# Patient Record
Sex: MALE | Race: WHITE | NOT HISPANIC OR LATINO | Employment: FULL TIME | URBAN - METROPOLITAN AREA
[De-identification: names, ages, dates, MRNs, and addresses within clinical notes are randomized per-mention and may not be internally consistent; named-entity substitution may affect disease eponyms.]

---

## 2017-01-03 DIAGNOSIS — E78.5 HYPERLIPIDEMIA, UNSPECIFIED HYPERLIPIDEMIA TYPE: ICD-10-CM

## 2017-01-03 RX ORDER — SIMVASTATIN 40 MG
40 TABLET ORAL EVERY EVENING
Qty: 90 TAB | Refills: 3 | Status: SHIPPED | OUTPATIENT
Start: 2017-01-03 | End: 2018-01-11 | Stop reason: SDUPTHER

## 2017-01-03 NOTE — TELEPHONE ENCOUNTER
Was the patient seen in the last year in this department? Yes     Does patient have an active prescription for medications requested? No     Received Request Via: Pharmacy

## 2017-02-02 RX ORDER — ASPIRIN 81 MG/1
TABLET, CHEWABLE ORAL
Qty: 100 TAB | Refills: 0 | Status: SHIPPED | OUTPATIENT
Start: 2017-02-02 | End: 2017-05-25 | Stop reason: SDUPTHER

## 2017-03-06 DIAGNOSIS — G47.00 INSOMNIA, UNSPECIFIED TYPE: ICD-10-CM

## 2017-03-07 RX ORDER — ZOLPIDEM TARTRATE 10 MG/1
TABLET ORAL
Qty: 90 TAB | Refills: 1 | Status: SHIPPED | OUTPATIENT
Start: 2017-03-07 | End: 2017-09-22 | Stop reason: SDUPTHER

## 2017-04-18 DIAGNOSIS — M25.522 ELBOW PAIN, LEFT: ICD-10-CM

## 2017-04-18 RX ORDER — DIAZEPAM 5 MG/1
5 TABLET ORAL
Qty: 3 TAB | Refills: 0 | Status: CANCELLED
Start: 2017-04-18

## 2017-04-21 ENCOUNTER — OFFICE VISIT (OUTPATIENT)
Dept: OTHER | Facility: IMAGING CENTER | Age: 62
End: 2017-04-21
Payer: COMMERCIAL

## 2017-04-21 DIAGNOSIS — M77.12 LEFT LATERAL EPICONDYLITIS: ICD-10-CM

## 2017-04-21 DIAGNOSIS — G47.00 INSOMNIA, UNSPECIFIED TYPE: ICD-10-CM

## 2017-04-21 DIAGNOSIS — M62.830 MUSCLE SPASM OF BACK: ICD-10-CM

## 2017-04-21 DIAGNOSIS — R07.89 RIGHT-SIDED CHEST WALL PAIN: ICD-10-CM

## 2017-04-21 DIAGNOSIS — M54.50 ACUTE LEFT-SIDED LOW BACK PAIN WITHOUT SCIATICA: ICD-10-CM

## 2017-04-21 PROCEDURE — 99213 OFFICE O/P EST LOW 20 MIN: CPT | Mod: 25 | Performed by: FAMILY MEDICINE

## 2017-04-21 PROCEDURE — 97813 ACUP 1/> W/ESTIM 1ST 15 MIN: CPT | Performed by: FAMILY MEDICINE

## 2017-04-21 PROCEDURE — 97811 ACUP 1/> W/O ESTIM EA ADD 15: CPT | Performed by: FAMILY MEDICINE

## 2017-04-21 RX ORDER — CYCLOBENZAPRINE HCL 5 MG
5-10 TABLET ORAL 3 TIMES DAILY PRN
Qty: 30 TAB | Refills: 1 | Status: SHIPPED | OUTPATIENT
Start: 2017-04-21 | End: 2017-07-06 | Stop reason: SDUPTHER

## 2017-04-21 NOTE — PROGRESS NOTES
[facility]upuncture Progress Note  [address] [place] [place] [address]  Dept: [phone]      Patient Name: Nick Rodriguez  YOB: 1955  Date of Service: 4/21/2017  4:09 PM    CC Left side low back pain/spasm   HPI This is a 60 year old male with left side lower back pain/spasm.   Was sitting at kitchen table and started to spasm past week or so. Left side slightly lateral, feels tight. Used to happen in past, but has not had in several years.  Was skiing before symptoms occurred and his grandchild was visiting, thus uncertain what may have provoked it.   Next week sees chiropractor and massage therapy. Also requests refill of muscle relaxant.   Last 2 days with mild headache in afternoon left lateral parietal region.   Has had pain chest on right side, felt due to his prior work out. Feels it is in the muscle. No shortness of breath or chest pressure. No radiation of symptoms.   No shortness of breath with activities.   On CPAP, usual sleep issue, wakes up at 2 am, takes something to help him sleep.  Re- aggravated his left tennis elbow symptoms with some activities previously.        ROS Season: fall  Color preference: blue  Taste preference: spicy  Born: [place], [place] 8:30 pm  , works for university. Son is in med school, recently got .   Active overall, hikes daily.      PMH Past Medical History   Diagnosis Date   • Hyperlipidemia    • Hemorrhoid    • Basal cell carcinoma      dermatology- Dr. Smith   • S/P colonoscopy with polypectomy      age 53, due age   • S/P colonoscopy 7/2013     negative, repeat in 5 years   • Sleep apnea    • Scoliosis    • Dyslipidemia    • Insomnia    • Chickenpox    • Slovenian measles    • Mumps    • Tonsillitis        Social History     Social History   • Marital Status:      Spouse Name: N/A   • Number of Children: N/A   • Years of Education: N/A     Occupational History   • ADMINISTRATION [employer]     Social History  Main Topics   • Smoking status: Never Smoker    • Smokeless tobacco: Never Used   • Alcohol Use: 0.0 oz/week     0 Standard drinks or equivalent per week      Comment: one a day   • Drug Use: No   • Sexual Activity:     Partners: Female     Other Topics Concern   • Not on file     Social History Narrative           MEDS Current Outpatient Prescriptions on File Prior to Visit   Medication Sig Dispense Refill   • zolpidem (AMBIEN) 10 MG Tab TAKE ONE TABLET BY MOUTH AT BEDTIME ASNEEDED FOR SLEEP -GENERIC FOR AMBIEN 90 Tab 1   • aspirin (ASA) 81 MG Chew Tab chewable tablet CHEW AND SWALLOW ONE TABLET BY MOUTH EVERY  Tab 0   • simvastatin (ZOCOR) 40 MG Tab Take 1 Tab by mouth every evening. 90 Tab 3   • tamsulosin (FLOMAX) 0.4 MG capsule TAKE ONE CAPSULE BY MOUTH DAILY 1/2 HOUR   AFTER BREAKFAST -GENERIC FOR FLOMAX 90 Cap 1   • diazepam (VALIUM) 5 MG Tab Take 1 Tab by mouth 1 time daily as needed for Anxiety. 3 Tab 0   • betamethasone valerate (VALISONE) 0.1 % CREA Apply 1 Application to affected area(s) 2 times a day. Apply to affected area(s) 2 times a day. 1 Tube 2   • ketoconazole (NIZORAL) 2 % CREA Apply  to affected area(s) every day. Apply to affected area(s) every day.     • fluorouracil (EFUDEX) 5 % cream Apply 1 Application to affected area(s) 2 times a day. Use 2-4 weeks as directed- apply bid. 1 Tube 0     No current facility-administered medications on file prior to visit.      ALLERGIES Allergies   Allergen Reactions   • Nkda [No Known Drug Allergy]       PE Tongue: not examined  Pulses: not examined  Right chest wall region: appears tender in right lateral lower pectoralis muscle region.   Areas of spasm appears left lateral upper lumbar region.      Assesment Eastern Qi stagnation- BL    Western 1. Acute left-sided low back pain without sciatica     2. Muscle spasm of back  cyclobenzaprine (FLEXERIL) 5 MG tablet   3. Left lateral epicondylitis     4. Insomnia, unspecified type     5.  Right-sided chest wall pain- appears due to muscle spasm. Monitor.    ER precautions reviewed.                 Plan Set 1: L- GB 39++> GB 34 b/l, BL 59++> BL 40 b/l, BL 60 b/l  Set 2: L- SJ 10 SI 7--> SI 8, SJ 6--> SJ 10  Set 3: ear-b/l garland men, R- insomnia, lumbar b/l  Set 4: GV 20, Si garland jose  Set 5: R- LR 4-6 x1A , KI 3-7 x1A, R- ling ku, da kary, vlad kary, SI 4    He will follow up as needed.     >15 min spent face to face, not including procedure.  >50% of the face to face time was spent in counseling and coordination. Topics discussed included:  Counseled on management of symptoms. Modify activities, use of splint as needed. Routine conditioning exercises.   Keep well hydrated.     Total acupuncture treatment time = 45 minutes    Eva Rand M.D.

## 2017-04-21 NOTE — MR AVS SNAPSHOT
Nick Rodriguez   2017 4:00 PM   Office Visit   MRN: [id number]    Department:  Acupuncture Med   Dept Phone:  [phone]    Description:  Male : 1955   Provider:  Eva Rand M.D.           Allergies as of 2017     Allergen Noted Reactions    Nkda [No Known Drug Allergy] 2010         You were diagnosed with     Acute left-sided low back pain without sciatica   [9339134]       Muscle spasm of back   [655617]       Left lateral epicondylitis   [112319]       Insomnia, unspecified type   [3146081]       Right-sided chest wall pain   [972612]         Vital Signs     Smoking Status                   Never Smoker            Basic Information     Date Of Birth Sex Race Ethnicity Preferred Language    1955 Male White Non- English      Problem List              ICD-10-CM Priority Class Noted - Resolved    Hyperlipidemia E78.5   10/11/2010 - Present    Insomnia G47.00   10/11/2010 - Present    Groin strain S76.219A   10/21/2011 - Present    Postnasal drip R09.82   5/3/2012 - Present    Sleep apnea G47.30   3/20/2013 - Present    Vitamin D insufficiency E55.9   2013 - Present    Right hip pain M25.551   2013 - Present    BPH (benign prostatic hypertrophy) N40.0   2015 - Present    Scoliosis M41.9   10/9/2015 - Present    Thrombocytopenia (CMS-HCC) D69.6   2015 - Present    Seasonal allergies J30.2   2016 - Present    Left forearm pain M79.632   2016 - Present    Left lateral epicondylitis M77.12   2016 - Present    Left elbow pain M25.522   2016 - Present    Preventative health care Z00.00   10/17/2016 - Present    Need for vaccination Z23   10/17/2016 - Present    Obstructive sleep apnea syndrome G47.33   10/17/2016 - Present    Pain in right shin M79.661   10/17/2016 - Present      Health Maintenance        Date Due Completion Dates    COLONOSCOPY 2018    IMM DTaP/Tdap/Td Vaccine (2 - Td) 10/16/2023 10/16/2013               Current Immunizations     Hepatitis A Vaccine, Adult 10/16/2013    Influenza TIV (IM) 10/24/2012, 10/24/2011    Influenza Vaccine Quad Inj (Preserved) 10/9/2015    SHINGLES VACCINE 10/17/2016  6:02 PM    Tdap Vaccine 10/16/2013      Below and/or attached are the medications your provider expects you to take. Review all of your home medications and newly ordered medications with your provider and/or pharmacist. Follow medication instructions as directed by your provider and/or pharmacist. Please keep your medication list with you and share with your provider. Update the information when medications are discontinued, doses are changed, or new medications (including over-the-counter products) are added; and carry medication information at all times in the event of emergency situations     Allergies:  NKDA - (reactions not documented)               Medications  Valid as of: April 21, 2017 -  5:15 PM    Generic Name Brand Name Tablet Size Instructions for use    Aspirin (Chew Tab) ASA 81 MG CHEW AND SWALLOW ONE TABLET BY MOUTH EVERY DAY        Betamethasone Valerate (Cream) VALISONE 0.1 % Apply 1 Application to affected area(s) 2 times a day. Apply to affected area(s) 2 times a day.        Cyclobenzaprine HCl (Tab) FLEXERIL 5 MG Take 1-2 Tabs by mouth 3 times a day as needed.        DiazePAM (Tab) VALIUM 5 MG Take 1 Tab by mouth 1 time daily as needed for Anxiety.        Fluorouracil (Cream) EFUDEX 5 % Apply 1 Application to affected area(s) 2 times a day. Use 2-4 weeks as directed- apply bid.        Ketoconazole (Cream) NIZORAL 2 % Apply  to affected area(s) every day. Apply to affected area(s) every day.        Simvastatin (Tab) ZOCOR 40 MG Take 1 Tab by mouth every evening.        Tamsulosin HCl (Cap) FLOMAX 0.4 MG TAKE ONE CAPSULE BY MOUTH DAILY 1/2 HOUR   AFTER BREAKFAST -GENERIC FOR FLOMAX        Zolpidem Tartrate (Tab) AMBIEN 10 MG TAKE ONE TABLET BY MOUTH AT BEDTIME ASNEEDED FOR SLEEP -GENERIC FOR AMBIEN         .                 Medicines prescribed today were sent to:     [facility] [place], [place] - [address] [facility]    [address] [facility] [place] [address]    Phone: [phone] Fax: [phone]    Open 24 Hours?: No      Medication refill instructions:       If your prescription bottle indicates you have medication refills left, it is not necessary to call your provider’s office. Please contact your pharmacy and they will refill your medication.    If your prescription bottle indicates you do not have any refills left, you may request refills at any time through one of the following ways: The online [employer] system (except Urgent Care), by calling your provider’s office, or by asking your pharmacy to contact your provider’s office with a refill request. Medication refills are processed only during regular business hours and may not be available until the next business day. Your provider may request additional information or to have a follow-up visit with you prior to refilling your medication.   *Please Note: Medication refills are assigned a new Rx number when refilled electronically. Your pharmacy may indicate that no refills were authorized even though a new prescription for the same medication is available at the pharmacy. Please request the medicine by name with the pharmacy before contacting your provider for a refill.           [employer] Access Code: Activation code not generated  Current [employer] Status: Active

## 2017-04-26 NOTE — TELEPHONE ENCOUNTER
Phone Number Called: [phone] (home)     Message: patient states this was the incorrect request.  No further action needed.    Left Message for patient to call back: no

## 2017-05-05 ENCOUNTER — OFFICE VISIT (OUTPATIENT)
Dept: OTHER | Facility: IMAGING CENTER | Age: 62
End: 2017-05-05
Payer: COMMERCIAL

## 2017-05-05 DIAGNOSIS — F43.9 STRESS: ICD-10-CM

## 2017-05-05 DIAGNOSIS — J34.89 NOSE IRRITATION: ICD-10-CM

## 2017-05-05 DIAGNOSIS — M54.50 CHRONIC BILATERAL LOW BACK PAIN WITHOUT SCIATICA: ICD-10-CM

## 2017-05-05 DIAGNOSIS — G89.29 CHRONIC BILATERAL LOW BACK PAIN WITHOUT SCIATICA: ICD-10-CM

## 2017-05-05 DIAGNOSIS — G47.33 OBSTRUCTIVE SLEEP APNEA SYNDROME: ICD-10-CM

## 2017-05-05 DIAGNOSIS — R51.9 NONINTRACTABLE EPISODIC HEADACHE, UNSPECIFIED HEADACHE TYPE: ICD-10-CM

## 2017-05-05 DIAGNOSIS — G47.00 INSOMNIA, UNSPECIFIED TYPE: ICD-10-CM

## 2017-05-05 PROCEDURE — 97811 ACUP 1/> W/O ESTIM EA ADD 15: CPT | Performed by: FAMILY MEDICINE

## 2017-05-05 PROCEDURE — 97813 ACUP 1/> W/ESTIM 1ST 15 MIN: CPT | Performed by: FAMILY MEDICINE

## 2017-05-05 PROCEDURE — 99213 OFFICE O/P EST LOW 20 MIN: CPT | Mod: 25 | Performed by: FAMILY MEDICINE

## 2017-05-05 NOTE — MR AVS SNAPSHOT
Nick Rodriguez   2017 3:00 PM   Office Visit   MRN: [id number]    Department:  Acupuncture Med   Dept Phone:  [phone]    Description:  Male : 1955   Provider:  Eva Rand M.D.           Allergies as of 2017     Allergen Noted Reactions    Nkda [No Known Drug Allergy] 2010         Vital Signs     Smoking Status                   Never Smoker            Basic Information     Date Of Birth Sex Race Ethnicity Preferred Language    1955 Male White Non- English      Your appointments     May 26, 2017  3:00 PM   ACUPUNCTURE 30 with Eva Rand M.D.   [facility] (--)    [address] [facility]  [place] [address] [phone]            Oct 05, 2017  8:00 AM   ANNUAL EXAM PREVENTATIVE with Eva Rand M.D.   [facility] [address] [place] [address]    [address] [place] [id number] [phone]              Problem List              ICD-10-CM Priority Class Noted - Resolved    Hyperlipidemia E78.5   10/11/2010 - Present    Insomnia G47.00   10/11/2010 - Present    Groin strain S76.219A   10/21/2011 - Present    Postnasal drip R09.82   5/3/2012 - Present    Sleep apnea G47.30   3/20/2013 - Present    Vitamin D insufficiency E55.9   2013 - Present    Right hip pain M25.551   2013 - Present    BPH (benign prostatic hypertrophy) N40.0   2015 - Present    Scoliosis M41.9   10/9/2015 - Present    Thrombocytopenia (CMS-HCC) D69.6   2015 - Present    Seasonal allergies J30.2   2016 - Present    Left forearm pain M79.632   2016 - Present    Left lateral epicondylitis M77.12   2016 - Present    Left elbow pain M25.522   2016 - Present    Preventative health care Z00.00   10/17/2016 - Present    Need for vaccination Z23   10/17/2016 - Present    Obstructive sleep apnea syndrome G47.33   10/17/2016 - Present    Pain in right shin M79.661   10/17/2016 - Present      Health Maintenance        Date Due Completion Dates    COLONOSCOPY 7/30/2018 7/30/2013    IMM DTaP/Tdap/Td Vaccine (2 - Td) 10/16/2023 10/16/2013            Current Immunizations     Hepatitis A Vaccine, Adult 10/16/2013    Influenza TIV (IM) 10/24/2012, 10/24/2011    Influenza Vaccine Quad Inj (Preserved) 10/9/2015    SHINGLES VACCINE 10/17/2016  6:02 PM    Tdap Vaccine 10/16/2013      Below and/or attached are the medications your provider expects you to take. Review all of your home medications and newly ordered medications with your provider and/or pharmacist. Follow medication instructions as directed by your provider and/or pharmacist. Please keep your medication list with you and share with your provider. Update the information when medications are discontinued, doses are changed, or new medications (including over-the-counter products) are added; and carry medication information at all times in the event of emergency situations     Allergies:  NKDA - (reactions not documented)               Medications  Valid as of: May 05, 2017 -  4:53 PM    Generic Name Brand Name Tablet Size Instructions for use    Aspirin (Chew Tab) ASA 81 MG CHEW AND SWALLOW ONE TABLET BY MOUTH EVERY DAY        Betamethasone Valerate (Cream) VALISONE 0.1 % Apply 1 Application to affected area(s) 2 times a day. Apply to affected area(s) 2 times a day.        Cyclobenzaprine HCl (Tab) FLEXERIL 5 MG Take 1-2 Tabs by mouth 3 times a day as needed.        DiazePAM (Tab) VALIUM 5 MG Take 1 Tab by mouth 1 time daily as needed for Anxiety.        Fluorouracil (Cream) EFUDEX 5 % Apply 1 Application to affected area(s) 2 times a day. Use 2-4 weeks as directed- apply bid.        Ketoconazole (Cream) NIZORAL 2 % Apply  to affected area(s) every day. Apply to affected area(s) every day.        Simvastatin (Tab) ZOCOR 40 MG Take 1 Tab by mouth every evening.        Tamsulosin HCl (Cap) FLOMAX 0.4 MG TAKE ONE CAPSULE BY MOUTH DAILY 1/2 HOUR   AFTER BREAKFAST -GENERIC FOR  FLOMAX        Zolpidem Tartrate (Tab) AMBIEN 10 MG TAKE ONE TABLET BY MOUTH AT BEDTIME ASNEEDED FOR SLEEP -GENERIC FOR AMBIEN        .                 Medicines prescribed today were sent to:     [facility] [place], [place] - [address] [facility]    [address] [facility] [place] [address]    Phone: [phone] Fax: [phone]    Open 24 Hours?: No      Medication refill instructions:       If your prescription bottle indicates you have medication refills left, it is not necessary to call your provider’s office. Please contact your pharmacy and they will refill your medication.    If your prescription bottle indicates you do not have any refills left, you may request refills at any time through one of the following ways: The online [employer] system (except Urgent Care), by calling your provider’s office, or by asking your pharmacy to contact your provider’s office with a refill request. Medication refills are processed only during regular business hours and may not be available until the next business day. Your provider may request additional information or to have a follow-up visit with you prior to refilling your medication.   *Please Note: Medication refills are assigned a new Rx number when refilled electronically. Your pharmacy may indicate that no refills were authorized even though a new prescription for the same medication is available at the pharmacy. Please request the medicine by name with the pharmacy before contacting your provider for a refill.           [employer] Access Code: Activation code not generated  Current [employer] Status: Active

## 2017-05-05 NOTE — PROGRESS NOTES
[facility] Acupuncture Progress Note  [address]  [place] [address]  Dept: [phone]      Patient Name: Nick Rodriguez  YOB: 1955  Date of Service: 5/5/2017  3:06 PM    CC  bilateral lower back pain and stress/insomnia    HPI This is a 60 year old male with bilateral lower back pain and stress issues.  Previous left side lower back spasm has improved but usually has his regular issues with his lower back bilaterally which is localized. He did use a muscle relaxant which helped.  States the last treatment helped to focus and relaxant. He did follow-up with a massage and chiropractic therapy.  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     Left side headache- has had 2 episodes. Feels almost like caffeine headache, region just above and laterally to outer portion of eyebrow. At worst can be 5/10. Seems to occur late afternoon and lasts the rest of the day. 2 days ago noticed symptoms, which was gone by morning. Nontender to touch of the area. Felt more inside, deep pain lateral to his eyebrow. No visual changes. Could be glasses or looking at screen. Possibly sharp. No light sensitivity to light or sound. No nausea/vomting. No throbbing. Possibly a tightness. Not sensitive to touch. No rash. Eyes are tired at times. No visual changes. Not waking up with pain. Has scheduled eye exam. Feels eye sight has gradually deteriorated overall.   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  1st time with symptoms was a month ago. Drinks his regular coffee in the morning routinely.   He does feel his sleep is off currently due to work and stressors. Seems to be waking up after an hour going to sleep. Uncertain if it's associated with stress or due to his CPAP.    Has CPAP- felt he had a cut in his nostril, then other side was sore. Improved last few days. Was sore and reddish in color. Lasted a couple of weeks.      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   Active overall, hikes daily.      PMH Past Medical History   Diagnosis Date   • Hyperlipidemia    • Hemorrhoid    • Basal cell carcinoma      dermatology- Dr. Smith   • S/P colonoscopy with polypectomy      age 53, due age   • S/P colonoscopy 7/2013     negative, repeat in 5 years   • Sleep apnea    • Scoliosis    • Dyslipidemia    • Insomnia    • Chickenpox    • Nepalese measles    • Mumps    • Tonsillitis        Social History     Social History   • Marital Status:      Spouse Name: N/A   • Number of Children: N/A   • Years of Education: N/A     Occupational History   • ADMINISTRATION [employer]     Social History Main Topics   • Smoking status: Never Smoker    • Smokeless tobacco: Never Used   • Alcohol Use: 0.0 oz/week     0 Standard drinks or equivalent per week      Comment: one a day   • Drug Use: No   • Sexual Activity:     Partners: Female     Other Topics Concern   • Not on file     Social History Narrative           MEDS Current Outpatient Prescriptions on File Prior to Visit   Medication Sig Dispense Refill   • cyclobenzaprine (FLEXERIL) 5 MG tablet Take 1-2 Tabs by mouth 3 times a day as needed. 30 Tab 1   • zolpidem (AMBIEN) 10 MG Tab TAKE ONE TABLET BY MOUTH AT BEDTIME ASNEEDED FOR SLEEP -GENERIC FOR AMBIEN 90 Tab 1   • aspirin (ASA) 81 MG Chew Tab chewable tablet CHEW AND SWALLOW ONE TABLET BY MOUTH EVERY  Tab 0   • simvastatin (ZOCOR) 40 MG Tab Take 1 Tab by mouth every evening. 90 Tab 3   • tamsulosin (FLOMAX) 0.4 MG capsule TAKE ONE CAPSULE BY MOUTH DAILY 1/2 HOUR   AFTER BREAKFAST -GENERIC FOR FLOMAX 90 Cap 1   • diazepam (VALIUM) 5 MG Tab Take 1 Tab by mouth 1 time daily as needed for Anxiety. 3 Tab 0   • betamethasone valerate (VALISONE) 0.1 % CREA Apply 1 Application to affected area(s) 2 times a day. Apply to affected  area(s) 2 times a day. 1 Tube 2   • ketoconazole (NIZORAL) 2 % CREA Apply  to affected area(s) every day. Apply to affected area(s) every day.     • fluorouracil (EFUDEX) 5 % cream Apply 1 Application to affected area(s) 2 times a day. Use 2-4 weeks as directed- apply bid. 1 Tube 0     No current facility-administered medications on file prior to visit.      ALLERGIES Allergies   Allergen Reactions   • Nkda [No Known Drug Allergy]       PE Tongue: not examined  Pulses: not examined     Assesment Eastern Qi stagnation- BL, garland imbalance    Western 1. Chronic bilateral low back pain without sciatica     2. Insomnia, unspecified type    3. Stress     4. Nonintractable episodic headache, unspecified headache type- unclear etiology, possible tension type headache, associated with stress or sleep disturbance. Has had 2 episodes lasting a few hours. Does not appear due to temporal arteritis at this time.    -Discussed possible trial of caffeine or other otc products for symptoms. Keep well hydrated. Advised ergonomics at work station. Monitor at this time. Patient obtain blood work if continued and persistent symptoms.  WESTERGREN SED RATE    CRP QUANTITIVE (NON-CARDIAC)   5. Obstructive sleep apnea syndrome- reviewed management of equipment.      6.    Nose irritation- currently appears improved.          Reviewed management of sleep apnea machine and equipment.          Use humidifier and nasal saline spray.          Follow up if any recurrent symptoms.            Plan Set 1: L- GB 39+++> GB 34 b/l, BL 59+++> BL 40 b/l, BL 60 b/l  Set 3: ear-b/l garland men, insomnia b/l, L-lumbar, tranquilizer b/l, R-BFA  Set 4: GV 20, Si garland jose, GV 24.5   Set 5: R- LR 4-6 x1A , KI 3-7 x1A  Set 6: L -TY YM 3:6   He will follow up as needed.   Otherwise recommend follow up for PCP visit if above symptoms continue or worsen.    >15 min spent face to face, not including procedure.  >50% of the face to face time was spent in counseling and  coordination. Topics discussed included:  Counseled on management of symptoms. Reviewed stress management techniques including meditation and breathing exercises in further detail. Sleep hygiene, monitoring of symptoms, and evaluation of work station.    Total acupuncture treatment time = 45 minutes    Eva Rand M.D.

## 2017-05-17 RX ORDER — TAMSULOSIN HYDROCHLORIDE 0.4 MG/1
0.4 CAPSULE ORAL DAILY
Qty: 90 CAP | Refills: 0 | Status: SHIPPED | OUTPATIENT
Start: 2017-05-17 | End: 2017-08-22 | Stop reason: SDUPTHER

## 2017-05-26 ENCOUNTER — OFFICE VISIT (OUTPATIENT)
Dept: OTHER | Facility: IMAGING CENTER | Age: 62
End: 2017-05-26
Payer: COMMERCIAL

## 2017-05-26 DIAGNOSIS — M54.50 CHRONIC BILATERAL LOW BACK PAIN WITHOUT SCIATICA: ICD-10-CM

## 2017-05-26 DIAGNOSIS — F43.9 STRESS: ICD-10-CM

## 2017-05-26 DIAGNOSIS — M77.12 LEFT LATERAL EPICONDYLITIS: ICD-10-CM

## 2017-05-26 DIAGNOSIS — H92.02 LEFT EAR PAIN: ICD-10-CM

## 2017-05-26 DIAGNOSIS — G89.29 CHRONIC BILATERAL LOW BACK PAIN WITHOUT SCIATICA: ICD-10-CM

## 2017-05-26 DIAGNOSIS — G47.00 INSOMNIA, UNSPECIFIED TYPE: ICD-10-CM

## 2017-05-26 DIAGNOSIS — R51.9 NONINTRACTABLE EPISODIC HEADACHE, UNSPECIFIED HEADACHE TYPE: ICD-10-CM

## 2017-05-26 PROCEDURE — 97811 ACUP 1/> W/O ESTIM EA ADD 15: CPT | Performed by: FAMILY MEDICINE

## 2017-05-26 PROCEDURE — 97813 ACUP 1/> W/ESTIM 1ST 15 MIN: CPT | Performed by: FAMILY MEDICINE

## 2017-05-26 PROCEDURE — 99213 OFFICE O/P EST LOW 20 MIN: CPT | Mod: 25 | Performed by: FAMILY MEDICINE

## 2017-05-26 NOTE — PROGRESS NOTES
[facility] Acupuncture Progress Note  [address]  [place] [address]  Dept: [phone]      Patient Name: Nick Rodriguez  YOB: 1955  Date of Service: 5/26/2017  3:00 PM    CC bilateral lower back pain and stress/insomnia    HPI This is a 61 year old male with bilateral lower back pain, stress/insomnia issues and continues to have intermittent left elbow pain symptoms.   Also has some left ear pain today.   Prior headache symptoms on left side resolved.   Has his regular insomnia issues, wakes up at 2 am as well as his lower back issues. Tends to have improvement after treatment until he overdoes it. Likes to exercise- goes to gym and hikes 3-4 days a week.   His elbow intermittently bothers him. Noticed when does weight activities.   Made a list- toolkit of stress management techniques as discussed at last visit.   Work-stressors are a little different since commencement is over.   Sat next to a speaker recently, thus has had some left side ear pain. Had hearing tested a couple years ago.   Feels sight graudally worse, will be getting eye sight checked. No flashes of light.   Eyes-feels looking at computer screen a little too much at work. Takes breaks regularly.   Daughter is pregnant with 2nd child now.      ROS Season: fall  Color preference: blue  Taste preference: spicy  Born: [place] 8:30 pm  , works for university. Son is in med school, recently got .   Active overall, hikes daily.      PMH Past Medical History   Diagnosis Date   • Hyperlipidemia    • Hemorrhoid    • Basal cell carcinoma      dermatology- Dr. Smith   • S/P colonoscopy with polypectomy      age 53, due age   • S/P colonoscopy 7/2013     negative, repeat in 5 years   • Sleep apnea    • Scoliosis    • Dyslipidemia    • Insomnia    • Chickenpox    • Bangladeshi measles    • Mumps    • Tonsillitis        Social History     Social History   • Marital Status:      Spouse Name: N/A   •  Number of Children: N/A   • Years of Education: N/A     Occupational History   • ADMINISTRATION [employer]     Social History Main Topics   • Smoking status: Never Smoker    • Smokeless tobacco: Never Used   • Alcohol Use: 0.0 oz/week     0 Standard drinks or equivalent per week      Comment: one a day   • Drug Use: No   • Sexual Activity:     Partners: Female     Other Topics Concern   • Not on file     Social History Narrative           MEDS Current Outpatient Prescriptions on File Prior to Visit   Medication Sig Dispense Refill   • aspirin (ASA) 81 MG Chew Tab chewable tablet CHEW AND SWALLOW 1 TABLET BY MOUTH ONCE DAILY 100 Tab 0   • tamsulosin (FLOMAX) 0.4 MG capsule Take 1 Cap by mouth every day. 90 Cap 0   • cyclobenzaprine (FLEXERIL) 5 MG tablet Take 1-2 Tabs by mouth 3 times a day as needed. 30 Tab 1   • zolpidem (AMBIEN) 10 MG Tab TAKE ONE TABLET BY MOUTH AT BEDTIME ASNEEDED FOR SLEEP -GENERIC FOR AMBIEN 90 Tab 1   • simvastatin (ZOCOR) 40 MG Tab Take 1 Tab by mouth every evening. 90 Tab 3   • diazepam (VALIUM) 5 MG Tab Take 1 Tab by mouth 1 time daily as needed for Anxiety. 3 Tab 0   • betamethasone valerate (VALISONE) 0.1 % CREA Apply 1 Application to affected area(s) 2 times a day. Apply to affected area(s) 2 times a day. 1 Tube 2   • ketoconazole (NIZORAL) 2 % CREA Apply  to affected area(s) every day. Apply to affected area(s) every day.     • fluorouracil (EFUDEX) 5 % cream Apply 1 Application to affected area(s) 2 times a day. Use 2-4 weeks as directed- apply bid. 1 Tube 0     No current facility-administered medications on file prior to visit.      ALLERGIES Allergies   Allergen Reactions   • Nkda [No Known Drug Allergy]       PE Tongue: not examined  Pulses: not examined     Assesment Eastern Qi stagnation- BL/ SJ, garland imbalance    Western 1. Chronic bilateral low back pain without sciatica- stable.      2. Left lateral epicondylitis- intermittent recurrent symptoms.      3.  Insomnia, unspecified type     4. Stress     5. Left ear pain- continue to monitor.      6. Nonintractable episodic headache, unspecified headache type- appears resolved.                  Plan Set 1: L- GB 39+++> GB 34 b/l, BL 59+++> BL 40 b/l, BL 60 b/l  Set 2: ear-b/l garland men,L- insomnia, L-lumbar, L-tranquilizer, R-BFA  Set 3: GV 20, Si garland jose, GV 24.5   Set 4: L, R- LR 4-6 x1A , KI 3-7 x1A  Set 5: L- SI 7--> SI 8, SJ 6--> SJ 10  Set 6: L- SJ 21, SI 19, GB 2   He will follow up as needed.     >15 min spent face to face, not including procedure.  >50% of the face to face time was spent in counseling and coordination. Topics discussed included:  Counseled on continued stress management of symptoms daily. Reviewed breathing exercises and recommendations. Recommend modification of routine exercise activities, including rest.   Reviewed preventative care fore left elbow symptoms. Modify activities at work, home and for routine exercise. Continue sleep hygiene.     Total acupuncture treatment time = 45 minutes    Eva Rand M.D.

## 2017-05-30 PROBLEM — M54.50 CHRONIC BILATERAL LOW BACK PAIN WITHOUT SCIATICA: Status: ACTIVE | Noted: 2017-05-30

## 2017-05-30 PROBLEM — G89.29 CHRONIC BILATERAL LOW BACK PAIN WITHOUT SCIATICA: Status: ACTIVE | Noted: 2017-05-30

## 2017-07-06 DIAGNOSIS — M62.830 MUSCLE SPASM OF BACK: ICD-10-CM

## 2017-07-07 RX ORDER — CYCLOBENZAPRINE HCL 5 MG
5-10 TABLET ORAL 3 TIMES DAILY PRN
Qty: 30 TAB | Refills: 0 | Status: SHIPPED | OUTPATIENT
Start: 2017-07-07 | End: 2018-03-07 | Stop reason: SDUPTHER

## 2017-07-18 ENCOUNTER — TELEPHONE (OUTPATIENT)
Dept: PULMONOLOGY | Facility: HOSPICE | Age: 62
End: 2017-07-18

## 2017-07-18 DIAGNOSIS — G47.33 OBSTRUCTIVE SLEEP APNEA: ICD-10-CM

## 2017-08-08 ENCOUNTER — PATIENT MESSAGE (OUTPATIENT)
Dept: OTHER | Facility: IMAGING CENTER | Age: 62
End: 2017-08-08

## 2017-08-09 ENCOUNTER — TELEPHONE (OUTPATIENT)
Dept: MEDICAL GROUP | Age: 62
End: 2017-08-09

## 2017-08-10 ENCOUNTER — OFFICE VISIT (OUTPATIENT)
Dept: MEDICAL GROUP | Age: 62
End: 2017-08-10
Payer: COMMERCIAL

## 2017-08-10 VITALS
DIASTOLIC BLOOD PRESSURE: 60 MMHG | TEMPERATURE: 97.7 F | WEIGHT: 185 LBS | HEART RATE: 80 BPM | BODY MASS INDEX: 25.9 KG/M2 | HEIGHT: 71 IN | OXYGEN SATURATION: 96 % | SYSTOLIC BLOOD PRESSURE: 108 MMHG

## 2017-08-10 DIAGNOSIS — Z13.0 SCREENING FOR DEFICIENCY ANEMIA: ICD-10-CM

## 2017-08-10 DIAGNOSIS — Z12.5 SCREENING FOR PROSTATE CANCER: ICD-10-CM

## 2017-08-10 DIAGNOSIS — Z13.1 SCREENING FOR DIABETES MELLITUS: ICD-10-CM

## 2017-08-10 DIAGNOSIS — J01.90 ACUTE NON-RECURRENT SINUSITIS, UNSPECIFIED LOCATION: ICD-10-CM

## 2017-08-10 DIAGNOSIS — E78.5 HYPERLIPIDEMIA, UNSPECIFIED HYPERLIPIDEMIA TYPE: ICD-10-CM

## 2017-08-10 DIAGNOSIS — E55.9 VITAMIN D INSUFFICIENCY: ICD-10-CM

## 2017-08-10 DIAGNOSIS — Z13.29 SCREENING FOR ENDOCRINE DISORDER: ICD-10-CM

## 2017-08-10 PROCEDURE — 99214 OFFICE O/P EST MOD 30 MIN: CPT | Performed by: FAMILY MEDICINE

## 2017-08-10 RX ORDER — AMOXICILLIN AND CLAVULANATE POTASSIUM 875; 125 MG/1; MG/1
1 TABLET, FILM COATED ORAL 2 TIMES DAILY
Qty: 20 TAB | Refills: 0 | Status: SHIPPED | OUTPATIENT
Start: 2017-08-10 | End: 2017-10-05

## 2017-08-10 ASSESSMENT — PATIENT HEALTH QUESTIONNAIRE - PHQ9: CLINICAL INTERPRETATION OF PHQ2 SCORE: 0

## 2017-08-10 NOTE — PROGRESS NOTES
SUBJECTIVE:        Chief Complaint   Patient presents with   • Cough   • Nasal Congestion   • URI       HPI:     Nick Rodriguez is a 61 y.o. male here for symptoms of cough and nasal congestion.  Symptoms started about 10 days ago. Has felt some sore throat symptoms.  Heavy congestion has improved somewhat. However, his cough has continued and last night seemed to get worse. Denies any shortness of breath or wheezing. Had one night when both sides of his jaws were hurting him. Prior to his symptoms occurring he was on an airplane recently visiting his new grandson. Cough has gotten worse since he got back into town. Feels that cough is going down into his chest. Has been taking Mucinex during the day. His appetite has been okay.    Hyperlipidemia- on simvastatin 40 mg. He will need routine labs for his future visit for his physical.   Vitamin D low on prior labs.     ROS:  Denies any recent fevers or chills. No nausea or vomiting. No diarrhea. No chest pains or shortness of breath. No lower extremity edema.    Current Outpatient Prescriptions on File Prior to Visit   Medication Sig Dispense Refill   • cyclobenzaprine (FLEXERIL) 5 MG tablet Take 1-2 Tabs by mouth 3 times a day as needed. 30 Tab 0   • aspirin (ASA) 81 MG Chew Tab chewable tablet CHEW AND SWALLOW 1 TABLET BY MOUTH ONCE DAILY 100 Tab 0   • tamsulosin (FLOMAX) 0.4 MG capsule Take 1 Cap by mouth every day. 90 Cap 0   • zolpidem (AMBIEN) 10 MG Tab TAKE ONE TABLET BY MOUTH AT BEDTIME ASNEEDED FOR SLEEP -GENERIC FOR AMBIEN 90 Tab 1   • simvastatin (ZOCOR) 40 MG Tab Take 1 Tab by mouth every evening. 90 Tab 3   • diazepam (VALIUM) 5 MG Tab Take 1 Tab by mouth 1 time daily as needed for Anxiety. 3 Tab 0   • betamethasone valerate (VALISONE) 0.1 % CREA Apply 1 Application to affected area(s) 2 times a day. Apply to affected area(s) 2 times a day. 1 Tube 2   • ketoconazole (NIZORAL) 2 % CREA Apply  to affected area(s) every day. Apply to affected area(s) every  "day.     • fluorouracil (EFUDEX) 5 % cream Apply 1 Application to affected area(s) 2 times a day. Use 2-4 weeks as directed- apply bid. 1 Tube 0     No current facility-administered medications on file prior to visit.       Allergies   Allergen Reactions   • Nkda [No Known Drug Allergy]        Patient Active Problem List    Diagnosis Date Noted   • Chronic bilateral low back pain without sciatica 05/30/2017   • Preventative health care 10/17/2016   • Need for vaccination 10/17/2016   • Obstructive sleep apnea syndrome 10/17/2016   • Pain in right shin 10/17/2016   • Seasonal allergies 05/06/2016   • Left forearm pain 05/06/2016   • Left lateral epicondylitis 05/06/2016   • Left elbow pain 05/06/2016   • Thrombocytopenia (CMS-HCC) 12/30/2015   • Scoliosis 10/09/2015   • BPH (benign prostatic hypertrophy) 02/12/2015   • Vitamin D insufficiency 11/21/2013   • Right hip pain 11/21/2013   • Sleep apnea 03/20/2013   • Postnasal drip 05/03/2012   • Groin strain 10/21/2011   • Hyperlipidemia 10/11/2010   • Insomnia 10/11/2010       Past Medical History   Diagnosis Date   • Hyperlipidemia    • Hemorrhoid    • Basal cell carcinoma      dermatology- Dr. Smith   • S/P colonoscopy with polypectomy      age 53, due age   • S/P colonoscopy 7/2013     negative, repeat in 5 years   • Sleep apnea    • Scoliosis    • Dyslipidemia    • Insomnia    • Chickenpox    • Romanian measles    • Mumps    • Tonsillitis        OBJECTIVE:   /60 mmHg  Pulse 80  Temp(Src) 36.5 °C (97.7 °F)  Ht 1.803 m (5' 11\")  Wt 83.915 kg (185 lb)  BMI 25.81 kg/m2  SpO2 96%  General: Well-developed well-nourished male, no acute distress  HEENT: oropharynx clear, eyes clear, TMs clear and intact, nasal passages with edema. Mild right maxillary sinus ttp.   Neck: supple, no lymphadenopathy- cervical or supraclavicular, no thyromegaly  Cardiovascular: regular rate and rhythm, no murmurs, gallops, rubs  Lungs: clear to auscultation bilaterally, no wheezes, " crackles, or rhonchi  Abdomen: +bowel sounds, soft, nontender, nondistended, no rebound, no guarding, no hepatosplenomegaly  Extremities: no cyanosis, clubbing, edema  Skin: Warm and dry  Psych: appropriate mood and affect      ASSESSMENT/PLAN:    61 y.o.male.     1. Acute non-recurrent sinusitis, unspecified location   -Recommend use of probiotic therapy. Reviewed possible side effects of medication therapy.  amoxicillin-clavulanate (AUGMENTIN) 875-125 MG Tab   2. Hyperlipidemia, unspecified hyperlipidemia type- stable. Continue current medication. Obtain labs.   LIPID PROFILE   3. Vitamin D insufficiency - monitor labs.  VITAMIN D,25 HYDROXY   4. Screening for diabetes mellitus  COMP METABOLIC PANEL   5. Screening for endocrine disorder  TSH   6. Screening for prostate cancer  PROSTATE SPECIFIC AG DIAGNOSTIC   7. Screening for deficiency anemia  CBC WITH DIFFERENTIAL     Rest, keep well hydrated, and discussed over-the-counter medications to use for symptom relief.  Follow up if no improvement in symptoms in 1-2 weeks.      This medical record contains text that has been entered with the assistance of computer voice recognition and dictation software.  Therefore, it may contain unintended errors in text, spelling, punctuation, or grammar.

## 2017-08-10 NOTE — MR AVS SNAPSHOT
"Nick Rodriguez   8/10/2017 3:40 PM   Office Visit   MRN: [id number]    Department:  [address] [place] [address]   Dept Phone:  [phone]    Description:  Male : 1955   Provider:  Eva Rand M.D.           Reason for Visit     Cough     Nasal Congestion     URI           Allergies as of 8/10/2017     Allergen Noted Reactions    Nkda [No Known Drug Allergy] 2010         You were diagnosed with     Acute non-recurrent sinusitis, unspecified location   [1643151]       Hyperlipidemia, unspecified hyperlipidemia type   [6062937]       Vitamin D insufficiency   [478376]       Screening for diabetes mellitus   [V77.1.ICD-9-CM]       Screening for endocrine disorder   [6188223]       Screening for prostate cancer   [916324]       Screening for deficiency anemia   [369408]         Vital Signs     Blood Pressure Pulse Temperature Height Weight Body Mass Index    108/60 mmHg 80 36.5 °C (97.7 °F) 1.803 m (5' 11\") 83.915 kg (185 lb) 25.81 kg/m2    Oxygen Saturation Smoking Status                96% Never Smoker           Basic Information     Date Of Birth Sex Race Ethnicity Preferred Language    1955 Male White Non- English      Your appointments     Oct 05, 2017  8:00 AM   ANNUAL EXAM PREVENTATIVE with Eva Rand M.D.   [address] [id number] [phone]              Problem List              ICD-10-CM Priority Class Noted - Resolved    Hyperlipidemia E78.5   10/11/2010 - Present    Insomnia G47.00   10/11/2010 - Present    Groin strain S76.219A   10/21/2011 - Present    Postnasal drip R09.82   5/3/2012 - Present    Sleep apnea G47.30   3/20/2013 - Present    Vitamin D insufficiency E55.9   2013 - Present    Right hip pain M25.551   2013 - Present    BPH (benign prostatic hypertrophy) N40.0   2015 - Present    Scoliosis M41.9   10/9/2015 - Present    Thrombocytopenia (CMS-HCC) D69.6   2015 - Present   " Seasonal allergies J30.2   5/6/2016 - Present    Left forearm pain M79.632   5/6/2016 - Present    Left lateral epicondylitis M77.12   5/6/2016 - Present    Left elbow pain M25.522   5/6/2016 - Present    Preventative health care Z00.00   10/17/2016 - Present    Need for vaccination Z23   10/17/2016 - Present    Obstructive sleep apnea syndrome G47.33   10/17/2016 - Present    Pain in right shin M79.661   10/17/2016 - Present    Chronic bilateral low back pain without sciatica M54.5, G89.29   5/30/2017 - Present      Health Maintenance        Date Due Completion Dates    IMM INFLUENZA (1) 9/1/2017 10/9/2015, 10/24/2012, 10/24/2011    COLONOSCOPY 7/30/2018 7/30/2013    IMM DTaP/Tdap/Td Vaccine (2 - Td) 10/16/2023 10/16/2013            Current Immunizations     Hepatitis A Vaccine, Adult 10/16/2013    Influenza TIV (IM) 10/24/2012, 10/24/2011    Influenza Vaccine Quad Inj (Preserved) 10/9/2015    SHINGLES VACCINE 10/17/2016  6:02 PM    Tdap Vaccine 10/16/2013      Below and/or attached are the medications your provider expects you to take. Review all of your home medications and newly ordered medications with your provider and/or pharmacist. Follow medication instructions as directed by your provider and/or pharmacist. Please keep your medication list with you and share with your provider. Update the information when medications are discontinued, doses are changed, or new medications (including over-the-counter products) are added; and carry medication information at all times in the event of emergency situations     Allergies:  NKDA - (reactions not documented)               Medications  Valid as of: August 10, 2017 -  4:27 PM    Generic Name Brand Name Tablet Size Instructions for use    Amoxicillin-Pot Clavulanate (Tab) AUGMENTIN 875-125 MG Take 1 Tab by mouth 2 times a day.        Aspirin (Chew Tab) ASA 81 MG CHEW AND SWALLOW 1 TABLET BY MOUTH ONCE DAILY        Betamethasone Valerate (Cream) VALISONE 0.1 % Apply 1  Application to affected area(s) 2 times a day. Apply to affected area(s) 2 times a day.        Cyclobenzaprine HCl (Tab) FLEXERIL 5 MG Take 1-2 Tabs by mouth 3 times a day as needed.        DiazePAM (Tab) VALIUM 5 MG Take 1 Tab by mouth 1 time daily as needed for Anxiety.        Fluorouracil (Cream) EFUDEX 5 % Apply 1 Application to affected area(s) 2 times a day. Use 2-4 weeks as directed- apply bid.        Hydrocod Polst-Chlorphen Polst (Suspension Extended Release) TUSSIONEX 10-8 MG/5ML Take 5 mL by mouth every 12 hours.        Ketoconazole (Cream) NIZORAL 2 % Apply  to affected area(s) every day. Apply to affected area(s) every day.        Simvastatin (Tab) ZOCOR 40 MG Take 1 Tab by mouth every evening.        Tamsulosin HCl (Cap) FLOMAX 0.4 MG Take 1 Cap by mouth every day.        Zolpidem Tartrate (Tab) AMBIEN 10 MG TAKE ONE TABLET BY MOUTH AT BEDTIME ASNEEDED FOR SLEEP -GENERIC FOR AMBIEN        .                 Medicines prescribed today were sent to:     [id number], [place] - [address] [id number] [facility] [address]    Phone: [phone] Fax: [phone]    Open 24 Hours?: No      Medication refill instructions:       If your prescription bottle indicates you have medication refills left, it is not necessary to call your provider’s office. Please contact your pharmacy and they will refill your medication.    If your prescription bottle indicates you do not have any refills left, you may request refills at any time through one of the following ways: The online [employer] system (except Urgent Care), by calling your provider’s office, or by asking your pharmacy to contact your provider’s office with a refill request. Medication refills are processed only during regular business hours and may not be available until the next business day. Your provider may request additional information or to have a follow-up visit with you prior to refilling your medication.   *Please Note: Medication  refills are assigned a new Rx number when refilled electronically. Your pharmacy may indicate that no refills were authorized even though a new prescription for the same medication is available at the pharmacy. Please request the medicine by name with the pharmacy before contacting your provider for a refill.        Your To Do List     Future Labs/Procedures Complete By Expires    CBC WITH DIFFERENTIAL  As directed 8/10/2018    COMP METABOLIC PANEL  As directed 2/7/2018    LIPID PROFILE  As directed 2/7/2018    PROSTATE SPECIFIC AG DIAGNOSTIC  As directed 8/10/2018    TSH  As directed 8/10/2018    VITAMIN D,25 HYDROXY  As directed 2/10/2018         [employer]hart Access Code: Activation code not generated  Current Aviir Status: Active

## 2017-08-22 RX ORDER — TAMSULOSIN HYDROCHLORIDE 0.4 MG/1
0.4 CAPSULE ORAL DAILY
Qty: 90 CAP | Refills: 0 | Status: SHIPPED | OUTPATIENT
Start: 2017-08-22 | End: 2017-11-29 | Stop reason: SDUPTHER

## 2017-08-28 RX ORDER — ALBUTEROL SULFATE 90 UG/1
2 AEROSOL, METERED RESPIRATORY (INHALATION) EVERY 6 HOURS PRN
Qty: 8.5 G | Refills: 0 | Status: SHIPPED | OUTPATIENT
Start: 2017-08-28 | End: 2021-01-14

## 2017-09-13 ENCOUNTER — HOSPITAL ENCOUNTER (OUTPATIENT)
Dept: LAB | Facility: MEDICAL CENTER | Age: 62
End: 2017-09-13
Attending: FAMILY MEDICINE
Payer: COMMERCIAL

## 2017-09-13 DIAGNOSIS — R51.9 NONINTRACTABLE EPISODIC HEADACHE, UNSPECIFIED HEADACHE TYPE: ICD-10-CM

## 2017-09-13 DIAGNOSIS — Z13.1 SCREENING FOR DIABETES MELLITUS: ICD-10-CM

## 2017-09-13 DIAGNOSIS — E78.5 HYPERLIPIDEMIA, UNSPECIFIED HYPERLIPIDEMIA TYPE: ICD-10-CM

## 2017-09-13 DIAGNOSIS — Z13.29 SCREENING FOR ENDOCRINE DISORDER: ICD-10-CM

## 2017-09-13 DIAGNOSIS — Z12.5 SCREENING FOR PROSTATE CANCER: ICD-10-CM

## 2017-09-13 DIAGNOSIS — E55.9 VITAMIN D INSUFFICIENCY: ICD-10-CM

## 2017-09-13 DIAGNOSIS — Z13.0 SCREENING FOR DEFICIENCY ANEMIA: ICD-10-CM

## 2017-09-13 LAB
25(OH)D3 SERPL-MCNC: 29 NG/ML (ref 30–100)
ALBUMIN SERPL BCP-MCNC: 4.1 G/DL (ref 3.2–4.9)
ALBUMIN/GLOB SERPL: 1.9 G/DL
ALP SERPL-CCNC: 42 U/L (ref 30–99)
ALT SERPL-CCNC: 22 U/L (ref 2–50)
ANION GAP SERPL CALC-SCNC: 4 MMOL/L (ref 0–11.9)
AST SERPL-CCNC: 28 U/L (ref 12–45)
BASOPHILS # BLD AUTO: 0.6 % (ref 0–1.8)
BASOPHILS # BLD: 0.03 K/UL (ref 0–0.12)
BILIRUB SERPL-MCNC: 0.6 MG/DL (ref 0.1–1.5)
BUN SERPL-MCNC: 17 MG/DL (ref 8–22)
CALCIUM SERPL-MCNC: 9.6 MG/DL (ref 8.5–10.5)
CHLORIDE SERPL-SCNC: 105 MMOL/L (ref 96–112)
CHOLEST SERPL-MCNC: 129 MG/DL (ref 100–199)
CO2 SERPL-SCNC: 30 MMOL/L (ref 20–33)
CREAT SERPL-MCNC: 1.11 MG/DL (ref 0.5–1.4)
CRP SERPL HS-MCNC: <0.02 MG/DL (ref 0–0.75)
EOSINOPHIL # BLD AUTO: 0.09 K/UL (ref 0–0.51)
EOSINOPHIL NFR BLD: 1.8 % (ref 0–6.9)
ERYTHROCYTE [DISTWIDTH] IN BLOOD BY AUTOMATED COUNT: 47.3 FL (ref 35.9–50)
ERYTHROCYTE [SEDIMENTATION RATE] IN BLOOD BY WESTERGREN METHOD: 5 MM/HOUR (ref 0–20)
GFR SERPL CREATININE-BSD FRML MDRD: >60 ML/MIN/1.73 M 2
GLOBULIN SER CALC-MCNC: 2.2 G/DL (ref 1.9–3.5)
GLUCOSE SERPL-MCNC: 101 MG/DL (ref 65–99)
HCT VFR BLD AUTO: 45.3 % (ref 42–52)
HDLC SERPL-MCNC: 53 MG/DL
HGB BLD-MCNC: 15.2 G/DL (ref 14–18)
IMM GRANULOCYTES # BLD AUTO: 0.01 K/UL (ref 0–0.11)
IMM GRANULOCYTES NFR BLD AUTO: 0.2 % (ref 0–0.9)
LDLC SERPL CALC-MCNC: 60 MG/DL
LYMPHOCYTES # BLD AUTO: 1.91 K/UL (ref 1–4.8)
LYMPHOCYTES NFR BLD: 38.6 % (ref 22–41)
MCH RBC QN AUTO: 33.3 PG (ref 27–33)
MCHC RBC AUTO-ENTMCNC: 33.6 G/DL (ref 33.7–35.3)
MCV RBC AUTO: 99.1 FL (ref 81.4–97.8)
MONOCYTES # BLD AUTO: 0.37 K/UL (ref 0–0.85)
MONOCYTES NFR BLD AUTO: 7.5 % (ref 0–13.4)
NEUTROPHILS # BLD AUTO: 2.54 K/UL (ref 1.82–7.42)
NEUTROPHILS NFR BLD: 51.3 % (ref 44–72)
NRBC # BLD AUTO: 0 K/UL
NRBC BLD AUTO-RTO: 0 /100 WBC
PLATELET # BLD AUTO: 153 K/UL (ref 164–446)
PMV BLD AUTO: 10 FL (ref 9–12.9)
POTASSIUM SERPL-SCNC: 4.2 MMOL/L (ref 3.6–5.5)
PROT SERPL-MCNC: 6.3 G/DL (ref 6–8.2)
PSA SERPL-MCNC: 1.67 NG/ML (ref 0–4)
RBC # BLD AUTO: 4.57 M/UL (ref 4.7–6.1)
SODIUM SERPL-SCNC: 139 MMOL/L (ref 135–145)
TRIGL SERPL-MCNC: 79 MG/DL (ref 0–149)
TSH SERPL DL<=0.005 MIU/L-ACNC: 2.59 UIU/ML (ref 0.3–3.7)
WBC # BLD AUTO: 5 K/UL (ref 4.8–10.8)

## 2017-09-13 PROCEDURE — 84153 ASSAY OF PSA TOTAL: CPT

## 2017-09-13 PROCEDURE — 80061 LIPID PANEL: CPT

## 2017-09-13 PROCEDURE — 86140 C-REACTIVE PROTEIN: CPT

## 2017-09-13 PROCEDURE — 85652 RBC SED RATE AUTOMATED: CPT

## 2017-09-13 PROCEDURE — 82306 VITAMIN D 25 HYDROXY: CPT

## 2017-09-13 PROCEDURE — 85025 COMPLETE CBC W/AUTO DIFF WBC: CPT

## 2017-09-13 PROCEDURE — 84443 ASSAY THYROID STIM HORMONE: CPT

## 2017-09-13 PROCEDURE — 80053 COMPREHEN METABOLIC PANEL: CPT

## 2017-09-13 PROCEDURE — 36415 COLL VENOUS BLD VENIPUNCTURE: CPT

## 2017-09-22 DIAGNOSIS — G47.00 INSOMNIA, UNSPECIFIED TYPE: ICD-10-CM

## 2017-09-23 RX ORDER — ZOLPIDEM TARTRATE 10 MG/1
TABLET ORAL
Qty: 90 TAB | Refills: 0 | OUTPATIENT
Start: 2017-09-23 | End: 2017-12-18 | Stop reason: SDUPTHER

## 2017-09-25 NOTE — TELEPHONE ENCOUNTER
Prescription called in to:    [id number] - [place] - [address] [facility]  [address] [facility]  [place] [address]  Phone: [phone] Fax: [phone]

## 2017-10-04 ENCOUNTER — TELEPHONE (OUTPATIENT)
Dept: MEDICAL GROUP | Age: 62
End: 2017-10-04

## 2017-10-04 NOTE — TELEPHONE ENCOUNTER
ESTABLISHED PATIENT PRE-VISIT PLANNING     Note: Patient will not be contacted if there is no indication to call.     1.  Reviewed notes from the last few office visits within the medical group: Yes    2.  If any orders were placed at last visit or intended to be done for this visit (i.e. 6 mos follow-up), do we have Results/Consult Notes?        •  Labs - Labs ordered, completed on 9/13 and results are in chart.       •  Imaging - Imaging was not ordered at last office visit.       •  Referrals - No referrals were ordered at last office visit.    3. Is this appointment scheduled as a Hospital Follow-Up? No    4.  Immunizations were updated in Epic using WebIZ?: Epic matches WebIZ       •  Web Iz Recommendations: FLU, HEPATITIS A , HEPATITIS B and TD    5.  Patient is due for the following Health Maintenance Topics:   Health Maintenance Due   Topic Date Due   • IMM INFLUENZA (1) 09/01/2017       - Patient is up-to-date on all Health Maintenance topics. No records have been requested at this time.    6.  Patient was NOT informed to arrive 15 min prior to their scheduled appointment and bring in their medication bottles.

## 2017-10-05 ENCOUNTER — OFFICE VISIT (OUTPATIENT)
Dept: MEDICAL GROUP | Age: 62
End: 2017-10-05
Payer: COMMERCIAL

## 2017-10-05 VITALS
HEART RATE: 74 BPM | DIASTOLIC BLOOD PRESSURE: 62 MMHG | OXYGEN SATURATION: 94 % | SYSTOLIC BLOOD PRESSURE: 108 MMHG | HEIGHT: 71 IN | TEMPERATURE: 96.8 F | BODY MASS INDEX: 26.04 KG/M2 | WEIGHT: 186 LBS

## 2017-10-05 DIAGNOSIS — Z23 NEED FOR VACCINATION: ICD-10-CM

## 2017-10-05 DIAGNOSIS — L57.0 AK (ACTINIC KERATOSIS): ICD-10-CM

## 2017-10-05 DIAGNOSIS — J30.1 SEASONAL ALLERGIC RHINITIS DUE TO POLLEN, UNSPECIFIED CHRONICITY: ICD-10-CM

## 2017-10-05 DIAGNOSIS — R41.3 MEMORY CHANGES: ICD-10-CM

## 2017-10-05 DIAGNOSIS — F51.01 PRIMARY INSOMNIA: ICD-10-CM

## 2017-10-05 DIAGNOSIS — G47.33 OBSTRUCTIVE SLEEP APNEA SYNDROME: ICD-10-CM

## 2017-10-05 DIAGNOSIS — E55.9 VITAMIN D INSUFFICIENCY: ICD-10-CM

## 2017-10-05 DIAGNOSIS — R20.0 NUMBNESS AND TINGLING IN BOTH HANDS: ICD-10-CM

## 2017-10-05 DIAGNOSIS — M41.9 SCOLIOSIS, UNSPECIFIED SCOLIOSIS TYPE, UNSPECIFIED SPINAL REGION: ICD-10-CM

## 2017-10-05 DIAGNOSIS — M54.50 CHRONIC BILATERAL LOW BACK PAIN WITHOUT SCIATICA: ICD-10-CM

## 2017-10-05 DIAGNOSIS — L98.9 SKIN LESIONS: ICD-10-CM

## 2017-10-05 DIAGNOSIS — N40.1 BENIGN PROSTATIC HYPERPLASIA WITH LOWER URINARY TRACT SYMPTOMS, SYMPTOM DETAILS UNSPECIFIED: ICD-10-CM

## 2017-10-05 DIAGNOSIS — Z00.00 WELL ADULT EXAM: ICD-10-CM

## 2017-10-05 DIAGNOSIS — R97.20 RISING PSA LEVEL: ICD-10-CM

## 2017-10-05 DIAGNOSIS — R20.2 NUMBNESS AND TINGLING IN BOTH HANDS: ICD-10-CM

## 2017-10-05 DIAGNOSIS — E78.00 PURE HYPERCHOLESTEROLEMIA: ICD-10-CM

## 2017-10-05 DIAGNOSIS — G89.29 CHRONIC BILATERAL LOW BACK PAIN WITHOUT SCIATICA: ICD-10-CM

## 2017-10-05 PROCEDURE — 90471 IMMUNIZATION ADMIN: CPT | Performed by: FAMILY MEDICINE

## 2017-10-05 PROCEDURE — 99396 PREV VISIT EST AGE 40-64: CPT | Mod: 25 | Performed by: FAMILY MEDICINE

## 2017-10-05 PROCEDURE — 90686 IIV4 VACC NO PRSV 0.5 ML IM: CPT | Performed by: FAMILY MEDICINE

## 2017-10-05 NOTE — PROGRESS NOTES
Chief Complaint   Patient presents with   • Annual Exam         <SUBJECTIVE>    Nick Rodriguez  is a 62 y.o. male for routine annual evaluation.    Hyperlipidemia- labs have been stable on simvastatin 40 mg daily. Has tolerated medication well. No significant or unusual muscle aches.     Sleep apnea-on CPAP therapy, stable. Seems fine for 3 hours a night. Had some irritation with CPAP, but is better. Does not feel refreshed in morning. Has appointment with pulmonary in December this year. Wakes up around 12-2 am. Then takes ambien, was taking 2 tabs in the past. Sleeps on back.     Hands- 4th and 5th finger gets numb bilaterally when sleeping. No weakness.     Insomnia- wakes up after about 3-4 hours of sleep. No issues with going to sleep. Goes to bed around 10 pm. No coffee past noon. Down to 3 cups a day. Limits alcohol. No sodas, not much sugar in diet.    Stress discussed previously. When wakes up speaker with talk radio which is on a timer.  90% of the time takes ambien.  Has benefited from acupuncture therapy in the past for sleep.    Last month had some jaw tightness on both sides. Goes to dentist regularly.     States he feels it takes a little longer to remember lately. Names of people or places usually. Has noticed more so within last year. Does not feel sleep is worse in last year. Family hx-negative for dementia. Mother will be turning 91 yo soon and doing well.  Father due to lifestyle passed age 70's, father had cva with surgery/anesthesia and then developed dementia. Brother inoperable brain tumor for 10 years. Mother lives in [facility], but has more needs lately. Sister is the main care taker currently.  He otherwise has his regular stressors.     BPH -has been stable on flomax therapy.   Friend had a prostate scare, thus patient concerned.   Appears to be slight rise in PSA level compared to last year.     Skin lesions-  uses effudex as needed.  Sees dermatology, Dr. Smith- next month, has had a  right side head bump for 6 months.     Scoliosis- L5 pain- same, no significant changes. Has not had massage or acupuncture in months. Can be painful at times with yardwork. Takes flexeril as needed, no issue son medication.   Left foot sometimes feels swollen on plantar side at night time, but no actual swelling.     Allergies- sometimes eye symptoms, congestion and drainage, but taking local honey helps.     Vitamin D levels slightly low.     Health Maintenance:  Diet: overall healthy diet currently. No soda. Does not eat fast food. Occasional Panda.   Calcium: no supplement therapy  Exercise: regular, better than before. Dog is 12 years old.   Immunizations: Tdap- 2013     ; Shingles vaccine-  2016        ; pneumonia vaccine- reviewed when due       ; influenza -due  Colonoscopy: 7/2013  PSA:    Ref. Range 9/29/2015 07:05 10/13/2015 12:00 10/12/2016 07:33 9/13/2017 07:25   Prostatic Specific Antigen Tot Latest Ref Range: 0.00 - 4.00 ng/mL 1.01  0.93 1.67       Current Outpatient Prescriptions on File Prior to Visit   Medication Sig Dispense Refill   • zolpidem (AMBIEN) 10 MG Tab TAKE ONE TABLET BY MOUTH AT BEDTIME ASNEEDED FOR SLEEP -GENERIC FOR AMBIEN 90 Tab 0   • tamsulosin (FLOMAX) 0.4 MG capsule Take 1 Cap by mouth every day. 90 Cap 0   • cyclobenzaprine (FLEXERIL) 5 MG tablet Take 1-2 Tabs by mouth 3 times a day as needed. 30 Tab 0   • aspirin (ASA) 81 MG Chew Tab chewable tablet CHEW AND SWALLOW 1 TABLET BY MOUTH ONCE DAILY 100 Tab 0   • simvastatin (ZOCOR) 40 MG Tab Take 1 Tab by mouth every evening. 90 Tab 3   • fluorouracil (EFUDEX) 5 % cream Apply 1 Application to affected area(s) 2 times a day. Use 2-4 weeks as directed- apply bid. 1 Tube 0   • albuterol 108 (90 Base) MCG/ACT Aero Soln inhalation aerosol Inhale 2 Puffs by mouth every 6 hours as needed for Shortness of Breath. 8.5 g 0   • betamethasone valerate (VALISONE) 0.1 % CREA Apply 1 Application to affected area(s) 2 times a day. Apply to  affected area(s) 2 times a day. 1 Tube 2   • ketoconazole (NIZORAL) 2 % CREA Apply  to affected area(s) every day. Apply to affected area(s) every day.       No current facility-administered medications on file prior to visit.        Allergies   Allergen Reactions   • Nkda [No Known Drug Allergy]        Past Medical History:   Diagnosis Date   • Basal cell carcinoma     dermatology- Dr. Smith   • Chickenpox    • Dyslipidemia    • [place] measles    • Groin strain 10/21/2011     ICD-10 transition   • Hemorrhoid    • Hyperlipidemia    • Insomnia    • Mumps    • Right hip pain 11/21/2013   • S/P colonoscopy 7/2013    negative, repeat in 5 years   • S/P colonoscopy with polypectomy     age 53, due age   • Scoliosis    • Sleep apnea    • Tonsillitis        Past Surgical History:   Procedure Laterality Date   • BLEPHAROPLASTY  11/14/2012    Performed by Florentin Naylor M.D. 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   HENT: Negative for congestion.    Eyes: Negative for pain. No visual changes.   Respiratory: Negative for cough and  "shortness of breath.    Cardiovascular: Negative for leg swelling.   Gastrointestinal: Negative for nausea, vomiting, abdominal pain and diarrhea. No black or bloody stool.   Genitourinary: Negative for dysuria and hematuria.   Skin: Negative for rash.   Neurological: Negative for dizziness, focal weakness and headaches.   Endo/Heme/Allergies: Does not bruise/bleed easily.   Psychiatric/Behavioral: Negative for depression.  The patient is not nervous/anxious.        <OBJECTIVE>  /62   Pulse 74   Temp 36 °C (96.8 °F)   Ht 1.803 m (5' 11\")   Wt 84.4 kg (186 lb)   SpO2 94%   BMI 25.94 kg/m²    General: Well-developed well-nourished male, A&O x 4  HEAD AND NECK:  Ears normal.  Throat, oral cavity and tongue normal.  Neck supple. No adenopathy or masses in the neck or supraclavicular regions.  No carotid bruits. No thyromegaly.  NEURO: Cranial nerves are normal. Neck supple. DTR's normal and symmetric.  CHEST:  Clear, good air entry, no wheezes, rhonchi or rales.  HEART:  S1 and S2 normal, no murmurs, clicks, gallops or rubs. Regular rate and rhythm.  No edema.  ABDOMEN:  Soft without tenderness, guarding, mass or organomegaly.  No CVA tenderness or inguinal adenopathy.  EXTREMITIES:  Extremities, reflexes and peripheral pulses are normal. Full strength throughout. 2+ DTR throughout.   SKIN:  No rashes. Right scalp hairline ~6 mm verrucous lesion.  Left side and right side temple small slightly rough scaly lesion with underlying pink. Also right ear tragus region and left arm large rough elevated growth.   Rectal Exam: right side anal skin tab, otherwise normal sphincter tone, no masses. Stool guaiac negative. Prostate exam- nontender, slightly enlarged, no nodules, smooth prostate.       ASSESSMENT/PLAN:    62 year old male.   1. Well adult exam   -Discussed healthy diet and routine exercise.      2. Pure hypercholesterolemia -controlled, continue current medication. Low cholesterol, high fiber diet.      3. " Obstructive sleep apnea syndrome- stable. Continue CPAP. Recommend follow up with pulmonary.      4. Primary insomnia- may have component related to GISSEL and stress.      5. Memory changes- minimal, per patient seems to take slightly longer to recall names, etc. Otherwise does not appear to have any significant changes in memory noted. Insomnia may also contribute to symptoms.   Will continue to monitor at this time.     6. Numbness and tingling in both hands- suspect due to cubital tunnel etiology.   -Recommend changes in sleep position and routine conditioning exercises. Monitor. Follow up if no further improvements.      7. Benign prostatic hyperplasia with lower urinary tract symptoms, symptom details unspecified - stable. Continue current medication.     8. Rising PSA level - will plan to recheck PSA levels.  PROSTATE SPECIFIC AG DIAGNOSTIC   9. Skin lesions- right scalp region appears possible wart. Patient has dermatology appointment.     10. AK (actinic keratosis)- effudex as needed. He has dermatology appointment.      11. Scoliosis, unspecified scoliosis type, unspecified spinal region - stable. See below.  DX-LUMBAR SPINE-2 OR 3 VIEWS   12. Chronic bilateral low back pain without sciatica- stable. Modify activities. Medication as needed. Recommend xray if any further changes.   DX-LUMBAR SPINE-2 OR 3 VIEWS   13. Seasonal allergic rhinitis due to pollen, unspecified chronicity - stable, continue current therapy.     14. Vitamin D insufficiency- recommend vitamin D 3, 3859-9791 IU daily. Plan to recheck labs in future.      15. Need for vaccination  INFLUENZA VACCINE QUAD INJ >3Y(PF)   Discussed potential alternative/holistic therapies.     Return in about 6 months (around 4/5/2018).  Recommend routine annual exam.

## 2017-11-14 PROBLEM — E78.00 PURE HYPERCHOLESTEROLEMIA: Status: ACTIVE | Noted: 2017-11-14

## 2017-12-07 ENCOUNTER — SLEEP CENTER VISIT (OUTPATIENT)
Dept: SLEEP MEDICINE | Facility: MEDICAL CENTER | Age: 62
End: 2017-12-07
Payer: COMMERCIAL

## 2017-12-07 VITALS
HEIGHT: 71 IN | WEIGHT: 181 LBS | RESPIRATION RATE: 16 BRPM | BODY MASS INDEX: 25.34 KG/M2 | TEMPERATURE: 97.8 F | DIASTOLIC BLOOD PRESSURE: 72 MMHG | SYSTOLIC BLOOD PRESSURE: 124 MMHG | OXYGEN SATURATION: 95 % | HEART RATE: 63 BPM

## 2017-12-07 DIAGNOSIS — G47.33 OBSTRUCTIVE SLEEP APNEA SYNDROME: ICD-10-CM

## 2017-12-07 DIAGNOSIS — F51.01 PRIMARY INSOMNIA: ICD-10-CM

## 2017-12-07 DIAGNOSIS — E78.00 PURE HYPERCHOLESTEROLEMIA: ICD-10-CM

## 2017-12-07 DIAGNOSIS — N40.1 BENIGN PROSTATIC HYPERPLASIA WITH LOWER URINARY TRACT SYMPTOMS, SYMPTOM DETAILS UNSPECIFIED: ICD-10-CM

## 2017-12-07 PROCEDURE — 99214 OFFICE O/P EST MOD 30 MIN: CPT | Performed by: INTERNAL MEDICINE

## 2017-12-07 RX ORDER — IMIQUIMOD 12.5 MG/.25G
CREAM TOPICAL
COMMUNITY
Start: 2017-10-25 | End: 2021-01-14

## 2017-12-07 RX ORDER — INGENOL MEBUTATE 150 UG/G
GEL TOPICAL
COMMUNITY
Start: 2017-10-25 | End: 2021-01-14

## 2017-12-07 NOTE — PROGRESS NOTES
CC: He returns to review his clinical status and his positive airway pressure compliance.    HPI:  62-year-old man returns for reevaluation of moderately severe obstructive sleep apnea syndrome. Patient's original study was done in [place] in 2012 and showed an RDI of 28.5, a supine RDI 48.5, and a REM RDI of 52.3 low saturation of 75%. He had moderate snoring at that time. He was placed on CPAP at 9.5 cm. He was last seen in October 2016 and at that time his residual AHI was 2.6. His average usage days used was 7 hours and 13 minutes and his uses have been for 90% of the days.    He is never felt much benefit symptomatically and persists with having some insomnia. Wakes up daily at 2AM most days..    Today his compliance from 11 72 12/6/2017 was reviewed. His usage has been 100% of the time for an average of 6 hours and 49 minutes with a residual AHI of 2.4 on CPAP at 9.5 cm. His average time in large leak per day is only for seconds.          Patient Active Problem List    Diagnosis Date Noted   • Pure hypercholesterolemia 11/14/2017   • Chronic bilateral low back pain without sciatica 05/30/2017   • Obstructive sleep apnea syndrome 10/17/2016   • Pain in right shin 10/17/2016   • Seasonal allergies 05/06/2016   • Left forearm pain 05/06/2016   • Left lateral epicondylitis 05/06/2016   • Left elbow pain 05/06/2016   • Thrombocytopenia (CMS-[facility]) 12/30/2015   • Scoliosis 10/09/2015   • Benign prostatic hyperplasia with lower urinary tract symptoms 02/12/2015   • Vitamin D insufficiency 11/21/2013   • Postnasal drip 05/03/2012   • Primary insomnia 10/11/2010       Past Medical History:   Diagnosis Date   • Basal cell carcinoma     dermatology- Dr. Smith   • Chickenpox    • Dyslipidemia    • [place] measles    • Groin strain 10/21/2011     ICD-10 transition   • Hemorrhoid    • Hyperlipidemia    • Insomnia    • Mumps    • Right hip pain 11/21/2013   • S/P colonoscopy 7/2013    negative, repeat in 5 years   • S/P colonoscopy  with polypectomy     age 53, due age   • Scoliosis    • Sleep apnea    • Tonsillitis         Past Surgical History:   Procedure Laterality Date   • BLEPHAROPLASTY  11/14/2012    Performed by Florentin Naylor M.D. 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 90 Cap 0   • zolpidem (AMBIEN) 10 MG Tab TAKE ONE TABLET BY MOUTH AT BEDTIME ASNEEDED FOR SLEEP -GENERIC FOR AMBIEN 90 Tab 0   • cyclobenzaprine (FLEXERIL) 5 MG tablet Take 1-2 Tabs by mouth 3 times a day as needed. 30 Tab 0   • aspirin (ASA) 81 MG Chew Tab chewable tablet CHEW AND SWALLOW 1 TABLET BY MOUTH ONCE DAILY 100 Tab 0   • simvastatin (ZOCOR) 40 MG Tab Take 1 Tab by mouth every evening. 90 Tab 3   • ketoconazole (NIZORAL) 2 % CREA Apply  to affected area(s) every day. Apply to affected area(s) every day.     • fluorouracil (EFUDEX) 5 % cream Apply 1 Application to affected area(s) 2 times a day. Use 2-4 weeks as directed- apply bid. 1 Tube 0   • imiquimod (ALDARA) 5 % cream      • PICATO 0.015 % Gel      • albuterol  "108 (90 Base) MCG/ACT Aero Soln inhalation aerosol Inhale 2 Puffs by mouth every 6 hours as needed for Shortness of Breath. 8.5 g 0   • betamethasone valerate (VALISONE) 0.1 % CREA Apply 1 Application to affected area(s) 2 times a day. Apply to affected area(s) 2 times a day. 1 Tube 2     No current facility-administered medications for this visit.     \"CURRENT RX\"    ALLERGIES: Patient has no known allergies.    ROS  Constitutional: Denies fever, chills, sweats, fatigue, weight loss.   Eyes: Denies glasses, vision loss, pain, drainage, double vision.   Ears/Nose/Mouth/Throat: Denies earache, difficulty hearing, ringing/buzzing in ears, rhinitis/nasal congestion, injury, recurrent sore throat, persistent hoarseness, decayed teeth/toothaches.   Cardiovascular: Denies chest pain, tightness, palpitations, swelling in legs/feet, fainting, difficulty breathing when lying down but gets better when sitting up.   Respiratory: Denies shortness of breath, cough, sputum, wheezing, painful breathing, coughing up blood.   Sleep: He continues with daytime tiredness and some insomnia.  Gastrointestinal: Denies heartburn, difficulty swallowing, nausea, abdominal pain, diarrhea, constipation.   Genitourinary: Denies frequent urination, painful urination, blood in urine, discharge.   Musculoskeletal: Denies painful joints, sore muscles, back pain.   Integumentary: Denies rashes, lumps, color changes.   Neurological: Denies frequent headaches, dizziness, weakness    PHYSICAL EXAM    /72   Pulse 63   Temp 36.6 °C (97.8 °F)   Resp 16   Ht 1.803 m (5' 11\")   Wt 82.1 kg (181 lb)   SpO2 95%   BMI 25.24 kg/m²   Appearance: Well-nourished, well-developed, no acute distress  Eyes:  PERRLA, EOMI  Hearing:  Grossly intact  Nose:  Normal, no lesions or deformities, turbinates moist  Oropharynx:  Tongue normal, posterior pharynx without erythema or exudate  Mallampati classification:  3  Neck: Supple, trachea midline, no " masses  Respiratory effort:  No intercostal retractions or use of accessory muscles  Lung auscultation:  No wheezes rhonchi rubs or rales  Cardiac auscultation:  No murmurs, rubs, or gallops, no regular rhythm, normal rate  Abdomen:  No tenderness, no organomegaly  Extremities:  No cyanosis, clubbing, edema  Gait and Station:  Normal  Digits and nails: No clubbing, cyanosis, petechiae, or nodes  Musculoskeletal:  Grossly normal  Skin:  No rashes  Orientation:  Oriented time, place, and person  Mood and affect:  No depression, anxiety, agitation  Judgment:  Intact    PROBLEMS:    1. Obstructive sleep apnea syndrome  2. Primary insomnia  3. Benign prostatic hyperplasia with lower urinary tract symptoms, symptom details unspecified  4. Pure hypercholesterolemia        PLAN:   Has been advised to continue the current CPAP 9.5 cm H2O therapy, clean equipment frequently, and get new mask and supplies as allowed by insurance and DME. Advised about potential problems including nasal obstruction/stuffiness, mask leak or discomfort , frequent awakenings, chill or dryness of the upper airway, noise, inconvenience, and lack of benefit. Recommend an earlier appointment, if significant treatment barriers develop.    Return in about 1 year (around 12/7/2018).      Will refer to Linda Brown for CBT-I.

## 2017-12-18 DIAGNOSIS — G47.00 INSOMNIA, UNSPECIFIED TYPE: ICD-10-CM

## 2017-12-18 RX ORDER — ZOLPIDEM TARTRATE 10 MG/1
TABLET ORAL
Qty: 90 TAB | Refills: 0 | OUTPATIENT
Start: 2017-12-18 | End: 2018-03-06 | Stop reason: SDUPTHER

## 2017-12-19 NOTE — TELEPHONE ENCOUNTER
Prescription called in to:    [id number] - [place] - [id number] [address]  [address] [address]  [place] [address]  Phone: [phone] Fax: [phone]  .

## 2018-01-11 DIAGNOSIS — E78.5 HYPERLIPIDEMIA, UNSPECIFIED HYPERLIPIDEMIA TYPE: ICD-10-CM

## 2018-01-12 RX ORDER — SIMVASTATIN 40 MG
40 TABLET ORAL EVERY EVENING
Qty: 90 TAB | Refills: 0 | Status: SHIPPED | OUTPATIENT
Start: 2018-01-12 | End: 2018-04-17 | Stop reason: SDUPTHER

## 2018-02-09 ENCOUNTER — TELEPHONE (OUTPATIENT)
Dept: PULMONOLOGY | Facility: HOSPICE | Age: 63
End: 2018-02-09

## 2018-02-09 DIAGNOSIS — G47.33 OBSTRUCTIVE SLEEP APNEA: ICD-10-CM

## 2018-03-06 DIAGNOSIS — G47.00 INSOMNIA, UNSPECIFIED TYPE: ICD-10-CM

## 2018-03-07 DIAGNOSIS — M62.830 MUSCLE SPASM OF BACK: ICD-10-CM

## 2018-03-07 RX ORDER — ZOLPIDEM TARTRATE 10 MG/1
TABLET ORAL
Qty: 90 TAB | Refills: 0 | Status: SHIPPED
Start: 2018-03-07 | End: 2018-06-04

## 2018-03-07 RX ORDER — TAMSULOSIN HYDROCHLORIDE 0.4 MG/1
0.4 CAPSULE ORAL DAILY
Qty: 90 CAP | Refills: 0 | Status: SHIPPED | OUTPATIENT
Start: 2018-03-07 | End: 2018-07-16 | Stop reason: SDUPTHER

## 2018-03-07 RX ORDER — CYCLOBENZAPRINE HCL 5 MG
5-10 TABLET ORAL 3 TIMES DAILY PRN
Qty: 30 TAB | Refills: 0 | Status: SHIPPED | OUTPATIENT
Start: 2018-03-07 | End: 2018-08-22 | Stop reason: SDUPTHER

## 2018-04-10 ENCOUNTER — TELEPHONE (OUTPATIENT)
Dept: PULMONOLOGY | Facility: HOSPICE | Age: 63
End: 2018-04-10

## 2018-04-10 DIAGNOSIS — G47.33 OBSTRUCTIVE SLEEP APNEA: ICD-10-CM

## 2018-04-17 DIAGNOSIS — E78.5 HYPERLIPIDEMIA, UNSPECIFIED HYPERLIPIDEMIA TYPE: ICD-10-CM

## 2018-04-18 RX ORDER — SIMVASTATIN 40 MG
40 TABLET ORAL EVERY EVENING
Qty: 90 TAB | Refills: 0 | Status: SHIPPED | OUTPATIENT
Start: 2018-04-18 | End: 2018-07-20 | Stop reason: SDUPTHER

## 2018-04-23 DIAGNOSIS — G47.33 OSA (OBSTRUCTIVE SLEEP APNEA): ICD-10-CM

## 2018-04-26 ENCOUNTER — OFFICE VISIT (OUTPATIENT)
Dept: OTHER | Facility: IMAGING CENTER | Age: 63
End: 2018-04-26
Payer: COMMERCIAL

## 2018-04-26 VITALS
HEIGHT: 71 IN | HEART RATE: 62 BPM | WEIGHT: 181 LBS | RESPIRATION RATE: 14 BRPM | DIASTOLIC BLOOD PRESSURE: 62 MMHG | SYSTOLIC BLOOD PRESSURE: 118 MMHG | TEMPERATURE: 98.7 F | OXYGEN SATURATION: 95 % | BODY MASS INDEX: 25.34 KG/M2

## 2018-04-26 DIAGNOSIS — M41.9 SCOLIOSIS, UNSPECIFIED SCOLIOSIS TYPE, UNSPECIFIED SPINAL REGION: ICD-10-CM

## 2018-04-26 DIAGNOSIS — Z13.0 SCREENING, DEFICIENCY ANEMIA, IRON: ICD-10-CM

## 2018-04-26 DIAGNOSIS — E78.00 PURE HYPERCHOLESTEROLEMIA: ICD-10-CM

## 2018-04-26 DIAGNOSIS — L98.9 SKIN LESIONS: ICD-10-CM

## 2018-04-26 DIAGNOSIS — E55.9 VITAMIN D INSUFFICIENCY: ICD-10-CM

## 2018-04-26 DIAGNOSIS — G89.29 CHRONIC BILATERAL LOW BACK PAIN WITHOUT SCIATICA: ICD-10-CM

## 2018-04-26 DIAGNOSIS — F43.9 STRESS: ICD-10-CM

## 2018-04-26 DIAGNOSIS — Z12.5 SCREENING FOR PROSTATE CANCER: ICD-10-CM

## 2018-04-26 DIAGNOSIS — J30.89 ENVIRONMENTAL AND SEASONAL ALLERGIES: ICD-10-CM

## 2018-04-26 DIAGNOSIS — M25.551 RIGHT HIP PAIN: ICD-10-CM

## 2018-04-26 DIAGNOSIS — M54.50 CHRONIC BILATERAL LOW BACK PAIN WITHOUT SCIATICA: ICD-10-CM

## 2018-04-26 DIAGNOSIS — F51.01 PRIMARY INSOMNIA: ICD-10-CM

## 2018-04-26 DIAGNOSIS — R21 RASH AND NONSPECIFIC SKIN ERUPTION: ICD-10-CM

## 2018-04-26 DIAGNOSIS — R41.3 MEMORY CHANGES: ICD-10-CM

## 2018-04-26 PROCEDURE — 99215 OFFICE O/P EST HI 40 MIN: CPT | Performed by: FAMILY MEDICINE

## 2018-04-26 RX ORDER — ZOLPIDEM TARTRATE 12.5 MG/1
12.5 TABLET, FILM COATED, EXTENDED RELEASE ORAL NIGHTLY PRN
Qty: 30 TAB | Refills: 0 | Status: SHIPPED | OUTPATIENT
Start: 2018-04-26 | End: 2018-07-06 | Stop reason: SDUPTHER

## 2018-04-26 RX ORDER — CETIRIZINE HYDROCHLORIDE 10 MG/1
10 TABLET ORAL DAILY
COMMUNITY
End: 2021-08-27

## 2018-04-26 RX ORDER — CYANOCOBALAMIN (VITAMIN B-12) 500 MCG
LOZENGE ORAL
COMMUNITY

## 2018-04-26 RX ORDER — KETOCONAZOLE 20 MG/G
1 CREAM TOPICAL DAILY
Qty: 1 TUBE | Refills: 0 | Status: SHIPPED | OUTPATIENT
Start: 2018-04-26 | End: 2021-03-18

## 2018-04-26 RX ORDER — CHLORAL HYDRATE 500 MG
1000 CAPSULE ORAL
COMMUNITY

## 2018-04-26 ASSESSMENT — PAIN SCALES - GENERAL: PAINLEVEL: NO PAIN

## 2018-04-26 NOTE — PROGRESS NOTES
SUBJECTIVE:        Chief Complaint   Patient presents with   • Other     spots on skin behind both knees   • Hip Pain     right hip pain after working out   • Allergic Rhinitis   • Insomnia       HPI:     Nick Rodriguez is a 62 y.o. male here for follow up of insomnia, skin lesions and chronic right hip issue.     Insomnia-states he falls asleep quickly, wakes up and then takes ambien 5-10 mg and will sleep for about 3-4 hours. Takes it nightly. Used to be mostly 1/2 tab previously. With ambien within 1/2 hour, he can fall asleep.   Drinks 3 cups of coffee in the morning but stops at noon.   One day had ice tea in the afternoon and it affected him. Likes craft beers- some have coffee and chocolate which could affect his sleep.  Spoke with pulmonologist due to his sleep apnea and was suggested to see a sleep doctor for further evaluation.     Skin issues- sometimes middle of his toes get athlete's foot.  Sometimes little bumps on the back side of his foot/ankle area. Has seen dermatology, Dr. Smith, and ketoconazole cream helps. Sees Dr. Smith regularly. Will make appointment for dermatology soon.   Had a gentile which he shaved off. At jaw line was itchy. Used just face cream which helped. Improved within past week.   Left popliteal area was itchy, was a dot in the area which looked like a wart. Used betamethasone cream which seemed to help.     Right hip pain- has had same issue for many years. Right anterior region muscles.   Getting out of bed in morning will notice symptoms. Will start walking and maybe feels he is starting to fall. No fall noted.  Feels in the anterior hip region inferior to iliac crest. Injured in past, feels tore a tendon or muscle in the past about 2-3 years ago. Has had issues since then.   Has chronic lower back issues, he has scoliosis 40%, see chiropractor.     Allergies-takes zyrtec to night time. 11 am would get very congested. Rhinorrhea. Seems to be better taking medication at night.  Seasonally worse.     Memory-possibly feels slightly more issues, feels it takes a little longer to recall names. Has sleep issues as above. Has had several stressors with aging mother who recently had a fall and is now in assisted living. Also in process of renovating his home which causes other financial stressors.     Notes his prior issues with his tennis elbow and the numbness/tingling he was feeling in his hands are better.     Will be due for lab work for lipids and vitamin D in future.       ROS:  Denies any recent fevers or chills. No nausea or vomiting. No diarrhea. No chest pains or shortness of breath. No lower extremity edema.    Current Outpatient Prescriptions on File Prior to Visit   Medication Sig Dispense Refill   • simvastatin (ZOCOR) 40 MG Tab Take 1 Tab by mouth every evening. 90 Tab 0   • zolpidem (AMBIEN) 10 MG Tab TAKE ONE TABLET BY MOUTH AT BEDTIME ASNEEDED FOR SLEEP -GENERIC FOR AMBIEN 90 Tab 0   • tamsulosin (FLOMAX) 0.4 MG capsule Take 1 Cap by mouth every day. 90 Cap 0   • imiquimod (ALDARA) 5 % cream      • PICATO 0.015 % Gel      • aspirin (ASA) 81 MG Chew Tab chewable tablet CHEW AND SWALLOW 1 TABLET BY MOUTH ONCE DAILY 100 Tab 0   • betamethasone valerate (VALISONE) 0.1 % CREA Apply 1 Application to affected area(s) 2 times a day. Apply to affected area(s) 2 times a day. 1 Tube 2   • cyclobenzaprine (FLEXERIL) 5 MG tablet Take 1-2 Tabs by mouth 3 times a day as needed. 30 Tab 0   • albuterol 108 (90 Base) MCG/ACT Aero Soln inhalation aerosol Inhale 2 Puffs by mouth every 6 hours as needed for Shortness of Breath. 8.5 g 0   • fluorouracil (EFUDEX) 5 % cream Apply 1 Application to affected area(s) 2 times a day. Use 2-4 weeks as directed- apply bid. 1 Tube 0     No current facility-administered medications on file prior to visit.        No Known Allergies    Patient Active Problem List    Diagnosis Date Noted   • Memory changes 04/29/2018   • Pure hypercholesterolemia 11/14/2017   •  "Chronic bilateral low back pain without sciatica 05/30/2017   • Obstructive sleep apnea syndrome 10/17/2016   • Pain in right shin 10/17/2016   • Seasonal allergies 05/06/2016   • Left forearm pain 05/06/2016   • Left lateral epicondylitis 05/06/2016   • Left elbow pain 05/06/2016   • Thrombocytopenia (HCC) 12/30/2015   • Scoliosis 10/09/2015   • Benign prostatic hyperplasia with lower urinary tract symptoms 02/12/2015   • Vitamin D insufficiency 11/21/2013   • Postnasal drip 05/03/2012   • Primary insomnia 10/11/2010       Past Medical History:   Diagnosis Date   • Basal cell carcinoma     dermatology- Dr. Smith   • Chickenpox    • Dyslipidemia    • Bengali measles    • Groin strain 10/21/2011     ICD-10 transition   • Hemorrhoid    • Hyperlipidemia    • Insomnia    • Mumps    • Right hip pain 11/21/2013   • S/P colonoscopy 7/2013    negative, repeat in 5 years   • S/P colonoscopy with polypectomy     age 53, due age   • Scoliosis    • Sleep apnea    • Tonsillitis          OBJECTIVE:   /62   Pulse 62   Temp 37.1 °C (98.7 °F)   Resp 14   Ht 1.803 m (5' 11\")   Wt 82.1 kg (181 lb)   SpO2 95%   BMI 25.24 kg/m²   General: Well-developed well-nourished male, no acute distress  Neck: supple, no lymphadenopathy- cervical or supraclavicular, no thyromegaly  Cardiovascular: regular rate and rhythm, no murmurs, gallops, rubs  Lungs: clear to auscultation bilaterally, no wheezes, crackles, or rhonchi  Abdomen: +bowel sounds, soft, nontender, nondistended, no rebound, no guarding, no hepatosplenomegaly  Extremities: no cyanosis, clubbing, edema. Right hip- mild TTP of hip flexor insertion site noted, overall good ROM and strength, no pain with axial load and internal rotation, no masses noted.   Skin: Warm and dry. Lower extremities with multiple ~4-6 mm slightly dry hyperkerotic rough skin lesion noted of posterior lower leg/ankle/foot areas. Left posterior calf ~ 1 cm slightly elevated pink plaque noted, appears " dry.  Psych: appropriate mood and affect  Back: no spinal ttp, scoliosis noted, left side muscle hypertrophy.       ASSESSMENT/PLAN:    62 y.o.male with chronic insomnia, sleep apnea and hyperlipidemia.     1. Primary insomnia-chronic issue, multifactorial.   -Sleep hygiene reviewed.   -Discussed options of therapy. Consider supplement therapy with magnesium or melantonin. Discussed management of stressors. Recommend follow up with appointment with sleep specialist.   -Will give trial on ambien CR. recommend use as needed. Reviewed precautions and potential side effects of medication therapy.   zolpidem (AMBIEN CR) 12.5 MG CR tablet   2. Skin lesions- multiple hyperkeratotic lesion of lower extremities, appears benign. Consider cryotherapy.   Left posterior leg lesion- unclear etiology, currently appears benign.   Continue to monitor skin lesions.    He will plan to see dermatology. Reviewed appropriate use of topical therapies.     3. Rash and nonspecific skin eruption- medication refill to use as needed.   ketoconazole (NIZORAL) 2 % Cream    CBC WITH DIFFERENTIAL   4. Right hip pain- appears within hip flexors, related to prior injury.   -Discussed consideration of further evaluation. Consider xray if continued symptoms. Consider physical therapy. Recommend routine conditioning exercises and modification of activities.   CBC WITH DIFFERENTIAL    DX-HIP-UNILATERAL-WITH PELVIS-1 VIEW RIGHT   5. Environmental and seasonal allergies - stable, continue current medication. May consider use of neti pot.      6. Chronic bilateral low back pain without sciatica-stable. Consider xray if continued right hip issues or any further changes.   DX-LUMBAR SPINE-2 OR 3 VIEWS   7. Scoliosis, unspecified scoliosis type, unspecified spinal region  DX-LUMBAR SPINE-2 OR 3 VIEWS   8. Memory changes- appears intermittent, mild and multifactorial. May be related to sleep issues and additional stressors.      9. Stress  -Counseled on  management of stressors. Consider counseling therapy and other resources to consider.       10. Vitamin D insufficiency   -Recheck labs in future.  VITAMIN D,25 HYDROXY   11. Pure hypercholesterolemia - stable, continue current medication. Recheck labs in future.  COMP METABOLIC PANEL    TSH WITH REFLEX TO FT4    LIPID PROFILE   12. Screening for prostate cancer  PROSTATE SPECIFIC AG SCREENING   13. Screening, deficiency anemia, iron  CBC WITH DIFFERENTIAL     Return in about 6 months (around 10/26/2018).  Follow up sooner if any worsening symptoms or no further improvements.     This medical record contains text that has been entered with the assistance of computer voice recognition and dictation software.  Therefore, it may contain unintended errors in text, spelling, punctuation, or grammar.    > 60 minutes face to face time spent with this patient of which > 30  minutes spent on counseling and coordination of care as above, excluding any time for procedures.

## 2018-04-27 ENCOUNTER — TELEPHONE (OUTPATIENT)
Dept: OTHER | Facility: IMAGING CENTER | Age: 63
End: 2018-04-27

## 2018-04-29 PROBLEM — R41.3 MEMORY CHANGES: Status: ACTIVE | Noted: 2018-04-29

## 2018-07-16 RX ORDER — TAMSULOSIN HYDROCHLORIDE 0.4 MG/1
0.4 CAPSULE ORAL DAILY
Qty: 90 CAP | Refills: 0 | Status: SHIPPED | OUTPATIENT
Start: 2018-07-16 | End: 2018-10-22 | Stop reason: SDUPTHER

## 2018-07-16 NOTE — TELEPHONE ENCOUNTER
From: Nick Rodriguez  Sent: 7/16/2018 11:31 AM PDT  Subject: Medication Renewal Request    Kamran Rodriguez would like a refill of the following medications:     tamsulosin (FLOMAX) 0.4 MG capsule [Eva Rand M.D.]    Preferred pharmacy: [id number] - [place], [place] - [id number]

## 2018-07-20 DIAGNOSIS — E78.5 HYPERLIPIDEMIA, UNSPECIFIED HYPERLIPIDEMIA TYPE: ICD-10-CM

## 2018-07-20 RX ORDER — SIMVASTATIN 40 MG
40 TABLET ORAL EVERY EVENING
Qty: 90 TAB | Refills: 0 | Status: SHIPPED | OUTPATIENT
Start: 2018-07-20 | End: 2018-10-22 | Stop reason: SDUPTHER

## 2018-08-22 ENCOUNTER — OFFICE VISIT (OUTPATIENT)
Dept: OTHER | Facility: IMAGING CENTER | Age: 63
End: 2018-08-22
Payer: COMMERCIAL

## 2018-08-22 VITALS
SYSTOLIC BLOOD PRESSURE: 104 MMHG | DIASTOLIC BLOOD PRESSURE: 64 MMHG | RESPIRATION RATE: 12 BRPM | OXYGEN SATURATION: 94 % | HEIGHT: 71 IN | TEMPERATURE: 98.2 F | WEIGHT: 179.9 LBS | HEART RATE: 60 BPM | BODY MASS INDEX: 25.19 KG/M2

## 2018-08-22 DIAGNOSIS — R10.31 RIGHT GROIN PAIN: ICD-10-CM

## 2018-08-22 DIAGNOSIS — G47.33 OBSTRUCTIVE SLEEP APNEA SYNDROME: ICD-10-CM

## 2018-08-22 DIAGNOSIS — J34.89 NASAL SORE: ICD-10-CM

## 2018-08-22 DIAGNOSIS — F43.9 STRESS: ICD-10-CM

## 2018-08-22 DIAGNOSIS — M62.830 MUSCLE SPASM OF BACK: ICD-10-CM

## 2018-08-22 DIAGNOSIS — F51.04 CHRONIC INSOMNIA: ICD-10-CM

## 2018-08-22 PROCEDURE — 99215 OFFICE O/P EST HI 40 MIN: CPT | Performed by: FAMILY MEDICINE

## 2018-08-22 RX ORDER — ZOLPIDEM TARTRATE 12.5 MG/1
12.5 TABLET, FILM COATED, EXTENDED RELEASE ORAL NIGHTLY PRN
COMMUNITY
End: 2018-11-14 | Stop reason: SDUPTHER

## 2018-08-22 RX ORDER — CYCLOBENZAPRINE HCL 5 MG
5-10 TABLET ORAL 3 TIMES DAILY PRN
Qty: 30 TAB | Refills: 0 | Status: SHIPPED | OUTPATIENT
Start: 2018-08-22 | End: 2018-09-10 | Stop reason: SDUPTHER

## 2018-08-22 ASSESSMENT — PAIN SCALES - GENERAL: PAINLEVEL: 6=MODERATE PAIN

## 2018-08-22 ASSESSMENT — PATIENT HEALTH QUESTIONNAIRE - PHQ9: CLINICAL INTERPRETATION OF PHQ2 SCORE: 0

## 2018-08-22 NOTE — PROGRESS NOTES
SUBJECTIVE:        Chief Complaint   Patient presents with   • Follow-Up   • Insomnia     some improvement with ambien and sleep therapist, but this week has been bad    • Hip Pain     right groin/hip pain, post 2 hernia operations   • Other     sore inside left nostril, worse with CPAP, present before in the spring but has returned over the last week        HPI:     Nick Rodriguez is a 62 y.o. Male with GISSEL on CPAP and chronic insomnia here for issues with his right groin/hip region.   Has had prior inguinal hernia operation and was concerned he may have done something to the area.   He was doing yard work, bending and moving plants and noticed pain later that night in the right groin area on Saturday about 4 days ago. Also had some right side lower back muscle pain which he may get on occasion. States the pain improved yesterday. Currently pain is at 6/10. He stopped taking medications for the pain, but did previously take a tramadol which helped.   Has not noticed a bulge in the right groin area. Hurts with sitting or moving in a certain way but is unable to actively illicit the pain.   Bowel habits are a little unusual, went 2 times in afternoon. Diet is the same.   No incontinence of bowel or bladder, no lower extremity weakness/tingling or numbness.   Has scoliosis- usually manages with massage and chiropractor.     Insomnia- chronic. Has been on ambien CR 12.5 mg and is working with a sleep therapist, Linda. Sees her once a month.   Has gets about 6 hours of sleep with medication. Notes he does not necessarily feel the medication kick in after he takes it.   Trying not to go to bed before 11 pm if he is not tired.     He has had stress with taking care of his mother with dementia 90. She recently moved into assisted living facility.    Mother in law who is 94 also has dementia. He is aware of his stressors- exercises and feels he should meditate to manage his stressors.      Left side of nose with a  sore on the inside medially for past week. He is on CPAP mask for. Has a piece that goes into his nose. Has sore which seems to have come back. 2 nights ago was worse, last night was better. Has used a new CPAP attachmenet.     ROS:  Denies any recent fevers or chills. No nausea or vomiting. No diarrhea. No chest pains or shortness of breath. No lower extremity edema.    Current Outpatient Prescriptions on File Prior to Visit   Medication Sig Dispense Refill   • simvastatin (ZOCOR) 40 MG Tab Take 1 Tab by mouth every evening. 90 Tab 0   • tamsulosin (FLOMAX) 0.4 MG capsule Take 1 Cap by mouth every day. 90 Cap 0   • ketoconazole (NIZORAL) 2 % Cream Apply 1 Application to affected area(s) every day. Apply to affected area(s) every day. 1 Tube 0   • Multiple Vitamins-Minerals (MULTIVITAMIN ADULTS PO) Take  by mouth.     • Ascorbic Acid (VITAMIN C PO) Take 1,000 mg by mouth.     • Cholecalciferol (VITAMIN D PO) Take  by mouth.     • Vitamin E 400 UNIT Tab Take  by mouth.     • Omega-3 Fatty Acids (FISH OIL) 1000 MG Cap capsule Take 1,000 mg by mouth 3 times a day, with meals.     • GLUCOSAMINE CHONDROITIN COMPLX PO Take  by mouth.     • cetirizine (ZYRTEC) 10 MG Tab Take 10 mg by mouth every day.     • aspirin (ASA) 81 MG Chew Tab chewable tablet CHEW AND SWALLOW 1 TABLET BY MOUTH ONCE DAILY 100 Tab 0   • betamethasone valerate (VALISONE) 0.1 % CREA Apply 1 Application to affected area(s) 2 times a day. Apply to affected area(s) 2 times a day. 1 Tube 2   • imiquimod (ALDARA) 5 % cream      • PICATO 0.015 % Gel      • albuterol 108 (90 Base) MCG/ACT Aero Soln inhalation aerosol Inhale 2 Puffs by mouth every 6 hours as needed for Shortness of Breath. 8.5 g 0   • fluorouracil (EFUDEX) 5 % cream Apply 1 Application to affected area(s) 2 times a day. Use 2-4 weeks as directed- apply bid. 1 Tube 0     No current facility-administered medications on file prior to visit.        No Known Allergies    Patient Active Problem List  "   Diagnosis Date Noted   • Chronic insomnia 08/23/2018   • Memory changes 04/29/2018   • Pure hypercholesterolemia 11/14/2017   • Chronic bilateral low back pain without sciatica 05/30/2017   • Obstructive sleep apnea syndrome 10/17/2016   • Pain in right shin 10/17/2016   • Seasonal allergies 05/06/2016   • Left forearm pain 05/06/2016   • Left lateral epicondylitis 05/06/2016   • Left elbow pain 05/06/2016   • Thrombocytopenia (HCC) 12/30/2015   • Scoliosis 10/09/2015   • Benign prostatic hyperplasia with lower urinary tract symptoms 02/12/2015   • Vitamin D insufficiency 11/21/2013   • Postnasal drip 05/03/2012   • Primary insomnia 10/11/2010       Past Medical History:   Diagnosis Date   • Basal cell carcinoma     dermatology- Dr. Smith   • Chickenpox    • Dyslipidemia    • Faroese measles    • Groin strain 10/21/2011     ICD-10 transition   • Hemorrhoid    • Hyperlipidemia    • Insomnia    • Mumps    • Right hip pain 11/21/2013   • S/P colonoscopy 7/2013    negative, repeat in 5 years   • S/P colonoscopy with polypectomy     age 53, due age   • Scoliosis    • Sleep apnea    • Tonsillitis              OBJECTIVE:   /64   Pulse 60   Temp 36.8 °C (98.2 °F)   Resp 12   Ht 1.803 m (5' 11\")   Wt 81.6 kg (179 lb 14.3 oz)   SpO2 94%   BMI 25.09 kg/m²   General: Well-developed well-nourished male, no acute distress  Nose: left side distal region of nasal septum with some edema and erythema. No abnormal scabbing or drainage noted at this time.   Neck: supple, no lymphadenopathy- cervical or supraclavicular, no thyromegaly  Cardiovascular: regular rate and rhythm, no murmurs, gallops, rubs  Lungs: clear to auscultation bilaterally, no wheezes, crackles, or rhonchi  Abdomen: +bowel sounds, soft, nontender, nondistended, no rebound, no guarding, no hepatosplenomegaly  Back: no spinal TTP, mild paraspinal TTP of lower lumbar region  Extremities: no cyanosis, clubbing, edema. Right hip/groin: well healed incision " scar, region of mild TTP, no obvious bulging masses noted of area at rest or with valsalva. Right hip: full ROM, negative hip impingement, no pain with axial load or IR, normal strength.   Skin: Warm and dry  Psych: appropriate mood and affect      ASSESSMENT/PLAN:    62 y.o.male with hx of right inguinal hernia repair, scoliosis,  intermittent chronic low back issues,  GISSEL on CPAP and chronic insomnia.     1. Right groin pain- recent symptoms after yard work. No obvious findings on exam. Has had some improvement recently, but some continued symptoms.   -Discussed obtain further imaging if no further improvements in symptoms.   US-INGUINAL HERNIA    DX-HIP-UNILATERAL-WITH PELVIS-1 VIEW RIGHT   2. Muscle spasm of back - stable.   -Flexeril as needed.  cyclobenzaprine (FLEXERIL) 5 MG tablet   3. Chronic insomnia- stable.   -He will continue to work with sleep therapist. Continue to work on possible weaning of medication therapy. Discussed sleep hygiene, stress management, healthy nutrition, routine exercise and consider supplement therapy.     4. Stress   -Counseled on management of stressors. Discussed importance of self care and setting boundaries.     5. Nasal sore- may be related to use of CPAP device and nose piece.   -Topical therapy discussed, recommend consider changes in CPAP supplies and follow up with pulmonary. If no further improvements, consider referral to ENT.  mupirocin (BACTROBAN) 2 % Ointment   6. Obstructive sleep apnea syndrome- stable.  As above.       Return in about 2 months (around 10/22/2018). Follow up as scheduled for well exam.     This medical record contains text that has been entered with the assistance of computer voice recognition and dictation software.  Therefore, it may contain unintended errors in text, spelling, punctuation, or grammar.    > 40 minutes face to face time spent with this patient of which > 30  minutes spent on counseling and coordination of care as above, excluding  any time for procedures.

## 2018-08-23 PROBLEM — F51.04 CHRONIC INSOMNIA: Status: ACTIVE | Noted: 2018-08-23

## 2018-09-10 ENCOUNTER — TELEPHONE (OUTPATIENT)
Dept: OTHER | Facility: IMAGING CENTER | Age: 63
End: 2018-09-10

## 2018-09-10 DIAGNOSIS — M62.830 MUSCLE SPASM OF BACK: ICD-10-CM

## 2018-09-11 RX ORDER — CYCLOBENZAPRINE HCL 5 MG
5-10 TABLET ORAL 3 TIMES DAILY PRN
Qty: 30 TAB | Refills: 0 | Status: SHIPPED | OUTPATIENT
Start: 2018-09-11 | End: 2019-06-24 | Stop reason: SDUPTHER

## 2018-09-12 NOTE — PROGRESS NOTES
CC: Follow up for GISSEL on CPAP    HPI:  Mr. Nick Rodriguez is a 62-year-old man whose original sleep study was performed in [facility] in 2012 and showed an RDI of 28.5, supine RDI 48.5, and a REM RDI of 52.3 with a tanisha saturation of 75%.  Moderate snoring was identified.  The patient t subsequently had an overnight oximetry on Pap which showed that saturations were less than 90% only rarely and no changes were needed.    The patient was last seen on 4/23/2018.  His 30 day compliance was 100% for 6 hours and 49 minutes with a residual apnea hypopnea index of 2.4 on CPAP of 9.5.  He did not have any significant leaks.    He unfortunately forgot his chip today but is not having any problems except with his mask provoking and nasal sore he would like to try a different mask and we will give him a mask today. Nasal lesion is not healing and waking him up. PCP provided ointment which is better.    Is seeing Dr. Brown and trying to go to bed later. Still taking nightly ambien.    Had colonoscopy done yesterday with polyp removal.      Patient Active Problem List    Diagnosis Date Noted   • Chronic insomnia 08/23/2018   • Memory changes 04/29/2018   • Pure hypercholesterolemia 11/14/2017   • Chronic bilateral low back pain without sciatica 05/30/2017   • Obstructive sleep apnea syndrome 10/17/2016   • Pain in right shin 10/17/2016   • Seasonal allergies 05/06/2016   • Left forearm pain 05/06/2016   • Left lateral epicondylitis 05/06/2016   • Left elbow pain 05/06/2016   • Thrombocytopenia (HCC) 12/30/2015   • Scoliosis 10/09/2015   • Benign prostatic hyperplasia with lower urinary tract symptoms 02/12/2015   • Vitamin D insufficiency 11/21/2013   • Postnasal drip 05/03/2012   • Primary insomnia 10/11/2010       Past Medical History:   Diagnosis Date   • Basal cell carcinoma     dermatology- Dr. Smith   • Chickenpox    • Dyslipidemia    • [place] measles    • Groin strain 10/21/2011     ICD-10 transition   •  Hemorrhoid    • Hyperlipidemia    • Insomnia    • Mumps    • Right hip pain 11/21/2013   • S/P colonoscopy 7/2013    negative, repeat in 5 years   • S/P colonoscopy with polypectomy     age 53, due age   • Scoliosis    • Sleep apnea    • Tonsillitis         Past Surgical History:   Procedure Laterality Date   • BLEPHAROPLASTY  11/14/2012    Performed by Florentin Naylor M.D. at [facility]   • INGUINAL HERNIA REPAIR  3/17/2009    Performed by SUDHA BARRETT at [facility]   • TONSILLECTOMY         Family History   Problem Relation Age of Onset   • Alcohol/Drug Father         alcohol   • Alcohol/Drug Brother         alcohol   • Sleep Apnea Brother    • Heart Disease Neg Hx    • Cancer Neg Hx        Social History     Social History   • Marital status:      Spouse name: N/A   • Number of children: N/A   • Years of education: N/A     Occupational History   • ADMINISTRATION [employer]     Social History Main Topics   • Smoking status: Never Smoker   • Smokeless tobacco: Never Used   • Alcohol use 4.2 oz/week     4 Glasses of wine, 3 Cans of beer per week      Comment: one a day   • Drug use: No   • Sexual activity: Yes     Partners: Female     Other Topics Concern   • Not on file     Social History Narrative            Current Outpatient Prescriptions   Medication Sig Dispense Refill   • zolpidem (AMBIEN CR) 12.5 MG CR tablet Take 12.5 mg by mouth at bedtime as needed for Sleep.     • simvastatin (ZOCOR) 40 MG Tab Take 1 Tab by mouth every evening. 90 Tab 0   • tamsulosin (FLOMAX) 0.4 MG capsule Take 1 Cap by mouth every day. 90 Cap 0   • ketoconazole (NIZORAL) 2 % Cream Apply 1 Application to affected area(s) every day. Apply to affected area(s) every day. 1 Tube 0   • Multiple Vitamins-Minerals (MULTIVITAMIN ADULTS PO) Take  by mouth.     • Ascorbic Acid (VITAMIN C PO) Take 1,000 mg by mouth.     • Cholecalciferol (VITAMIN D PO) Take  by mouth.     • Vitamin  "E 400 UNIT Tab Take  by mouth.     • Omega-3 Fatty Acids (FISH OIL) 1000 MG Cap capsule Take 1,000 mg by mouth 3 times a day, with meals.     • GLUCOSAMINE CHONDROITIN COMPLX PO Take  by mouth.     • cetirizine (ZYRTEC) 10 MG Tab Take 10 mg by mouth every day.     • aspirin (ASA) 81 MG Chew Tab chewable tablet CHEW AND SWALLOW 1 TABLET BY MOUTH ONCE DAILY 100 Tab 0   • betamethasone valerate (VALISONE) 0.1 % CREA Apply 1 Application to affected area(s) 2 times a day. Apply to affected area(s) 2 times a day. 1 Tube 2   • cyclobenzaprine (FLEXERIL) 5 MG tablet Take 1-2 Tabs by mouth 3 times a day as needed. 30 Tab 0   • imiquimod (ALDARA) 5 % cream      • PICATO 0.015 % Gel      • albuterol 108 (90 Base) MCG/ACT Aero Soln inhalation aerosol Inhale 2 Puffs by mouth every 6 hours as needed for Shortness of Breath. 8.5 g 0   • fluorouracil (EFUDEX) 5 % cream Apply 1 Application to affected area(s) 2 times a day. Use 2-4 weeks as directed- apply bid. 1 Tube 0     No current facility-administered medications for this visit.     \"CURRENT RX\"    ALLERGIES: Patient has no known allergies.    ROS      Constitutional: Denies fever, chills, sweats,  weight loss, fatigue  Cardiovascular: Denies chest pain, tightness, palpitations, swelling in legs/feet, fainting, difficulty breathing when lying down but gets better when sitting up.   Respiratory: Denies shortness of breath, cough, sputum, wheezing, painful breathing, coughing up blood.   Sleep: per HPI  Gastrointestinal: Denies  difficulty swallowing, nausea, abdominal pain, diarrhea, constipation, heartburn..   Musculoskeletal: Denies painful joints, sore muscles, back pain.        PHYSICAL EXAM  Nonobese    /70   Pulse 62   Resp 16   Ht 1.803 m (5' 11\")   Wt 80.7 kg (178 lb)   SpO2 95%   BMI 24.83 kg/m²   Appearance: Well-nourished, well-developed, no acute distress  Eyes:  PERRLA, EOMI  ENMT: without lesions, deformities;hearing grossly intact; tongue normal, " posterior pharynx without erythema or exudate; Mallampati classification:   Neck: Supple, trachea midline, no masses  Respiratory effort:  No intercostal retractions or use of accessory muscles  Lung auscultation:  No wheezes rhonchi rubs or rales  Cardiac: No murmurs, rubs, or gallops; regular rhythm, normal rate; no edema  Abdomen:  No tenderness, no organomegaly  Musculoskeletal:  Grossly normal; gait and station normal; digits and nails normal  Skin:  No rashes, petechiae, cyanosis  Neurologic: without focal signs; oriented to person, time, place, and purpose; judgement intact  Psychiatric:  No depression, anxiety, agitation        PROBLEMS:    1. Obstructive sleep apnea syndrome      2. Benign prostatic hyperplasia with lower urinary tract symptoms, symptom details unspecified      3. Pure hypercholesterolemia      4. Non-smoker      5. Body mass index is 24.83 kg/m².       6. Insomnia              PLAN:     Return in about 6 months (around 3/13/2019).    Has been advised to continue the current CPAP 9.5 cm H2O, clean equipment frequently, and get new mask and supplies as allowed by insurance and DME. Advised about potential problems including nasal obstruction/stuffiness, mask leak or discomfort , frequent awakenings, chill or dryness of the upper airway, noise, inconvenience, and lack of benefit. Recommend an earlier appointment, if significant treatment barriers develop.    We will have the tech providing with mask fit today in order new mask through his DME. She has let him try some masks. He will call or come by to report on efficacy.    He will bring his chip in later.

## 2018-09-13 ENCOUNTER — SLEEP CENTER VISIT (OUTPATIENT)
Dept: SLEEP MEDICINE | Facility: MEDICAL CENTER | Age: 63
End: 2018-09-13
Payer: COMMERCIAL

## 2018-09-13 VITALS
OXYGEN SATURATION: 95 % | RESPIRATION RATE: 16 BRPM | HEART RATE: 62 BPM | WEIGHT: 178 LBS | HEIGHT: 71 IN | SYSTOLIC BLOOD PRESSURE: 108 MMHG | BODY MASS INDEX: 24.92 KG/M2 | DIASTOLIC BLOOD PRESSURE: 70 MMHG

## 2018-09-13 DIAGNOSIS — E78.00 PURE HYPERCHOLESTEROLEMIA: ICD-10-CM

## 2018-09-13 DIAGNOSIS — G47.33 OBSTRUCTIVE SLEEP APNEA SYNDROME: ICD-10-CM

## 2018-09-13 DIAGNOSIS — Z78.9 NON-SMOKER: ICD-10-CM

## 2018-09-13 DIAGNOSIS — N40.1 BENIGN PROSTATIC HYPERPLASIA WITH LOWER URINARY TRACT SYMPTOMS, SYMPTOM DETAILS UNSPECIFIED: ICD-10-CM

## 2018-09-13 PROCEDURE — 99214 OFFICE O/P EST MOD 30 MIN: CPT | Performed by: INTERNAL MEDICINE

## 2018-09-18 ENCOUNTER — TELEPHONE (OUTPATIENT)
Dept: PULMONOLOGY | Facility: HOSPICE | Age: 63
End: 2018-09-18

## 2018-09-18 DIAGNOSIS — G47.33 OBSTRUCTIVE SLEEP APNEA: ICD-10-CM

## 2018-09-19 ENCOUNTER — OFFICE VISIT (OUTPATIENT)
Dept: OTHER | Facility: IMAGING CENTER | Age: 63
End: 2018-09-19
Payer: COMMERCIAL

## 2018-09-19 VITALS
OXYGEN SATURATION: 95 % | DIASTOLIC BLOOD PRESSURE: 68 MMHG | HEIGHT: 71 IN | SYSTOLIC BLOOD PRESSURE: 98 MMHG | RESPIRATION RATE: 12 BRPM | BODY MASS INDEX: 25.03 KG/M2 | HEART RATE: 68 BPM | WEIGHT: 178.79 LBS | TEMPERATURE: 98.4 F

## 2018-09-19 DIAGNOSIS — R42 DIZZINESS: ICD-10-CM

## 2018-09-19 DIAGNOSIS — J30.1 SEASONAL ALLERGIC RHINITIS DUE TO POLLEN: ICD-10-CM

## 2018-09-19 PROCEDURE — 99214 OFFICE O/P EST MOD 30 MIN: CPT | Performed by: FAMILY MEDICINE

## 2018-09-19 RX ORDER — FLUTICASONE PROPIONATE 50 MCG
2 SPRAY, SUSPENSION (ML) NASAL DAILY
Qty: 16 G | Refills: 3 | Status: SHIPPED | OUTPATIENT
Start: 2018-09-19 | End: 2019-06-24 | Stop reason: SDUPTHER

## 2018-09-19 ASSESSMENT — PAIN SCALES - GENERAL: PAINLEVEL: NO PAIN

## 2018-09-19 NOTE — PROGRESS NOTES
"SUBJECTIVE:      Chief Complaint   Patient presents with   • Dizziness     had dizzy episdoe about 10 days ago, not sure if it was vertigo, midly dizzy this morning       HPI:     Nick Rodriguez is a 62 y.o. male here for symptoms of dizziness which started about 10 days ago.  States that after waking up he noted some vertigo symptoms \" like the room was spinning\" as he was prepping for his colonoscopy.  States he was pulling back on his supplements at that time.  Initial symptoms started when he was standing in doing something and noticed some spinning sensation for about 30 seconds.  Since then his symptoms have repeated intermittently several times.  He notices symptoms when he lies back as he gets into bed or sometimes getting up.  He does not have any active vertigo symptoms at this time.  He might also notice a very mild lightheadedness when he gets up. However, states that this has been going on intermittently previously. Feels he needs to be cautious about getting up.   States the day before his symptoms started the wind blew the door and the door knob his his upper back and pushed him forward a bit.   He has allergies and has noticed them this season. Has used flonase for allergies previously. No ear pain currently.   States he has been doing some head exercises to help with the dizziness that his wife showed him.   He was concerned as he will be leaving [place].       ROS:  Denies any recent fevers or chills. No nausea or vomiting. No diarrhea. No chest pains or shortness of breath. No lower extremity edema. No palpitations.     Current Outpatient Prescriptions on File Prior to Visit   Medication Sig Dispense Refill   • cyclobenzaprine (FLEXERIL) 5 MG tablet Take 1-2 Tabs by mouth 3 times a day as needed. 30 Tab 0   • zolpidem (AMBIEN CR) 12.5 MG CR tablet Take 12.5 mg by mouth at bedtime as needed for Sleep.     • simvastatin (ZOCOR) 40 MG Tab Take 1 Tab by " mouth every evening. 90 Tab 0   • tamsulosin (FLOMAX) 0.4 MG capsule Take 1 Cap by mouth every day. 90 Cap 0   • Multiple Vitamins-Minerals (MULTIVITAMIN ADULTS PO) Take  by mouth.     • Ascorbic Acid (VITAMIN C PO) Take 1,000 mg by mouth.     • Cholecalciferol (VITAMIN D PO) Take  by mouth.     • Vitamin E 400 UNIT Tab Take  by mouth.     • Omega-3 Fatty Acids (FISH OIL) 1000 MG Cap capsule Take 1,000 mg by mouth 3 times a day, with meals.     • GLUCOSAMINE CHONDROITIN COMPLX PO Take  by mouth.     • cetirizine (ZYRTEC) 10 MG Tab Take 10 mg by mouth every day.     • imiquimod (ALDARA) 5 % cream      • PICATO 0.015 % Gel      • ketoconazole (NIZORAL) 2 % Cream Apply 1 Application to affected area(s) every day. Apply to affected area(s) every day. 1 Tube 0   • albuterol 108 (90 Base) MCG/ACT Aero Soln inhalation aerosol Inhale 2 Puffs by mouth every 6 hours as needed for Shortness of Breath. 8.5 g 0   • aspirin (ASA) 81 MG Chew Tab chewable tablet CHEW AND SWALLOW 1 TABLET BY MOUTH ONCE DAILY 100 Tab 0   • betamethasone valerate (VALISONE) 0.1 % CREA Apply 1 Application to affected area(s) 2 times a day. Apply to affected area(s) 2 times a day. 1 Tube 2   • fluorouracil (EFUDEX) 5 % cream Apply 1 Application to affected area(s) 2 times a day. Use 2-4 weeks as directed- apply bid. 1 Tube 0     No current facility-administered medications on file prior to visit.        No Known Allergies    Patient Active Problem List    Diagnosis Date Noted   • Non-smoker 09/13/2018   • Chronic insomnia 08/23/2018   • Memory changes 04/29/2018   • Pure hypercholesterolemia 11/14/2017   • Chronic bilateral low back pain without sciatica 05/30/2017   • Obstructive sleep apnea syndrome 10/17/2016   • Pain in right shin 10/17/2016   • Seasonal allergies 05/06/2016   • Left forearm pain 05/06/2016   • Left lateral epicondylitis 05/06/2016   • Left elbow pain 05/06/2016   • Thrombocytopenia (HCC) 12/30/2015   • Scoliosis 10/09/2015   •  "Benign prostatic hyperplasia with lower urinary tract symptoms 02/12/2015   • Vitamin D insufficiency 11/21/2013   • Postnasal drip 05/03/2012   • Primary insomnia 10/11/2010       Past Medical History:   Diagnosis Date   • Basal cell carcinoma     dermatology- Dr. Smith   • Chickenpox    • Dyslipidemia    • [place] measles    • Groin strain 10/21/2011     ICD-10 transition   • Hemorrhoid    • Hyperlipidemia    • Insomnia    • Mumps    • Right hip pain 11/21/2013   • S/P colonoscopy 7/2013    negative, repeat in 5 years   • S/P colonoscopy with polypectomy     age 53, due age   • Scoliosis    • Sleep apnea    • Tonsillitis            OBJECTIVE:   BP (!) 98/68 (BP Location: Left arm, Patient Position: Standing, BP Cuff Size: Adult)   Pulse 68   Temp 36.9 °C (98.4 °F) (Temporal)   Resp 12   Ht 1.803 m (5' 11\")   Wt 81.1 kg (178 lb 12.7 oz)   SpO2 95%   BMI 24.94 kg/m²    General: Well-developed well-nourished male, no acute distress  Neck: supple, no lymphadenopathy- cervical or supraclavicular, no thyromegaly  HEENT: oropharynx clear, eyes clear, right side with cerumen, otherwise visible portion of TMs clear and intact  Cardiovascular: regular rate and rhythm, no murmurs, gallops, rubs, no carotid bruits  Lungs: clear to auscultation bilaterally, no wheezes, crackles, or rhonchi  Abdomen: +bowel sounds, soft, nontender, nondistended, no rebound, no guarding, no hepatosplenomegaly  Extremities: no cyanosis, clubbing, edema  Neuro: CN 2-12 intact, negative rhomberg, normal strength throughout  Skin: Warm and dry  Psych: appropriate mood and affect    [place]-Hallpike maneuver negative.   Epley maneuver shown to patient.       ASSESSMENT/PLAN:    62 y.o.male with new symptoms of dizziness.     1. Dizziness- appears multifactorial. Currently appears some improvement. Component due to positional vertigo per history, unable to illicit with [place] Hallpike maneuver today.  However, also appears to have slight orthostatic " component as BP was on lower end.   -Recommend keep well hydrated, fall precautions, and home epley maneuver exercises. Will obtain carotid u/s.  Will continue to monitor at this time. Consider repeat EKG as needed. Reviewed side effects of medication.  US-CAROTID DOPPLER BILAT    meclizine (ANTIVERT) 25 MG Tab   2. Seasonal allergic rhinitis due to pollen - stable.   -Flonase refilled.  fluticasone (FLONASE) 50 MCG/ACT nasal spray     Return in about 2 weeks (around 10/3/2018), or if symptoms worsen or fail to improve.    This medical record contains text that has been entered with the assistance of computer voice recognition and dictation software.  Therefore, it may contain unintended errors in text, spelling, punctuation, or grammar.

## 2018-09-20 ENCOUNTER — HOSPITAL ENCOUNTER (OUTPATIENT)
Dept: LAB | Facility: MEDICAL CENTER | Age: 63
End: 2018-09-20
Attending: FAMILY MEDICINE
Payer: COMMERCIAL

## 2018-09-20 DIAGNOSIS — Z13.0 SCREENING, DEFICIENCY ANEMIA, IRON: ICD-10-CM

## 2018-09-20 DIAGNOSIS — R21 RASH AND NONSPECIFIC SKIN ERUPTION: ICD-10-CM

## 2018-09-20 DIAGNOSIS — E55.9 VITAMIN D INSUFFICIENCY: ICD-10-CM

## 2018-09-20 DIAGNOSIS — M25.551 RIGHT HIP PAIN: ICD-10-CM

## 2018-09-20 DIAGNOSIS — R97.20 RISING PSA LEVEL: ICD-10-CM

## 2018-09-20 DIAGNOSIS — E78.00 PURE HYPERCHOLESTEROLEMIA: ICD-10-CM

## 2018-09-20 LAB
25(OH)D3 SERPL-MCNC: 33 NG/ML (ref 30–100)
ALBUMIN SERPL BCP-MCNC: 4.5 G/DL (ref 3.2–4.9)
ALBUMIN/GLOB SERPL: 2.1 G/DL
ALP SERPL-CCNC: 50 U/L (ref 30–99)
ALT SERPL-CCNC: 29 U/L (ref 2–50)
ANION GAP SERPL CALC-SCNC: 4 MMOL/L (ref 0–11.9)
AST SERPL-CCNC: 27 U/L (ref 12–45)
BASOPHILS # BLD AUTO: 0.3 % (ref 0–1.8)
BASOPHILS # BLD: 0.02 K/UL (ref 0–0.12)
BILIRUB SERPL-MCNC: 0.7 MG/DL (ref 0.1–1.5)
BUN SERPL-MCNC: 16 MG/DL (ref 8–22)
CALCIUM SERPL-MCNC: 10.2 MG/DL (ref 8.5–10.5)
CHLORIDE SERPL-SCNC: 101 MMOL/L (ref 96–112)
CHOLEST SERPL-MCNC: 161 MG/DL (ref 100–199)
CO2 SERPL-SCNC: 32 MMOL/L (ref 20–33)
CREAT SERPL-MCNC: 1.17 MG/DL (ref 0.5–1.4)
EOSINOPHIL # BLD AUTO: 0.08 K/UL (ref 0–0.51)
EOSINOPHIL NFR BLD: 1.3 % (ref 0–6.9)
ERYTHROCYTE [DISTWIDTH] IN BLOOD BY AUTOMATED COUNT: 46.5 FL (ref 35.9–50)
FASTING STATUS PATIENT QL REPORTED: NORMAL
GLOBULIN SER CALC-MCNC: 2.1 G/DL (ref 1.9–3.5)
GLUCOSE SERPL-MCNC: 108 MG/DL (ref 65–99)
HCT VFR BLD AUTO: 48.1 % (ref 42–52)
HDLC SERPL-MCNC: 68 MG/DL
HGB BLD-MCNC: 16.4 G/DL (ref 14–18)
IMM GRANULOCYTES # BLD AUTO: 0.01 K/UL (ref 0–0.11)
IMM GRANULOCYTES NFR BLD AUTO: 0.2 % (ref 0–0.9)
LDLC SERPL CALC-MCNC: 80 MG/DL
LYMPHOCYTES # BLD AUTO: 2 K/UL (ref 1–4.8)
LYMPHOCYTES NFR BLD: 31.6 % (ref 22–41)
MCH RBC QN AUTO: 33.7 PG (ref 27–33)
MCHC RBC AUTO-ENTMCNC: 34.1 G/DL (ref 33.7–35.3)
MCV RBC AUTO: 99 FL (ref 81.4–97.8)
MONOCYTES # BLD AUTO: 0.42 K/UL (ref 0–0.85)
MONOCYTES NFR BLD AUTO: 6.6 % (ref 0–13.4)
NEUTROPHILS # BLD AUTO: 3.8 K/UL (ref 1.82–7.42)
NEUTROPHILS NFR BLD: 60 % (ref 44–72)
NRBC # BLD AUTO: 0 K/UL
NRBC BLD-RTO: 0 /100 WBC
PLATELET # BLD AUTO: 172 K/UL (ref 164–446)
PMV BLD AUTO: 9.8 FL (ref 9–12.9)
POTASSIUM SERPL-SCNC: 4.3 MMOL/L (ref 3.6–5.5)
PROT SERPL-MCNC: 6.6 G/DL (ref 6–8.2)
PSA SERPL-MCNC: 1.61 NG/ML (ref 0–4)
RBC # BLD AUTO: 4.86 M/UL (ref 4.7–6.1)
SODIUM SERPL-SCNC: 137 MMOL/L (ref 135–145)
TRIGL SERPL-MCNC: 63 MG/DL (ref 0–149)
TSH SERPL DL<=0.005 MIU/L-ACNC: 1.49 UIU/ML (ref 0.38–5.33)
WBC # BLD AUTO: 6.3 K/UL (ref 4.8–10.8)

## 2018-09-20 PROCEDURE — 36415 COLL VENOUS BLD VENIPUNCTURE: CPT

## 2018-09-20 PROCEDURE — 82306 VITAMIN D 25 HYDROXY: CPT

## 2018-09-20 PROCEDURE — 80053 COMPREHEN METABOLIC PANEL: CPT

## 2018-09-20 PROCEDURE — 84443 ASSAY THYROID STIM HORMONE: CPT

## 2018-09-20 PROCEDURE — 84153 ASSAY OF PSA TOTAL: CPT

## 2018-09-20 PROCEDURE — 85025 COMPLETE CBC W/AUTO DIFF WBC: CPT

## 2018-09-20 PROCEDURE — 80061 LIPID PANEL: CPT

## 2018-09-21 RX ORDER — MECLIZINE HYDROCHLORIDE 25 MG/1
12.5-25 TABLET ORAL 3 TIMES DAILY PRN
Qty: 30 TAB | Refills: 0 | Status: SHIPPED | OUTPATIENT
Start: 2018-09-21 | End: 2021-02-11 | Stop reason: SDUPTHER

## 2018-10-05 ENCOUNTER — OFFICE VISIT (OUTPATIENT)
Dept: OTHER | Facility: IMAGING CENTER | Age: 63
End: 2018-10-05
Payer: COMMERCIAL

## 2018-10-05 VITALS
HEART RATE: 68 BPM | DIASTOLIC BLOOD PRESSURE: 66 MMHG | SYSTOLIC BLOOD PRESSURE: 114 MMHG | WEIGHT: 181.22 LBS | BODY MASS INDEX: 25.27 KG/M2 | TEMPERATURE: 98.2 F | OXYGEN SATURATION: 95 % | RESPIRATION RATE: 14 BRPM

## 2018-10-05 DIAGNOSIS — Z00.00 WELL ADULT EXAM: ICD-10-CM

## 2018-10-05 DIAGNOSIS — R42 DIZZINESS: ICD-10-CM

## 2018-10-05 DIAGNOSIS — R41.3 MEMORY CHANGES: ICD-10-CM

## 2018-10-05 DIAGNOSIS — D69.6 THROMBOCYTOPENIA (HCC): ICD-10-CM

## 2018-10-05 DIAGNOSIS — N40.1 BENIGN PROSTATIC HYPERPLASIA WITH LOWER URINARY TRACT SYMPTOMS, SYMPTOM DETAILS UNSPECIFIED: ICD-10-CM

## 2018-10-05 DIAGNOSIS — E78.00 PURE HYPERCHOLESTEROLEMIA: ICD-10-CM

## 2018-10-05 DIAGNOSIS — E55.9 VITAMIN D INSUFFICIENCY: ICD-10-CM

## 2018-10-05 DIAGNOSIS — R71.8 ELEVATED MCV: ICD-10-CM

## 2018-10-05 DIAGNOSIS — G89.29 CHRONIC BILATERAL LOW BACK PAIN WITHOUT SCIATICA: ICD-10-CM

## 2018-10-05 DIAGNOSIS — Z85.828 HISTORY OF BASAL CELL CARCINOMA: ICD-10-CM

## 2018-10-05 DIAGNOSIS — R73.01 ELEVATED FASTING GLUCOSE: ICD-10-CM

## 2018-10-05 DIAGNOSIS — F51.04 CHRONIC INSOMNIA: ICD-10-CM

## 2018-10-05 DIAGNOSIS — Z23 NEED FOR INFLUENZA VACCINATION: ICD-10-CM

## 2018-10-05 DIAGNOSIS — M54.50 CHRONIC BILATERAL LOW BACK PAIN WITHOUT SCIATICA: ICD-10-CM

## 2018-10-05 DIAGNOSIS — G47.33 OBSTRUCTIVE SLEEP APNEA SYNDROME: ICD-10-CM

## 2018-10-05 PROCEDURE — 90471 IMMUNIZATION ADMIN: CPT | Performed by: FAMILY MEDICINE

## 2018-10-05 PROCEDURE — 90662 IIV NO PRSV INCREASED AG IM: CPT | Performed by: FAMILY MEDICINE

## 2018-10-05 PROCEDURE — 99397 PER PM REEVAL EST PAT 65+ YR: CPT | Mod: 25 | Performed by: FAMILY MEDICINE

## 2018-10-05 ASSESSMENT — PAIN SCALES - GENERAL: PAINLEVEL: NO PAIN
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
## 2018-10-08 ENCOUNTER — HOSPITAL ENCOUNTER (OUTPATIENT)
Dept: RADIOLOGY | Facility: MEDICAL CENTER | Age: 63
End: 2018-10-08
Attending: FAMILY MEDICINE
Payer: COMMERCIAL

## 2018-10-08 DIAGNOSIS — R42 DIZZINESS: ICD-10-CM

## 2018-10-08 PROCEDURE — 93880 EXTRACRANIAL BILAT STUDY: CPT

## 2018-10-16 PROBLEM — R73.01 ELEVATED FASTING GLUCOSE: Status: ACTIVE | Noted: 2018-10-16

## 2018-10-16 PROBLEM — Z85.828 HISTORY OF BASAL CELL CARCINOMA: Status: ACTIVE | Noted: 2018-10-16

## 2018-10-22 DIAGNOSIS — E78.5 HYPERLIPIDEMIA, UNSPECIFIED HYPERLIPIDEMIA TYPE: ICD-10-CM

## 2018-10-22 RX ORDER — TAMSULOSIN HYDROCHLORIDE 0.4 MG/1
0.4 CAPSULE ORAL DAILY
Qty: 90 CAP | Refills: 3 | Status: SHIPPED | OUTPATIENT
Start: 2018-10-22 | End: 2019-06-24 | Stop reason: SDUPTHER

## 2018-10-22 RX ORDER — SIMVASTATIN 40 MG
40 TABLET ORAL EVERY EVENING
Qty: 90 TAB | Refills: 1 | Status: SHIPPED | OUTPATIENT
Start: 2018-10-22 | End: 2019-04-19 | Stop reason: SDUPTHER

## 2018-11-13 DIAGNOSIS — F51.01 PRIMARY INSOMNIA: ICD-10-CM

## 2018-11-13 RX ORDER — ZOLPIDEM TARTRATE 12.5 MG/1
12.5 TABLET, FILM COATED, EXTENDED RELEASE ORAL NIGHTLY PRN
Qty: 30 TAB | Refills: 3 | Status: CANCELLED | OUTPATIENT
Start: 2018-11-13 | End: 2018-12-13

## 2018-11-13 NOTE — TELEPHONE ENCOUNTER
Would like to see if patient will tolerate going to the regular ambien 10 mg as he has been working on weaning down medication.

## 2018-11-14 ENCOUNTER — PATIENT MESSAGE (OUTPATIENT)
Dept: OTHER | Facility: IMAGING CENTER | Age: 63
End: 2018-11-14

## 2018-11-14 DIAGNOSIS — F51.01 PRIMARY INSOMNIA: ICD-10-CM

## 2018-11-14 RX ORDER — ZOLPIDEM TARTRATE 12.5 MG/1
12.5 TABLET, FILM COATED, EXTENDED RELEASE ORAL NIGHTLY PRN
Qty: 30 TAB | Refills: 2 | Status: SHIPPED
Start: 2018-11-14 | End: 2018-12-14

## 2018-12-18 ENCOUNTER — TELEPHONE (OUTPATIENT)
Dept: OTHER | Facility: IMAGING CENTER | Age: 63
End: 2018-12-18

## 2018-12-18 NOTE — TELEPHONE ENCOUNTER
Pt calling to check on ambien prescription request. Would like to try to switch back to ambien 10mg. Notified pt I will review with Dr. Rand tomorrow and get back to him. Pt has 1 dose of ambien left for tonight.     Pt also requesting updated referral to Dermatology. Sees Dr. Smith who suggested follow up visits [id number] to monitor hx of basal cell carcinoma.     Pt reports he continues to experience some mild vertigo some evenings. Nothing as severe as he experienced in September. Pt has been checking blood pressure. Readings are typically in the 110s/60s. Pt reports increased stress with family members passing. Mother is in hospice and brother with a terminal cancer diagnosis. Pt believes this is having a negative effect on his health. He is trying to manage using healthy coping techniques such as exercise. Encouraged pt to make follow up appointment for any needs. Pt agrees with this plan.

## 2019-02-20 ENCOUNTER — TELEPHONE (OUTPATIENT)
Dept: SLEEP MEDICINE | Facility: MEDICAL CENTER | Age: 64
End: 2019-02-20

## 2019-02-20 NOTE — TELEPHONE ENCOUNTER
Patient is due to return for a 6 month follow up per Dr. Slaughter last note and is scheduled for this Friday, 2/22/19, to see him but he would like to be on an annual schedule vs 6 months. He would like to know if this appointment is necessary for him to keep or if he can just reschedule this visit for September as he was last seen September 2018. Can you please check with Dr. Slaughter and either have the patient keep this Friday or transfer him to the scheduling department to reschedule this appointment.    Thank you,  Tabby

## 2019-02-20 NOTE — TELEPHONE ENCOUNTER
Well he is going to need to switch DME since he is with [facility] so he should keep it so we have a recent note and compliance for new DME.

## 2019-02-21 PROBLEM — Z92.29 UP TO DATE WITH INFLUENZA VACCINATION: Status: ACTIVE | Noted: 2019-02-21

## 2019-02-21 NOTE — PROGRESS NOTES
CC: Follow up for GISSEL on CPAP  HPI:  Mr. Nick Rodriguez is a 63 year-old man whose original sleep study was performed in [facility] in 2012 and showed an RDI of 28.5, supine RDI 48.5, and a REM RDI of 52.3 with a tanisha saturation of 75%.  He was last seen 9/13/2018 and was doing quite well although he forgot his chip.  Previously in April 2018 his compliance was 100% and his residual apnea hypopnea index was 2.4 on CPAP at 9 cm H2O.    The patient reports improved symptoms using positive airway pressure.  Has experienced no significant problems with mask fit, mask leak, pressure tolerance, excessive airway dryness, nasal congestion, aerophagia, or chest pain.    His 30-day compliance is 96.7% for 6 hours and 56 minutes.  He has a residual apnea hypopnea index of 3.4.  He is on auto titrating CPAP 9.5-13 cm H2O.    Lost 4 friends/family members in the last few months, including Christos Rand, owner [employer] and [place].    Taking 10 mg of zolpidem, gets from Dr. Rand.    Significant comorbidities and modifying factors include insomnia, seasonal allergies, BPH, dyslipidemia, non-smoker, up-to-date with influenza vaccination, dyslipidemia, intermittent chronic low back pain.      Patient Active Problem List    Diagnosis Date Noted   • Up to date with influenza vaccination 02/21/2019   • Elevated fasting glucose 10/16/2018   • History of basal cell carcinoma 10/16/2018   • Non-smoker 09/13/2018   • Chronic insomnia 08/23/2018   • Memory changes 04/29/2018   • Pure hypercholesterolemia 11/14/2017   • Chronic bilateral low back pain without sciatica 05/30/2017   • Obstructive sleep apnea syndrome 10/17/2016   • Pain in right shin 10/17/2016   • Seasonal allergies 05/06/2016   • Left forearm pain 05/06/2016   • Left lateral epicondylitis 05/06/2016   • Left elbow pain 05/06/2016   • Thrombocytopenia (HCC) 12/30/2015   • Scoliosis 10/09/2015   • Benign prostatic hyperplasia with lower urinary tract symptoms  02/12/2015   • Vitamin D insufficiency 11/21/2013   • Postnasal drip 05/03/2012   • Primary insomnia 10/11/2010       Past Medical History:   Diagnosis Date   • Basal cell carcinoma     dermatology- Dr. Smith   • Chickenpox    • Dyslipidemia    • Sami measles    • Groin strain 10/21/2011     ICD-10 transition   • Hemorrhoid    • Hyperlipidemia    • Insomnia    • Mumps    • Right hip pain 11/21/2013   • S/P colonoscopy 7/2013    negative, repeat in 5 years   • S/P colonoscopy with polypectomy     age 53, due age   • Scoliosis    • Sleep apnea    • Tonsillitis         Past Surgical History:   Procedure Laterality Date   • BLEPHAROPLASTY  11/14/2012    Performed by Florentin Naylor M.D. 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        Current Outpatient Prescriptions   Medication Sig Dispense Refill   • zolpidem (AMBIEN) 10 MG Tab Take 10 mg by mouth at bedtime as needed for Sleep.     • tamsulosin (FLOMAX) 0.4 MG capsule Take 1 Cap by mouth every day. 90 Cap 3   • simvastatin (ZOCOR) 40 MG Tab Take 1 Tab by mouth every  "evening. 90 Tab 1   • cyclobenzaprine (FLEXERIL) 5 MG tablet Take 1-2 Tabs by mouth 3 times a day as needed. 30 Tab 0   • ketoconazole (NIZORAL) 2 % Cream Apply 1 Application to affected area(s) every day. Apply to affected area(s) every day. 1 Tube 0   • Multiple Vitamins-Minerals (MULTIVITAMIN ADULTS PO) Take  by mouth.     • Ascorbic Acid (VITAMIN C PO) Take 1,000 mg by mouth.     • Cholecalciferol (VITAMIN D PO) Take  by mouth.     • Vitamin E 400 UNIT Tab Take  by mouth.     • Omega-3 Fatty Acids (FISH OIL) 1000 MG Cap capsule Take 1,000 mg by mouth 3 times a day, with meals.     • GLUCOSAMINE CHONDROITIN COMPLX PO Take  by mouth.     • cetirizine (ZYRTEC) 10 MG Tab Take 10 mg by mouth every day.     • imiquimod (ALDARA) 5 % cream      • PICATO 0.015 % Gel      • aspirin (ASA) 81 MG Chew Tab chewable tablet CHEW AND SWALLOW 1 TABLET BY MOUTH ONCE DAILY 100 Tab 0   • betamethasone valerate (VALISONE) 0.1 % CREA Apply 1 Application to affected area(s) 2 times a day. Apply to affected area(s) 2 times a day. 1 Tube 2   • fluorouracil (EFUDEX) 5 % cream Apply 1 Application to affected area(s) 2 times a day. Use 2-4 weeks as directed- apply bid. 1 Tube 0   • meclizine (ANTIVERT) 25 MG Tab Take 0.5-1 Tabs by mouth 3 times a day as needed for Dizziness or Vertigo. (Patient not taking: Reported on 2/22/2019) 30 Tab 0   • fluticasone (FLONASE) 50 MCG/ACT nasal spray Spray 2 Sprays in nose every day. Each Nostril (Patient not taking: Reported on 2/22/2019) 16 g 3   • albuterol 108 (90 Base) MCG/ACT Aero Soln inhalation aerosol Inhale 2 Puffs by mouth every 6 hours as needed for Shortness of Breath. 8.5 g 0     No current facility-administered medications for this visit.     \"CURRENT RX\"    ALLERGIES: Patient has no known allergies.    ROS    Constitutional: Denies fever, chills, sweats,  weight loss, fatigue  Cardiovascular: Denies chest pain, tightness, palpitations, swelling in legs/feet, fainting, difficulty breathing " "when lying down but gets better when sitting up.   Respiratory: Denies shortness of breath, cough, sputum, wheezing, painful breathing, coughing up blood.   Sleep: per HPI  Gastrointestinal: Denies  difficulty swallowing, nausea, abdominal pain, diarrhea, constipation, heartburn.  Musculoskeletal: Denies painful joints, sore muscles, back pain.        PHYSICAL EXAM  Normal weight    /76 (BP Location: Right arm, Patient Position: Standing, BP Cuff Size: Adult)   Pulse 73   Resp 16   Ht 1.803 m (5' 11\")   Wt 80.7 kg (178 lb)   SpO2 96%   BMI 24.83 kg/m²   Appearance: Well-nourished, well-developed, no acute distress  Eyes:  PERRLA, EOMI  ENMT: without lesions, deformities;hearing grossly intact; tongue normal, posterior pharynx without erythema or exudate; Mallampati classification:   Neck: Supple, trachea midline, no masses  Respiratory effort:  No intercostal retractions or use of accessory muscles  Lung auscultation:  No wheezes rhonchi rubs or rales  Cardiac: No murmurs, rubs, or gallops; regular rhythm, normal rate; no edema  Abdomen:  No tenderness, no organomegaly  Musculoskeletal:  Grossly normal; gait and station normal; digits and nails normal  Skin:  No rashes, petechiae, cyanosis  Neurologic: without focal signs; oriented to person, time, place, and purpose; judgement intact  Psychiatric:  No depression, anxiety, agitation        PROBLEMS:  1. Obstructive sleep apnea syndrome    - DME Mask and Supplies    2. Non-smoker      3. Up to date with influenza vaccination      4. Benign prostatic hyperplasia with lower urinary tract symptoms, symptom details unspecified      5. Pure hypercholesterolemia      6. Primary insomnia        PLAN:     Return in about 1 year (around 2/22/2020).    Has been advised to continue the current auto titrating CPAP 9.5-13 cm water, clean equipment frequently, and get new mask and supplies as allowed by insurance and DME. Advised about potential problems including " nasal obstruction/stuffiness, mask leak or discomfort , frequent awakenings, chill or dryness of the upper airway, noise, inconvenience, and lack of benefit. Recommend an earlier appointment, if significant treatment barriers develop.    Have advised the patient to follow up with the appropriate healthcare practitioners for all other medical problems and issues.

## 2019-02-22 ENCOUNTER — SLEEP CENTER VISIT (OUTPATIENT)
Dept: SLEEP MEDICINE | Facility: MEDICAL CENTER | Age: 64
End: 2019-02-22
Payer: COMMERCIAL

## 2019-02-22 VITALS
BODY MASS INDEX: 24.92 KG/M2 | SYSTOLIC BLOOD PRESSURE: 110 MMHG | RESPIRATION RATE: 16 BRPM | WEIGHT: 178 LBS | DIASTOLIC BLOOD PRESSURE: 76 MMHG | HEART RATE: 73 BPM | HEIGHT: 71 IN | OXYGEN SATURATION: 96 %

## 2019-02-22 DIAGNOSIS — Z78.9 NON-SMOKER: ICD-10-CM

## 2019-02-22 DIAGNOSIS — G47.33 OBSTRUCTIVE SLEEP APNEA SYNDROME: ICD-10-CM

## 2019-02-22 DIAGNOSIS — F51.01 PRIMARY INSOMNIA: ICD-10-CM

## 2019-02-22 DIAGNOSIS — E78.00 PURE HYPERCHOLESTEROLEMIA: ICD-10-CM

## 2019-02-22 DIAGNOSIS — N40.1 BENIGN PROSTATIC HYPERPLASIA WITH LOWER URINARY TRACT SYMPTOMS, SYMPTOM DETAILS UNSPECIFIED: ICD-10-CM

## 2019-02-22 DIAGNOSIS — Z92.29 UP TO DATE WITH INFLUENZA VACCINATION: ICD-10-CM

## 2019-02-22 PROCEDURE — 99214 OFFICE O/P EST MOD 30 MIN: CPT | Performed by: INTERNAL MEDICINE

## 2019-02-22 RX ORDER — ZOLPIDEM TARTRATE 10 MG/1
10 TABLET ORAL NIGHTLY PRN
COMMUNITY
End: 2019-03-05 | Stop reason: SDUPTHER

## 2019-03-05 DIAGNOSIS — F51.01 PRIMARY INSOMNIA: ICD-10-CM

## 2019-03-06 RX ORDER — ZOLPIDEM TARTRATE 10 MG/1
10 TABLET ORAL NIGHTLY PRN
Qty: 30 TAB | Refills: 1 | Status: SHIPPED
Start: 2019-03-06 | End: 2019-05-07 | Stop reason: SDUPTHER

## 2019-04-19 DIAGNOSIS — E78.5 HYPERLIPIDEMIA, UNSPECIFIED HYPERLIPIDEMIA TYPE: ICD-10-CM

## 2019-04-19 RX ORDER — SIMVASTATIN 40 MG
40 TABLET ORAL EVERY EVENING
Qty: 90 TAB | Refills: 1 | Status: SHIPPED | OUTPATIENT
Start: 2019-04-19 | End: 2019-06-24 | Stop reason: SDUPTHER

## 2019-04-22 ENCOUNTER — APPOINTMENT (OUTPATIENT)
Dept: DERMATOLOGY | Facility: IMAGING CENTER | Age: 64
End: 2019-04-22

## 2019-05-07 DIAGNOSIS — F51.01 PRIMARY INSOMNIA: ICD-10-CM

## 2019-05-10 RX ORDER — ZOLPIDEM TARTRATE 10 MG/1
10 TABLET ORAL NIGHTLY PRN
Qty: 30 TAB | Refills: 1 | Status: SHIPPED
Start: 2019-05-10 | End: 2019-06-24 | Stop reason: SDUPTHER

## 2019-05-22 ENCOUNTER — OFFICE VISIT (OUTPATIENT)
Dept: MEDICAL GROUP | Facility: IMAGING CENTER | Age: 64
End: 2019-05-22
Payer: COMMERCIAL

## 2019-05-22 VITALS
BODY MASS INDEX: 24.3 KG/M2 | TEMPERATURE: 98.9 F | HEART RATE: 68 BPM | WEIGHT: 173.6 LBS | HEIGHT: 71 IN | DIASTOLIC BLOOD PRESSURE: 74 MMHG | SYSTOLIC BLOOD PRESSURE: 122 MMHG | OXYGEN SATURATION: 95 % | RESPIRATION RATE: 14 BRPM

## 2019-05-22 DIAGNOSIS — Z12.11 SCREEN FOR COLON CANCER: ICD-10-CM

## 2019-05-22 DIAGNOSIS — Z12.5 SCREENING FOR PROSTATE CANCER: ICD-10-CM

## 2019-05-22 DIAGNOSIS — J30.2 SEASONAL ALLERGIES: ICD-10-CM

## 2019-05-22 DIAGNOSIS — Z85.828 HISTORY OF BASAL CELL CARCINOMA: ICD-10-CM

## 2019-05-22 DIAGNOSIS — G47.33 OBSTRUCTIVE SLEEP APNEA SYNDROME: ICD-10-CM

## 2019-05-22 DIAGNOSIS — L98.9 SKIN LESION OF HAND: ICD-10-CM

## 2019-05-22 DIAGNOSIS — F43.9 STRESS: ICD-10-CM

## 2019-05-22 DIAGNOSIS — R63.4 WEIGHT LOSS: ICD-10-CM

## 2019-05-22 PROCEDURE — 17000 DESTRUCT PREMALG LESION: CPT | Performed by: FAMILY MEDICINE

## 2019-05-22 PROCEDURE — 99214 OFFICE O/P EST MOD 30 MIN: CPT | Mod: 25 | Performed by: FAMILY MEDICINE

## 2019-05-22 NOTE — PROGRESS NOTES
SUBJECTIVE:        Chief Complaint   Patient presents with   • Other     growth on right hand, x 3 weeks ago    • Weight Loss     has noticed some weight loss problems    • Allergic Rhinitis       HPI:     Nick Rdoriguez is a 63 y.o. male here for concerns for weight loss, allergies and growth on right hand.   Weight loss- previously with workng on some weight loss, but not actively at this time. Was concerned as his weight has dropped further. At home today noted it was 163 lbs, prior goal was 175 lbs. He has been mostly 176-185 lbs in the past.  Notes his waist is the same. 2 year ago 184 lbs, initially had cut out beer and was exercising regularly.   He has not been exercising as regularly at this time.   He has had several stressors- currently some chronic work stressors and will be attending 4 celebrations of life for family members.   He has a new grandson and his son recently graduated from medical school and will be attending residency in [place], thus will be close enough to visit regularly. Patient may retire by 2020.   Notes he is eating a lot of food, but not eating sweets.   Sleep challenges are back.   Avoiding chocolate, sweets, beer, and caffeine.  Stopped working out as he had a right hip issues for a couple months.   A year ago was 169 lbs, last 2 weeks, noted down to 159 lbs.   He has a friend with stage 4 pancreatic cancer, thus concerned.   Colonoscopy 9/2018- polyp, repeat in 5 years recommended.   Has noticed at night that he shirt might be wet about once a month.   No blood in urine or stools. No other urinary changes. No abdominal pain.   BM evolved- solid and softer, 3 times a day currently.  Denies coughing. Never smoker.   Diet:   Blueberries, yogurt, wheat bread with almond butter  Chicken and rice, salad- spinach  Water, coffee, beer 3 times a week at most now, 4 years ago1-2 a night.     Some allergies- takes medication zyrtec. Cat at his chest initially. Does not believe he  has cat allergies.   Used inhaler in past. Around eyes seem heavy, not sure if stress or allergies.     Sleep apnea- has been otherwise stable on CPAP.     Skin lesion of proximal dorsum right thumb area since about 3 weeks ago. Has been rough and pink. No scabbing or bleeding of the area. Used his foot cream on the area. He has multiple precancerous lesions on his scalp for which he see dermatology. Plans to have treated soon.       ROS:  Denies any recent fevers or chills. No nausea or vomiting. No diarrhea. No chest pains or shortness of breath. No lower extremity edema. No constipation.       Current Outpatient Prescriptions on File Prior to Visit   Medication Sig Dispense Refill   • zolpidem (AMBIEN) 10 MG Tab Take 1 Tab by mouth at bedtime as needed for Sleep for up to 30 days. 30 Tab 1   • simvastatin (ZOCOR) 40 MG Tab Take 1 Tab by mouth every evening. 90 Tab 1   • tamsulosin (FLOMAX) 0.4 MG capsule Take 1 Cap by mouth every day. 90 Cap 3   • cyclobenzaprine (FLEXERIL) 5 MG tablet Take 1-2 Tabs by mouth 3 times a day as needed. 30 Tab 0   • ketoconazole (NIZORAL) 2 % Cream Apply 1 Application to affected area(s) every day. Apply to affected area(s) every day. 1 Tube 0   • Multiple Vitamins-Minerals (MULTIVITAMIN ADULTS PO) Take  by mouth.     • Ascorbic Acid (VITAMIN C PO) Take 1,000 mg by mouth.     • Cholecalciferol (VITAMIN D PO) Take  by mouth.     • Vitamin E 400 UNIT Tab Take  by mouth.     • Omega-3 Fatty Acids (FISH OIL) 1000 MG Cap capsule Take 1,000 mg by mouth 3 times a day, with meals.     • GLUCOSAMINE CHONDROITIN COMPLX PO Take  by mouth.     • cetirizine (ZYRTEC) 10 MG Tab Take 10 mg by mouth every day.     • aspirin (ASA) 81 MG Chew Tab chewable tablet CHEW AND SWALLOW 1 TABLET BY MOUTH ONCE DAILY 100 Tab 0   • betamethasone valerate (VALISONE) 0.1 % CREA Apply 1 Application to affected area(s) 2 times a day. Apply to affected area(s) 2 times a day. 1 Tube 2   • fluorouracil (EFUDEX) 5 %  cream Apply 1 Application to affected area(s) 2 times a day. Use 2-4 weeks as directed- apply bid. 1 Tube 0   • meclizine (ANTIVERT) 25 MG Tab Take 0.5-1 Tabs by mouth 3 times a day as needed for Dizziness or Vertigo. (Patient not taking: Reported on 2/22/2019) 30 Tab 0   • fluticasone (FLONASE) 50 MCG/ACT nasal spray Spray 2 Sprays in nose every day. Each Nostril (Patient not taking: Reported on 2/22/2019) 16 g 3   • imiquimod (ALDARA) 5 % cream      • PICATO 0.015 % Gel      • albuterol 108 (90 Base) MCG/ACT Aero Soln inhalation aerosol Inhale 2 Puffs by mouth every 6 hours as needed for Shortness of Breath. 8.5 g 0     No current facility-administered medications on file prior to visit.        No Known Allergies    Patient Active Problem List    Diagnosis Date Noted   • Up to date with influenza vaccination 02/21/2019   • Elevated fasting glucose 10/16/2018   • History of basal cell carcinoma 10/16/2018   • Non-smoker 09/13/2018   • Chronic insomnia 08/23/2018   • Memory changes 04/29/2018   • Pure hypercholesterolemia 11/14/2017   • Chronic bilateral low back pain without sciatica 05/30/2017   • Obstructive sleep apnea syndrome 10/17/2016   • Pain in right shin 10/17/2016   • Seasonal allergies 05/06/2016   • Left forearm pain 05/06/2016   • Left lateral epicondylitis 05/06/2016   • Left elbow pain 05/06/2016   • Thrombocytopenia (HCC) 12/30/2015   • Scoliosis 10/09/2015   • Benign prostatic hyperplasia with lower urinary tract symptoms 02/12/2015   • Vitamin D insufficiency 11/21/2013   • Postnasal drip 05/03/2012   • Primary insomnia 10/11/2010       Past Medical History:   Diagnosis Date   • Basal cell carcinoma     dermatology- Dr. Smith   • Chickenpox    • Dyslipidemia    • Montserratian measles    • Groin strain 10/21/2011     ICD-10 transition   • Hemorrhoid    • Hyperlipidemia    • Insomnia    • Mumps    • Right hip pain 11/21/2013   • S/P colonoscopy 7/2013    negative, repeat in 5 years   • S/P colonoscopy with  "polypectomy     age 53, due age   • Scoliosis    • Sleep apnea    • Tonsillitis      Past Surgical History:   Procedure Laterality Date   • BLEPHAROPLASTY  11/14/2012    Performed by Florentin Naylor M.D. at [facility]   • INGUINAL HERNIA REPAIR  3/17/2009    Performed by SUDHA BARRETT at [facility]   • TONSILLECTOMY         Family History   Problem Relation Age of Onset   • Alcohol/Drug Father         alcohol   • Alcohol/Drug Brother         alcohol   • Sleep Apnea Brother    • Cancer Brother    • Cancer Brother         brain   • Heart Disease Neg Hx      Social History     Social History Narrative    , 3 children.          OBJECTIVE:   /74 (BP Location: Left arm, Patient Position: Sitting, BP Cuff Size: Adult)   Pulse 68   Temp 37.2 °C (98.9 °F) (Temporal)   Resp 14   Ht 1.803 m (5' 11\")   Wt 78.7 kg (173 lb 9.6 oz)   SpO2 95%   BMI 24.21 kg/m²   General: Well-developed well-nourished male, no acute distress  Neck: supple, no lymphadenopathy- cervical or supraclavicular, no thyromegaly  Cardiovascular: regular rate and rhythm, no murmurs, gallops, rubs  Lungs: clear to auscultation bilaterally, no wheezes, crackles, or rhonchi  Abdomen: +bowel sounds, soft, nontender, nondistended, no rebound, no guarding, no hepatosplenomegaly  Extremities: no cyanosis, clubbing, edema  Skin: Warm and dry. Right dorsum proximal thumb region with small slight pink area and overlying central rough patch.   Psych: appropriate mood and affect    Procedure note: Areas described above on skin of right thumb was treated with liquid nitrogen for 30 seconds.      ASSESSMENT/PLAN:    63 y.o.male with arlen, chronic insomnia, hypercholesterolemia,  seasonal allergies and hx of basal cell carcinoma.     1. Weight loss- unclear etiology of weight loss, component appears gradual and due to diet and activity changes. May also be related to recent multiple stressors.   -Will obtain " further lab work and evaluation. Will continue to monitor at this time.  Recommend monitor calories/activities- consider use of myAxoGenpal program.  CBC WITH DIFFERENTIAL    Comp Metabolic Panel    TSH WITH REFLEX TO FT4    URINALYSIS,CULTURE IF INDICATED    DX-CHEST-2 VIEWS    OCCULT BLOOD FECES IMMUNOASSAY   2. Screen for colon cancer  OCCULT BLOOD FECES IMMUNOASSAY   3. Stress  -Counseled on management of stressors.  Recommend routine meditation practices. Consider acupuncture therapy. Monitor.     4. Seasonal allergies   -May continues zyrtec, flonase and consider neti pot and acupuncture therapy. Recommend shower prior to bedtime.      5. Obstructive sleep apnea syndrome - stable, continue CPAP.     6. Skin lesion of hand- possible AK, treated with liquid nitrogen. Monitor. Follow up with dermatology if no further improvements.      7. History of basal cell carcinoma - recommend routine follow up with dermatology.         Return in about 1 month (around 6/22/2019).   Follow up sooner as needed.     This medical record contains text that has been entered with the assistance of computer voice recognition and dictation software.  Therefore, it may contain unintended errors in text, spelling, punctuation, or grammar.

## 2019-05-28 ENCOUNTER — HOSPITAL ENCOUNTER (OUTPATIENT)
Facility: MEDICAL CENTER | Age: 64
End: 2019-05-28
Attending: FAMILY MEDICINE
Payer: COMMERCIAL

## 2019-05-28 PROCEDURE — 82274 ASSAY TEST FOR BLOOD FECAL: CPT

## 2019-05-29 ENCOUNTER — HOSPITAL ENCOUNTER (OUTPATIENT)
Dept: LAB | Facility: MEDICAL CENTER | Age: 64
End: 2019-05-29
Attending: FAMILY MEDICINE
Payer: COMMERCIAL

## 2019-05-29 ENCOUNTER — HOSPITAL ENCOUNTER (OUTPATIENT)
Dept: RADIOLOGY | Facility: MEDICAL CENTER | Age: 64
End: 2019-05-29
Attending: FAMILY MEDICINE
Payer: COMMERCIAL

## 2019-05-29 DIAGNOSIS — Z12.5 SCREENING FOR PROSTATE CANCER: ICD-10-CM

## 2019-05-29 DIAGNOSIS — R63.4 WEIGHT LOSS: ICD-10-CM

## 2019-05-29 LAB
ALBUMIN SERPL BCP-MCNC: 4 G/DL (ref 3.2–4.9)
ALBUMIN/GLOB SERPL: 1.9 G/DL
ALP SERPL-CCNC: 48 U/L (ref 30–99)
ALT SERPL-CCNC: 25 U/L (ref 2–50)
ANION GAP SERPL CALC-SCNC: 4 MMOL/L (ref 0–11.9)
APPEARANCE UR: CLEAR
AST SERPL-CCNC: 26 U/L (ref 12–45)
BASOPHILS # BLD AUTO: 0.5 % (ref 0–1.8)
BASOPHILS # BLD: 0.02 K/UL (ref 0–0.12)
BILIRUB SERPL-MCNC: 0.5 MG/DL (ref 0.1–1.5)
BILIRUB UR QL STRIP.AUTO: NEGATIVE
BUN SERPL-MCNC: 21 MG/DL (ref 8–22)
CALCIUM SERPL-MCNC: 9.5 MG/DL (ref 8.5–10.5)
CHLORIDE SERPL-SCNC: 107 MMOL/L (ref 96–112)
CO2 SERPL-SCNC: 28 MMOL/L (ref 20–33)
COLOR UR: YELLOW
CREAT SERPL-MCNC: 1.05 MG/DL (ref 0.5–1.4)
EOSINOPHIL # BLD AUTO: 0.12 K/UL (ref 0–0.51)
EOSINOPHIL NFR BLD: 2.7 % (ref 0–6.9)
ERYTHROCYTE [DISTWIDTH] IN BLOOD BY AUTOMATED COUNT: 48.4 FL (ref 35.9–50)
FASTING STATUS PATIENT QL REPORTED: NORMAL
GLOBULIN SER CALC-MCNC: 2.1 G/DL (ref 1.9–3.5)
GLUCOSE SERPL-MCNC: 101 MG/DL (ref 65–99)
GLUCOSE UR STRIP.AUTO-MCNC: NEGATIVE MG/DL
HCT VFR BLD AUTO: 46 % (ref 42–52)
HGB BLD-MCNC: 15.3 G/DL (ref 14–18)
IMM GRANULOCYTES # BLD AUTO: 0.01 K/UL (ref 0–0.11)
IMM GRANULOCYTES NFR BLD AUTO: 0.2 % (ref 0–0.9)
KETONES UR STRIP.AUTO-MCNC: NEGATIVE MG/DL
LEUKOCYTE ESTERASE UR QL STRIP.AUTO: NEGATIVE
LYMPHOCYTES # BLD AUTO: 1.74 K/UL (ref 1–4.8)
LYMPHOCYTES NFR BLD: 39.7 % (ref 22–41)
MCH RBC QN AUTO: 33.3 PG (ref 27–33)
MCHC RBC AUTO-ENTMCNC: 33.3 G/DL (ref 33.7–35.3)
MCV RBC AUTO: 100 FL (ref 81.4–97.8)
MICRO URNS: NORMAL
MONOCYTES # BLD AUTO: 0.34 K/UL (ref 0–0.85)
MONOCYTES NFR BLD AUTO: 7.8 % (ref 0–13.4)
NEUTROPHILS # BLD AUTO: 2.15 K/UL (ref 1.82–7.42)
NEUTROPHILS NFR BLD: 49.1 % (ref 44–72)
NITRITE UR QL STRIP.AUTO: NEGATIVE
NRBC # BLD AUTO: 0 K/UL
NRBC BLD-RTO: 0 /100 WBC
PH UR STRIP.AUTO: 5.5 [PH]
PLATELET # BLD AUTO: 139 K/UL (ref 164–446)
PMV BLD AUTO: 10 FL (ref 9–12.9)
POTASSIUM SERPL-SCNC: 4 MMOL/L (ref 3.6–5.5)
PROT SERPL-MCNC: 6.1 G/DL (ref 6–8.2)
PROT UR QL STRIP: NEGATIVE MG/DL
PSA SERPL-MCNC: 2.5 NG/ML (ref 0–4)
RBC # BLD AUTO: 4.6 M/UL (ref 4.7–6.1)
RBC UR QL AUTO: NEGATIVE
SODIUM SERPL-SCNC: 139 MMOL/L (ref 135–145)
SP GR UR STRIP.AUTO: 1.02
TSH SERPL DL<=0.005 MIU/L-ACNC: 3.48 UIU/ML (ref 0.38–5.33)
UROBILINOGEN UR STRIP.AUTO-MCNC: 0.2 MG/DL
WBC # BLD AUTO: 4.4 K/UL (ref 4.8–10.8)

## 2019-05-29 PROCEDURE — 36415 COLL VENOUS BLD VENIPUNCTURE: CPT

## 2019-05-29 PROCEDURE — 85025 COMPLETE CBC W/AUTO DIFF WBC: CPT

## 2019-05-29 PROCEDURE — 81003 URINALYSIS AUTO W/O SCOPE: CPT

## 2019-05-29 PROCEDURE — 84443 ASSAY THYROID STIM HORMONE: CPT

## 2019-05-29 PROCEDURE — 71046 X-RAY EXAM CHEST 2 VIEWS: CPT

## 2019-05-29 PROCEDURE — 80053 COMPREHEN METABOLIC PANEL: CPT

## 2019-05-29 PROCEDURE — 84153 ASSAY OF PSA TOTAL: CPT

## 2019-05-30 DIAGNOSIS — Z12.11 SCREEN FOR COLON CANCER: ICD-10-CM

## 2019-05-30 DIAGNOSIS — R63.4 WEIGHT LOSS: ICD-10-CM

## 2019-05-30 LAB — HEMOCCULT STL QL IA: NEGATIVE

## 2019-06-24 DIAGNOSIS — M62.830 MUSCLE SPASM OF BACK: ICD-10-CM

## 2019-06-24 DIAGNOSIS — F51.01 PRIMARY INSOMNIA: ICD-10-CM

## 2019-06-24 DIAGNOSIS — E78.5 HYPERLIPIDEMIA, UNSPECIFIED HYPERLIPIDEMIA TYPE: ICD-10-CM

## 2019-06-24 DIAGNOSIS — J30.1 SEASONAL ALLERGIC RHINITIS DUE TO POLLEN: ICD-10-CM

## 2019-06-25 RX ORDER — ZOLPIDEM TARTRATE 10 MG/1
10 TABLET ORAL NIGHTLY PRN
Qty: 90 TAB | Refills: 0 | Status: SHIPPED
Start: 2019-06-25 | End: 2019-06-28 | Stop reason: SDUPTHER

## 2019-06-25 RX ORDER — TAMSULOSIN HYDROCHLORIDE 0.4 MG/1
0.4 CAPSULE ORAL DAILY
Qty: 90 CAP | Refills: 3 | Status: SHIPPED | OUTPATIENT
Start: 2019-06-25 | End: 2020-06-01

## 2019-06-25 RX ORDER — CYCLOBENZAPRINE HCL 5 MG
5-10 TABLET ORAL 3 TIMES DAILY PRN
Qty: 90 TAB | Refills: 0 | Status: SHIPPED | OUTPATIENT
Start: 2019-06-25 | End: 2021-02-11 | Stop reason: SDUPTHER

## 2019-06-25 RX ORDER — FLUTICASONE PROPIONATE 50 MCG
2 SPRAY, SUSPENSION (ML) NASAL DAILY
Qty: 16 G | Refills: 3 | Status: SHIPPED | OUTPATIENT
Start: 2019-06-25 | End: 2020-09-28

## 2019-06-25 RX ORDER — SIMVASTATIN 40 MG
40 TABLET ORAL EVERY EVENING
Qty: 90 TAB | Refills: 3 | Status: SHIPPED | OUTPATIENT
Start: 2019-06-25 | End: 2020-06-01

## 2019-06-28 RX ORDER — ZOLPIDEM TARTRATE 10 MG/1
10 TABLET ORAL NIGHTLY PRN
Qty: 90 TAB | Refills: 0 | Status: SHIPPED
Start: 2019-06-28 | End: 2019-09-26

## 2019-07-12 ENCOUNTER — TELEPHONE (OUTPATIENT)
Dept: MEDICAL GROUP | Facility: IMAGING CENTER | Age: 64
End: 2019-07-12

## 2019-07-12 DIAGNOSIS — Z13.0 SCREENING FOR DEFICIENCY ANEMIA: ICD-10-CM

## 2019-07-12 DIAGNOSIS — E78.00 PURE HYPERCHOLESTEROLEMIA: ICD-10-CM

## 2019-07-12 DIAGNOSIS — Z13.1 SCREENING FOR DIABETES MELLITUS: ICD-10-CM

## 2019-07-12 DIAGNOSIS — R73.01 ELEVATED FASTING GLUCOSE: ICD-10-CM

## 2019-07-12 DIAGNOSIS — Z13.21 ENCOUNTER FOR VITAMIN DEFICIENCY SCREENING: ICD-10-CM

## 2019-07-12 NOTE — TELEPHONE ENCOUNTER
Pt. Called to vinnie his annual exam for October. He is wondering if he needs labs before that appt. He had labs done on 5/29/19 and is also wondering if those labs are enough for his October appt or if you want to order new labs. Thanks!

## 2019-07-15 NOTE — TELEPHONE ENCOUNTER
I put in some fasting labs to be completed prior to his appointment in October, some to recheck and some additional labs.

## 2019-08-23 ENCOUNTER — APPOINTMENT (OUTPATIENT)
Dept: SLEEP MEDICINE | Facility: MEDICAL CENTER | Age: 64
End: 2019-08-23
Payer: COMMERCIAL

## 2019-08-28 DIAGNOSIS — R94.128 ABNORMAL HEARING TEST: ICD-10-CM

## 2019-08-28 DIAGNOSIS — Z01.118 ABNORMAL HEARING TEST: ICD-10-CM

## 2019-09-04 ENCOUNTER — HOSPITAL ENCOUNTER (OUTPATIENT)
Dept: LAB | Facility: MEDICAL CENTER | Age: 64
End: 2019-09-04
Attending: FAMILY MEDICINE
Payer: COMMERCIAL

## 2019-09-04 DIAGNOSIS — R73.01 ELEVATED FASTING GLUCOSE: ICD-10-CM

## 2019-09-04 DIAGNOSIS — E78.00 PURE HYPERCHOLESTEROLEMIA: ICD-10-CM

## 2019-09-04 DIAGNOSIS — Z13.21 ENCOUNTER FOR VITAMIN DEFICIENCY SCREENING: ICD-10-CM

## 2019-09-04 DIAGNOSIS — Z13.1 SCREENING FOR DIABETES MELLITUS: ICD-10-CM

## 2019-09-04 LAB
25(OH)D3 SERPL-MCNC: 29 NG/ML (ref 30–100)
ALBUMIN SERPL BCP-MCNC: 4.1 G/DL (ref 3.2–4.9)
ALBUMIN/GLOB SERPL: 1.8 G/DL
ALP SERPL-CCNC: 45 U/L (ref 30–99)
ALT SERPL-CCNC: 24 U/L (ref 2–50)
ANION GAP SERPL CALC-SCNC: 6 MMOL/L (ref 0–11.9)
AST SERPL-CCNC: 27 U/L (ref 12–45)
BASOPHILS # BLD AUTO: 0.6 % (ref 0–1.8)
BASOPHILS # BLD: 0.03 K/UL (ref 0–0.12)
BILIRUB SERPL-MCNC: 0.7 MG/DL (ref 0.1–1.5)
BUN SERPL-MCNC: 20 MG/DL (ref 8–22)
CALCIUM SERPL-MCNC: 9.7 MG/DL (ref 8.5–10.5)
CHLORIDE SERPL-SCNC: 106 MMOL/L (ref 96–112)
CHOLEST SERPL-MCNC: 163 MG/DL (ref 100–199)
CO2 SERPL-SCNC: 27 MMOL/L (ref 20–33)
CREAT SERPL-MCNC: 1.02 MG/DL (ref 0.5–1.4)
EOSINOPHIL # BLD AUTO: 0.12 K/UL (ref 0–0.51)
EOSINOPHIL NFR BLD: 2.2 % (ref 0–6.9)
ERYTHROCYTE [DISTWIDTH] IN BLOOD BY AUTOMATED COUNT: 48.4 FL (ref 35.9–50)
EST. AVERAGE GLUCOSE BLD GHB EST-MCNC: 123 MG/DL
FASTING STATUS PATIENT QL REPORTED: NORMAL
GLOBULIN SER CALC-MCNC: 2.3 G/DL (ref 1.9–3.5)
GLUCOSE SERPL-MCNC: 107 MG/DL (ref 65–99)
HBA1C MFR BLD: 5.9 % (ref 0–5.6)
HCT VFR BLD AUTO: 47.3 % (ref 42–52)
HDLC SERPL-MCNC: 68 MG/DL
HGB BLD-MCNC: 15.4 G/DL (ref 14–18)
IMM GRANULOCYTES # BLD AUTO: 0.01 K/UL (ref 0–0.11)
IMM GRANULOCYTES NFR BLD AUTO: 0.2 % (ref 0–0.9)
LDLC SERPL CALC-MCNC: 76 MG/DL
LYMPHOCYTES # BLD AUTO: 1.68 K/UL (ref 1–4.8)
LYMPHOCYTES NFR BLD: 31.2 % (ref 22–41)
MCH RBC QN AUTO: 32.6 PG (ref 27–33)
MCHC RBC AUTO-ENTMCNC: 32.6 G/DL (ref 33.7–35.3)
MCV RBC AUTO: 100 FL (ref 81.4–97.8)
MONOCYTES # BLD AUTO: 0.42 K/UL (ref 0–0.85)
MONOCYTES NFR BLD AUTO: 7.8 % (ref 0–13.4)
NEUTROPHILS # BLD AUTO: 3.12 K/UL (ref 1.82–7.42)
NEUTROPHILS NFR BLD: 58 % (ref 44–72)
NRBC # BLD AUTO: 0 K/UL
NRBC BLD-RTO: 0 /100 WBC
PLATELET # BLD AUTO: 143 K/UL (ref 164–446)
PMV BLD AUTO: 10.4 FL (ref 9–12.9)
POTASSIUM SERPL-SCNC: 4.1 MMOL/L (ref 3.6–5.5)
PROT SERPL-MCNC: 6.4 G/DL (ref 6–8.2)
RBC # BLD AUTO: 4.73 M/UL (ref 4.7–6.1)
SODIUM SERPL-SCNC: 139 MMOL/L (ref 135–145)
TRIGL SERPL-MCNC: 95 MG/DL (ref 0–149)
WBC # BLD AUTO: 5.4 K/UL (ref 4.8–10.8)

## 2019-09-04 PROCEDURE — 83036 HEMOGLOBIN GLYCOSYLATED A1C: CPT

## 2019-09-04 PROCEDURE — 80053 COMPREHEN METABOLIC PANEL: CPT

## 2019-09-04 PROCEDURE — 80061 LIPID PANEL: CPT

## 2019-09-04 PROCEDURE — 82306 VITAMIN D 25 HYDROXY: CPT

## 2019-09-04 PROCEDURE — 36415 COLL VENOUS BLD VENIPUNCTURE: CPT

## 2019-09-04 PROCEDURE — 85025 COMPLETE CBC W/AUTO DIFF WBC: CPT

## 2019-09-23 ENCOUNTER — TELEPHONE (OUTPATIENT)
Dept: MEDICAL GROUP | Facility: IMAGING CENTER | Age: 64
End: 2019-09-23

## 2019-09-23 ENCOUNTER — INPATIENT (INPATIENT)
Facility: HOSPITAL | Age: 64
LOS: 0 days | Discharge: HOME | DRG: 72 | End: 2019-09-23
Attending: INTERNAL MEDICINE | Admitting: INTERNAL MEDICINE
Payer: COMMERCIAL

## 2019-09-23 VITALS
TEMPERATURE: 96 F | SYSTOLIC BLOOD PRESSURE: 121 MMHG | HEART RATE: 63 BPM | WEIGHT: 192.9 LBS | DIASTOLIC BLOOD PRESSURE: 73 MMHG | OXYGEN SATURATION: 99 % | RESPIRATION RATE: 18 BRPM

## 2019-09-23 VITALS — WEIGHT: 164.91 LBS | HEIGHT: 71 IN

## 2019-09-23 DIAGNOSIS — R41.82 ALTERED MENTAL STATUS, UNSPECIFIED: ICD-10-CM

## 2019-09-23 LAB
ALBUMIN SERPL ELPH-MCNC: 3.5 G/DL — SIGNIFICANT CHANGE UP (ref 3.3–5)
ALP SERPL-CCNC: 60 U/L — SIGNIFICANT CHANGE UP (ref 40–120)
ALT FLD-CCNC: 39 U/L — SIGNIFICANT CHANGE UP (ref 12–78)
ANION GAP SERPL CALC-SCNC: 6 MMOL/L — SIGNIFICANT CHANGE UP (ref 5–17)
APTT BLD: 29.5 SEC — SIGNIFICANT CHANGE UP (ref 27.5–36.3)
AST SERPL-CCNC: 29 U/L — SIGNIFICANT CHANGE UP (ref 15–37)
BASOPHILS # BLD AUTO: 0.02 K/UL — SIGNIFICANT CHANGE UP (ref 0–0.2)
BASOPHILS NFR BLD AUTO: 0.4 % — SIGNIFICANT CHANGE UP (ref 0–2)
BILIRUB SERPL-MCNC: 0.4 MG/DL — SIGNIFICANT CHANGE UP (ref 0.2–1.2)
BUN SERPL-MCNC: 16 MG/DL — SIGNIFICANT CHANGE UP (ref 7–23)
CALCIUM SERPL-MCNC: 9.4 MG/DL — SIGNIFICANT CHANGE UP (ref 8.5–10.1)
CHLORIDE SERPL-SCNC: 110 MMOL/L — HIGH (ref 96–108)
CO2 SERPL-SCNC: 27 MMOL/L — SIGNIFICANT CHANGE UP (ref 22–31)
CREAT SERPL-MCNC: 1.07 MG/DL — SIGNIFICANT CHANGE UP (ref 0.5–1.3)
EOSINOPHIL # BLD AUTO: 0.13 K/UL — SIGNIFICANT CHANGE UP (ref 0–0.5)
EOSINOPHIL NFR BLD AUTO: 2.5 % — SIGNIFICANT CHANGE UP (ref 0–6)
GLUCOSE SERPL-MCNC: 103 MG/DL — HIGH (ref 70–99)
HCT VFR BLD CALC: 42.1 % — SIGNIFICANT CHANGE UP (ref 39–50)
HGB BLD-MCNC: 14.2 G/DL — SIGNIFICANT CHANGE UP (ref 13–17)
IMM GRANULOCYTES NFR BLD AUTO: 0.2 % — SIGNIFICANT CHANGE UP (ref 0–1.5)
INR BLD: 1.11 RATIO — SIGNIFICANT CHANGE UP (ref 0.88–1.16)
LYMPHOCYTES # BLD AUTO: 1.44 K/UL — SIGNIFICANT CHANGE UP (ref 1–3.3)
LYMPHOCYTES # BLD AUTO: 27.6 % — SIGNIFICANT CHANGE UP (ref 13–44)
MCHC RBC-ENTMCNC: 33.3 PG — SIGNIFICANT CHANGE UP (ref 27–34)
MCHC RBC-ENTMCNC: 33.7 GM/DL — SIGNIFICANT CHANGE UP (ref 32–36)
MCV RBC AUTO: 98.8 FL — SIGNIFICANT CHANGE UP (ref 80–100)
MONOCYTES # BLD AUTO: 0.44 K/UL — SIGNIFICANT CHANGE UP (ref 0–0.9)
MONOCYTES NFR BLD AUTO: 8.4 % — SIGNIFICANT CHANGE UP (ref 2–14)
NEUTROPHILS # BLD AUTO: 3.17 K/UL — SIGNIFICANT CHANGE UP (ref 1.8–7.4)
NEUTROPHILS NFR BLD AUTO: 60.9 % — SIGNIFICANT CHANGE UP (ref 43–77)
PCP SPEC-MCNC: SIGNIFICANT CHANGE UP
PLATELET # BLD AUTO: 146 K/UL — LOW (ref 150–400)
POTASSIUM SERPL-MCNC: 4.1 MMOL/L — SIGNIFICANT CHANGE UP (ref 3.5–5.3)
POTASSIUM SERPL-SCNC: 4.1 MMOL/L — SIGNIFICANT CHANGE UP (ref 3.5–5.3)
PROT SERPL-MCNC: 6.4 GM/DL — SIGNIFICANT CHANGE UP (ref 6–8.3)
PROTHROM AB SERPL-ACNC: 12.4 SEC — SIGNIFICANT CHANGE UP (ref 10–12.9)
RBC # BLD: 4.26 M/UL — SIGNIFICANT CHANGE UP (ref 4.2–5.8)
RBC # FLD: 13.2 % — SIGNIFICANT CHANGE UP (ref 10.3–14.5)
SODIUM SERPL-SCNC: 143 MMOL/L — SIGNIFICANT CHANGE UP (ref 135–145)
TSH SERPL-MCNC: 0.95 UU/ML — SIGNIFICANT CHANGE UP (ref 0.34–4.82)
WBC # BLD: 5.21 K/UL — SIGNIFICANT CHANGE UP (ref 3.8–10.5)
WBC # FLD AUTO: 5.21 K/UL — SIGNIFICANT CHANGE UP (ref 3.8–10.5)

## 2019-09-23 PROCEDURE — 93010 ELECTROCARDIOGRAM REPORT: CPT

## 2019-09-23 PROCEDURE — 95816 EEG AWAKE AND DROWSY: CPT

## 2019-09-23 PROCEDURE — 70496 CT ANGIOGRAPHY HEAD: CPT | Mod: 26

## 2019-09-23 PROCEDURE — 95816 EEG AWAKE AND DROWSY: CPT | Mod: 26

## 2019-09-23 PROCEDURE — 70498 CT ANGIOGRAPHY NECK: CPT | Mod: 26

## 2019-09-23 PROCEDURE — 92523 SPEECH SOUND LANG COMPREHEN: CPT | Mod: GN

## 2019-09-23 PROCEDURE — 92610 EVALUATE SWALLOWING FUNCTION: CPT | Mod: GN

## 2019-09-23 PROCEDURE — 99285 EMERGENCY DEPT VISIT HI MDM: CPT

## 2019-09-23 PROCEDURE — 70551 MRI BRAIN STEM W/O DYE: CPT

## 2019-09-23 PROCEDURE — 82607 VITAMIN B-12: CPT

## 2019-09-23 PROCEDURE — 70551 MRI BRAIN STEM W/O DYE: CPT | Mod: 26

## 2019-09-23 PROCEDURE — 36415 COLL VENOUS BLD VENIPUNCTURE: CPT

## 2019-09-23 PROCEDURE — 84443 ASSAY THYROID STIM HORMONE: CPT

## 2019-09-23 RX ORDER — ASCORBIC ACID 60 MG
1 TABLET,CHEWABLE ORAL
Qty: 0 | Refills: 0 | DISCHARGE

## 2019-09-23 RX ORDER — VITAMIN E 100 UNIT
1 CAPSULE ORAL
Qty: 0 | Refills: 0 | DISCHARGE

## 2019-09-23 RX ORDER — TAMSULOSIN HYDROCHLORIDE 0.4 MG/1
0.4 CAPSULE ORAL AT BEDTIME
Refills: 0 | Status: DISCONTINUED | OUTPATIENT
Start: 2019-09-23 | End: 2019-09-23

## 2019-09-23 RX ORDER — INFLUENZA VIRUS VACCINE 15; 15; 15; 15 UG/.5ML; UG/.5ML; UG/.5ML; UG/.5ML
0.5 SUSPENSION INTRAMUSCULAR ONCE
Refills: 0 | Status: DISCONTINUED | OUTPATIENT
Start: 2019-09-23 | End: 2019-09-23

## 2019-09-23 RX ORDER — GLUCOSAMINE/CHONDROITIN/C/MANG 500-400 MG
1 CAPSULE ORAL
Qty: 0 | Refills: 0 | DISCHARGE

## 2019-09-23 RX ORDER — OMEGA-3 ACID ETHYL ESTERS 1 G
1 CAPSULE ORAL
Qty: 0 | Refills: 0 | DISCHARGE

## 2019-09-23 RX ORDER — SIMVASTATIN 20 MG/1
40 TABLET, FILM COATED ORAL AT BEDTIME
Refills: 0 | Status: DISCONTINUED | OUTPATIENT
Start: 2019-09-23 | End: 2019-09-23

## 2019-09-23 RX ORDER — CYCLOBENZAPRINE HYDROCHLORIDE 10 MG/1
1 TABLET, FILM COATED ORAL
Qty: 0 | Refills: 0 | DISCHARGE

## 2019-09-23 RX ORDER — FLUTICASONE PROPIONATE 50 MCG
1 SPRAY, SUSPENSION NASAL
Qty: 0 | Refills: 0 | DISCHARGE

## 2019-09-23 RX ORDER — ASPIRIN/CALCIUM CARB/MAGNESIUM 324 MG
1 TABLET ORAL
Qty: 0 | Refills: 0 | DISCHARGE

## 2019-09-23 RX ORDER — SIMVASTATIN 20 MG/1
1 TABLET, FILM COATED ORAL
Qty: 0 | Refills: 0 | DISCHARGE

## 2019-09-23 RX ORDER — TAMSULOSIN HYDROCHLORIDE 0.4 MG/1
1 CAPSULE ORAL
Qty: 0 | Refills: 0 | DISCHARGE

## 2019-09-23 RX ORDER — ZOLPIDEM TARTRATE 10 MG/1
5 TABLET ORAL AT BEDTIME
Refills: 0 | Status: DISCONTINUED | OUTPATIENT
Start: 2019-09-23 | End: 2019-09-23

## 2019-09-23 RX ORDER — ZOLPIDEM TARTRATE 10 MG/1
1 TABLET ORAL
Qty: 0 | Refills: 0 | DISCHARGE
Start: 2019-09-23

## 2019-09-23 RX ORDER — ASPIRIN/CALCIUM CARB/MAGNESIUM 324 MG
81 TABLET ORAL DAILY
Refills: 0 | Status: DISCONTINUED | OUTPATIENT
Start: 2019-09-23 | End: 2019-09-23

## 2019-09-23 RX ORDER — ZOLPIDEM TARTRATE 10 MG/1
0.5 TABLET ORAL
Qty: 0 | Refills: 0 | DISCHARGE

## 2019-09-23 RX ADMIN — Medication 81 MILLIGRAM(S): at 17:00

## 2019-09-23 RX ADMIN — TAMSULOSIN HYDROCHLORIDE 0.4 MILLIGRAM(S): 0.4 CAPSULE ORAL at 21:52

## 2019-09-23 NOTE — SWALLOW BEDSIDE ASSESSMENT ADULT - SLP GENERAL OBSERVATIONS
The pt was alert, pleasant and interactive. He was able to verbalize during communicative probes and in conversation. The pt was oriented x3+. When he verbalized, his motor speech abilities were intact and his speech intelligibility was 100%. No" slowing of speech" was demonstrated.  Additionally, his utterances were fluent, linguistically intact, adequately detailed and contextually appropriate.  Pt was able to effectively verbalize his intents and is at reported communicative baseline. Note that he denied Dysphagia when asked. . The pt was alert, pleasant and interactive. He was able to verbalize during communicative probes and in conversation. The pt was oriented x3+. When he verbalized, his motor speech abilities were intact and his speech intelligibility was 100%. No" slowing of speech" was demonstrated.  Additionally, his utterances were fluent, linguistically intact, adequately detailed and contextually appropriate.  Pt was able to effectively verbalize his intents and is at reported communicative baseline. Note that he denied Dysphagia when asked.

## 2019-09-23 NOTE — DISCHARGE NOTE PROVIDER - HOSPITAL COURSE
Impression:    63M.  admitted 2019.  visiting from [place] to attend his mother's .  arrived Saturday, "taking a red eye flight."  during the day, admits to consuming more EtOH than usual.  his last drink was ~9PM before going to bed.  has been taking Ambien 10mg for insomnia for ~5 years.  unable to sleep during night.  took 1/2 tablet much later than usual.   hx of GEMA and sleeps w/ CPAP.          the following morning, he was noted to be unsteady, experienced double vision and had periods of confusion.  denied focal neurologic symptoms such as slurred speech, facial droop, extremity weakness.  family brought him into ED after arriving at the  [facility] for evaluation.  in ED, urine tox negative;  CT neuroimaging w/o acute changes.        PMHx:  HLD;  GEMA-CPAP;  BPH.        Assessment:    acute metabolic encephalopathy, now resolved.  no focal neurologic findings upon PE and neuroimaging thus far.  suspect combination of poor sleep from red eye flight and hx of sleep apnea + "more than usual EtOH" consumption within 24 hours prior to onset of symptoms + taking Ambien much later than usual (early that morning).  ddx:  TIA;  CVA;  seizure;  dysrhythmia;  metabolic derangement.        Plan:    -telemetry monitoring, no arrhythmia.    -brain MR, no acute findings.    -EEG, no seizure.    -advised patient to considering weaning off Ambien due to the side effects and risks associated with this medication.  for now, will avoid abrupt discontinuation.  decrease dose to 5mg qhs  PRN.    -may discharge home this evening.

## 2019-09-23 NOTE — ED ADULT NURSE REASSESSMENT NOTE - NS ED NURSE REASSESS COMMENT FT1
Pt received from previous RN.  Pt aaox3. no neuro deficits noted on neuro exam  No c/o pain or discomfort, in no apparent distress.  Plan of care, transition from ED to admitted status discussed with pt and family members at bedside.  All questions answered to pt satisfaction.   Call bell given to patient.  No additional needs at this time, will continue hourly rounding.

## 2019-09-23 NOTE — H&P ADULT - NSHPPHYSICALEXAM_GEN_ALL_CORE
Vital Signs Last 24 Hrs  T(C): 35.7 (23 Sep 2019 13:45), Max: 36.4 (23 Sep 2019 09:54)  T(F): 96.3 (23 Sep 2019 13:45), Max: 97.6 (23 Sep 2019 09:54)  HR: 63 (23 Sep 2019 13:45) (62 - 65)  BP: 121/73 (23 Sep 2019 13:45) (114/100 - 125/82)  BP(mean): --  RR: 18 (23 Sep 2019 13:45) (15 - 18)  SpO2: 99% (23 Sep 2019 13:45) (94% - 100%)    Constitutional: NAD.   HEENT: PERRL, EOMI, MMM.  Neck: Soft and supple, No carotid bruit, No JVD  Respiratory: Breath sounds are clear bilaterally, No wheezing, rales or rhonchi  Cardiovascular: S1 and S2, regular rate and rhythm, no murmur, rub or gallop.  Gastrointestinal: Bowel Sounds present, soft, nontender, nondistended, no guarding, no rebound, no mass.  Extremities: No peripheral edema  Vascular: 2+ peripheral pulses  Neurological: A/O x 3, no focal deficits  Musculoskeletal: 5/5 strength b/l upper and lower extremities  Skin:  no visible rashes.

## 2019-09-23 NOTE — DISCHARGE NOTE NURSING/CASE MANAGEMENT/SOCIAL WORK - PATIENT PORTAL LINK FT
You can access the FollowMyHealth Patient Portal offered by [facility] by registering at the following website: [email or web address]. By joining [facility]’s FollowMyHealth portal, you will also be able to view your health information using other applications (apps) compatible with our system.

## 2019-09-23 NOTE — PHARMACOTHERAPY INTERVENTION NOTE - COMMENTS
Med history completed, verified with patient with a medication listed recorded no his phone, and doctor first.

## 2019-09-23 NOTE — SWALLOW BEDSIDE ASSESSMENT ADULT - SWALLOW EVAL: RECOMMENDED DIET
SUGGEST A REGULAR TEXTURE DIET WITH THIN LIQUID CONSISTENCIES AS THE PATIENT TOLERATES THESE FOOD CONSISTENCIES FROM AN OROPHARYNGEAL SWALLOWING STANCE OMN CLINICAL EXAM. SUGGEST A REGULAR TEXTURE DIET WITH THIN LIQUID CONSISTENCIES AS THE PATIENT TOLERATES THESE FOOD CONSISTENCIES FROM AN OROPHARYNGEAL SWALLOWING STANCE ON CLINICAL EXAM.

## 2019-09-23 NOTE — ED PROVIDER NOTE - OBJECTIVE STATEMENT
64 y/o male with no significant PMHx presents to the ED c/o double vision. Pt states he was driving to [employer] for a , felt dizzy, had double vision and had to stop driving. No other complaints at this time. 64 y/o male with a PMHx of HLD presents to the ED c/o confusion. As per family at bedside, they were getting ready to go to a  this morning and pt stumbled on the stairs around 8am. Pt was driving to the [employer] and started to swerve. Family notes that pt pulled over so his wife could drive and he stumbled out of the car. Pt was slightly confused. No LOC. No other complaints at this time.

## 2019-09-23 NOTE — ED STATDOCS - PROGRESS NOTE DETAILS
Prem Cruz: 64 y/o male presents to the ED c/o eye vision change. States he became lightheaded since 8am this morning. States he was driving to [employer] for a  and became dizzy and had to stop driving. As per wife, pt was "not responding" and appeared lethargic. Will send pt to main ED for further evaluation.

## 2019-09-23 NOTE — TELEPHONE ENCOUNTER
[facility] from [facility], called morning of 9/23/19, stating patient has been admitted to their hospital. 6 E. [phone] /kb

## 2019-09-23 NOTE — CONSULT NOTE ADULT - ASSESSMENT
63 year old man with a h/o HLD who presented with acute confusion and word finding difficulties but has resolved, NIHSS 0.     PLAN-  Pt is not a candidate for t-PA at this time  Telemetry observation   possible MRI vs outpatient follow-up     Discussed with Dr. Pascual who will see the patient 63 year old man with a h/o HLD who presented with acute confusion and word finding difficulties but has resolved, NIHSS 0.     PLAN-  Pt is not a candidate for t-PA at this time  Telemetry observation   possible MRI vs outpatient follow-up     Discussed with Dr. Pascual who will see the patient     NEUROLOGY ATTENDING NOTE : Pt seen and evaluated in ER.    Pt had Transient episode of Confusion with unsteady gait TIA can not be ruled out.    Recommended admission to telemetry for work up.  Pt and family declined.  Aspirin 325 m stat and followed  by 81 mg q day.  EEG prior to D/c.  MRI brain as out Pt.  Follow up with PCP.  Continue Statin.  Monitor BP.  Correct any metabolic causes.  Discussed with pt, his wife and Dr. Gonzales.

## 2019-09-23 NOTE — SWALLOW BEDSIDE ASSESSMENT ADULT - SWALLOW EVAL: DIAGNOSIS
1) The patient demonstrates Oropharyngeal Swallowing abilities which subjectively appear to be stable and within functional parameters for age. NO behavioral aspiration signs exhibited on exam. Odynophagia was denied.   2) The pt was alert and interactive. He was able to verbalize during communicative probes and in conversation. The pt was oriented x3+. When he verbalized, his motor speech abilities were intact and his speech intelligibility was 100%. No" slowing of speech" was demonstrated.  Additionally, his utterances were fluent, linguistically intact, adequately detailed and contextually appropriate.  Pt was able to effectively verbalize his intents and is at reported communicative baseline.

## 2019-09-23 NOTE — SWALLOW BEDSIDE ASSESSMENT ADULT - COMMENTS
The pt was admitted to  after his daughters witnessed him walking in an unsteady "wobbly" manner. His daughters also stated that his "speech sounded slow" at the time when he tried to speak and that he possibly c/o diplopia at the time. He has gait steadiness/mentation/speech integrity have been improving since being in the hospital. The pt has little recollection of the events leading up to his admission. Work up is in progress. His underlying medical history is notable for him recently taking a red eye flight where he did drink alcohol but that was days before he became symptomatic. Other prior medical history is notable for HLD, obstructive sleep apnea for which he os on CPAP and insomnia for which he is prescribed Ambien. The pt was admitted to  after his daughters witnessed him walking in an unsteady "wobbly" manner. His daughters also stated that his "speech sounded slow" at the time when he tried to speak and that he possibly c/o diplopia at the time. His gait steadiness/mentation/speech integrity have been improving since being in the hospital. The pt has little recollection of the events leading up to his admission. Work up is in progress. His underlying medical history is notable for him recently taking a red eye flight where he did drink alcohol but that was days before he became symptomatic. Other prior medical history is notable for HLD, obstructive sleep apnea for which he is on CPAP and insomnia for which he is prescribed Ambien.

## 2019-09-23 NOTE — SWALLOW BEDSIDE ASSESSMENT ADULT - SWALLOW EVAL: CRITERIA FOR SKILLED INTERVENTION MET
NO NEED FOR SPEECH OR SWALLOWING THERAPY. HIS SPEECH-LANGUAGE AND OROPHARYNGEAL SWALLOWING ABILITIES ARE WITHIN FUNCTIONAL PARAMETERS/AT REPORTED BASELINE. GIVEN ABOVE, WILL NOT ACTIVELY FOLLOW. RECONSULT PRN.

## 2019-09-23 NOTE — H&P ADULT - NSHPROSALLOTHERNEGRD_GEN_ALL_CORE
All other review of systems negative, except as noted in HPI Hold off eliquis now Hold off eliquis now, resume after thoracentesis

## 2019-09-23 NOTE — ED PROVIDER NOTE - PMH
No pertinent past medical history <<----- Click to add NO pertinent Past Medical History HLD (hyperlipidemia)

## 2019-09-23 NOTE — ED PROVIDER NOTE - PROGRESS NOTE DETAILS
Patient's speech more fluent, oriented x 3. Intiially refusing admission -- now agreeing.  Will order MRI brain, EEG already requested.  TBA tele

## 2019-09-23 NOTE — ED ADULT TRIAGE NOTE - CHIEF COMPLAINT QUOTE
presents to ED c/o double vision, dizziness, and lightheadedness since about 0830 this morning. denies HA or eye pain. broken blood vessel in L. eye noted upon arrival

## 2019-09-23 NOTE — H&P ADULT - HISTORY OF PRESENT ILLNESS
CC:  altered mental status.    HPI:  63M.  admitted 2019.  visiting from [place] to attend his mother's .  arrived Saturday, "taking a red eye flight."  during the day, admits to consuming more EtOH than usual.  his last drink was ~9PM before going to bed.  has been taking Ambien 10mg for insomnia for ~5 years.  unable to sleep during night.  took 1/2 tablet (knowing he had to wake earlier for the  the next morning) much later than usual.  moreover, hx of GEMA and sleeps w/ CPAP.      during to following morning, he was noted to be unsteady, experienced double vision and had periods of confusion.  denied focal neurologic symptoms such as slurred speech, facial droop, extremity weakness.  family brought him into ED after [facility].  in ED, CT neuroimaging w/o acute changes. CC:  altered mental status.    HPI:  63M.  admitted 2019.  visiting from [place] to attend his mother's .  arrived Saturday, "taking a red eye flight."  during the day, admits to consuming more EtOH than usual.  his last drink was ~9PM before going to bed.  has been taking Ambien 10mg for insomnia for ~5 years.  unable to sleep during night.  took 1/2 tablet (knowing he had to wake earlier for the  the next morning) much later than usual.  moreover, hx of GEMA and sleeps w/ CPAP.      during to following morning, he was noted to be unsteady, experienced double vision and had periods of confusion.  denied focal neurologic symptoms such as slurred speech, facial droop, extremity weakness.  family brought him into ED after arriving at the  [facility] for evaluation.  in ED, CT neuroimaging w/o acute changes.    ROS:  (-) SOB, palpitations, CP, slurred speech, facial droop or extremity weakness.    PMHx:  HLD;  GEMA-CPAP;  BPH.    PSHx:  denied recent major surgeries.    ALL:  denied.    Home Medications:  aspirin 81 mg oral tablet: 1 tab(s) orally once a day (23 Sep 2019 12:40)  cyclobenzaprine 5 mg oral tablet: 1 tab(s) orally 3 times a day, As Needed - for muscle spasm (23 Sep 2019 12:40)  Fish Oil 1000 mg oral capsule: 1 cap(s) orally once a day (23 Sep 2019 12:40)  fluticasone 50 mcg/inh nasal spray: 1 spray(s) nasal once a day (23 Sep 2019 12:40)  Glucosamine Chondroitin oral capsule: 1 cap(s) orally once a day (23 Sep 2019 12:40)  simvastatin 40 mg oral tablet: 1 tab(s) orally once a day (at bedtime) (23 Sep 2019 12:40)  tamsulosin 0.4 mg oral capsule: 1 cap(s) orally once a day (23 Sep 2019 12:40)  Vitamin C 1000 mg oral tablet: 1 tab(s) orally once a day (23 Sep 2019 12:40)  vitamin E 400 intl units oral capsule: 1 cap(s) orally once a day (23 Sep 2019 12:40)  zolpidem 10 mg oral tablet: 0.5 tab(s) orally 3 times a week (23 Sep 2019 12:40)    SocHx:  lives in [place] w/ his wife.  3 children who live locally.  no tobacco.  occational EtOH.  infrequent marijuana.    FHx:  no hx of sudden cardiac events 1st degree relatives. CC:  altered mental status.    HPI:  63M.  admitted 2019.  visiting from [place] to attend his mother's .  arrived Saturday, "taking a red eye flight."  during the day, admits to consuming more EtOH than usual.  his last drink was ~9PM before going to bed.  has been taking Ambien 10mg for insomnia for ~5 years.  unable to sleep during night.  took 1/2 tablet much later than usual.   hx of GEMA and sleeps w/ CPAP.      the following morning, he was noted to be unsteady, experienced double vision and had periods of confusion.  denied focal neurologic symptoms such as slurred speech, facial droop, extremity weakness.  family brought him into ED after arriving at the  [facility] for evaluation.  in ED, urine tox negative.  CT neuroimaging w/o acute changes.    ROS:  (-) SOB, palpitations, CP, slurred speech, facial droop or extremity weakness.    PMHx:  HLD;  GEMA-CPAP;  BPH.    PSHx:  denied recent major surgeries.    ALL:  denied.    Home Medications:  aspirin 81 mg oral tablet: 1 tab(s) orally once a day (23 Sep 2019 12:40)  cyclobenzaprine 5 mg oral tablet: 1 tab(s) orally 3 times a day, As Needed - for muscle spasm (23 Sep 2019 12:40)  Fish Oil 1000 mg oral capsule: 1 cap(s) orally once a day (23 Sep 2019 12:40)  fluticasone 50 mcg/inh nasal spray: 1 spray(s) nasal once a day (23 Sep 2019 12:40)  Glucosamine Chondroitin oral capsule: 1 cap(s) orally once a day (23 Sep 2019 12:40)  simvastatin 40 mg oral tablet: 1 tab(s) orally once a day (at bedtime) (23 Sep 2019 12:40)  tamsulosin 0.4 mg oral capsule: 1 cap(s) orally once a day (23 Sep 2019 12:40)  Vitamin C 1000 mg oral tablet: 1 tab(s) orally once a day (23 Sep 2019 12:40)  vitamin E 400 intl units oral capsule: 1 cap(s) orally once a day (23 Sep 2019 12:40)  zolpidem 10 mg oral tablet: 0.5 tab(s) orally 3 times a week (23 Sep 2019 12:40)    SocHx:  lives in [place] w/ his wife.  3 children who live locally.  no tobacco.  occational EtOH.  infrequent marijuana.    FHx:  no hx of sudden cardiac events 1st degree relatives.

## 2019-09-23 NOTE — CONSULT NOTE ADULT - SUBJECTIVE AND OBJECTIVE BOX
Patient is a 63y old  Male who presents with a chief complaint of confusion     HPI:  Patient is a 63 year old man with a PMH of Hyperlipidemia who is visiting from [place] for his mother's  when his family noticed he was stumbled on the stairs around 8am, he then swerved while driving to the [employer] a little while later and then stumbled out of the car when they pulled over to switch drivers. He presented the ED and was slightly confused in the ED and a CODE STROKE was called. Pt was taken directly to the CT scanner from intake, the pt was then examined in the Trauma bay. On exam the patient has an NHISS 0.   CT Head was negative for acute stroke or hemorrhage.      PAST MEDICAL & SURGICAL HISTORY:  High Cholesterol     FAMILY HISTORY:   non-contributory      Social Hx:    Nonsmoker, no drug or alcohol use    MEDICATIONS  (STANDING):  Home meds includes Lipitor     Allergies:  No Known Allergies    ROS: Pertinent positives in HPI, all other ROS were reviewed and are negative.      Vital Signs Last 24 Hrs  T(C): 36.4 (23 Sep 2019 09:54), Max: 36.4 (23 Sep 2019 09:54)  T(F): 97.6 (23 Sep 2019 09:54), Max: 97.6 (23 Sep 2019 09:54)  HR: 64 (23 Sep 2019 09:54) (64 - 64)  BP: 115/68 (23 Sep 2019 09:54) (115/68 - 115/68)  RR: 18 (23 Sep 2019 09:54) (18 - 18)  SpO2: 94% (23 Sep 2019 09:54) (94% - 94%)    PHYSICAL EXAM:  Constitutional: awake and alert, NAD  HEENT: PERRLA, EOMI  Neck: Supple  Respiratory: Breath sounds are clear bilaterally  Cardiovascular: S1 and S2, regular rhythm  Gastrointestinal: soft, nontender  Extremities:  no edema  Vascular: Carotid Bruit - no  Musculoskeletal: no joint swelling/tenderness, no abnormal movements  Skin: No rashes    Neurological exam:  HF: A x O x 3. Appropriately interactive, normal affect. Speech fluent, No Aphasia or paraphasic errors. Naming /repetition intact   CN: PEARRL, EOMI, VFF, facial sensation normal, no NLFD, tongue midline, Palate moves equally, SCM equal bilaterally  Motor: No pronator drift, Strength 5/5 in all 4 ext, normal bulk and tone, no tremor, rigidity or bradykinesia.    Sens: Intact to light touch / PP/ VS/ JS    Reflexes: Symmetric and normal, downgoing toes b/l  Coord:  No FNFA, dysmetria, RICARDO intact   Gait/Balance: Steady gait, balance intact     NIHSS: 0    Labs:                        14.2   5.21  )-----------( 146      ( 23 Sep 2019 10:27 )             42.1     PT/INR - ( 23 Sep 2019 10:27 )   PT: 12.4 sec;   INR: 1.11 ratio      PTT - ( 23 Sep 2019 10:27 )  PTT:29.5 sec    RADIOLOGY:  < from: CT Brain Stroke Protocol (19 @ 10:38) >  IMPRESSION:    1.   Right carotid system:  No hemodynamically significant stenosis.  2.   Left carotid system:  No hemodynamically significant stenosis.  3.   Intracranial circulation:  No significant vascular lesion.  4.   Brain:  No significant lesion identified.

## 2019-09-23 NOTE — H&P ADULT - ASSESSMENT
Impression:  63M.  admitted 2019.  visiting from [place] to attend his mother's .  arrived Saturday, "taking a red eye flight."  during the day, admits to consuming more EtOH than usual.  his last drink was ~9PM before going to bed.  has been taking Ambien 10mg for insomnia for ~5 years.  unable to sleep during night.  took 1/2 tablet (knowing he had to wake earlier for the  the next morning) much later than usual.  moreover, hx of GEMA and sleeps w/ CPAP.      during to following morning, he was noted to be unsteady, experienced double vision and had periods of confusion.  denied focal neurologic symptoms such as slurred speech, facial droop, extremity weakness.  family brought him into ED after arriving at the  [facility] for evaluation.  in ED, CT neuroimaging w/o acute changes.  urine toxicology negative.    PMHx:  HLD;  GEMA-CPAP;  BPH.    Assessment:  1.	acute metabolic encephalopathy, now resolved.  no focal neurologic findings upon PE and neuroimaging thus far.  suspect combination of poor sleep from red eye flight and hx of sleep apnea + "more than usual EtOH" consumption within 24 hours prior to onset of symptoms + taking Ambien much later than usual (early that morning).  ddx:  TIA;  CVA;  seizure;  dysrhythmia;  metabolic derangement.    Plan:  -telemetry monitoring.  -brain MR.  -EEG.  -check B12 and folic acid.  -orthostatic VS.  -decrease Ambien dose 5mg qhs PRN.  -Neurology following. Impression:  63M.  admitted 2019.  visiting from [place] to attend his mother's .  arrived Saturday, "taking a red eye flight."  during the day, admits to consuming more EtOH than usual.  his last drink was ~9PM before going to bed.  has been taking Ambien 10mg for insomnia for ~5 years.  unable to sleep during night.  took 1/2 tablet much later than usual.   hx of GEMA and sleeps w/ CPAP.      the following morning, he was noted to be unsteady, experienced double vision and had periods of confusion.  denied focal neurologic symptoms such as slurred speech, facial droop, extremity weakness.  family brought him into ED after arriving at the  [facility] for evaluation.  in ED, urine tox negative;  CT neuroimaging w/o acute changes.    PMHx:  HLD;  GEMA-CPAP;  BPH.    Assessment:  1.	acute metabolic encephalopathy, now resolved.  no focal neurologic findings upon PE and neuroimaging thus far.  suspect combination of poor sleep from red eye flight and hx of sleep apnea + "more than usual EtOH" consumption within 24 hours prior to onset of symptoms + taking Ambien much later than usual (early that morning).  ddx:  TIA;  CVA;  seizure;  dysrhythmia;  metabolic derangement.    Plan:  -telemetry monitoring.  -brain MR.  -EEG.  -check B12 and folic acid.  -orthostatic VS.  -decrease Ambien dose 5mg qhs PRN.  -Neurology following. Impression:  63M.  admitted 2019.  visiting from [place] to attend his mother's .  arrived Saturday, "taking a red eye flight."  during the day, admits to consuming more EtOH than usual.  his last drink was ~9PM before going to bed.  has been taking Ambien 10mg for insomnia for ~5 years.  unable to sleep during night.  took 1/2 tablet much later than usual.   hx of GEMA and sleeps w/ CPAP.      the following morning, he was noted to be unsteady, experienced double vision and had periods of confusion.  denied focal neurologic symptoms such as slurred speech, facial droop, extremity weakness.  family brought him into ED after arriving at the  [facility] for evaluation.  in ED, urine tox negative;  CT neuroimaging w/o acute changes.    PMHx:  HLD;  GEMA-CPAP;  BPH.    Assessment:  1.	acute metabolic encephalopathy, now resolved.  no focal neurologic findings upon PE and neuroimaging thus far.  suspect combination of poor sleep from red eye flight and hx of sleep apnea + "more than usual EtOH" consumption within 24 hours prior to onset of symptoms + taking Ambien much later than usual (early that morning).  ddx:  TIA;  CVA;  seizure;  dysrhythmia;  metabolic derangement.    Plan:  -telemetry monitoring.  -brain MR.  -EEG.  -check B12 and folic acid.  -orthostatic VS.  -advised patient to considering weaning off Ambien due to the side effects and risks associated with this medication.  for now, will avoid abrupt discontinuation.  decrease dose to 5mg qhs  PRN.  -Neurology following.

## 2019-09-24 LAB — VIT B12 SERPL-MCNC: 442 PG/ML — SIGNIFICANT CHANGE UP (ref 232–1245)

## 2019-09-27 DIAGNOSIS — G47.33 OBSTRUCTIVE SLEEP APNEA (ADULT) (PEDIATRIC): ICD-10-CM

## 2019-09-27 DIAGNOSIS — R29.700 NIHSS SCORE 0: ICD-10-CM

## 2019-09-27 DIAGNOSIS — G47.00 INSOMNIA, UNSPECIFIED: ICD-10-CM

## 2019-09-27 DIAGNOSIS — G93.41 METABOLIC ENCEPHALOPATHY: ICD-10-CM

## 2019-09-27 DIAGNOSIS — F10.10 ALCOHOL ABUSE, UNCOMPLICATED: ICD-10-CM

## 2019-10-01 ENCOUNTER — TELEPHONE (OUTPATIENT)
Dept: MEDICAL GROUP | Facility: IMAGING CENTER | Age: 64
End: 2019-10-01

## 2019-10-01 NOTE — TELEPHONE ENCOUNTER
Patient calling in to see if we have received records from [facility] in [place]. Patient states they ran a lot of tests and he would like Dr. Rand to see them. Let patient know we have not received records but I would try calling them to get the records sent. No other questions at this time.

## 2019-10-04 ENCOUNTER — OFFICE VISIT (OUTPATIENT)
Dept: MEDICAL GROUP | Facility: IMAGING CENTER | Age: 64
End: 2019-10-04
Payer: COMMERCIAL

## 2019-10-04 VITALS
SYSTOLIC BLOOD PRESSURE: 122 MMHG | WEIGHT: 176.6 LBS | HEIGHT: 71 IN | HEART RATE: 57 BPM | BODY MASS INDEX: 24.72 KG/M2 | TEMPERATURE: 97.8 F | RESPIRATION RATE: 14 BRPM | DIASTOLIC BLOOD PRESSURE: 74 MMHG | OXYGEN SATURATION: 97 %

## 2019-10-04 DIAGNOSIS — R35.1 BENIGN PROSTATIC HYPERPLASIA WITH NOCTURIA: ICD-10-CM

## 2019-10-04 DIAGNOSIS — Z23 NEED FOR VACCINATION WITH 13-POLYVALENT PNEUMOCOCCAL CONJUGATE VACCINE: ICD-10-CM

## 2019-10-04 DIAGNOSIS — E78.00 PURE HYPERCHOLESTEROLEMIA: ICD-10-CM

## 2019-10-04 DIAGNOSIS — Z11.59 NEED FOR HEPATITIS C SCREENING TEST: ICD-10-CM

## 2019-10-04 DIAGNOSIS — N40.1 BENIGN PROSTATIC HYPERPLASIA WITH NOCTURIA: ICD-10-CM

## 2019-10-04 DIAGNOSIS — Z87.898 HISTORY OF VERTIGO: ICD-10-CM

## 2019-10-04 DIAGNOSIS — Z85.828 HISTORY OF BASAL CELL CARCINOMA: ICD-10-CM

## 2019-10-04 DIAGNOSIS — F51.04 CHRONIC INSOMNIA: ICD-10-CM

## 2019-10-04 DIAGNOSIS — R61 NIGHT SWEATS: ICD-10-CM

## 2019-10-04 DIAGNOSIS — Z12.5 SCREENING PSA (PROSTATE SPECIFIC ANTIGEN): ICD-10-CM

## 2019-10-04 DIAGNOSIS — Z00.00 WELL ADULT EXAM: ICD-10-CM

## 2019-10-04 DIAGNOSIS — E55.9 VITAMIN D INSUFFICIENCY: ICD-10-CM

## 2019-10-04 DIAGNOSIS — R41.3 MEMORY CHANGES: ICD-10-CM

## 2019-10-04 DIAGNOSIS — R40.4 AWARENESS ALTERATION, TRANSIENT: ICD-10-CM

## 2019-10-04 DIAGNOSIS — Z86.010 HISTORY OF COLON POLYPS: ICD-10-CM

## 2019-10-04 DIAGNOSIS — F43.9 STRESS: ICD-10-CM

## 2019-10-04 DIAGNOSIS — G47.33 OBSTRUCTIVE SLEEP APNEA SYNDROME: ICD-10-CM

## 2019-10-04 DIAGNOSIS — Z23 NEED FOR INFLUENZA VACCINATION: ICD-10-CM

## 2019-10-04 LAB
APPEARANCE UR: CLEAR
BILIRUB UR STRIP-MCNC: NEGATIVE MG/DL
COLOR UR AUTO: YELLOW
GLUCOSE UR STRIP.AUTO-MCNC: NEGATIVE MG/DL
KETONES UR STRIP.AUTO-MCNC: NEGATIVE MG/DL
LEUKOCYTE ESTERASE UR QL STRIP.AUTO: NEGATIVE
NITRITE UR QL STRIP.AUTO: NEGATIVE
PH UR STRIP.AUTO: 6 [PH] (ref 5–8)
PROT UR QL STRIP: NEGATIVE MG/DL
RBC UR QL AUTO: NEGATIVE
SP GR UR STRIP.AUTO: 1.01
UROBILINOGEN UR STRIP-MCNC: 0.2 MG/DL

## 2019-10-04 PROCEDURE — 90472 IMMUNIZATION ADMIN EACH ADD: CPT | Performed by: FAMILY MEDICINE

## 2019-10-04 PROCEDURE — 90670 PCV13 VACCINE IM: CPT | Performed by: FAMILY MEDICINE

## 2019-10-04 PROCEDURE — 90662 IIV NO PRSV INCREASED AG IM: CPT | Performed by: FAMILY MEDICINE

## 2019-10-04 PROCEDURE — 81002 URINALYSIS NONAUTO W/O SCOPE: CPT | Performed by: FAMILY MEDICINE

## 2019-10-04 PROCEDURE — 90471 IMMUNIZATION ADMIN: CPT | Performed by: FAMILY MEDICINE

## 2019-10-04 PROCEDURE — 99397 PER PM REEVAL EST PAT 65+ YR: CPT | Mod: 25 | Performed by: FAMILY MEDICINE

## 2019-10-04 ASSESSMENT — PATIENT HEALTH QUESTIONNAIRE - PHQ9: CLINICAL INTERPRETATION OF PHQ2 SCORE: 0

## 2019-10-04 NOTE — PROGRESS NOTES
"    Chief Complaint   Patient presents with   • Annual Exam       HPI:  64 y.o. Male here for routine well exam.   Notes he had an [place] mother's [place].   States he was conscious but not aware. He was at a  type mass. Does not remember most of the mass and wife noted he was mumbling to her.    He does recall seeing cousins in front of him, but does not recall the mass service. Doesn't remember the 1st 2 hours.   He was taken to the ER 19 and evaluated. He felt he was \"coming out of it\" while in the ER. Had 2 CT scan, cognitive test, blood work, EEG and brain MRI which were normal. No specific findings or diagnosis noted.   Some notes in system, media.   Some mild volume loss noted on brain imaging.   Prior to that, in the morning, reports he had double vision.  \"Was dream like.\"  Has new glasses recently, had popped blood vessel recently.  Had alcohol night before the occurence. Takes ambien nightly, which he has been doing for a long time.   Has had some limited memory.   Some \"disappearing memory\"- mixing up name on regular basis for 4-5 months.   Feels he could have mixed up some medications that night as he was traveling. Mostly they are vitamins.   Has come down to 1/2 tab of ambien as he is now trying to wean off the medication.   No significant dementia in family.  He is on asa 81 mg daily.     Significant stressors- family and work. Extended family and siblings. Thinking about early correction.   Wife noticed that morning, he was wobbly and not steady. Was slightly mumbling. Stumbling, loses on right lower extremity, tripping up some.   Feels lack of patience in himself, short tempered. Some frustruations.   Some lack of memory with some conversations.   Eats normal meals. Normal appetite.   Was previously seeing counseling therapy.     Hx of vertigo in past- used meclizine in past. Has not needed to use it. Once a month has a slight " feeling, but does not take medication for it.     Occasionally wakes up in sweats once a month. For past year. No cough or shortness of breath. CXR 5/2019.  Slight hip symptoms on right he has had in past, but not sure if due to slight dizziness occasionally. Exercises from chiropractor.     GISSEL-stable on CPAP.     BPH- on flomax therapy. Nocturia currently, recently. Due to coffee, but not alcohol.  Past months.   Nightly one beer. Notes urine was more yellow that day when he was seen in the ER.     Vitamin D low on prior labs.   Hypercholesterolemia- stable on simvastatin 40 mg daily.     Health Maintenance  Immunizations: Td/Tdap 2013;  Influenza vaccine recommend annually; Zostavax 2016; Shingrix -recommended;  PCV 13 due; PPSV 23 - due in future  Colonoscopy: due 3 years. Hx of colon polyps.   PSA: 5/2019- normal range  AAA: 2015- normal ultrasound  Hx of basal cell skin cancer- sees Dr. Smith, dermatology.     Lifestyle:  Diet: overall healthy, some alcohol  Supplements: as listed  ASA: 81 mg daily.  Exercise/activities: 2 times a week. Hiking. Yard work.   Stressors: work, family. Several stressors in the past couple years, has lost several family members.   Social Support: family  Work: thinking about early longterm       Review of Systems   Constitutional: Negative for fever, chills and malaise/fatigue.   HENT: Negative for congestion, sore throat, or swallowing issues.   Eyes: Negative for pain.   Respiratory: Negative for cough and shortness of breath.    Cardiovascular: Negative for leg swelling. No chest pain.   Gastrointestinal: Negative for nausea, vomiting, abdominal pain and diarrhea.   Genitourinary: Negative for dysuria and hematuria.   Skin: Negative for rash.   Neurological: Negative for new symptoms focal weakness and headaches.   Endo/Heme/Allergies: Does not bruise/bleed easily.   Psychiatric/Behavioral: Negative for depression.  The patient is not nervous/anxious.          Current Outpatient  Medications:   •  tamsulosin (FLOMAX) 0.4 MG capsule, Take 1 Cap by mouth every day., Disp: 90 Cap, Rfl: 3  •  simvastatin (ZOCOR) 40 MG Tab, Take 1 Tab by mouth every evening., Disp: 90 Tab, Rfl: 3  •  fluticasone (FLONASE) 50 MCG/ACT nasal spray, Spray 2 Sprays in nose every day. Each Nostril, Disp: 16 g, Rfl: 3  •  cyclobenzaprine (FLEXERIL) 5 MG tablet, Take 1-2 Tabs by mouth 3 times a day as needed., Disp: 90 Tab, Rfl: 0  •  ketoconazole (NIZORAL) 2 % Cream, Apply 1 Application to affected area(s) every day. Apply to affected area(s) every day., Disp: 1 Tube, Rfl: 0  •  Multiple Vitamins-Minerals (MULTIVITAMIN ADULTS PO), Take  by mouth., Disp: , Rfl:   •  Ascorbic Acid (VITAMIN C PO), Take 1,000 mg by mouth., Disp: , Rfl:   •  Cholecalciferol (VITAMIN D PO), Take  by mouth., Disp: , Rfl:   •  Vitamin E 400 UNIT Tab, Take  by mouth., Disp: , Rfl:   •  Omega-3 Fatty Acids (FISH OIL) 1000 MG Cap capsule, Take 1,000 mg by mouth 3 times a day, with meals., Disp: , Rfl:   •  GLUCOSAMINE CHONDROITIN COMPLX PO, Take  by mouth., Disp: , Rfl:   •  cetirizine (ZYRTEC) 10 MG Tab, Take 10 mg by mouth every day., Disp: , Rfl:   •  imiquimod (ALDARA) 5 % cream, , Disp: , Rfl:   •  aspirin (ASA) 81 MG Chew Tab chewable tablet, CHEW AND SWALLOW 1 TABLET BY MOUTH ONCE DAILY, Disp: 100 Tab, Rfl: 0  •  betamethasone valerate (VALISONE) 0.1 % CREA, Apply 1 Application to affected area(s) 2 times a day. Apply to affected area(s) 2 times a day., Disp: 1 Tube, Rfl: 2  •  fluorouracil (EFUDEX) 5 % cream, Apply 1 Application to affected area(s) 2 times a day. Use 2-4 weeks as directed- apply bid., Disp: 1 Tube, Rfl: 0  •  meclizine (ANTIVERT) 25 MG Tab, Take 0.5-1 Tabs by mouth 3 times a day as needed for Dizziness or Vertigo. (Patient not taking: Reported on 2/22/2019), Disp: 30 Tab, Rfl: 0  •  PICATO 0.015 % Gel, , Disp: , Rfl:   •  albuterol 108 (90 Base) MCG/ACT Aero Soln inhalation aerosol, Inhale 2 Puffs by mouth every 6 hours as  needed for Shortness of Breath. (Patient not taking: Reported on 10/4/2019), Disp: 8.5 g, Rfl: 0    No Known Allergies    Patient Active Problem List   Diagnosis   • Primary insomnia   • Postnasal drip   • Vitamin D insufficiency   • Benign prostatic hyperplasia with lower urinary tract symptoms   • Scoliosis   • Thrombocytopenia (HCC)   • Seasonal allergies   • Left forearm pain   • Left lateral epicondylitis   • Left elbow pain   • Obstructive sleep apnea syndrome   • Pain in right shin   • Chronic bilateral low back pain without sciatica   • Pure hypercholesterolemia   • Memory changes   • Chronic insomnia   • Non-smoker   • Elevated fasting glucose   • History of basal cell carcinoma   • Up to date with influenza vaccination   • History of colon polyps       Past Medical History:   Diagnosis Date   • Basal cell carcinoma     dermatology- Dr. Smith   • Chickenpox    • Dyslipidemia    • Indian measles    • Groin strain 10/21/2011     ICD-10 transition   • Hemorrhoid    • Hyperlipidemia    • Insomnia    • Mumps    • Right hip pain 11/21/2013   • S/P colonoscopy 7/2013    negative, repeat in 5 years   • S/P colonoscopy with polypectomy     age 53, due age   • Scoliosis    • Sleep apnea    • Tonsillitis        Past Surgical History:   Procedure Laterality Date   • BLEPHAROPLASTY  11/14/2012    Performed by Florentin Naylor M.D. at [facility]   • INGUINAL HERNIA REPAIR  3/17/2009    Performed by SUDHA BARRETT at [facility]   • TONSILLECTOMY         Family History   Problem Relation Age of Onset   • Alcohol/Drug Father         alcohol   • Alcohol/Drug Brother         alcohol   • Sleep Apnea Brother    • Cancer Brother    • Cancer Brother         brain   • Heart Disease Neg Hx        Social History     Socioeconomic History   • Marital status:      Spouse name: Not on file   • Number of children: Not on file   • Years of education: Not on file   • Highest education level:  "Not on file   Occupational History   • Occupation: ADMINISTRATION     Employer: [employer]   Social Needs   • Financial resource strain: Not on file   • Food insecurity:     Worry: Not on file     Inability: Not on file   • Transportation needs:     Medical: Not on file     Non-medical: Not on file   Tobacco Use   • Smoking status: Never Smoker   • Smokeless tobacco: Never Used   Substance and Sexual Activity   • Alcohol use: Yes     Alcohol/week: 4.2 oz     Types: 4 Glasses of wine, 3 Cans of beer per week     Comment: one a day   • Drug use: No   • Sexual activity: Yes     Partners: Female     Social History Narrative    , 3 children.          PHYSICAL EXAM:  /74 (BP Location: Left arm, Patient Position: Sitting, BP Cuff Size: Adult)   Pulse (!) 57   Temp 36.6 °C (97.8 °F) (Temporal)   Resp 14   Ht 1.803 m (5' 11\")   Wt 80.1 kg (176 lb 9.6 oz)   SpO2 97%   BMI 24.63 kg/m²   Constitutional: He appears well-developed and well-nourished. He appears not diaphoretic. No distress.   HENT: Right Ear: External ear normal. Left Ear: External ear normal. Tympanic membranes clear and intact.   Nose: Nose normal.   Mouth/Throat: Oropharynx is clear and moist. No oropharyngeal exudate.     Eyes: Conjunctivae and extraocular motions are normal. Pupils are equal, round, and reactive to light. No scleral icterus.   Neck: Normal range of motion. Neck supple. No thyromegaly present.   Cardiovascular: Normal rate, regular rhythm, normal heart sounds and intact distal pulses.  Exam reveals no gallop and no friction rub.  No murmur heard. No carotid bruits. No JVD.   Pulmonary/Chest: Effort normal and breath sounds normal. No respiratory distress. No wheezes. No rales.   Abdominal: Soft. Bowel sounds are normal. He exhibits no distension and no mass. No tenderness. No rebound and no guarding.   Lymphadenopathy:  He has no cervical adenopathy. No inguinal adenopathy.   Neurological:He is alert. He has normal " reflexes. No cranial nerve deficit. He exhibits normal muscle tone.   Skin: Skin is warm and dry. No rash noted. He is not diaphoretic. Multiple patchy pink areas on scalp noted.    Psychiatric: He has a normal mood and affect. His behavior is normal.   Musculoskeletal: He exhibits no edema. Normal strength throughout. 2+ DTR throughout.        Ref. Range 9/4/2019 06:54   WBC Latest Ref Range: 4.8 - 10.8 K/uL 5.4   RBC Latest Ref Range: 4.70 - 6.10 M/uL 4.73   Hemoglobin Latest Ref Range: 14.0 - 18.0 g/dL 15.4   Hematocrit Latest Ref Range: 42.0 - 52.0 % 47.3   MCV Latest Ref Range: 81.4 - 97.8 fL 100.0 (H)   MCH Latest Ref Range: 27.0 - 33.0 pg 32.6   MCHC Latest Ref Range: 33.7 - 35.3 g/dL 32.6 (L)   RDW Latest Ref Range: 35.9 - 50.0 fL 48.4   Platelet Count Latest Ref Range: 164 - 446 K/uL 143 (L)   MPV Latest Ref Range: 9.0 - 12.9 fL 10.4   Neutrophils-Polys Latest Ref Range: 44.00 - 72.00 % 58.00   Neutrophils (Absolute) Latest Ref Range: 1.82 - 7.42 K/uL 3.12   Lymphocytes Latest Ref Range: 22.00 - 41.00 % 31.20   Lymphs (Absolute) Latest Ref Range: 1.00 - 4.80 K/uL 1.68   Monocytes Latest Ref Range: 0.00 - 13.40 % 7.80   Monos (Absolute) Latest Ref Range: 0.00 - 0.85 K/uL 0.42   Eosinophils Latest Ref Range: 0.00 - 6.90 % 2.20   Eos (Absolute) Latest Ref Range: 0.00 - 0.51 K/uL 0.12   Basophils Latest Ref Range: 0.00 - 1.80 % 0.60   Baso (Absolute) Latest Ref Range: 0.00 - 0.12 K/uL 0.03   Immature Granulocytes Latest Ref Range: 0.00 - 0.90 % 0.20   Immature Granulocytes (abs) Latest Ref Range: 0.00 - 0.11 K/uL 0.01   Nucleated RBC Latest Units: /100 WBC 0.00   NRBC (Absolute) Latest Units: K/uL 0.00   Sodium Latest Ref Range: 135 - 145 mmol/L 139   Potassium Latest Ref Range: 3.6 - 5.5 mmol/L 4.1   Chloride Latest Ref Range: 96 - 112 mmol/L 106   Co2 Latest Ref Range: 20 - 33 mmol/L 27   Anion Gap Latest Ref Range: 0.0 - 11.9  6.0   Glucose Latest Ref Range: 65 - 99 mg/dL 107 (H)   Bun Latest Ref Range: 8 -  22 mg/dL 20   Creatinine Latest Ref Range: 0.50 - 1.40 mg/dL 1.02   GFR If  Latest Ref Range: >60 mL/min/1.73 m 2 >60   GFR If Non  Latest Ref Range: >60 mL/min/1.73 m 2 >60   Calcium Latest Ref Range: 8.5 - 10.5 mg/dL 9.7   AST(SGOT) Latest Ref Range: 12 - 45 U/L 27   ALT(SGPT) Latest Ref Range: 2 - 50 U/L 24   Alkaline Phosphatase Latest Ref Range: 30 - 99 U/L 45   Total Bilirubin Latest Ref Range: 0.1 - 1.5 mg/dL 0.7   Albumin Latest Ref Range: 3.2 - 4.9 g/dL 4.1   Total Protein Latest Ref Range: 6.0 - 8.2 g/dL 6.4   Globulin Latest Ref Range: 1.9 - 3.5 g/dL 2.3   A-G Ratio Latest Units: g/dL 1.8   Glycohemoglobin Latest Ref Range: 0.0 - 5.6 % 5.9 (H)   Estim. Avg Glu Latest Units: mg/dL 123   Fasting Status Unknown Fasting   Cholesterol,Tot Latest Ref Range: 100 - 199 mg/dL 163   Triglycerides Latest Ref Range: 0 - 149 mg/dL 95   HDL Latest Ref Range: >=40 mg/dL 68   LDL Latest Ref Range: <100 mg/dL 76     Urine poc: negative.     ASSESSMENT/PLAN:    This is a 64 y.o. male for routine well exam.     1. Well adult exam  -Discussed healthy diet and routine exercise.      2. Awareness alteration, transient - currently appears stable. ER visit while in [facility] 9/23/19- see media. Unclear etiology. Consider due to stress reaction or possible TIA. Patient is on asa and statin therapy. BP stable.   -Recommend further evaluation with neurology. Continue on asa therapy. Monitor.  REFERRAL TO NEUROLOGY   3. Stress- has had several significant stressors which may have contributed to above symptoms.   -Recommend working on self care with adequate nutrition and fluid intake, routine exercise, adequate sleep and stress management. Recommend modify work activities. Recommend re-establish with counseling therapy.     4. History of vertigo - stable. Monitor.     5. Memory changes -reviewed brain MRI results, see media.   -Discussed referral to neurology. Also, consider neuropsychology referral  discussed.  REFERRAL TO NEUROLOGY   6. Night sweats -occurs about once a month. Reviewed labs and imaging.   Unclear etiology at this time. Compared to prior visit, weight gain noted.      -Will continue monitor, recommend keep well hydrated.  Recommend avoid alcohol intake.     7. Chronic insomnia- currently weaning ambien therapy, appears tolerating well. Has tried to wean in the past.   -Consider acupuncture and counseling therapy. Consider use of magnesium supplement and melatonin. Recommend sleep hygiene and routine stress management. Recommend healthy diet and routine exercise as tolerated. Continue to wean ambien therapy.     8. Benign prostatic hyperplasia with nocturia  -Recommend avoid alcohol and caffeine. Continue current medication. Monitor.   POCT Urinalysis   9. Obstructive sleep apnea syndrome- stable, continue cpap.     10. Vitamin D insufficiency- minimally low on last labs.   -Recommend add vitamin D3, 2916-2158 IU daily. Will monitor in future.     11. Pure hypercholesterolemia -controlled, continue current medication. Heart healthy diet and routine exercise recommended.     12. History of colon polyps   -Recommend routine screening colonoscopy when due as above.     13. Need for vaccination with 13-polyvalent pneumococcal conjugate vaccine  Prevnar 13 PCV-13   14. Need for influenza vaccination  INFLUENZA VACCINE, HIGH DOSE (65+ ONLY)   15. History of basal cell carcinoma   -Recommend routine follow up with dermatology.     16. Need for hepatitis C screening test  HEP C VIRUS ANTIBODY   17. Screening PSA (prostate specific antigen) - slight increase in psa form 2018 to 2019. Will continue to monitor at this time.  PROSTATE SPECIFIC AG SCREENING     Follow up in 1 month.   Recommend routine annual well exam.     This medical record contains text that has been entered with the assistance of computer voice recognition and dictation software.  Therefore, it may contain unintended errors in text,  spelling, punctuation, or grammar.

## 2019-10-14 ENCOUNTER — HOSPITAL ENCOUNTER (OUTPATIENT)
Dept: RADIOLOGY | Facility: MEDICAL CENTER | Age: 64
End: 2019-10-14
Attending: OTOLARYNGOLOGY
Payer: COMMERCIAL

## 2019-10-14 DIAGNOSIS — H91.8X2 FLAT HEARING LOSS OF LEFT EAR: ICD-10-CM

## 2019-10-14 PROCEDURE — A9576 INJ PROHANCE MULTIPACK: HCPCS | Performed by: OTOLARYNGOLOGY

## 2019-10-14 PROCEDURE — 700117 HCHG RX CONTRAST REV CODE 255: Performed by: OTOLARYNGOLOGY

## 2019-10-14 PROCEDURE — 70553 MRI BRAIN STEM W/O & W/DYE: CPT

## 2019-10-14 RX ADMIN — GADOTERIDOL 15 ML: 279.3 INJECTION, SOLUTION INTRAVENOUS at 15:43

## 2019-10-15 PROBLEM — Z86.010 HISTORY OF COLON POLYPS: Status: ACTIVE | Noted: 2019-10-15

## 2019-10-15 PROBLEM — Z86.0100 HISTORY OF COLON POLYPS: Status: ACTIVE | Noted: 2019-10-15

## 2019-11-22 ENCOUNTER — OFFICE VISIT (OUTPATIENT)
Dept: MEDICAL GROUP | Facility: IMAGING CENTER | Age: 64
End: 2019-11-22
Payer: COMMERCIAL

## 2019-11-22 VITALS
HEART RATE: 58 BPM | TEMPERATURE: 98.9 F | RESPIRATION RATE: 17 BRPM | WEIGHT: 175.6 LBS | BODY MASS INDEX: 24.58 KG/M2 | HEIGHT: 71 IN | DIASTOLIC BLOOD PRESSURE: 72 MMHG | OXYGEN SATURATION: 98 % | SYSTOLIC BLOOD PRESSURE: 118 MMHG

## 2019-11-22 DIAGNOSIS — F43.9 STRESS: ICD-10-CM

## 2019-11-22 DIAGNOSIS — R94.128 ABNORMAL HEARING TEST: ICD-10-CM

## 2019-11-22 DIAGNOSIS — M41.9 SCOLIOSIS, UNSPECIFIED SCOLIOSIS TYPE, UNSPECIFIED SPINAL REGION: ICD-10-CM

## 2019-11-22 DIAGNOSIS — R40.4 AWARENESS ALTERATION, TRANSIENT: ICD-10-CM

## 2019-11-22 DIAGNOSIS — M54.50 CHRONIC BILATERAL LOW BACK PAIN WITHOUT SCIATICA: ICD-10-CM

## 2019-11-22 DIAGNOSIS — Z01.118 ABNORMAL HEARING TEST: ICD-10-CM

## 2019-11-22 DIAGNOSIS — G89.29 CHRONIC BILATERAL LOW BACK PAIN WITHOUT SCIATICA: ICD-10-CM

## 2019-11-22 DIAGNOSIS — M25.9 HIP PROBLEM: ICD-10-CM

## 2019-11-22 DIAGNOSIS — L98.9 SKIN LESIONS: ICD-10-CM

## 2019-11-22 DIAGNOSIS — R41.3 MEMORY CHANGES: ICD-10-CM

## 2019-11-22 PROCEDURE — 99214 OFFICE O/P EST MOD 30 MIN: CPT | Performed by: FAMILY MEDICINE

## 2019-11-22 ASSESSMENT — PAIN SCALES - GENERAL: PAINLEVEL: NO PAIN

## 2019-11-22 NOTE — PROGRESS NOTES
SUBJECTIVE:      Chief Complaint   Patient presents with   • Follow-Up   • Other     allergy shot at night, 4 pm, congested from allergies.        HPI:    Nick Rodriguez is a 64 y.o. male with arlen, chronic insomnia, chronic low back pain, hypercholesterolemia,  and hx of basal cell carcinoma here for follow up of multiple issues.     Right hip- states he did not get xray for hip completed yet. Sees physical therapist, Boris Anand, next week.   Continues to find that his right hip occasionally tamiko. Has had for a while, possibly now occurring more frequently.   Has had chronic back pain in low back, not significant changes recently. Has scoliosis.   Feels left side pulls to right.   No pain, numbness/tingling down legs. However, feels lower legs are tight.  No bowel or bladder incontinence.    Stretches some but not daily.   Feel lower legs are tight.     Saw ENT for evaluation of abnormal hearing test. Saw Dr. Latif. Had imaging completed with MRI- no abnormality noted.     Had prior episode of altered awareness around his mother's  [place] which [place] Also had noticed slight memory issues.   Saw Dr. Garcia for initial assessment. He would like new referral to Dr. Gonzalez.   Recently noticed that when he is turning his head with his glasses on he picks up something on the edge of his visual field which is unclear if due to his glasses.    He spoke with a colleague who felt his symptoms may have been due to stress.     Has had many stressors due to work and loss of family members. He is looking for therapist. States his daughter is a therapist.   Discussed marriage counseling with wife as well.   Discussed seeing neuropsychology, but would like to hold off.   Finds in general massage therapy helps.     Sees dermatology routinely. Will have another full head treatment for skin lesions. He is on topical therapy twice daily.     ROS:  Denies any recent fevers or  chills. No nausea or vomiting. No diarrhea. No chest pains or shortness of breath. No lower extremity edema.    Current Outpatient Medications on File Prior to Visit   Medication Sig Dispense Refill   • tamsulosin (FLOMAX) 0.4 MG capsule Take 1 Cap by mouth every day. 90 Cap 3   • simvastatin (ZOCOR) 40 MG Tab Take 1 Tab by mouth every evening. 90 Tab 3   • fluticasone (FLONASE) 50 MCG/ACT nasal spray Spray 2 Sprays in nose every day. Each Nostril 16 g 3   • cyclobenzaprine (FLEXERIL) 5 MG tablet Take 1-2 Tabs by mouth 3 times a day as needed. 90 Tab 0   • ketoconazole (NIZORAL) 2 % Cream Apply 1 Application to affected area(s) every day. Apply to affected area(s) every day. 1 Tube 0   • Multiple Vitamins-Minerals (MULTIVITAMIN ADULTS PO) Take  by mouth.     • Ascorbic Acid (VITAMIN C PO) Take 1,000 mg by mouth.     • Cholecalciferol (VITAMIN D PO) Take  by mouth.     • Vitamin E 400 UNIT Tab Take  by mouth.     • Omega-3 Fatty Acids (FISH OIL) 1000 MG Cap capsule Take 1,000 mg by mouth 3 times a day, with meals.     • GLUCOSAMINE CHONDROITIN COMPLX PO Take  by mouth.     • cetirizine (ZYRTEC) 10 MG Tab Take 10 mg by mouth every day.     • PICATO 0.015 % Gel      • albuterol 108 (90 Base) MCG/ACT Aero Soln inhalation aerosol Inhale 2 Puffs by mouth every 6 hours as needed for Shortness of Breath. 8.5 g 0   • aspirin (ASA) 81 MG Chew Tab chewable tablet CHEW AND SWALLOW 1 TABLET BY MOUTH ONCE DAILY 100 Tab 0   • betamethasone valerate (VALISONE) 0.1 % CREA Apply 1 Application to affected area(s) 2 times a day. Apply to affected area(s) 2 times a day. 1 Tube 2   • fluorouracil (EFUDEX) 5 % cream Apply 1 Application to affected area(s) 2 times a day. Use 2-4 weeks as directed- apply bid. 1 Tube 0   • meclizine (ANTIVERT) 25 MG Tab Take 0.5-1 Tabs by mouth 3 times a day as needed for Dizziness or Vertigo. (Patient not taking: Reported on 2/22/2019) 30 Tab 0   • imiquimod (ALDARA) 5 % cream        No current  "facility-administered medications on file prior to visit.        Allergies   Allergen Reactions   • Seasonal        Patient Active Problem List    Diagnosis Date Noted   • History of colon polyps 10/15/2019   • Up to date with influenza vaccination 02/21/2019   • Elevated fasting glucose 10/16/2018   • History of basal cell carcinoma 10/16/2018   • Non-smoker 09/13/2018   • Chronic insomnia 08/23/2018   • Memory changes 04/29/2018   • Pure hypercholesterolemia 11/14/2017   • Chronic bilateral low back pain without sciatica 05/30/2017   • Obstructive sleep apnea syndrome 10/17/2016   • Pain in right shin 10/17/2016   • Seasonal allergies 05/06/2016   • Left forearm pain 05/06/2016   • Left lateral epicondylitis 05/06/2016   • Left elbow pain 05/06/2016   • Thrombocytopenia (HCC) 12/30/2015   • Scoliosis 10/09/2015   • Benign prostatic hyperplasia with lower urinary tract symptoms 02/12/2015   • Vitamin D insufficiency 11/21/2013   • Postnasal drip 05/03/2012   • Primary insomnia 10/11/2010       Past Medical History:   Diagnosis Date   • Basal cell carcinoma     dermatology- Dr. Smith   • Chickenpox    • Dyslipidemia    • Bulgarian measles    • Groin strain 10/21/2011     ICD-10 transition   • Hemorrhoid    • Hyperlipidemia    • Insomnia    • Mumps    • Right hip pain 11/21/2013   • S/P colonoscopy 7/2013    negative, repeat in 5 years   • S/P colonoscopy with polypectomy     age 53, due age   • Scoliosis    • Sleep apnea    • Tonsillitis        OBJECTIVE:   /72 (BP Location: Right arm, Patient Position: Sitting, BP Cuff Size: Adult)   Pulse (!) 58   Temp 37.2 °C (98.9 °F) (Temporal)   Resp 17   Ht 1.803 m (5' 11\")   Wt 79.7 kg (175 lb 9.6 oz)   SpO2 98%   BMI 24.49 kg/m²   General: Well-developed well-nourished male, no acute distress  HEENT: eyes clear, multiple small pink patches on scalp noted  Neck: supple, no lymphadenopathy- cervical or supraclavicular, no thyromegaly  Cardiovascular: regular rate and " rhythm, no murmurs, gallops, rubs  Lungs: clear to auscultation bilaterally, no wheezes, crackles, or rhonchi  Abdomen: +bowel sounds, soft, nontender, nondistended, no rebound, no guarding, no hepatosplenomegaly  Extremities: no cyanosis, clubbing, edema. Very slight hip drop noted with trendelenburg of left side, otherwise 5/5 strength throughout lower extremities, 2+ DTR.    Skin: Warm and dry  Psych: appropriate mood and affect      ASSESSMENT/PLAN:    64 y.o.male with arlen, chronic insomnia, chronic low back pain, hypercholesterolemia,  and hx of basal cell carcinoma.     1. Hip problem- right. Some findings for gluteal weakness noted on exam. Due to scoliosis and chronic low back issues, will further evaluate lumbar region with MRI. Patient will follow up with physical therapy as scheduled.  MR-LUMBAR SPINE-W/O   2. Chronic bilateral low back pain without sciatica  MR-LUMBAR SPINE-W/O   3. Scoliosis, unspecified scoliosis type, unspecified spinal region  MR-LUMBAR SPINE-W/O   4. Abnormal hearing test - stable, has been evaluated by ENT. Monitor.     5. Awareness alteration, transient -stable, no new episodes.   -New referral to neurologist per patient request.  REFERRAL TO NEUROLOGY   6. Memory changes- stable. As above.   REFERRAL TO NEUROLOGY   7. Stress  -Reviewed recommendations for stress management and counseling therapy recommended. Recommend modify work activities.     8. Skin lesions- scalp, treated by dermatology-stable.     Recommend continue current therapy and follow up with dermatology.        Follow up after imaging complete within 3-4 weeks.       This medical record contains text that has been entered with the assistance of computer voice recognition and dictation software.  Therefore, it may contain unintended errors in text, spelling, punctuation, or grammar.

## 2020-01-13 RX ORDER — MEMANTINE HYDROCHLORIDE 10 MG/1
10 TABLET ORAL 2 TIMES DAILY
Qty: 60 TAB | Status: CANCELLED | OUTPATIENT
Start: 2020-01-13

## 2020-01-15 RX ORDER — MEMANTINE HYDROCHLORIDE 5 MG/1
5 TABLET ORAL 2 TIMES DAILY
Qty: 60 TAB | Refills: 3 | Status: SHIPPED | OUTPATIENT
Start: 2020-01-15 | End: 2020-02-24 | Stop reason: SDUPTHER

## 2020-01-29 ENCOUNTER — APPOINTMENT (OUTPATIENT)
Dept: DERMATOLOGY | Facility: IMAGING CENTER | Age: 65
End: 2020-01-29
Payer: MEDICARE

## 2020-02-18 ENCOUNTER — TELEPHONE (OUTPATIENT)
Dept: MEDICAL GROUP | Facility: IMAGING CENTER | Age: 65
End: 2020-02-18

## 2020-02-18 DIAGNOSIS — H91.90 HEARING DISORDER, UNSPECIFIED LATERALITY: ICD-10-CM

## 2020-02-18 NOTE — TELEPHONE ENCOUNTER
----- Message from Nick Rodriguez sent at 2/17/2020  7:47 PM PST -----  Regarding: Non-Urgent Medical Question  Contact: [phone]  Hi,   I'm doing well, but I want to proceed with a hearing aid.   I saw Dr. Jeffrey at [facility] on 8/ 16.19.  I didn't think much of him but proceeded to see Dr. Latif (ENT).    He ordered an MRI that came back with no problems.   I think he approved me for a hearing aid, but he suggested that I wait until later in 2020 when the cost of hearing aids will drop significantly.     At this point, I'd like to proceed with a doctor to prescribe my hearing aid needs.  (Is this how it works?)   After this, I will order the hearing aid from a third party.     A [employer] colleague recommend Dr. Ian Campoverde of the [facility].   If this is the way I get a hearing aid prescription, can you refer me to Dr. Campoverde?      Thanks , Kamran

## 2020-02-24 RX ORDER — MEMANTINE HYDROCHLORIDE 5 MG/1
5 TABLET ORAL 2 TIMES DAILY
Qty: 180 TAB | Refills: 3 | Status: SHIPPED | OUTPATIENT
Start: 2020-02-24 | End: 2021-05-28

## 2020-02-24 NOTE — TELEPHONE ENCOUNTER
Received request via: Pharmacy    Was the patient seen in the last year in this department? Yes    Does the patient have an active prescription (recently filled or refills available) for medication(s) requested? No     (pt just had a refill sent to local pharmacy. This request is for a year supply to a mail pharmacy)

## 2020-03-03 ENCOUNTER — OFFICE VISIT (OUTPATIENT)
Dept: NEUROLOGY | Facility: MEDICAL CENTER | Age: 65
End: 2020-03-03
Payer: COMMERCIAL

## 2020-03-03 VITALS
DIASTOLIC BLOOD PRESSURE: 70 MMHG | TEMPERATURE: 98.6 F | RESPIRATION RATE: 16 BRPM | SYSTOLIC BLOOD PRESSURE: 114 MMHG | WEIGHT: 176.81 LBS | HEIGHT: 71 IN | HEART RATE: 73 BPM | OXYGEN SATURATION: 95 % | BODY MASS INDEX: 24.75 KG/M2

## 2020-03-03 DIAGNOSIS — G31.84 AMNESTIC MCI (MILD COGNITIVE IMPAIRMENT WITH MEMORY LOSS): ICD-10-CM

## 2020-03-03 PROCEDURE — 99204 OFFICE O/P NEW MOD 45 MIN: CPT

## 2020-03-03 ASSESSMENT — FIBROSIS 4 INDEX: FIB4 SCORE: 2.54

## 2020-03-03 ASSESSMENT — PATIENT HEALTH QUESTIONNAIRE - PHQ9: CLINICAL INTERPRETATION OF PHQ2 SCORE: 0

## 2020-03-03 NOTE — PROGRESS NOTES
CC: memory loss       HPI:  65 yo male with amnestic episode last year ( [employer] and Master's in Business) during 2019 while in [place].  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  There is fair amount of stress despite good   He saw Dr Garcia in his private office and he was prescribed memantine.  He went to check his hearing and he also had MRI brain and he was going to have hearing aids in both ears.  Has trouble spelling words sometimes.  Writes notes and puts everything in his calendar, would misplace objects  No driving difficulties, wife states he is forgetful regarding appointments and things to be   Work is stressful and he finds some people at work to be difficult to deal with.  Would forget faculty.  Mood is good, sleep on CPAP   Sleeps 4-6 h per night wakes up at 4 or 5 am   Wakes up in the middle of the night   anixety yes  Depression no    had episode of confusion and had some issues with his glassess  Sometimes sees double when looking at  Hallucination no   Delusions no   Falls and balance problems no   Some muscle problem and pain in right groin.  Lumbar radiculopathy   Back pain.  He was taking Ambien 10 mg every night and he stopped in October.  He pays bills and does not leave the stove off.  Can use appliances and does not get lost on the street.  He was drinking 1 beer a day and he cut that back.  No smoking, no drinking and no drugs.  Drinks 2-3 drinks per week.  Takes namenda 5 mg bid not sure if it helps.      FH   Parents - father  at 73 from ETOH and smoking  Mom was 95 yo passed in assisted living   Siblings sister dies from alcoholism in 40's  brother is recovered alcoholic  Other brother had brain tumor and       ROS:   Constitutional: No fevers or chills.  Eyes: No blurry vision or eye  pain.  ENT: No dysphagia or hearing loss.  Respiratory: No cough or shortness of breath.  Cardiovascular: No chest pain or palpitations.  GI: No nausea, vomiting, or diarrhea.  : No urinary incontinence or dysuria.  Musculoskeletal: No joint swelling or arthralgias.  Skin: No skin rashes.  Neuro: as above   Endocrine: No heat or cold intolerance. No polydipsia or polyuria.  Psych: some anxiety   Heme/Lymph: No easy bruising or swollen lymph nodes.      Past Medical History:   Past Medical History:   Diagnosis Date   • Basal cell carcinoma     dermatology- Dr. Smith   • Chickenpox    • Dyslipidemia    • [place] measles    • Groin strain 10/21/2011     ICD-10 transition   • Hemorrhoid    • Hyperlipidemia    • Insomnia    • Mumps    • Right hip pain 11/21/2013   • S/P colonoscopy 7/2013    negative, repeat in 5 years   • S/P colonoscopy with polypectomy     age 53, due age   • Scoliosis    • Sleep apnea    • Tonsillitis        Past Surgical History:   Past Surgical History:   Procedure Laterality Date   • BLEPHAROPLASTY  11/14/2012    Performed by Florentin Naylor M.D. at [facility]   • INGUINAL HERNIA REPAIR  3/17/2009    Performed by SUDHA BARRETT at [facility]   • TONSILLECTOMY         Social History:   Social History     Socioeconomic History   • Marital status:      Spouse name: Not on file   • Number of children: Not on file   • Years of education: Not on file   • Highest education level: Not on file   Occupational History   • Occupation: ADMINISTRATION     Employer: [employer]   Social Needs   • Financial resource strain: Not on file   • Food insecurity     Worry: Not on file     Inability: Not on file   • Transportation needs     Medical: Not on file     Non-medical: Not on file   Tobacco Use   • Smoking status: Never Smoker   • Smokeless tobacco: Never Used   Substance and Sexual Activity   • Alcohol use: Yes     Alcohol/week: 4.2 oz     Types: 4 Glasses  "of wine, 3 Cans of beer per week     Comment: one a day   • Drug use: No   • Sexual activity: Yes     Partners: Female   Lifestyle   • Physical activity     Days per week: Not on file     Minutes per session: Not on file   • Stress: Not on file   Relationships   • Social connections     Talks on phone: Not on file     Gets together: Not on file     Attends Druze service: Not on file     Active member of club or organization: Not on file     Attends meetings of clubs or organizations: Not on file     Relationship status: Not on file   • Intimate partner violence     Fear of current or ex partner: Not on file     Emotionally abused: Not on file     Physically abused: Not on file     Forced sexual activity: Not on file   Other Topics Concern   • Not on file   Social History Narrative    , 3 children.        Family History:  Family History   Problem Relation Age of Onset   • Alcohol/Drug Father         alcohol   • Alcohol/Drug Brother         alcohol   • Sleep Apnea Brother    • Cancer Brother    • Cancer Brother         brain   • Heart Disease Neg Hx        Allergies:   Allergies   Allergen Reactions   • Seasonal        Physical Exam:     Encounter Vitals  Standard Vitals  Vitals  Blood Pressure : 114/70  Temperature: 37 °C (98.6 °F)  Temp src: Temporal  Pulse: 73  Respiration: 16  Pulse Oximetry: 95 %  Height: 180.3 cm (5' 11\")  Weight: 80.2 kg (176 lb 12.9 oz)  Encounter Vitals  Temperature: 37 °C (98.6 °F)  Temp src: Temporal  Blood Pressure : 114/70  Pulse: 73  Respiration: 16  Pulse Oximetry: 95 %  Weight: 80.2 kg (176 lb 12.9 oz)  Height: 180.3 cm (5' 11\")  BMI (Calculated): 24.66             Constitutional: Well-developed, well-nourished, good hygiene. Appears stated age.  Cardiovascular: RRR, with no murmurs, rubs or gallops. No carotid bruits.   Respiratory: Lungs CTA B/L, no W/R/R.   Abdomen: Soft Non-tender to Palpation. Non-distended.  Extremities: No peripheral edema, pedal pulses intact. "   Skin: Warm, dry, intact. No rashes observed.  Eyes: Sclera anicteric   Funduscopic: Optic discs flat with no evidence of papilledema or pallor.   Neurologic:   Mental Status: Awake, alert, oriented x 3.   Speech: Fluent with normal prosody.   Memory: Able to recall recent and remote events accurately.    Concentration: Attentive. Able to focus on history and follow multi-step commands.              Fund of Knowledge: Appropriate   Cranial Nerves:    CN II: PERRL. No afferent pupillary defect.    CN III, IV, VI: Good eye closure, EOMI.     CN V: Facial sensation intact and symmetric.     CN VII: No facial asymmetry.     CN VIII: Hearing intact.     CN IX and X: Palate elevates symmetrically. Normal gag reflex.    CN XI: Symmetric shoulder shrug.     CN XII: Tongue midline.    Sensory: Intact light touch, vibration and temperature.    Coordination: No evidence of past-pointing on finger to nose testing, no dysdiadochokinesia. Heel to shin intact.             DTR's: 2+ throughout without clonus.    Babinski: Toes downgoing bilaterally.   Romberg: Negative.   Movements: No tremors observed.   Musculoskeletal:    Strength: 5/5 in upper and lower extremities bilaterally.   Gait: Steady, narrow based.    Tone: Normal bulk and tone.   Joints: No swelling.     Current Outpatient Medications on File Prior to Visit   Medication Sig Dispense Refill   • memantine (NAMENDA) 5 MG Tab Take 1 Tab by mouth 2 times a day. 180 Tab 3   • tamsulosin (FLOMAX) 0.4 MG capsule Take 1 Cap by mouth every day. 90 Cap 3   • simvastatin (ZOCOR) 40 MG Tab Take 1 Tab by mouth every evening. 90 Tab 3   • fluticasone (FLONASE) 50 MCG/ACT nasal spray Spray 2 Sprays in nose every day. Each Nostril 16 g 3   • cyclobenzaprine (FLEXERIL) 5 MG tablet Take 1-2 Tabs by mouth 3 times a day as needed. 90 Tab 0   • meclizine (ANTIVERT) 25 MG Tab Take 0.5-1 Tabs by mouth 3 times a day as needed for Dizziness or Vertigo. 30 Tab 0   • ketoconazole (NIZORAL) 2 %  Cream Apply 1 Application to affected area(s) every day. Apply to affected area(s) every day. 1 Tube 0   • Multiple Vitamins-Minerals (MULTIVITAMIN ADULTS PO) Take  by mouth.     • Ascorbic Acid (VITAMIN C PO) Take 1,000 mg by mouth.     • Cholecalciferol (VITAMIN D PO) Take  by mouth.     • Vitamin E 400 UNIT Tab Take  by mouth.     • Omega-3 Fatty Acids (FISH OIL) 1000 MG Cap capsule Take 1,000 mg by mouth 3 times a day, with meals.     • GLUCOSAMINE CHONDROITIN COMPLX PO Take  by mouth.     • cetirizine (ZYRTEC) 10 MG Tab Take 10 mg by mouth every day.     • imiquimod (ALDARA) 5 % cream      • PICATO 0.015 % Gel      • albuterol 108 (90 Base) MCG/ACT Aero Soln inhalation aerosol Inhale 2 Puffs by mouth every 6 hours as needed for Shortness of Breath. 8.5 g 0   • aspirin (ASA) 81 MG Chew Tab chewable tablet CHEW AND SWALLOW 1 TABLET BY MOUTH ONCE DAILY 100 Tab 0   • betamethasone valerate (VALISONE) 0.1 % CREA Apply 1 Application to affected area(s) 2 times a day. Apply to affected area(s) 2 times a day. 1 Tube 2   • fluorouracil (EFUDEX) 5 % cream Apply 1 Application to affected area(s) 2 times a day. Use 2-4 weeks as directed- apply bid. 1 Tube 0     No current facility-administered medications on file prior to visit.      Labs:  Lab Results   Component Value Date/Time    SODIUM 139 09/04/2019 06:54 AM    POTASSIUM 4.1 09/04/2019 06:54 AM    CHLORIDE 106 09/04/2019 06:54 AM    CO2 27 09/04/2019 06:54 AM    GLUCOSE 107 (H) 09/04/2019 06:54 AM    BUN 20 09/04/2019 06:54 AM    CREATININE 1.02 09/04/2019 06:54 AM    CREATININE 1.19 10/01/2010 12:00 AM    BUNCREATRAT 16 10/01/2010 12:00 AM    GLOMRATE >59 10/01/2010 12:00 AM      Lab Results   Component Value Date/Time    WBC 5.4 09/04/2019 06:54 AM    WBC 5.7 10/01/2010 12:00 AM    RBC 4.73 09/04/2019 06:54 AM    RBC 4.76 10/01/2010 12:00 AM    HEMOGLOBIN 15.4 09/04/2019 06:54 AM    HEMATOCRIT 47.3 09/04/2019 06:54 AM    .0 (H) 09/04/2019 06:54 AM      (H) 10/01/2010 12:00 AM    MCH 32.6 09/04/2019 06:54 AM    MCH 34.0 10/01/2010 12:00 AM    MCHC 32.6 (L) 09/04/2019 06:54 AM    MPV 10.4 09/04/2019 06:54 AM    NEUTSPOLYS 58.00 09/04/2019 06:54 AM    LYMPHOCYTES 31.20 09/04/2019 06:54 AM    MONOCYTES 7.80 09/04/2019 06:54 AM    EOSINOPHILS 2.20 09/04/2019 06:54 AM    BASOPHILS 0.60 09/04/2019 06:54 AM      Imaging:   MRI brain reviewed personally   unremarkable     IMPRESSION:     1.  Minimal supratentorial white matter disease most consistent with microvascular ischemic change. Common findings for the patient's age.  2.  Unremarkable IACs. No evidence of acoustic schwannoma. No evidence of active inflammatory mastoid disease.                                                                                                                                                                                                                                                                   Assessment/Plan:  Mild cognitive impairment,amnestic type   MOCA 28/30 significant for short term memory impairment likely 2/2 normal aging vs less likely early AD  Plan  Referral to Neuropsychology for memory testing   Labs vit B12 and folate   Neurological exam is otherwise normal   Discussed ageing,sleep,mood,diet and exercise     1. Proper Diet: We recommend the Mediterranean diet   2. Proper Vascular Health: Making sure that your primary care provider (PCP) is screening for and treating all vascular risk factors (diabetes, high cholesterol, high blood pressure, and such)  3. Quit smoking, if you smoke   4. Exercise as tolerated with a goal of at least 30 minutes 5 days a week or a total of 150 minutes per week  5. Focus on what you enjoy  6. Try learning new hobbies or skills, even if you’re not great at them  7. Regular social interaction: Maintain an active social life as much as possible   8. Keep your brain active with cognitively stimulating activities such as brain games  9. Get  7-8 hours of sleep per night  10. Maintain a predictable daily routine  [You may visit www.healthybrains.org for more information]

## 2020-03-13 ENCOUNTER — HOSPITAL ENCOUNTER (OUTPATIENT)
Dept: RADIOLOGY | Facility: MEDICAL CENTER | Age: 65
End: 2020-03-13
Payer: COMMERCIAL

## 2020-03-26 ENCOUNTER — TELEPHONE (OUTPATIENT)
Dept: SLEEP MEDICINE | Facility: MEDICAL CENTER | Age: 65
End: 2020-03-26

## 2020-03-26 DIAGNOSIS — G47.33 OSA (OBSTRUCTIVE SLEEP APNEA): ICD-10-CM

## 2020-03-26 NOTE — TELEPHONE ENCOUNTER
Patient called to get supplies was due 02/2020 made an appointment for 04/02/2020 with Dr. Slaughter. Patients uses DME:  [facility] /  [phone] / fax [phone]   Please review.

## 2020-03-31 NOTE — PROGRESS NOTES
CC: Follow up for GISSEL on AutoPap 9.5-13 cm water    HPI:  Mr. Nick Rodriguez is a 64 year-old man whose original sleep study was performed in [facility] in 2012 and showed an RDI of 28.5, supine RDI 48.5, and a REM RDI of 52.3 with a tanisha saturation of 75%.  He was last seen 2/22/2019 when his compliance was excellent and his AHI was normalized to 3.4.     The patient reports improved symptoms using positive airway pressure.  Has experienced no significant problems with mask fit, mask leak, pressure tolerance, excessive airway dryness, nasal congestion, aerophagia, or chest pain.    Today, his 30-day compliance is 100% for 6 hours and 52 minutes.  He remains on auto titrating CPAP 9.5 to 13 cm water.  His average residual estimated apnea hypopnea index is 2.4.  His auto CPAP mean pressure is 9.9.    Will be retiring this year which will reduce his stress. Has seen Dr. Brown.      Significant comorbidities and modifying factors include insomnia, seasonal allergies, BPH, dyslipidemia, non-smoker, up-to-date with influenza vaccination, dyslipidemia,  normal BMI, and intermittent chronic low back pain.    Patient Active Problem List    Diagnosis Date Noted   • History of colon polyps 10/15/2019   • Elevated fasting glucose 10/16/2018   • History of basal cell carcinoma 10/16/2018   • Non-smoker 09/13/2018   • Chronic insomnia 08/23/2018   • Memory changes 04/29/2018   • Pure hypercholesterolemia 11/14/2017   • Chronic bilateral low back pain without sciatica 05/30/2017   • Obstructive sleep apnea syndrome 10/17/2016   • Pain in right shin 10/17/2016   • Seasonal allergies 05/06/2016   • Left forearm pain 05/06/2016   • Left lateral epicondylitis 05/06/2016   • Left elbow pain 05/06/2016   • Thrombocytopenia (HCC) 12/30/2015   • Scoliosis 10/09/2015   • Benign prostatic hyperplasia with lower urinary tract symptoms 02/12/2015   • Vitamin D insufficiency 11/21/2013   • Postnasal drip 05/03/2012   • Primary  insomnia 10/11/2010       Past Medical History:   Diagnosis Date   • Basal cell carcinoma     dermatology- Dr. Smith   • Chickenpox    • Dyslipidemia    • Citizen of [place] measles    • Groin strain 10/21/2011     ICD-10 transition   • Hemorrhoid    • Hyperlipidemia    • Insomnia    • Mumps    • Right hip pain 11/21/2013   • S/P colonoscopy 7/2013    negative, repeat in 5 years   • S/P colonoscopy with polypectomy     age 53, due age   • Scoliosis    • Sleep apnea    • Tonsillitis         Past Surgical History:   Procedure Laterality Date   • BLEPHAROPLASTY  11/14/2012    Performed by Florentin Naylor M.D. at [facility]   • INGUINAL HERNIA REPAIR  3/17/2009    Performed by SUDHA BARRETT at [facility]   • TONSILLECTOMY         Family History   Problem Relation Age of Onset   • Alcohol/Drug Father         alcohol   • Alcohol/Drug Brother         alcohol   • Sleep Apnea Brother    • Cancer Brother    • Cancer Brother         brain   • Heart Disease Neg Hx        Social History     Socioeconomic History   • Marital status:      Spouse name: Not on file   • Number of children: Not on file   • Years of education: Not on file   • Highest education level: Not on file   Occupational History   • Occupation: ADMINISTRATION     Employer: [employer]   Social Needs   • Financial resource strain: Not on file   • Food insecurity     Worry: Not on file     Inability: Not on file   • Transportation needs     Medical: Not on file     Non-medical: Not on file   Tobacco Use   • Smoking status: Never Smoker   • Smokeless tobacco: Never Used   Substance and Sexual Activity   • Alcohol use: Yes     Alcohol/week: 4.2 oz     Types: 4 Glasses of wine, 3 Cans of beer per week     Comment: one a day   • Drug use: No   • Sexual activity: Yes     Partners: Female   Lifestyle   • Physical activity     Days per week: Not on file     Minutes per session: Not on file   • Stress: Not on file   Relationships    • Social connections     Talks on phone: Not on file     Gets together: Not on file     Attends Confucianist service: Not on file     Active member of club or organization: Not on file     Attends meetings of clubs or organizations: Not on file     Relationship status: Not on file   • Intimate partner violence     Fear of current or ex partner: Not on file     Emotionally abused: Not on file     Physically abused: Not on file     Forced sexual activity: Not on file   Other Topics Concern   • Not on file   Social History Narrative    , 3 children.        Current Outpatient Medications   Medication Sig Dispense Refill   • memantine (NAMENDA) 5 MG Tab Take 1 Tab by mouth 2 times a day. 180 Tab 3   • tamsulosin (FLOMAX) 0.4 MG capsule Take 1 Cap by mouth every day. 90 Cap 3   • simvastatin (ZOCOR) 40 MG Tab Take 1 Tab by mouth every evening. 90 Tab 3   • fluticasone (FLONASE) 50 MCG/ACT nasal spray Spray 2 Sprays in nose every day. Each Nostril 16 g 3   • cyclobenzaprine (FLEXERIL) 5 MG tablet Take 1-2 Tabs by mouth 3 times a day as needed. 90 Tab 0   • meclizine (ANTIVERT) 25 MG Tab Take 0.5-1 Tabs by mouth 3 times a day as needed for Dizziness or Vertigo. 30 Tab 0   • ketoconazole (NIZORAL) 2 % Cream Apply 1 Application to affected area(s) every day. Apply to affected area(s) every day. 1 Tube 0   • Multiple Vitamins-Minerals (MULTIVITAMIN ADULTS PO) Take  by mouth.     • Ascorbic Acid (VITAMIN C PO) Take 1,000 mg by mouth.     • Cholecalciferol (VITAMIN D PO) Take  by mouth.     • Vitamin E 400 UNIT Tab Take  by mouth.     • Omega-3 Fatty Acids (FISH OIL) 1000 MG Cap capsule Take 1,000 mg by mouth 3 times a day, with meals.     • GLUCOSAMINE CHONDROITIN COMPLX PO Take  by mouth.     • cetirizine (ZYRTEC) 10 MG Tab Take 10 mg by mouth every day.     • imiquimod (ALDARA) 5 % cream      • PICATO 0.015 % Gel      • albuterol 108 (90 Base) MCG/ACT Aero Soln inhalation aerosol Inhale 2 Puffs by mouth every 6 hours as  "needed for Shortness of Breath. 8.5 g 0   • aspirin (ASA) 81 MG Chew Tab chewable tablet CHEW AND SWALLOW 1 TABLET BY MOUTH ONCE DAILY 100 Tab 0   • betamethasone valerate (VALISONE) 0.1 % CREA Apply 1 Application to affected area(s) 2 times a day. Apply to affected area(s) 2 times a day. 1 Tube 2   • fluorouracil (EFUDEX) 5 % cream Apply 1 Application to affected area(s) 2 times a day. Use 2-4 weeks as directed- apply bid. 1 Tube 0     No current facility-administered medications for this visit.     \"CURRENT RX\"    ALLERGIES: Seasonal    ROS    Constitutional: Denies fever, chills, sweats,  weight loss, fatigue  Cardiovascular: Denies chest pain, tightness, palpitations, swelling in legs/feet, fainting, difficulty breathing when lying down but gets better when sitting up.   Respiratory: Denies shortness of breath, cough, sputum, wheezing, painful breathing, coughing up blood.   Sleep: per HPI  Gastrointestinal: Denies  difficulty swallowing, nausea, abdominal pain, diarrhea, constipation, heartburn.  Musculoskeletal: Denies painful joints, sore muscles, back pain.    Neuro: had diplopia with a negative work up    PHYSICAL EXAM      /70 (BP Location: Right arm, Patient Position: Standing, BP Cuff Size: Adult)   Pulse 68   Resp 14   Ht 1.803 m (5' 11\")   Wt 79.8 kg (176 lb)   SpO2 97%   BMI 24.55 kg/m²   Appearance: Well-nourished, well-developed, no acute distress  Eyes:  PERRLA, EOMI; glasses  ENMT: without lesions, deformities;hearing grossly intact; tongue normal, posterior pharynx without erythema or exudate;   Modified Mallampati (AKA Freidman) Score:  Neck: Supple, trachea midline, no masses  Respiratory effort:  No intercostal retractions or use of accessory muscles  Lung auscultation:  No wheezes rhonchi rubs or rales  Cardiac: No murmurs, rubs, or gallops; regular rhythm, normal rate; no edema  Abdomen:  No tenderness, no organomegaly  Musculoskeletal:  Grossly normal; gait and station normal; " digits and nails normal  Skin:  No rashes, petechiae, cyanosis  Neurologic: without focal signs; oriented to person, time, place, and purpose; judgement intact  Psychiatric:  No depression, anxiety, agitation        PROBLEMS:  1. Obstructive sleep apnea syndrome      2. Non-smoker      3. Benign prostatic hyperplasia with nocturia      4. Chronic insomnia    - REFERRAL TO OTHER    5. Pure hypercholesterolemia            PLAN:     Has been advised to continue the current auto titrating CPAP 9.5-13 cm water, clean equipment frequently, and get new mask and supplies as allowed by insurance and DME. Advised about potential problems including nasal obstruction/stuffiness, mask leak or discomfort , frequent awakenings, chill or dryness of the upper airway, noise, inconvenience, and lack of benefit. Recommend an earlier appointment, if significant treatment barriers develop.    Have advised the patient to follow up with the appropriate healthcare practitioners for all other medical problems and issues.    Return in about 1 year (around 4/2/2021).      Will re-refer to Dr. Brown per his request.

## 2020-04-01 PROBLEM — Z23 NEED FOR PROPHYLACTIC VACCINATION AGAINST STREPTOCOCCUS PNEUMONIAE (PNEUMOCOCCUS) AND INFLUENZA: Status: ACTIVE | Noted: 2019-02-21

## 2020-04-01 PROBLEM — Z23 NEED FOR PROPHYLACTIC VACCINATION AGAINST STREPTOCOCCUS PNEUMONIAE (PNEUMOCOCCUS) AND INFLUENZA: Status: RESOLVED | Noted: 2019-02-21 | Resolved: 2020-04-01

## 2020-04-02 ENCOUNTER — SLEEP CENTER VISIT (OUTPATIENT)
Dept: SLEEP MEDICINE | Facility: MEDICAL CENTER | Age: 65
End: 2020-04-02
Payer: COMMERCIAL

## 2020-04-02 VITALS
OXYGEN SATURATION: 97 % | RESPIRATION RATE: 14 BRPM | HEIGHT: 71 IN | DIASTOLIC BLOOD PRESSURE: 70 MMHG | BODY MASS INDEX: 24.64 KG/M2 | SYSTOLIC BLOOD PRESSURE: 112 MMHG | WEIGHT: 176 LBS | HEART RATE: 68 BPM

## 2020-04-02 DIAGNOSIS — R35.1 BENIGN PROSTATIC HYPERPLASIA WITH NOCTURIA: ICD-10-CM

## 2020-04-02 DIAGNOSIS — E78.00 PURE HYPERCHOLESTEROLEMIA: ICD-10-CM

## 2020-04-02 DIAGNOSIS — N40.1 BENIGN PROSTATIC HYPERPLASIA WITH NOCTURIA: ICD-10-CM

## 2020-04-02 DIAGNOSIS — Z78.9 NON-SMOKER: ICD-10-CM

## 2020-04-02 DIAGNOSIS — G47.33 OBSTRUCTIVE SLEEP APNEA SYNDROME: ICD-10-CM

## 2020-04-02 DIAGNOSIS — F51.04 CHRONIC INSOMNIA: ICD-10-CM

## 2020-04-02 PROCEDURE — 99214 OFFICE O/P EST MOD 30 MIN: CPT | Performed by: INTERNAL MEDICINE

## 2020-04-02 ASSESSMENT — FIBROSIS 4 INDEX: FIB4 SCORE: 2.54

## 2020-05-05 ENCOUNTER — TELEMEDICINE (OUTPATIENT)
Dept: BEHAVIORAL HEALTH | Facility: CLINIC | Age: 65
End: 2020-05-05
Payer: COMMERCIAL

## 2020-05-05 DIAGNOSIS — R41.3 MEMORY CHANGES: ICD-10-CM

## 2020-05-05 NOTE — BH THERAPY
[facility]  INITIAL ASSESSMENT  Met with the patient per their request via (HIPPA compliant) Zoom video conference to accommodate state imposed restrictions on social contact due to the COVID-19 pandemic.      Name: Nick Rodriguez  MRN: [id number]  : 1955  Age: 64 y.o.  Date of assessment: 2020  PCP: Eva Rand M.D.  Persons in attendance: Patient  Total session time: 38 minutes      CHIEF COMPLAINT AND HISTORY OF PRESENTING PROBLEM:  (as stated by Patient):  Nick Rodriguez is a 64 y.o., White male referred for assessment by No ref. provider found.  Primary presenting issue includes   Chief Complaint   Patient presents with   • Poor Memory     The patient ID/Chief Complaint:  The patient is a 64 year old male, , .  The patient self-referred and voluntarily presented for an individual intake to address concerns related to neuropsychological functioning and for a rule out of dementia.  Reviewed limits of confidentiality and [facility] policies; patient expressed understanding and agreed to voluntarily proceed with evaluation and treatment.    SUBJECTIVE:    HISTORY OF PRESENT ILLNESS:    RELEVANT HISTORY:    The patient ID/Chief Complaint:  The patient is a 64 year old male, , .  The patient self-referred and voluntarily presented for an individual intake to address concerns related to neuropsychological functioning and for a rule out of dementia.  Reviewed limits of confidentiality and [facility] policies; patient expressed understanding and agreed to voluntarily proceed with evaluation and treatment.    History of Present Illness:   The patient reported in 2019 he experienced some neurological symptoms ultimately the neurological evaluation was negative however additional follow-up with neurology has suggested the possibility of dementia.  For this reason he is referred for  neuropsychological testing to provide more accurate information related to his current neuropsychological functioning.  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  The patient denies symptoms of bozena, hypomania, auditory visual hallucinations, panic disorder, eating disorder, and other somatic complaints.    Relevant History:    Social History: The patient has been  for approximately 33 years he has 3 adult children.  He recently lost his mother in December 2018.  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    Past Psychiatric History: Patient denies a history of psychiatric hospitalization suicide attempts and self-injurious behavior he does report a history of brief psychotherapy he also has never been prescribed psychotropic medications.  He is currently being prescribed Namenda for concerns about dementia.    Family Psychiatric History:    Patient reports history of his father having alcohol use disorder and his brother having 25 years of sobriety from alcohol.    Family Medical History:    The patient reports that his younger brother passed away from a longstanding brain tumor in December 2019    Substance Use/Addiction History:  Alcohol currently using 2-3 drinks per week    Cannabis currently 2-3 days per week for sleep  Nicotine denies  Illicit drugs denies    The patient denies history of substance abuse treatment and denies history of substance abuse arrest and/or convict      MEDICAL HISTORY  History of head trauma: No  History of seizure: No  History of neurological concerns: Yes  Past medical/surgical history:   Past Medical History:   Diagnosis Date   • Basal cell carcinoma     dermatology- Dr. Smith   • Chickenpox    • Dyslipidemia    • [place] measles    • Groin strain 10/21/2011     ICD-10  transition   • Hemorrhoid    • Hyperlipidemia    • Insomnia    • Mumps    • Right hip pain 11/21/2013   • S/P colonoscopy 7/2013    negative, repeat in 5 years   • S/P colonoscopy with polypectomy     age 53, due age   • Scoliosis    • Sleep apnea    • Tonsillitis       Past Surgical History:   Procedure Laterality Date   • BLEPHAROPLASTY  11/14/2012    Performed by Florentin Naylor M.D. at SURGERY SURGICAL [facility] ORS   • INGUINAL HERNIA REPAIR  3/17/2009    Performed by SUDHA BARRETT at SURGERY SAME DAY [facility] ORS   • TONSILLECTOMY          Medication Allergies:  Seasonal     SUBSTANCE USE/ADDICTION HISTORY  [] Not applicable - patient 10 years of age or younger      [] Patient denies use of any substance/addictive behaviors     SAFETY ASSESSMENT - SELF  Does patient acknowledge current or past symptoms of dangerousness to self? No  Does parent/significant other report patient has current or past symptoms of dangerousness to self? No      Recent change in frequency/specificity/intensity of suicidal thoughts or self-harm behavior? No  Current access to firearms, medications, or other identified means of suicide/self-harm? No  If yes, willing to restrict access to means of suicide/self-harm? No  Protective factors present: Positive self-efficacy    Current Suicide Risk: Low  Crisis Safety Plan completed and copy given to patient: No    SAFETY ASSESSMENT - OTHERS  Does paor past symptoms of aggressive behavior or risk to others? No  Does parent/significant othtient acknowledge current or past symptoms of aggressive behavior or risk to others? No  Does parent/significant other report patient has current or past symptoms of aggressive behavior or risk to others? No    Recent change in frequency/specificity/intensity of thoughts or threats to harm others? No  Current access to firearms/other identified means of harm? No  If yes, willing to restrict access to weapons/means of harm? No  Protective factors present:  Well-developed sense of empathy    Current Homicide Risk:  Not applicable  Crisis Safety Plan completed and copy given to patient? No  Based on information provided during the current assessment, is a mandated “duty to warn” being exercised? No    Risk Level: Not Currently at Clinically Significant Risk  Hospitalization is not deemed necessary at this time as the patient does not present a clear or imminent danger to self or others. No indication for pursuing higher level of care. Outpatient management is currently most appropriate and least restrictive level of care.     OBJECTIVE:    MENTAL STATUS/OBSERVATIONS    Patient did not present in acute distress. Patient was appropriately groomed. Patient was alert and oriented x4. Eye contact was appropriate. No abnormalities in attention or concentration were noted. No abnormalities of movement present; psychomotor activity was normal. Speech was fluent and regular in rhythm, rate, volume, and tone. Thought processes linear, logical and goal directed. There was no evidence of thought disorder. No auditory or visual hallucinations. Long and short term memory appeared to be intact. Insight, judgment, and impulse control were deemed to be good.  Reported mood was “anxious.” Affect was full-ranging and appropriate to thought content and conversation.  Patient denied past and current suicidal and homicidal ideation in plan, intent, and preparatory behavior.        RESULTS OF SCREENING MEASURES:  [] Not applicable    The [employer] Mental Status Examination indicated a total score of 24 suggesting mild cognitive impairment.    DIAGNOSTIC IMPRESSION(S):  1. Memory changes        PLAN:    IDENTIFIED NEEDS/PLAN:  [If any of these marked, trigger DISPOSITION list]  Other: Psychological testing  Refer to [facility]: Outpatient testing    1) The patient will return to the clinic 1 week.  2) Crisis Response Plan:  Reviewed emergency resources with the  patient and the patient expressed understanding including:  If feeling suicidal, patient will call or present to the [facility] during duty hours or present to closest ED ([facility] or [facility], call 911 or crisis hotline ([phone]-GBYJ) after duty hours.  3) Referrals/Consults:  N/A  4) Barriers to Learning:  No  5) Readiness to Learn:  Yes  6) Cultural Concerns:  No  7) Patient voiced understanding of, and agreement with, plan and goals as annotated above. Yes   8) Declare these services are medically necessary and appropriate to the patient's diagnosis and needs  9) The point of contact at the Clinic regarding this evaluation is Dr. Mary, Psychologist.    Oscar Mary III, Ed.D.  Psychologist  [place] [id number]

## 2020-06-24 ENCOUNTER — APPOINTMENT (OUTPATIENT)
Dept: BEHAVIORAL HEALTH | Facility: CLINIC | Age: 65
End: 2020-06-24
Payer: COMMERCIAL

## 2020-06-26 DIAGNOSIS — Z20.828 VIRAL DISEASE EXPOSURE: ICD-10-CM

## 2020-07-02 ENCOUNTER — OFFICE VISIT (OUTPATIENT)
Dept: BEHAVIORAL HEALTH | Facility: CLINIC | Age: 65
End: 2020-07-02
Payer: COMMERCIAL

## 2020-07-02 DIAGNOSIS — R41.3 MEMORY CHANGES: ICD-10-CM

## 2020-07-02 PROCEDURE — 96137 PSYCL/NRPSYC TST PHY/QHP EA: CPT | Performed by: PSYCHOLOGIST

## 2020-07-02 PROCEDURE — 96136 PSYCL/NRPSYC TST PHY/QHP 1ST: CPT | Performed by: PSYCHOLOGIST

## 2020-07-02 PROCEDURE — 99999 PR NO CHARGE: CPT | Performed by: PSYCHOLOGIST

## 2020-07-03 NOTE — BH THERAPY
[facility]  INITIAL ASSESSMENT  Met with the patient per their request via (HIPPA compliant) Zoom video conference to accommodate state imposed restrictions on social contact due to the COVID-19 pandemic.      Name: Nick Rodriguez  MRN: [id number]  : 1955  Age: 64 y.o.  Date of assessment: 2020  PCP: Eva Rand M.D.  Persons in attendance: Patient  Total session time: 100 minutes      CHIEF COMPLAINT AND HISTORY OF PRESENTING PROBLEM:  (as stated by Patient):  Nick Rodriguez is a 64 y.o., White male referred for assessment by No ref. provider found.  Primary presenting issue includes   Chief Complaint   Patient presents with   • Poor Memory     The patient ID/Chief Complaint:  The patient is a 64 year old male, , .  The patient self-referred and voluntarily presented for an individual intake to address concerns related to neuropsychological functioning and for a rule out of dementia.  Reviewed limits of confidentiality and [facility] policies; patient expressed understanding and agreed to voluntarily proceed with evaluation and treatment.    SUBJECTIVE:    HISTORY OF PRESENT ILLNESS:    RELEVANT HISTORY:    The patient ID/Chief Complaint:  The patient is a 64 year old male, , .  The patient self-referred and voluntarily presented for an individual intake to address concerns related to neuropsychological functioning and for a rule out of dementia.  Reviewed limits of confidentiality and [facility] policies; patient expressed understanding and agreed to voluntarily proceed with evaluation and treatment.    History of Present Illness:   The patient reported in 2019 he experienced some neurological symptoms ultimately the neurological evaluation was negative however additional follow-up with neurology has suggested the possibility of dementia.  For this reason he is referred for  neuropsychological testing to provide more accurate information related to his current neuropsychological functioning.  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  The patient denies symptoms of bozena, hypomania, auditory visual hallucinations, panic disorder, eating disorder, and other somatic complaints.    Relevant History:    Social History: The patient has been  for approximately 33 years he has 3 adult children.  He recently lost his mother in December 2018.  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    Past Psychiatric History: Patient denies a history of psychiatric hospitalization suicide attempts and self-injurious behavior he does report a history of brief psychotherapy he also has never been prescribed psychotropic medications.  He is currently being prescribed Namenda for concerns about dementia.    Family Psychiatric History:    Patient reports history of his father having alcohol use disorder and his brother having 25 years of sobriety from alcohol.    Family Medical History:    The patient reports that his younger brother passed away from a longstanding brain tumor in December 2019    Substance Use/Addiction History:  Alcohol currently using 2-3 drinks per week    Cannabis currently 2-3 days per week for sleep  Nicotine denies  Illicit drugs denies    The patient denies history of substance abuse treatment and denies history of substance abuse arrest and/or convict      MEDICAL HISTORY  History of head trauma: No  History of seizure: No  History of neurological concerns: Yes  Past medical/surgical history:   Past Medical History:   Diagnosis Date   • Basal cell carcinoma     dermatology- Dr. Smith   • Chickenpox    • Dyslipidemia    • [place] measles    • Groin strain 10/21/2011     ICD-10  transition   • Hemorrhoid    • Hyperlipidemia    • Insomnia    • Mumps    • Right hip pain 11/21/2013   • S/P colonoscopy 7/2013    negative, repeat in 5 years   • S/P colonoscopy with polypectomy     age 53, due age   • Scoliosis    • Sleep apnea    • Tonsillitis       Past Surgical History:   Procedure Laterality Date   • BLEPHAROPLASTY  11/14/2012    Performed by Florentin Naylor M.D. at SURGERY SURGICAL [facility] ORS   • INGUINAL HERNIA REPAIR  3/17/2009    Performed by USDHA BARRETT at SURGERY SAME DAY [facility] ORS   • TONSILLECTOMY          Medication Allergies:  Seasonal     SUBSTANCE USE/ADDICTION HISTORY    [x] Patient denies use of any substance/addictive behaviors     SAFETY ASSESSMENT - SELF  Does patient acknowledge current or past symptoms of dangerousness to self? No  Does parent/significant other report patient has current or past symptoms of dangerousness to self? No      Recent change in frequency/specificity/intensity of suicidal thoughts or self-harm behavior? No  Current access to firearms, medications, or other identified means of suicide/self-harm? No  If yes, willing to restrict access to means of suicide/self-harm? No  Protective factors present: Positive self-efficacy    Current Suicide Risk: Low  Crisis Safety Plan completed and copy given to patient: No    SAFETY ASSESSMENT - OTHERS  Does paor past symptoms of aggressive behavior or risk to others? No  Does parent/significant othtient acknowledge current or past symptoms of aggressive behavior or risk to others? No  Does parent/significant other report patient has current or past symptoms of aggressive behavior or risk to others? No    Recent change in frequency/specificity/intensity of thoughts or threats to harm others? No  Current access to firearms/other identified means of harm? No  If yes, willing to restrict access to weapons/means of harm? No  Protective factors present: Well-developed sense of empathy    Current Homicide Risk:   Not applicable  Crisis Safety Plan completed and copy given to patient? No  Based on information provided during the current assessment, is a mandated “duty to warn” being exercised? No    Risk Level: Not Currently at Clinically Significant Risk  Hospitalization is not deemed necessary at this time as the patient does not present a clear or imminent danger to self or others. No indication for pursuing higher level of care. Outpatient management is currently most appropriate and least restrictive level of care.     OBJECTIVE:    MENTAL STATUS/OBSERVATIONS    Patient did not present in acute distress. Patient was appropriately groomed. Patient was alert and oriented x4. Eye contact was appropriate. No abnormalities in attention or concentration were noted. No abnormalities of movement present; psychomotor activity was normal. Speech was fluent and regular in rhythm, rate, volume, and tone. Thought processes linear, logical and goal directed. There was no evidence of thought disorder. No auditory or visual hallucinations. Long and short term memory appeared to be intact. Insight, judgment, and impulse control were deemed to be good.  Reported mood was “anxious.” Affect was full-ranging and appropriate to thought content and conversation.  Patient denied past and current suicidal and homicidal ideation in plan, intent, and preparatory behavior.        RESULTS OF SCREENING MEASURES:  [] Not applicable    The patient was administered the RBANS, [place] Naming Test, and trails.    DIAGNOSTIC IMPRESSION(S):  1. Memory changes        PLAN:    IDENTIFIED NEEDS/PLAN:  [If any of these marked, trigger DISPOSITION list]  Other: Psychological testing  Refer to [facility]: Outpatient testing    1) The patient will return to the clinic 2 to 3 weeks in order to continue psychological testing  2) Crisis Response Plan:  Reviewed emergency resources with the patient and the patient expressed understanding including:  If  feeling suicidal, patient will call or present to the [facility] Health Clinic during duty hours or present to closest ED ([facility] or [facility], call 911 or crisis hotline ([phone]-YRUE) after duty hours.  3) Referrals/Consults:  N/A  4) Barriers to Learning:  No  5) Readiness to Learn:  Yes  6) Cultural Concerns:  No  7) Patient voiced understanding of, and agreement with, plan and goals as annotated above. Yes   8) Declare these services are medically necessary and appropriate to the patient's diagnosis and needs  9) The point of contact at the Clinic regarding this evaluation is Dr. Mary, Psychologist.    Oscar Mary III, Ed.D.  Psychologist  [place] [facility]018

## 2020-07-07 ENCOUNTER — HOSPITAL ENCOUNTER (OUTPATIENT)
Dept: LAB | Facility: MEDICAL CENTER | Age: 65
End: 2020-07-07
Attending: FAMILY MEDICINE
Payer: COMMERCIAL

## 2020-07-07 DIAGNOSIS — Z20.828 VIRAL DISEASE EXPOSURE: ICD-10-CM

## 2020-07-07 LAB — COVID ORDER STATUS COVID19: NORMAL

## 2020-07-07 PROCEDURE — U0003 INFECTIOUS AGENT DETECTION BY NUCLEIC ACID (DNA OR RNA); SEVERE ACUTE RESPIRATORY SYNDROME CORONAVIRUS 2 (SARS-COV-2) (CORONAVIRUS DISEASE [COVID-19]), AMPLIFIED PROBE TECHNIQUE, MAKING USE OF HIGH THROUGHPUT TECHNOLOGIES AS DESCRIBED BY CMS-2020-01-R: HCPCS

## 2020-07-08 LAB
SARS-COV-2 RNA RESP QL NAA+PROBE: NOTDETECTED
SPECIMEN SOURCE: NORMAL

## 2020-07-23 ENCOUNTER — APPOINTMENT (OUTPATIENT)
Dept: BEHAVIORAL HEALTH | Facility: CLINIC | Age: 65
End: 2020-07-23
Payer: COMMERCIAL

## 2020-07-27 ENCOUNTER — APPOINTMENT (RX ONLY)
Dept: URBAN - METROPOLITAN AREA CLINIC 4 | Facility: CLINIC | Age: 65
Setting detail: DERMATOLOGY
End: 2020-07-27

## 2020-07-27 ENCOUNTER — APPOINTMENT (OUTPATIENT)
Dept: BEHAVIORAL HEALTH | Facility: CLINIC | Age: 65
End: 2020-07-27
Payer: COMMERCIAL

## 2020-07-27 PROBLEM — D04.9 CARCINOMA IN SITU OF SKIN, UNSPECIFIED: Status: ACTIVE | Noted: 2020-07-27

## 2020-07-27 PROCEDURE — ? MOHS SURGERY PHONE CONSULTATION

## 2020-07-27 NOTE — PROCEDURE: MOHS SURGERY PHONE CONSULTATION
Date Of Mohs Surgery: 08/05/2020
Patient Reported Location: LEFT SUPERIOR MEDIAL FOREHEAD
Does The Patient Have An Artificial Heart Valve?: No
Office Location Of Mohs Surgery: [facility]
Referring Provider: LUIS F TRAMMELL
Has The Patient Ever Been Seen In Our Practice For Mohs Surgery Before?: Yes
Pathology Accession #: [id number]
Patient's Insurance: [employer]
Referring Provider Office Number: [phone]
Which Antibiotic Do They Take For Surgical Prophylaxis?: Amoxicillin (2 grams)
Patient Preferred Phone Number: [phone]
Detail Level: Simple
Time Of Mohs Surgery: 9:15

## 2020-08-05 ENCOUNTER — APPOINTMENT (RX ONLY)
Dept: URBAN - METROPOLITAN AREA CLINIC 22 | Facility: CLINIC | Age: 65
Setting detail: DERMATOLOGY
End: 2020-08-05

## 2020-08-05 VITALS — DIASTOLIC BLOOD PRESSURE: 62 MMHG | SYSTOLIC BLOOD PRESSURE: 104 MMHG

## 2020-08-05 PROBLEM — D04.39 CARCINOMA IN SITU OF SKIN OF OTHER PARTS OF FACE: Status: ACTIVE | Noted: 2020-08-05

## 2020-08-05 PROCEDURE — 17311 MOHS 1 STAGE H/N/HF/G: CPT

## 2020-08-05 PROCEDURE — ? MOHS SURGERY

## 2020-08-05 PROCEDURE — 17312 MOHS ADDL STAGE: CPT

## 2020-08-05 PROCEDURE — ? REFERRAL CORRESPONDENCE

## 2020-08-05 PROCEDURE — 13132 CMPLX RPR F/C/C/M/N/AX/G/H/F: CPT

## 2020-08-05 NOTE — PROCEDURE: MOHS SURGERY
Mohs Case Number: [id number]
Previous Accession (Optional): [id number]
Biopsy Photograph Reviewed: No (no photograph available)
Referring Physician (Optional): Kristen Evans
Consent Type: Consent 1 (Standard)
Eye Shield Used: No
Surgeon Performing Repair (Optional): Zain Arteaga M.D.
Initial Size Of Lesion: 0.8
X Size Of Lesion In Cm (Optional): 0.5
Number Of Stages: 2
Primary Defect Length In Cm (Final Defect Size - Required For Flaps/Grafts): 1.7
Primary Defect Width In Cm (Final Defect Size - Required For Flaps/Grafts): 1.2
Repair Type: Complex Repair
Oculoplastic Surgeon Procedure Text (A): After obtaining clear surgical margins the patient was sent to oculoplastics for surgical repair.  The patient understands they will receive post-surgical care and follow-up from the referring physician's office.
Otolaryngologist Procedure Text (A): After obtaining clear surgical margins the patient was sent to otolaryngology for surgical repair.  The patient understands they will receive post-surgical care and follow-up from the referring physician's office.
Plastic Surgeon Procedure Text (A): After obtaining clear surgical margins the patient was sent to plastics for surgical repair.  The patient understands they will receive post-surgical care and follow-up from the referring physician's office.
Mid-Level Procedure Text (A): After obtaining clear surgical margins the patient was sent to a mid-level provider for surgical repair.  The patient understands they will receive post-surgical care and follow-up from the mid-level provider.
Provider Procedure Text (A): After obtaining clear surgical margins the defect was repaired by another provider.
Asc Procedure Text (A): After obtaining clear surgical margins the patient was sent to an ASC for surgical repair.  The patient understands they will receive post-surgical care and follow-up from the ASC physician.
Suturegard Retention Suture: 2-0 Nylon
Retention Suture Bite Size: 3 mm
Length To Time In Minutes Device Was In Place: 10
Number Of Hemigard Strips Per Side: 1
Simple / Intermediate / Complex Repair - Final Wound Length In Cm: 2.9
Complex Requirements: Extensive Undermining Performed?: Yes
Width Of Defect Perpendicular To Closure In Cm (Required): 1.4
Distance Of Undermining In Cm (Required): 1.5
Undermining Type: Entire Wound
Debridement Text: The wound edges were debrided prior to proceeding with the closure to facilitate wound healing.
Helical Rim Text: The closure involved the helical rim.
Vermilion Border Text: The closure involved the vermilion border.
Nostril Rim Text: The closure involved the nostril rim.
Retention Suture Text: Retention sutures were placed to support the closure and prevent dehiscence.
Secondary Defect Length In Cm (Required For Flaps): 0
Area H Indication Text: Tumors in this location are included in Area H (eyelids, eyebrows, nose, lips, chin, ear, pre-auricular, post-auricular, temple, genitalia, hands, feet, ankles and areola).  Tissue conservation is critical in these anatomic locations.
Area M Indication Text: Tumors in this location are included in Area M (cheek, forehead, scalp, neck, jawline and pretibial skin).  Mohs surgery is indicated for tumors in these anatomic locations.
Area L Indication Text: Tumors in this location are included in Area L (trunk and extremities).  Mohs surgery is indicated for larger tumors, or tumors with aggressive histologic features, in these anatomic locations.
Tumor Debulked?: curette
Depth Of Tumor Invasion (For Histology): epidermis
Surgical Defect Length In Cm (Optional): 1.1
Special Stains Stage 1 - Results: Base On Clearance Noted Above
Stage 2: Additional Anesthesia Volume In Cc: 2.5
Stage 2: Additional Anesthesia Type: 1% lidocaine with epinephrine and a 1:10 solution of 8.4% sodium bicarbonate
Stage 4: Additional Anesthesia Type: 1% lidocaine with epinephrine
Include Tumor Staging In Mohs Note?: Please Select the Appropriate Response
Staging Info: By selecting yes to the question above you will include information on AJCC 8 tumor staging in your Mohs note. Information on tumor staging will be automatically added for SCCs on the head and neck. AJCC 8 includes tumor size, tumor depth, perineural involvement and bone invasion.
Tumor Depth: Less than 6mm from granular layer and no invasion beyond the subcutaneous fat
Medical Necessity Statement: Based on my medical judgement, Mohs surgery is the most appropriate treatment for this cancer compared to other treatments.
Alternatives Discussed Intro (Do Not Add Period): I discussed alternative treatments to Mohs surgery and specifically discussed the risks and benefits of
Consent 1/Introductory Paragraph: The rationale for Mohs was explained to the patient and consent was obtained. The risks, benefits and alternatives to therapy were discussed in detail. Specifically, the risks of infection, scarring, bleeding, prolonged wound healing, incomplete removal, allergy to anesthesia, nerve injury and recurrence were addressed. Prior to the procedure, the treatment site was clearly identified and confirmed by the patient. All components of Universal Protocol/PAUSE Rule completed.
Consent 2/Introductory Paragraph: Mohs surgery was explained to the patient and consent was obtained. The risks, benefits and alternatives to therapy were discussed in detail. Specifically, the risks of infection, scarring, bleeding, prolonged wound healing, incomplete removal, allergy to anesthesia, nerve injury and recurrence were addressed. Prior to the procedure, the treatment site was clearly identified and confirmed by the patient. All components of Universal Protocol/PAUSE Rule completed.
Consent 3/Introductory Paragraph: I gave the patient a chance to ask questions they had about the procedure.  Following this I explained the Mohs procedure and consent was obtained. The risks, benefits and alternatives to therapy were discussed in detail. Specifically, the risks of infection, scarring, bleeding, prolonged wound healing, incomplete removal, allergy to anesthesia, nerve injury and recurrence were addressed. Prior to the procedure, the treatment site was clearly identified and confirmed by the patient. All components of Universal Protocol/PAUSE Rule completed.
Consent (Temporal Branch)/Introductory Paragraph: The rationale for Mohs was explained to the patient and consent was obtained. The risks, benefits and alternatives to therapy were discussed in detail. Specifically, the risks of damage to the temporal branch of the facial nerve, infection, scarring, bleeding, prolonged wound healing, incomplete removal, allergy to anesthesia, and recurrence were addressed. Prior to the procedure, the treatment site was clearly identified and confirmed by the patient. All components of Universal Protocol/PAUSE Rule completed.
Consent (Marginal Mandibular)/Introductory Paragraph: The rationale for Mohs was explained to the patient and consent was obtained. The risks, benefits and alternatives to therapy were discussed in detail. Specifically, the risks of damage to the marginal mandibular branch of the facial nerve, infection, scarring, bleeding, prolonged wound healing, incomplete removal, allergy to anesthesia, and recurrence were addressed. Prior to the procedure, the treatment site was clearly identified and confirmed by the patient. All components of Universal Protocol/PAUSE Rule completed.
Consent (Spinal Accessory)/Introductory Paragraph: The rationale for Mohs was explained to the patient and consent was obtained. The risks, benefits and alternatives to therapy were discussed in detail. Specifically, the risks of damage to the spinal accessory nerve, infection, scarring, bleeding, prolonged wound healing, incomplete removal, allergy to anesthesia, and recurrence were addressed. Prior to the procedure, the treatment site was clearly identified and confirmed by the patient. All components of Universal Protocol/PAUSE Rule completed.
Consent (Near Eyelid Margin)/Introductory Paragraph: The rationale for Mohs was explained to the patient and consent was obtained. The risks, benefits and alternatives to therapy were discussed in detail. Specifically, the risks of ectropion or eyelid deformity, infection, scarring, bleeding, prolonged wound healing, incomplete removal, allergy to anesthesia, nerve injury and recurrence were addressed. Prior to the procedure, the treatment site was clearly identified and confirmed by the patient. All components of Universal Protocol/PAUSE Rule completed.
Consent (Ear)/Introductory Paragraph: The rationale for Mohs was explained to the patient and consent was obtained. The risks, benefits and alternatives to therapy were discussed in detail. Specifically, the risks of ear deformity, infection, scarring, bleeding, prolonged wound healing, incomplete removal, allergy to anesthesia, nerve injury and recurrence were addressed. Prior to the procedure, the treatment site was clearly identified and confirmed by the patient. All components of Universal Protocol/PAUSE Rule completed.
Consent (Nose)/Introductory Paragraph: The rationale for Mohs was explained to the patient and consent was obtained. The risks, benefits and alternatives to therapy were discussed in detail. Specifically, the risks of nasal deformity, changes in the flow of air through the nose, infection, scarring, bleeding, prolonged wound healing, incomplete removal, allergy to anesthesia, nerve injury and recurrence were addressed. Prior to the procedure, the treatment site was clearly identified and confirmed by the patient. All components of Universal Protocol/PAUSE Rule completed.
Consent (Lip)/Introductory Paragraph: The rationale for Mohs was explained to the patient and consent was obtained. The risks, benefits and alternatives to therapy were discussed in detail. Specifically, the risks of lip deformity, changes in the oral aperture, infection, scarring, bleeding, prolonged wound healing, incomplete removal, allergy to anesthesia, nerve injury and recurrence were addressed. Prior to the procedure, the treatment site was clearly identified and confirmed by the patient. All components of Universal Protocol/PAUSE Rule completed.
Consent (Scalp)/Introductory Paragraph: The rationale for Mohs was explained to the patient and consent was obtained. The risks, benefits and alternatives to therapy were discussed in detail. Specifically, the risks of changes in hair growth pattern secondary to repair, infection, scarring, bleeding, prolonged wound healing, incomplete removal, allergy to anesthesia, nerve injury and recurrence were addressed. Prior to the procedure, the treatment site was clearly identified and confirmed by the patient. All components of Universal Protocol/PAUSE Rule completed.
Detail Level: Detailed
Postop Diagnosis: same
Anesthesia Volume In Cc: 5.5
Additional Anesthesia Volume In Cc: 6
Hemostasis: Electricator
Estimated Blood Loss (Cc): minimal
Repair Anesthesia Method: local infiltration
Deep Sutures: 3-0 Maxon
Epidermal Sutures: 4-0 Surgipro
Epidermal Closure: simple interrupted
Suturegard Intro: Intraoperative tissue expansion was performed, utilizing the SUTUREGARD device, in order to reduce wound tension.
Suturegard Body: The suture ends were repeatedly re-tightened and re-clamped to achieve the desired tissue expansion.
Hemigard Intro: Due to skin fragility and wound tension, it was decided to use HEMIGARD adhesive retention suture devices to permit a linear closure. The skin was cleaned and dried for a 6cm distance away from the wound. Excessive hair, if present, was removed to allow for adhesion.
Hemigard Postcare Instructions: The HEMIGARD strips are to remain completely dry for at least 5-7 days.
Donor Site Anesthesia Type: same as repair anesthesia
Graft Donor Site Bandage (Optional-Leave Blank If You Don't Want In Note): Steri-strips and a pressure bandage were applied to the donor site.
Closure 2 Information: This tab is for additional flaps and grafts, including complex repair and grafts and complex repair and flaps. You can also specify a different location for the additional defect, if the location is the same you do not need to select a new one. We will insert the automated text for the repair you select below just as we do for solitary flaps and grafts. Please note that at this time if you select a location with a different insurance zone you will need to override the ICD10 and CPT if appropriate.
Closure 3 Information: This tab is for additional flaps and grafts above and beyond our usual structured repairs.  Please note if you enter information here it will not currently bill and you will need to add the billing information manually.
Wound Care: Petrolatum
Dressing: pressure dressing with telfa
Dressing (No Sutures): dry sterile dressing
Suture Removal: 14 days
Unna Boot Text: An Unna boot was placed to help immobilize the limb and facilitate more rapid healing.
Home Suture Removal Text: Patient was provided instructions on removing sutures and will remove their sutures at home.  If they have any questions or difficulties they will call the office.
Post-Care Instructions: I reviewed with the patient in detail post-care instructions. Patient is not to engage in any heavy lifting, exercise, or swimming for the next 14 days. Should the patient develop any fevers, chills, bleeding, severe pain patient will contact the office immediately.
Pain Refusal Text: I offered to prescribe pain medication but the patient refused to take this medication.
Mauc Instructions: By selecting yes to the question below the MAUC number will be added into the note.  This will be calculated automatically based on the diagnosis chosen, the size entered, the body zone selected (H,M,L) and the specific indications you chose. You will also have the option to override the Mohs AUC if you disagree with the automatically calculated number and this option is found in the Case Summary tab.
Where Do You Want The Question To Include Opioid Counseling Located?: Case Summary Tab
Eye Protection Verbiage: Before proceeding with the stage, a plastic scleral shield was inserted. The globe was anesthetized with a few drops of 1% lidocaine with 1:100,000 epinephrine. Then, an appropriate sized scleral shield was chosen and coated with lacrilube ointment. The shield was gently inserted and left in place for the duration of each stage. After the stage was completed, the shield was gently removed.
Mohs Method Verbiage: An incision at a 45 degree angle following the standard Mohs approach was done and the specimen was harvested as a microscopic controlled layer.
Surgeon/Pathologist Verbiage (Will Incorporate Name Of Surgeon From Intro If Not Blank): operated in two distinct and integrated capacities as the surgeon and pathologist.
Mohs Histo Method Verbiage: Each section was then chromacoded and processed in the Mohs lab using the Mohs protocol and submitted for frozen section.
Subsequent Stages Histo Method Verbiage: Using a similar technique to that described above, a thin layer of tissue was removed from all areas where tumor was visible on the previous stage.  The tissue was again oriented, mapped, dyed, and processed as above.
Mohs Rapid Report Verbiage: The area of clinically evident tumor was marked with skin marking ink and appropriately hatched.  The initial incision was made following the Mohs approach through the skin.  The specimen was taken to the lab, divided into the necessary number of pieces, chromacoded and processed according to the Mohs protocol.  This was repeated in successive stages until a tumor free defect was achieved.
Complex Repair Preamble Text (Leave Blank If You Do Not Want): Extensive wide undermining was performed.
Intermediate Repair Preamble Text (Leave Blank If You Do Not Want): Undermining was performed with blunt dissection.
Non-Graft Cartilage Fenestration Text: The cartilage was fenestrated with a 2mm punch biopsy to help facilitate healing.
Graft Cartilage Fenestration Text: The cartilage was fenestrated with a 2mm punch biopsy to help facilitate graft survival and healing.
Secondary Intention Text (Leave Blank If You Do Not Want): The defect will heal with secondary intention.
No Repair - Repaired With Adjacent Surgical Defect Text (Leave Blank If You Do Not Want): After obtaining clear surgical margins the defect was repaired concurrently with another surgical defect which was in close approximation.
Advancement Flap (Single) Text: The defect edges were debeveled with a #15 scalpel blade.  Given the location of the defect and the proximity to free margins a single advancement flap was deemed most appropriate.  Using a sterile surgical marker, an appropriate advancement flap was drawn incorporating the defect and placing the expected incisions within the relaxed skin tension lines where possible.    The area thus outlined was incised deep to adipose tissue with a #15 scalpel blade.  The skin margins were undermined to an appropriate distance in all directions utilizing iris scissors.
Advancement Flap (Double) Text: The defect edges were debeveled with a #15 scalpel blade.  Given the location of the defect and the proximity to free margins a double advancement flap was deemed most appropriate.  Using a sterile surgical marker, the appropriate advancement flaps were drawn incorporating the defect and placing the expected incisions within the relaxed skin tension lines where possible.    The area thus outlined was incised deep to adipose tissue with a #15 scalpel blade.  The skin margins were undermined to an appropriate distance in all directions utilizing iris scissors.
Burow's Advancement Flap Text: The defect edges were debeveled with a #15 scalpel blade.  Given the location of the defect and the proximity to free margins a Burow's advancement flap was deemed most appropriate.  Using a sterile surgical marker, the appropriate advancement flap was drawn incorporating the defect and placing the expected incisions within the relaxed skin tension lines where possible.    The area thus outlined was incised deep to adipose tissue with a #15 scalpel blade.  The skin margins were undermined to an appropriate distance in all directions utilizing iris scissors.
Chonodrocutaneous Helical Advancement Flap Text: The defect edges were debeveled with a #15 scalpel blade.  Given the location of the defect and the proximity to free margins a chondrocutaneous helical advancement flap was deemed most appropriate.  Using a sterile surgical marker, the appropriate advancement flap was drawn incorporating the defect and placing the expected incisions within the relaxed skin tension lines where possible.    The area thus outlined was incised deep to adipose tissue with a #15 scalpel blade.  The skin margins were undermined to an appropriate distance in all directions utilizing iris scissors.
Crescentic Advancement Flap Text: The defect edges were debeveled with a #15 scalpel blade.  Given the location of the defect and the proximity to free margins a crescentic advancement flap was deemed most appropriate.  Using a sterile surgical marker, the appropriate advancement flap was drawn incorporating the defect and placing the expected incisions within the relaxed skin tension lines where possible.    The area thus outlined was incised deep to adipose tissue with a #15 scalpel blade.  The skin margins were undermined to an appropriate distance in all directions utilizing iris scissors.
A-T Advancement Flap Text: The defect edges were debeveled with a #15 scalpel blade.  Given the location of the defect, shape of the defect and the proximity to free margins an A-T advancement flap was deemed most appropriate.  Using a sterile surgical marker, an appropriate advancement flap was drawn incorporating the defect and placing the expected incisions within the relaxed skin tension lines where possible.    The area thus outlined was incised deep to adipose tissue with a #15 scalpel blade.  The skin margins were undermined to an appropriate distance in all directions utilizing iris scissors.
O-T Advancement Flap Text: The defect edges were debeveled with a #15 scalpel blade.  Given the location of the defect, shape of the defect and the proximity to free margins an O-T advancement flap was deemed most appropriate.  Using a sterile surgical marker, an appropriate advancement flap was drawn incorporating the defect and placing the expected incisions within the relaxed skin tension lines where possible.    The area thus outlined was incised deep to adipose tissue with a #15 scalpel blade.  The skin margins were undermined to an appropriate distance in all directions utilizing iris scissors.
O-L Flap Text: The defect edges were debeveled with a #15 scalpel blade.  Given the location of the defect, shape of the defect and the proximity to free margins an O-L flap was deemed most appropriate.  Using a sterile surgical marker, an appropriate advancement flap was drawn incorporating the defect and placing the expected incisions within the relaxed skin tension lines where possible.    The area thus outlined was incised deep to adipose tissue with a #15 scalpel blade.  The skin margins were undermined to an appropriate distance in all directions utilizing iris scissors.
O-Z Flap Text: The defect edges were debeveled with a #15 scalpel blade.  Given the location of the defect, shape of the defect and the proximity to free margins an O-Z flap was deemed most appropriate.  Using a sterile surgical marker, an appropriate transposition flap was drawn incorporating the defect and placing the expected incisions within the relaxed skin tension lines where possible. The area thus outlined was incised deep to adipose tissue with a #15 scalpel blade.  The skin margins were undermined to an appropriate distance in all directions utilizing iris scissors.
Double O-Z Flap Text: The defect edges were debeveled with a #15 scalpel blade.  Given the location of the defect, shape of the defect and the proximity to free margins a Double O-Z flap was deemed most appropriate.  Using a sterile surgical marker, an appropriate transposition flap was drawn incorporating the defect and placing the expected incisions within the relaxed skin tension lines where possible. The area thus outlined was incised deep to adipose tissue with a #15 scalpel blade.  The skin margins were undermined to an appropriate distance in all directions utilizing iris scissors.
V-Y Flap Text: The defect edges were debeveled with a #15 scalpel blade.  Given the location of the defect, shape of the defect and the proximity to free margins a V-Y flap was deemed most appropriate.  Using a sterile surgical marker, an appropriate advancement flap was drawn incorporating the defect and placing the expected incisions within the relaxed skin tension lines where possible.    The area thus outlined was incised deep to adipose tissue with a #15 scalpel blade.  The skin margins were undermined to an appropriate distance in all directions utilizing iris scissors.
Advancement-Rotation Flap Text: The defect edges were debeveled with a #15 scalpel blade.  Given the location of the defect, shape of the defect and the proximity to free margins an advancement-rotation flap was deemed most appropriate.  Using a sterile surgical marker, an appropriate flap was drawn incorporating the defect and placing the expected incisions within the relaxed skin tension lines where possible. The area thus outlined was incised deep to adipose tissue with a #15 scalpel blade.  The skin margins were undermined to an appropriate distance in all directions utilizing iris scissors.
Mercedes Flap Text: The defect edges were debeveled with a #15 scalpel blade.  Given the location of the defect, shape of the defect and the proximity to free margins a Mercedes flap was deemed most appropriate.  Using a sterile surgical marker, an appropriate advancement flap was drawn incorporating the defect and placing the expected incisions within the relaxed skin tension lines where possible. The area thus outlined was incised deep to adipose tissue with a #15 scalpel blade.  The skin margins were undermined to an appropriate distance in all directions utilizing iris scissors.
Modified Advancement Flap Text: The defect edges were debeveled with a #15 scalpel blade.  Given the location of the defect, shape of the defect and the proximity to free margins a modified advancement flap was deemed most appropriate.  Using a sterile surgical marker, an appropriate advancement flap was drawn incorporating the defect and placing the expected incisions within the relaxed skin tension lines where possible.    The area thus outlined was incised deep to adipose tissue with a #15 scalpel blade.  The skin margins were undermined to an appropriate distance in all directions utilizing iris scissors.
Mucosal Advancement Flap Text: Given the location of the defect, shape of the defect and the proximity to free margins a mucosal advancement flap was deemed most appropriate. Incisions were made with a 15 blade scalpel in the appropriate fashion along the cutaneous vermilion border and the mucosal lip. The remaining actinically damaged mucosal tissue was excised.  The mucosal advancement flap was then elevated to the gingival sulcus with care taken to preserve the neurovascular structures and advanced into the primary defect. Care was taken to ensure that precise realignment of the vermilion border was achieved.
Peng Advancement Flap Text: The defect edges were debeveled with a #15 scalpel blade.  Given the location of the defect, shape of the defect and the proximity to free margins a Peng advancement flap was deemed most appropriate.  Using a sterile surgical marker, an appropriate advancement flap was drawn incorporating the defect and placing the expected incisions within the relaxed skin tension lines where possible. The area thus outlined was incised deep to adipose tissue with a #15 scalpel blade.  The skin margins were undermined to an appropriate distance in all directions utilizing iris scissors.
Hatchet Flap Text: The defect edges were debeveled with a #15 scalpel blade.  Given the location of the defect, shape of the defect and the proximity to free margins a hatchet flap was deemed most appropriate.  Using a sterile surgical marker, an appropriate hatchet flap was drawn incorporating the defect and placing the expected incisions within the relaxed skin tension lines where possible.    The area thus outlined was incised deep to adipose tissue with a #15 scalpel blade.  The skin margins were undermined to an appropriate distance in all directions utilizing iris scissors.
Rotation Flap Text: The defect edges were debeveled with a #15 scalpel blade.  Given the location of the defect, shape of the defect and the proximity to free margins a rotation flap was deemed most appropriate.  Using a sterile surgical marker, an appropriate rotation flap was drawn incorporating the defect and placing the expected incisions within the relaxed skin tension lines where possible.    The area thus outlined was incised deep to adipose tissue with a #15 scalpel blade.  The skin margins were undermined to an appropriate distance in all directions utilizing iris scissors.
Spiral Flap Text: The defect edges were debeveled with a #15 scalpel blade.  Given the location of the defect, shape of the defect and the proximity to free margins a spiral flap was deemed most appropriate.  Using a sterile surgical marker, an appropriate rotation flap was drawn incorporating the defect and placing the expected incisions within the relaxed skin tension lines where possible. The area thus outlined was incised deep to adipose tissue with a #15 scalpel blade.  The skin margins were undermined to an appropriate distance in all directions utilizing iris scissors.
Star Wedge Flap Text: The defect edges were debeveled with a #15 scalpel blade.  Given the location of the defect, shape of the defect and the proximity to free margins a star wedge flap was deemed most appropriate.  Using a sterile surgical marker, an appropriate rotation flap was drawn incorporating the defect and placing the expected incisions within the relaxed skin tension lines where possible. The area thus outlined was incised deep to adipose tissue with a #15 scalpel blade.  The skin margins were undermined to an appropriate distance in all directions utilizing iris scissors.
Transposition Flap Text: The defect edges were debeveled with a #15 scalpel blade.  Given the location of the defect and the proximity to free margins a transposition flap was deemed most appropriate.  Using a sterile surgical marker, an appropriate transposition flap was drawn incorporating the defect.    The area thus outlined was incised deep to adipose tissue with a #15 scalpel blade.  The skin margins were undermined to an appropriate distance in all directions utilizing iris scissors.
Muscle Hinge Flap Text: The defect edges were debeveled with a #15 scalpel blade.  Given the size, depth and location of the defect and the proximity to free margins a muscle hinge flap was deemed most appropriate.  Using a sterile surgical marker, an appropriate hinge flap was drawn incorporating the defect. The area thus outlined was incised with a #15 scalpel blade.  The skin margins were undermined to an appropriate distance in all directions utilizing iris scissors.
Melolabial Transposition Flap Text: The defect edges were debeveled with a #15 scalpel blade.  Given the location of the defect and the proximity to free margins a melolabial flap was deemed most appropriate.  Using a sterile surgical marker, an appropriate melolabial transposition flap was drawn incorporating the defect.    The area thus outlined was incised deep to adipose tissue with a #15 scalpel blade.  The skin margins were undermined to an appropriate distance in all directions utilizing iris scissors.
Rhombic Flap Text: The defect edges were debeveled with a #15 scalpel blade.  Given the location of the defect and the proximity to free margins a rhombic flap was deemed most appropriate.  Using a sterile surgical marker, an appropriate rhombic flap was drawn incorporating the defect.    The area thus outlined was incised deep to adipose tissue with a #15 scalpel blade.  The skin margins were undermined to an appropriate distance in all directions utilizing iris scissors.
Rhomboid Transposition Flap Text: The defect edges were debeveled with a #15 scalpel blade.  Given the location of the defect and the proximity to free margins a rhomboid transposition flap was deemed most appropriate.  Using a sterile surgical marker, an appropriate rhomboid flap was drawn incorporating the defect.    The area thus outlined was incised deep to adipose tissue with a #15 scalpel blade.  The skin margins were undermined to an appropriate distance in all directions utilizing iris scissors.
Bi-Rhombic Flap Text: The defect edges were debeveled with a #15 scalpel blade.  Given the location of the defect and the proximity to free margins a bi-rhombic flap was deemed most appropriate.  Using a sterile surgical marker, an appropriate rhombic flap was drawn incorporating the defect. The area thus outlined was incised deep to adipose tissue with a #15 scalpel blade.  The skin margins were undermined to an appropriate distance in all directions utilizing iris scissors.
Helical Rim Advancement Flap Text: The defect edges were debeveled with a #15 blade scalpel.  Given the location of the defect and the proximity to free margins (helical rim) a double helical rim advancement flap was deemed most appropriate.  Using a sterile surgical marker, the appropriate advancement flaps were drawn incorporating the defect and placing the expected incisions between the helical rim and antihelix where possible.  The area thus outlined was incised through and through with a #15 scalpel blade.  With a skin hook and iris scissors, the flaps were gently and sharply undermined and freed up.
Bilateral Helical Rim Advancement Flap Text: The defect edges were debeveled with a #15 blade scalpel.  Given the location of the defect and the proximity to free margins (helical rim) a bilateral helical rim advancement flap was deemed most appropriate.  Using a sterile surgical marker, the appropriate advancement flaps were drawn incorporating the defect and placing the expected incisions between the helical rim and antihelix where possible.  The area thus outlined was incised through and through with a #15 scalpel blade.  With a skin hook and iris scissors, the flaps were gently and sharply undermined and freed up.
Ear Star Wedge Flap Text: The defect edges were debeveled with a #15 blade scalpel.  Given the location of the defect and the proximity to free margins (helical rim) an ear star wedge flap was deemed most appropriate.  Using a sterile surgical marker, the appropriate flap was drawn incorporating the defect and placing the expected incisions between the helical rim and antihelix where possible.  The area thus outlined was incised through and through with a #15 scalpel blade.
Banner Transposition Flap Text: The defect edges were debeveled with a #15 scalpel blade.  Given the location of the defect and the proximity to free margins a Banner transposition flap was deemed most appropriate.  Using a sterile surgical marker, an appropriate flap drawn around the defect. The area thus outlined was incised deep to adipose tissue with a #15 scalpel blade.  The skin margins were undermined to an appropriate distance in all directions utilizing iris scissors.
Bilobed Flap Text: The defect edges were debeveled with a #15 scalpel blade.  Given the location of the defect and the proximity to free margins a bilobe flap was deemed most appropriate.  Using a sterile surgical marker, an appropriate bilobe flap drawn around the defect.    The area thus outlined was incised deep to adipose tissue with a #15 scalpel blade.  The skin margins were undermined to an appropriate distance in all directions utilizing iris scissors.
Bilobed Transposition Flap Text: The defect edges were debeveled with a #15 scalpel blade.  Given the location of the defect and the proximity to free margins a bilobed transposition flap was deemed most appropriate.  Using a sterile surgical marker, an appropriate bilobe flap drawn around the defect.    The area thus outlined was incised deep to adipose tissue with a #15 scalpel blade.  The skin margins were undermined to an appropriate distance in all directions utilizing iris scissors.
Trilobed Flap Text: The defect edges were debeveled with a #15 scalpel blade.  Given the location of the defect and the proximity to free margins a trilobed flap was deemed most appropriate.  Using a sterile surgical marker, an appropriate trilobed flap drawn around the defect.    The area thus outlined was incised deep to adipose tissue with a #15 scalpel blade.  The skin margins were undermined to an appropriate distance in all directions utilizing iris scissors.
Dorsal Nasal Flap Text: The defect edges were debeveled with a #15 scalpel blade.  Given the location of the defect and the proximity to free margins a dorsal nasal flap was deemed most appropriate.  Using a sterile surgical marker, an appropriate dorsal nasal flap was drawn around the defect.    The area thus outlined was incised deep to adipose tissue with a #15 scalpel blade.  The skin margins were undermined to an appropriate distance in all directions utilizing iris scissors.
Island Pedicle Flap Text: The defect edges were debeveled with a #15 scalpel blade.  Given the location of the defect, shape of the defect and the proximity to free margins an island pedicle advancement flap was deemed most appropriate.  Using a sterile surgical marker, an appropriate advancement flap was drawn incorporating the defect, outlining the appropriate donor tissue and placing the expected incisions within the relaxed skin tension lines where possible.    The area thus outlined was incised deep to adipose tissue with a #15 scalpel blade.  The skin margins were undermined to an appropriate distance in all directions around the primary defect and laterally outward around the island pedicle utilizing iris scissors.  There was minimal undermining beneath the pedicle flap.
Island Pedicle Flap With Canthal Suspension Text: The defect edges were debeveled with a #15 scalpel blade.  Given the location of the defect, shape of the defect and the proximity to free margins an island pedicle advancement flap was deemed most appropriate.  Using a sterile surgical marker, an appropriate advancement flap was drawn incorporating the defect, outlining the appropriate donor tissue and placing the expected incisions within the relaxed skin tension lines where possible. The area thus outlined was incised deep to adipose tissue with a #15 scalpel blade.  The skin margins were undermined to an appropriate distance in all directions around the primary defect and laterally outward around the island pedicle utilizing iris scissors.  There was minimal undermining beneath the pedicle flap. A suspension suture was placed in the canthal tendon to prevent tension and prevent ectropion.
Alar Island Pedicle Flap Text: The defect edges were debeveled with a #15 scalpel blade.  Given the location of the defect, shape of the defect and the proximity to the alar rim an island pedicle advancement flap was deemed most appropriate.  Using a sterile surgical marker, an appropriate advancement flap was drawn incorporating the defect, outlining the appropriate donor tissue and placing the expected incisions within the nasal ala running parallel to the alar rim. The area thus outlined was incised with a #15 scalpel blade.  The skin margins were undermined minimally to an appropriate distance in all directions around the primary defect and laterally outward around the island pedicle utilizing iris scissors.  There was minimal undermining beneath the pedicle flap.
Double Island Pedicle Flap Text: The defect edges were debeveled with a #15 scalpel blade.  Given the location of the defect, shape of the defect and the proximity to free margins a double island pedicle advancement flap was deemed most appropriate.  Using a sterile surgical marker, an appropriate advancement flap was drawn incorporating the defect, outlining the appropriate donor tissue and placing the expected incisions within the relaxed skin tension lines where possible.    The area thus outlined was incised deep to adipose tissue with a #15 scalpel blade.  The skin margins were undermined to an appropriate distance in all directions around the primary defect and laterally outward around the island pedicle utilizing iris scissors.  There was minimal undermining beneath the pedicle flap.
Island Pedicle Flap-Requiring Vessel Identification Text: The defect edges were debeveled with a #15 scalpel blade.  Given the location of the defect, shape of the defect and the proximity to free margins an island pedicle advancement flap was deemed most appropriate.  Using a sterile surgical marker, an appropriate advancement flap was drawn, based on the axial vessel mentioned above, incorporating the defect, outlining the appropriate donor tissue and placing the expected incisions within the relaxed skin tension lines where possible.    The area thus outlined was incised deep to adipose tissue with a #15 scalpel blade.  The skin margins were undermined to an appropriate distance in all directions around the primary defect and laterally outward around the island pedicle utilizing iris scissors.  There was minimal undermining beneath the pedicle flap.
Keystone Flap Text: The defect edges were debeveled with a #15 scalpel blade.  Given the location of the defect, shape of the defect a keystone flap was deemed most appropriate.  Using a sterile surgical marker, an appropriate keystone flap was drawn incorporating the defect, outlining the appropriate donor tissue and placing the expected incisions within the relaxed skin tension lines where possible. The area thus outlined was incised deep to adipose tissue with a #15 scalpel blade.  The skin margins were undermined to an appropriate distance in all directions around the primary defect and laterally outward around the flap utilizing iris scissors.
O-T Plasty Text: The defect edges were debeveled with a #15 scalpel blade.  Given the location of the defect, shape of the defect and the proximity to free margins an O-T plasty was deemed most appropriate.  Using a sterile surgical marker, an appropriate O-T plasty was drawn incorporating the defect and placing the expected incisions within the relaxed skin tension lines where possible.    The area thus outlined was incised deep to adipose tissue with a #15 scalpel blade.  The skin margins were undermined to an appropriate distance in all directions utilizing iris scissors.
O-Z Plasty Text: The defect edges were debeveled with a #15 scalpel blade.  Given the location of the defect, shape of the defect and the proximity to free margins an O-Z plasty (double transposition flap) was deemed most appropriate.  Using a sterile surgical marker, the appropriate transposition flaps were drawn incorporating the defect and placing the expected incisions within the relaxed skin tension lines where possible.    The area thus outlined was incised deep to adipose tissue with a #15 scalpel blade.  The skin margins were undermined to an appropriate distance in all directions utilizing iris scissors.  Hemostasis was achieved with electrocautery.  The flaps were then transposed into place, one clockwise and the other counterclockwise, and anchored with interrupted buried subcutaneous sutures.
Double O-Z Plasty Text: The defect edges were debeveled with a #15 scalpel blade.  Given the location of the defect, shape of the defect and the proximity to free margins a Double O-Z plasty (double transposition flap) was deemed most appropriate.  Using a sterile surgical marker, the appropriate transposition flaps were drawn incorporating the defect and placing the expected incisions within the relaxed skin tension lines where possible. The area thus outlined was incised deep to adipose tissue with a #15 scalpel blade.  The skin margins were undermined to an appropriate distance in all directions utilizing iris scissors.  Hemostasis was achieved with electrocautery.  The flaps were then transposed into place, one clockwise and the other counterclockwise, and anchored with interrupted buried subcutaneous sutures.
V-Y Plasty Text: The defect edges were debeveled with a #15 scalpel blade.  Given the location of the defect, shape of the defect and the proximity to free margins an V-Y advancement flap was deemed most appropriate.  Using a sterile surgical marker, an appropriate advancement flap was drawn incorporating the defect and placing the expected incisions within the relaxed skin tension lines where possible.    The area thus outlined was incised deep to adipose tissue with a #15 scalpel blade.  The skin margins were undermined to an appropriate distance in all directions utilizing iris scissors.
H Plasty Text: Given the location of the defect, shape of the defect and the proximity to free margins a H-plasty was deemed most appropriate for repair.  Using a sterile surgical marker, the appropriate advancement arms of the H-plasty were drawn incorporating the defect and placing the expected incisions within the relaxed skin tension lines where possible. The area thus outlined was incised deep to adipose tissue with a #15 scalpel blade. The skin margins were undermined to an appropriate distance in all directions utilizing iris scissors.  The opposing advancement arms were then advanced into place in opposite direction and anchored with interrupted buried subcutaneous sutures.
W Plasty Text: The lesion was extirpated to the level of the fat with a #15 scalpel blade.  Given the location of the defect, shape of the defect and the proximity to free margins a W-plasty was deemed most appropriate for repair.  Using a sterile surgical marker, the appropriate transposition arms of the W-plasty were drawn incorporating the defect and placing the expected incisions within the relaxed skin tension lines where possible.    The area thus outlined was incised deep to adipose tissue with a #15 scalpel blade.  The skin margins were undermined to an appropriate distance in all directions utilizing iris scissors.  The opposing transposition arms were then transposed into place in opposite direction and anchored with interrupted buried subcutaneous sutures.
Z Plasty Text: The lesion was extirpated to the level of the fat with a #15 scalpel blade.  Given the location of the defect, shape of the defect and the proximity to free margins a Z-plasty was deemed most appropriate for repair.  Using a sterile surgical marker, the appropriate transposition arms of the Z-plasty were drawn incorporating the defect and placing the expected incisions within the relaxed skin tension lines where possible.    The area thus outlined was incised deep to adipose tissue with a #15 scalpel blade.  The skin margins were undermined to an appropriate distance in all directions utilizing iris scissors.  The opposing transposition arms were then transposed into place in opposite direction and anchored with interrupted buried subcutaneous sutures.
Zygomaticofacial Flap Text: Given the location of the defect, shape of the defect and the proximity to free margins a zygomaticofacial flap was deemed most appropriate for repair.  Using a sterile surgical marker, the appropriate flap was drawn incorporating the defect and placing the expected incisions within the relaxed skin tension lines where possible. The area thus outlined was incised deep to adipose tissue with a #15 scalpel blade with preservation of a vascular pedicle.  The skin margins were undermined to an appropriate distance in all directions utilizing iris scissors.  The flap was then placed into the defect and anchored with interrupted buried subcutaneous sutures.
Cheek Interpolation Flap Text: A decision was made to reconstruct the defect utilizing an interpolation axial flap and a staged reconstruction.  A telfa template was made of the defect.  This telfa template was then used to outline the Cheek Interpolation flap.  The donor area for the pedicle flap was then injected with anesthesia.  The flap was excised through the skin and subcutaneous tissue down to the layer of the underlying musculature.  The interpolation flap was carefully excised within this deep plane to maintain its blood supply.  The edges of the donor site were undermined.   The donor site was closed in a primary fashion.  The pedicle was then rotated into position and sutured.  Once the tube was sutured into place, adequate blood supply was confirmed with blanching and refill.  The pedicle was then wrapped with xeroform gauze and dressed appropriately with a telfa and gauze bandage to ensure continued blood supply and protect the attached pedicle.
Cheek-To-Nose Interpolation Flap Text: A decision was made to reconstruct the defect utilizing an interpolation axial flap and a staged reconstruction.  A telfa template was made of the defect.  This telfa template was then used to outline the Cheek-To-Nose Interpolation flap.  The donor area for the pedicle flap was then injected with anesthesia.  The flap was excised through the skin and subcutaneous tissue down to the layer of the underlying musculature.  The interpolation flap was carefully excised within this deep plane to maintain its blood supply.  The edges of the donor site were undermined.   The donor site was closed in a primary fashion.  The pedicle was then rotated into position and sutured.  Once the tube was sutured into place, adequate blood supply was confirmed with blanching and refill.  The pedicle was then wrapped with xeroform gauze and dressed appropriately with a telfa and gauze bandage to ensure continued blood supply and protect the attached pedicle.
Interpolation Flap Text: A decision was made to reconstruct the defect utilizing an interpolation axial flap and a staged reconstruction.  A telfa template was made of the defect.  This telfa template was then used to outline the interpolation flap.  The donor area for the pedicle flap was then injected with anesthesia.  The flap was excised through the skin and subcutaneous tissue down to the layer of the underlying musculature.  The interpolation flap was carefully excised within this deep plane to maintain its blood supply.  The edges of the donor site were undermined.   The donor site was closed in a primary fashion.  The pedicle was then rotated into position and sutured.  Once the tube was sutured into place, adequate blood supply was confirmed with blanching and refill.  The pedicle was then wrapped with xeroform gauze and dressed appropriately with a telfa and gauze bandage to ensure continued blood supply and protect the attached pedicle.
Melolabial Interpolation Flap Text: A decision was made to reconstruct the defect utilizing an interpolation axial flap and a staged reconstruction.  A telfa template was made of the defect.  This telfa template was then used to outline the melolabial interpolation flap.  The donor area for the pedicle flap was then injected with anesthesia.  The flap was excised through the skin and subcutaneous tissue down to the layer of the underlying musculature.  The pedicle flap was carefully excised within this deep plane to maintain its blood supply.  The edges of the donor site were undermined.   The donor site was closed in a primary fashion.  The pedicle was then rotated into position and sutured.  Once the tube was sutured into place, adequate blood supply was confirmed with blanching and refill.  The pedicle was then wrapped with xeroform gauze and dressed appropriately with a telfa and gauze bandage to ensure continued blood supply and protect the attached pedicle.
Mastoid Interpolation Flap Text: A decision was made to reconstruct the defect utilizing an interpolation axial flap and a staged reconstruction.  A telfa template was made of the defect.  This telfa template was then used to outline the mastoid interpolation flap.  The donor area for the pedicle flap was then injected with anesthesia.  The flap was excised through the skin and subcutaneous tissue down to the layer of the underlying musculature.  The pedicle flap was carefully excised within this deep plane to maintain its blood supply.  The edges of the donor site were undermined.   The donor site was closed in a primary fashion.  The pedicle was then rotated into position and sutured.  Once the tube was sutured into place, adequate blood supply was confirmed with blanching and refill.  The pedicle was then wrapped with xeroform gauze and dressed appropriately with a telfa and gauze bandage to ensure continued blood supply and protect the attached pedicle.
Posterior Auricular Interpolation Flap Text: A decision was made to reconstruct the defect utilizing an interpolation axial flap and a staged reconstruction.  A telfa template was made of the defect.  This telfa template was then used to outline the posterior auricular interpolation flap.  The donor area for the pedicle flap was then injected with anesthesia.  The flap was excised through the skin and subcutaneous tissue down to the layer of the underlying musculature.  The pedicle flap was carefully excised within this deep plane to maintain its blood supply.  The edges of the donor site were undermined.   The donor site was closed in a primary fashion.  The pedicle was then rotated into position and sutured.  Once the tube was sutured into place, adequate blood supply was confirmed with blanching and refill.  The pedicle was then wrapped with xeroform gauze and dressed appropriately with a telfa and gauze bandage to ensure continued blood supply and protect the attached pedicle.
Paramedian Forehead Flap Text: A decision was made to reconstruct the defect utilizing an interpolation axial flap and a staged reconstruction.  A telfa template was made of the defect.  This telfa template was then used to outline the paramedian forehead pedicle flap.  The donor area for the pedicle flap was then injected with anesthesia.  The flap was excised through the skin and subcutaneous tissue down to the layer of the underlying musculature.  The pedicle flap was carefully excised within this deep plane to maintain its blood supply.  The edges of the donor site were undermined.   The donor site was closed in a primary fashion.  The pedicle was then rotated into position and sutured.  Once the tube was sutured into place, adequate blood supply was confirmed with blanching and refill.  The pedicle was then wrapped with xeroform gauze and dressed appropriately with a telfa and gauze bandage to ensure continued blood supply and protect the attached pedicle.
Cheiloplasty (Less Than 50%) Text: A decision was made to reconstruct the defect with a  cheiloplasty.  The defect was undermined extensively.  Additional obicularis oris muscle was excised with a 15 blade scalpel.  The defect was converted into a full thickness wedge, of less than 50% of the vertical height of the lip, to facilite a better cosmetic result.  Small vessels were then tied off with 5-0 monocyrl. The obicularis oris, superficial fascia, adipose and dermis were then reapproximated.  After the deeper layers were approximated the epidermis was reapproximated with particular care given to realign the vermilion border.
Cheiloplasty (Complex) Text: A decision was made to reconstruct the defect with a  cheiloplasty.  The defect was undermined extensively.  Additional obicularis oris muscle was excised with a 15 blade scalpel.  The defect was converted into a full thickness wedge to facilite a better cosmetic result.  Small vessels were then tied off with 5-0 monocyrl. The obicularis oris, superficial fascia, adipose and dermis were then reapproximated.  After the deeper layers were approximated the epidermis was reapproximated with particular care given to realign the vermilion border.
Ear Wedge Repair Text: A wedge excision was completed by carrying down an excision through the full thickness of the ear and cartilage with an inward facing Burow's triangle. The wound was then closed in a layered fashion.
Full Thickness Lip Wedge Repair (Flap) Text: Given the location of the defect and the proximity to free margins a full thickness wedge repair was deemed most appropriate.  Using a sterile surgical marker, the appropriate repair was drawn incorporating the defect and placing the expected incisions perpendicular to the vermilion border.  The vermilion border was also meticulously outlined to ensure appropriate reapproximation during the repair.  The area thus outlined was incised through and through with a #15 scalpel blade.  The muscularis and dermis were reaproximated with deep sutures following hemostasis. Care was taken to realign the vermilion border before proceeding with the superficial closure.  Once the vermilion was realigned the superfical and mucosal closure was finished.
Ftsg Text: The defect edges were debeveled with a #15 scalpel blade.  Given the location of the defect, shape of the defect and the proximity to free margins a full thickness skin graft was deemed most appropriate.  Using a sterile surgical marker, the primary defect shape was transferred to the donor site. The area thus outlined was incised deep to adipose tissue with a #15 scalpel blade.  The harvested graft was then trimmed of adipose tissue until only dermis and epidermis was left.  The skin margins of the secondary defect were undermined to an appropriate distance in all directions utilizing iris scissors.  The secondary defect was closed with interrupted buried subcutaneous sutures.  The skin edges were then re-apposed with running  sutures.  The skin graft was then placed in the primary defect and oriented appropriately.
Split-Thickness Skin Graft Text: The defect edges were debeveled with a #15 scalpel blade.  Given the location of the defect, shape of the defect and the proximity to free margins a split thickness skin graft was deemed most appropriate.  Using a sterile surgical marker, the primary defect shape was transferred to the donor site. The split thickness graft was then harvested.  The skin graft was then placed in the primary defect and oriented appropriately.
Burow's Graft Text: The defect edges were debeveled with a #15 scalpel blade.  Given the location of the defect, shape of the defect, the proximity to free margins and the presence of a standing cone deformity a Burow's skin graft was deemed most appropriate. The standing cone was removed and this tissue was then trimmed to the shape of the primary defect. The adipose tissue was also removed until only dermis and epidermis were left.  The skin margins of the secondary defect were undermined to an appropriate distance in all directions utilizing iris scissors.  The secondary defect was closed with interrupted buried subcutaneous sutures.  The skin edges were then re-apposed with running  sutures.  The skin graft was then placed in the primary defect and oriented appropriately.
Cartilage Graft Text: The defect edges were debeveled with a #15 scalpel blade.  Given the location of the defect, shape of the defect, the fact the defect involved a full thickness cartilage defect a cartilage graft was deemed most appropriate.  An appropriate donor site was identified, cleansed, and anesthetized. The cartilage graft was then harvested and transferred to the recipient site, oriented appropriately and then sutured into place.  The secondary defect was then repaired using a primary closure.
Composite Graft Text: The defect edges were debeveled with a #15 scalpel blade.  Given the location of the defect, shape of the defect, the proximity to free margins and the fact the defect was full thickness a composite graft was deemed most appropriate.  The defect was outline and then transferred to the donor site.  A full thickness graft was then excised from the donor site. The graft was then placed in the primary defect, oriented appropriately and then sutured into place.  The secondary defect was then repaired using a primary closure.
Epidermal Autograft Text: The defect edges were debeveled with a #15 scalpel blade.  Given the location of the defect, shape of the defect and the proximity to free margins an epidermal autograft was deemed most appropriate.  Using a sterile surgical marker, the primary defect shape was transferred to the donor site. The epidermal graft was then harvested.  The skin graft was then placed in the primary defect and oriented appropriately.
Dermal Autograft Text: The defect edges were debeveled with a #15 scalpel blade.  Given the location of the defect, shape of the defect and the proximity to free margins a dermal autograft was deemed most appropriate.  Using a sterile surgical marker, the primary defect shape was transferred to the donor site. The area thus outlined was incised deep to adipose tissue with a #15 scalpel blade.  The harvested graft was then trimmed of adipose and epidermal tissue until only dermis was left.  The skin graft was then placed in the primary defect and oriented appropriately.
Skin Substitute Text: The defect edges were debeveled with a #15 scalpel blade.  Given the location of the defect, shape of the defect and the proximity to free margins a skin substitute graft was deemed most appropriate.  The graft material was trimmed to fit the size of the defect. The graft was then placed in the primary defect and oriented appropriately.
Tissue Cultured Epidermal Autograft Text: The defect edges were debeveled with a #15 scalpel blade.  Given the location of the defect, shape of the defect and the proximity to free margins a tissue cultured epidermal autograft was deemed most appropriate.  The graft was then trimmed to fit the size of the defect.  The graft was then placed in the primary defect and oriented appropriately.
Xenograft Text: The defect edges were debeveled with a #15 scalpel blade.  Given the location of the defect, shape of the defect and the proximity to free margins a xenograft was deemed most appropriate.  The graft was then trimmed to fit the size of the defect.  The graft was then placed in the primary defect and oriented appropriately.
Purse String (Simple) Text: Given the location of the defect and the characteristics of the surrounding skin a purse string closure was deemed most appropriate.  Undermining was performed circumfirentially around the surgical defect.  A purse string suture was then placed and tightened.
Purse String (Intermediate) Text: Given the location of the defect and the characteristics of the surrounding skin a purse string intermediate closure was deemed most appropriate.  Undermining was performed circumfirentially around the surgical defect.  A purse string suture was then placed and tightened.
Partial Purse String (Simple) Text: Given the location of the defect and the characteristics of the surrounding skin a simple purse string closure was deemed most appropriate.  Undermining was performed circumfirentially around the surgical defect.  A purse string suture was then placed and tightened. Wound tension only allowed a partial closure of the circular defect.
Partial Purse String (Intermediate) Text: Given the location of the defect and the characteristics of the surrounding skin an intermediate purse string closure was deemed most appropriate.  Undermining was performed circumfirentially around the surgical defect.  A purse string suture was then placed and tightened. Wound tension only allowed a partial closure of the circular defect.
Localized Dermabrasion With Wire Brush Text: The patient was draped in routine manner.  Localized dermabrasion using 3 x 17 mm wire brush was performed in routine manner to papillary dermis. This spot dermabrasion is being performed to complete skin cancer reconstruction. It also will eliminate the other sun damaged precancerous cells that are known to be part of the regional effect of a lifetime's worth of sun exposure. This localized dermabrasion is therapeutic and should not be considered cosmetic in any regard.
Tarsorrhaphy Text: A tarsorrhaphy was performed using Frost sutures.
Complex Repair And Flap Additional Text (Will Appearing After The Standard Complex Repair Text): The complex repair was not sufficient to completely close the primary defect. The remaining additional defect was repaired with the flap mentioned below.
Complex Repair And Graft Additional Text (Will Appearing After The Standard Complex Repair Text): The complex repair was not sufficient to completely close the primary defect. The remaining additional defect was repaired with the graft mentioned below.
Manual Repair Warning Statement: We plan on removing the manually selected variable below in favor of our much easier automatic structured text blocks found in the previous tab. We decided to do this to help make the flow better and give you the full power of structured data. Manual selection is never going to be ideal in our platform and I would encourage you to avoid using manual selection from this point on, especially since I will be sunsetting this feature. It is important that you do one of two things with the customized text below. First, you can save all of the text in a word file so you can have it for future reference. Second, transfer the text to the appropriate area in the Library tab. Lastly, if there is a flap or graft type which we do not have you need to let us know right away so I can add it in before the variable is hidden. No need to panic, we plan to give you roughly 6 months to make the change.
Same Histology In Subsequent Stages Text: The pattern and morphology of the tumor is as described in the first stage.
No Residual Tumor Seen Histology Text: There were no malignant cells seen in the sections examined.
Inflammation Suggestive Of Cancer Camouflage Histology Text: There was a dense lymphocytic infiltrate which prevented adequate histologic evaluation of adjacent structures.
Bcc Histology Text: There were numerous aggregates of basaloid cells.
Bcc Infiltrative Histology Text: There were numerous aggregates of basaloid cells demonstrating an infiltrative pattern.
Mart-1 - Positive Histology Text: MART-1 staining demonstrates areas of higher density and clustering of melanocytes with Pagetoid spread upwards within the epidermis. The surgical margins are positive for tumor cells.
Mart-1 - Negative Histology Text: MART-1 staining demonstrates a normal density and pattern of melanocytes along the dermal-epidermal junction. The surgical margins are negative for tumor cells.
Information: Selecting Yes will display possible errors in your note based on the variables you have selected. This validation is only offered as a suggestion for you. PLEASE NOTE THAT THE VALIDATION TEXT WILL BE REMOVED WHEN YOU FINALIZE YOUR NOTE. IF YOU WANT TO FAX A PRELIMINARY NOTE YOU WILL NEED TO TOGGLE THIS TO 'NO' IF YOU DO NOT WANT IT IN YOUR FAXED NOTE.

## 2020-08-19 ENCOUNTER — APPOINTMENT (RX ONLY)
Dept: URBAN - METROPOLITAN AREA CLINIC 22 | Facility: CLINIC | Age: 65
Setting detail: DERMATOLOGY
End: 2020-08-19

## 2020-08-19 DIAGNOSIS — Z48.817 ENCOUNTER FOR SURGICAL AFTERCARE FOLLOWING SURGERY ON THE SKIN AND SUBCUTANEOUS TISSUE: ICD-10-CM

## 2020-08-19 PROCEDURE — ? POST-OP WOUND EVALUATION

## 2020-08-19 PROCEDURE — 99024 POSTOP FOLLOW-UP VISIT: CPT

## 2020-08-19 ASSESSMENT — LOCATION DETAILED DESCRIPTION DERM: LOCATION DETAILED: LEFT SUPERIOR MEDIAL FOREHEAD

## 2020-08-19 ASSESSMENT — LOCATION SIMPLE DESCRIPTION DERM: LOCATION SIMPLE: LEFT FOREHEAD

## 2020-08-19 ASSESSMENT — LOCATION ZONE DERM: LOCATION ZONE: FACE

## 2020-08-19 NOTE — PROCEDURE: POST-OP WOUND EVALUATION
Detail Level: Detailed
Add 56167 Cpt? (Important Note: In 2017 The Use Of 33895 Is Being Tracked By Cms To Determine Future Global Period Reimbursement For Global Periods): yes
Wound Evaluated By (Optional): Zain Arteaga MD
Wound Diameter In Cm(Optional): 0
Wound Crusting?: clean
Sutures?: intact
Wound Edema?: mild
Wound Color?: pink
Any New Or Residual Neoplasm?: No
Patient To Follow-Up With?: their primary dermatologist

## 2020-10-19 ENCOUNTER — HOSPITAL ENCOUNTER (OUTPATIENT)
Dept: LAB | Facility: MEDICAL CENTER | Age: 65
End: 2020-10-19
Attending: FAMILY MEDICINE
Payer: MEDICARE

## 2020-10-19 ENCOUNTER — TELEPHONE (OUTPATIENT)
Dept: MEDICAL GROUP | Facility: IMAGING CENTER | Age: 65
End: 2020-10-19

## 2020-10-19 DIAGNOSIS — Z12.5 SCREENING PSA (PROSTATE SPECIFIC ANTIGEN): ICD-10-CM

## 2020-10-19 DIAGNOSIS — R73.01 ELEVATED FASTING GLUCOSE: ICD-10-CM

## 2020-10-19 DIAGNOSIS — Z13.0 SCREENING FOR DEFICIENCY ANEMIA: ICD-10-CM

## 2020-10-19 DIAGNOSIS — E78.5 HYPERLIPIDEMIA, UNSPECIFIED HYPERLIPIDEMIA TYPE: ICD-10-CM

## 2020-10-19 DIAGNOSIS — E55.9 VITAMIN D INSUFFICIENCY: ICD-10-CM

## 2020-10-19 DIAGNOSIS — Z00.00 WELL ADULT EXAM: ICD-10-CM

## 2020-10-19 LAB
25(OH)D3 SERPL-MCNC: 41 NG/ML (ref 30–100)
ALBUMIN SERPL BCP-MCNC: 4.3 G/DL (ref 3.2–4.9)
ALBUMIN/GLOB SERPL: 1.7 G/DL
ALP SERPL-CCNC: 58 U/L (ref 30–99)
ALT SERPL-CCNC: 33 U/L (ref 2–50)
ANION GAP SERPL CALC-SCNC: 9 MMOL/L (ref 7–16)
AST SERPL-CCNC: 30 U/L (ref 12–45)
BASOPHILS # BLD AUTO: 0.3 % (ref 0–1.8)
BASOPHILS # BLD: 0.02 K/UL (ref 0–0.12)
BILIRUB SERPL-MCNC: 0.6 MG/DL (ref 0.1–1.5)
BUN SERPL-MCNC: 21 MG/DL (ref 8–22)
CALCIUM SERPL-MCNC: 10.3 MG/DL (ref 8.5–10.5)
CHLORIDE SERPL-SCNC: 103 MMOL/L (ref 96–112)
CHOLEST SERPL-MCNC: 179 MG/DL (ref 100–199)
CO2 SERPL-SCNC: 30 MMOL/L (ref 20–33)
CREAT SERPL-MCNC: 1.05 MG/DL (ref 0.5–1.4)
EOSINOPHIL # BLD AUTO: 0.18 K/UL (ref 0–0.51)
EOSINOPHIL NFR BLD: 2.7 % (ref 0–6.9)
ERYTHROCYTE [DISTWIDTH] IN BLOOD BY AUTOMATED COUNT: 49.7 FL (ref 35.9–50)
EST. AVERAGE GLUCOSE BLD GHB EST-MCNC: 120 MG/DL
GLOBULIN SER CALC-MCNC: 2.5 G/DL (ref 1.9–3.5)
GLUCOSE SERPL-MCNC: 89 MG/DL (ref 65–99)
HBA1C MFR BLD: 5.8 % (ref 0–5.6)
HCT VFR BLD AUTO: 47.6 % (ref 42–52)
HDLC SERPL-MCNC: 71 MG/DL
HGB BLD-MCNC: 15.5 G/DL (ref 14–18)
IMM GRANULOCYTES # BLD AUTO: 0.02 K/UL (ref 0–0.11)
IMM GRANULOCYTES NFR BLD AUTO: 0.3 % (ref 0–0.9)
LDLC SERPL CALC-MCNC: 90 MG/DL
LYMPHOCYTES # BLD AUTO: 2.43 K/UL (ref 1–4.8)
LYMPHOCYTES NFR BLD: 36.6 % (ref 22–41)
MCH RBC QN AUTO: 32.7 PG (ref 27–33)
MCHC RBC AUTO-ENTMCNC: 32.6 G/DL (ref 33.7–35.3)
MCV RBC AUTO: 100.4 FL (ref 81.4–97.8)
MONOCYTES # BLD AUTO: 0.62 K/UL (ref 0–0.85)
MONOCYTES NFR BLD AUTO: 9.3 % (ref 0–13.4)
NEUTROPHILS # BLD AUTO: 3.37 K/UL (ref 1.82–7.42)
NEUTROPHILS NFR BLD: 50.8 % (ref 44–72)
NRBC # BLD AUTO: 0 K/UL
NRBC BLD-RTO: 0 /100 WBC
PLATELET # BLD AUTO: 171 K/UL (ref 164–446)
PMV BLD AUTO: 10.2 FL (ref 9–12.9)
POTASSIUM SERPL-SCNC: 4.5 MMOL/L (ref 3.6–5.5)
PROT SERPL-MCNC: 6.8 G/DL (ref 6–8.2)
PSA SERPL-MCNC: 1.76 NG/ML (ref 0–4)
RBC # BLD AUTO: 4.74 M/UL (ref 4.7–6.1)
SODIUM SERPL-SCNC: 142 MMOL/L (ref 135–145)
TRIGL SERPL-MCNC: 91 MG/DL (ref 0–149)
TSH SERPL DL<=0.005 MIU/L-ACNC: 3.11 UIU/ML (ref 0.38–5.33)
WBC # BLD AUTO: 6.6 K/UL (ref 4.8–10.8)

## 2020-10-19 PROCEDURE — 83036 HEMOGLOBIN GLYCOSYLATED A1C: CPT

## 2020-10-19 PROCEDURE — 80053 COMPREHEN METABOLIC PANEL: CPT

## 2020-10-19 PROCEDURE — 80061 LIPID PANEL: CPT

## 2020-10-19 PROCEDURE — 84443 ASSAY THYROID STIM HORMONE: CPT

## 2020-10-19 PROCEDURE — 85025 COMPLETE CBC W/AUTO DIFF WBC: CPT

## 2020-10-19 PROCEDURE — 36415 COLL VENOUS BLD VENIPUNCTURE: CPT

## 2020-10-19 PROCEDURE — 84153 ASSAY OF PSA TOTAL: CPT

## 2020-10-19 PROCEDURE — 82306 VITAMIN D 25 HYDROXY: CPT

## 2020-10-19 NOTE — TELEPHONE ENCOUNTER
Pt called to make sure he could do an in person visit for his annual on 10/21. Pt denies any covid symptoms. Will proceed with in office visit.

## 2020-10-21 ENCOUNTER — OFFICE VISIT (OUTPATIENT)
Dept: MEDICAL GROUP | Facility: IMAGING CENTER | Age: 65
End: 2020-10-21
Payer: MEDICARE

## 2020-10-21 VITALS
OXYGEN SATURATION: 97 % | HEIGHT: 71 IN | SYSTOLIC BLOOD PRESSURE: 120 MMHG | DIASTOLIC BLOOD PRESSURE: 72 MMHG | TEMPERATURE: 98.1 F | HEART RATE: 56 BPM | RESPIRATION RATE: 16 BRPM | BODY MASS INDEX: 25.11 KG/M2 | WEIGHT: 179.4 LBS

## 2020-10-21 DIAGNOSIS — R35.1 BENIGN PROSTATIC HYPERPLASIA WITH NOCTURIA: ICD-10-CM

## 2020-10-21 DIAGNOSIS — F51.04 CHRONIC INSOMNIA: ICD-10-CM

## 2020-10-21 DIAGNOSIS — H91.90 HEARING DISORDER, UNSPECIFIED LATERALITY: ICD-10-CM

## 2020-10-21 DIAGNOSIS — Z11.59 NEED FOR HEPATITIS C SCREENING TEST: ICD-10-CM

## 2020-10-21 DIAGNOSIS — N40.1 BENIGN PROSTATIC HYPERPLASIA WITH NOCTURIA: ICD-10-CM

## 2020-10-21 DIAGNOSIS — G89.29 CHRONIC BILATERAL LOW BACK PAIN WITHOUT SCIATICA: ICD-10-CM

## 2020-10-21 DIAGNOSIS — Z23 NEED FOR 23-POLYVALENT PNEUMOCOCCAL POLYSACCHARIDE VACCINE: ICD-10-CM

## 2020-10-21 DIAGNOSIS — Z86.010 HISTORY OF COLON POLYPS: ICD-10-CM

## 2020-10-21 DIAGNOSIS — R09.81 NASAL CONGESTION: ICD-10-CM

## 2020-10-21 DIAGNOSIS — Z85.828 HISTORY OF BASAL CELL CARCINOMA: ICD-10-CM

## 2020-10-21 DIAGNOSIS — M25.9 HIP PROBLEM: ICD-10-CM

## 2020-10-21 DIAGNOSIS — Z00.00 WELL ADULT EXAM: ICD-10-CM

## 2020-10-21 DIAGNOSIS — M54.50 CHRONIC BILATERAL LOW BACK PAIN WITHOUT SCIATICA: ICD-10-CM

## 2020-10-21 DIAGNOSIS — R73.01 IMPAIRED FASTING GLUCOSE: ICD-10-CM

## 2020-10-21 DIAGNOSIS — G47.33 OBSTRUCTIVE SLEEP APNEA SYNDROME: ICD-10-CM

## 2020-10-21 DIAGNOSIS — Z97.8 PRESENCE OF ORTHOTIC DEVICE: ICD-10-CM

## 2020-10-21 DIAGNOSIS — E55.9 VITAMIN D INSUFFICIENCY: ICD-10-CM

## 2020-10-21 DIAGNOSIS — R71.8 ELEVATED MCV: ICD-10-CM
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
## 2020-10-21 DIAGNOSIS — E78.00 PURE HYPERCHOLESTEROLEMIA: ICD-10-CM

## 2020-10-21 DIAGNOSIS — J30.1 SEASONAL ALLERGIC RHINITIS DUE TO POLLEN: ICD-10-CM

## 2020-10-21 DIAGNOSIS — Z87.898 HISTORY OF VERTIGO: ICD-10-CM

## 2020-10-21 DIAGNOSIS — R41.3 MEMORY CHANGES: ICD-10-CM

## 2020-10-21 PROCEDURE — G0402 INITIAL PREVENTIVE EXAM: HCPCS | Performed by: FAMILY MEDICINE

## 2020-10-21 PROCEDURE — 90732 PPSV23 VACC 2 YRS+ SUBQ/IM: CPT | Performed by: FAMILY MEDICINE

## 2020-10-21 PROCEDURE — G0009 ADMIN PNEUMOCOCCAL VACCINE: HCPCS | Performed by: FAMILY MEDICINE

## 2020-10-21 ASSESSMENT — FIBROSIS 4 INDEX: FIB4 SCORE: 2.05

## 2020-10-21 ASSESSMENT — ENCOUNTER SYMPTOMS: GENERAL WELL-BEING: EXCELLENT

## 2020-10-21 ASSESSMENT — PATIENT HEALTH QUESTIONNAIRE - PHQ9: CLINICAL INTERPRETATION OF PHQ2 SCORE: 0

## 2020-10-21 ASSESSMENT — ACTIVITIES OF DAILY LIVING (ADL): BATHING_REQUIRES_ASSISTANCE: 0

## 2020-10-21 NOTE — PROGRESS NOTES
Chief Complaint   Patient presents with   • Annual Exam       HPI:  Kamran is a 65 y.o. here for Medicare Annual Wellness Visit  Male with gissel, chronic insomnia, chronic low back and right hip pain, hypercholesterolemia, and hx of basal cell carcinoma. States he is transitioning to medicare in December.     Memory changes with prior transient alteration in awareness for which he was evaluated in the ER prior, last year.  Has been seeing neurology.   Had cognitive testing- Oscar Mary. Feels he did not do well. November 13 or so has further testing.   Will see Dr. Junior as his prior neurologist is no longer available.   Taking memantine 5 mg twice daily, tolerates fine.    A couple years ago started to notice some memory issues.   Still with difficulty of short and long term memory issues. Has some word recall issues.   Exercises regularly.     GISSEL on cpap-needs to have it evaluated.   Has a company he works with currently.   Will let us know if needs new provider.     Chronic insomnia- previously on ambien, stable off medication.   Was seeing Dr. Brown, sleep psychologist, on hold at this time.   After retiring has been good. Off prior ambien therapy.   Takes melatonin for sleep.   Able to get to sleep, but wakes up, 3-4 hours later and takes the melatonin.      Hx of vertigo- once had dizziness, used meclizine. Has not been a regular problem.     Seasonal allergies-nasal congestion- Flonase, every morning.   Taking zyrtec at bed time. Has not noticed a pattern. No reflux that he noticed correlated.   Coffee 3 to 2 cups a day.   Alcohol- stopped daily drink of beer or wine.   Thinks due to jeff brush. Has neti pot.   Does not shower routinely prior to bedtime.    Hx of basal cell skin cancer.   Has been treated with skin lesions on his head. Sees dermatology, Dr. Smith every 6 months.     BPH- flomax therapy. Stable.   Nocturia, up at 3 am, once a night.   Sleep is better overall. May consider trial off medication,  as not sure is helping.   Pillow speaker-at night.     Hypercholesterolemia-  on simvastatin 40 mg. Stable, tolerates well.   Diet is overall good.   Not eating red meat often, hamburgers 3-4 times a year.   Occasionally has dairy. Yogurt, toast with peanut butter.     IFG- stable- has cut down alcohol.   Aware of alcoholism in family.   Orange juice.   Vitamin D low on prior labs.   Elevated MCV on labs.     Hx of colon polyps.     Chronic lumbar and right hip issues.   Has scoliosis, working with physical therapy. Stable.     Hearing issues- stable.     Had tick bite, was in [facility] Sunday, 3 days ago.   Tick was up high on skin and not deep. Caught it early, not engorged. Took picture.   States he was playing with dogs.   Pulled it out. No bull's eyes type rash noted.   Yellow, bruise like of area.   No fevers/chills.     17 years ago had shoe insert made. Needs new one, referral to podiatry.   States his nails fell apart in March, but improved in April.     Health Maintenance:  Immunizations: Td/Tdap 2013;  Influenza vaccine recommend annually; Zostavax 2016; Shingrix -recommended;  PCV 13 due; PPSV 23 - due in future  Colonoscopy: Hx of colon polyps. 2018- recall colonoscopy 5 years.   PSA: 10/2020 normal range  AAA: 2015- normal ultrasound  Hx of basal cell skin cancer- sees Dr. Smith, dermatology.      Lifestyle:  Diet: overall healthy, some alcohol  Supplements: as listed  Exercise/activities: routine  Stressors: stable.  Social Support: family  Goals: meditation, yoga       Patient Active Problem List    Diagnosis Date Noted   • History of colon polyps 10/15/2019   • Elevated fasting glucose 10/16/2018   • History of basal cell carcinoma 10/16/2018   • Non-smoker 09/13/2018   • Chronic insomnia 08/23/2018   • Memory changes 04/29/2018   • Pure hypercholesterolemia 11/14/2017   • Chronic bilateral low back pain without sciatica 05/30/2017   • Obstructive sleep apnea syndrome 10/17/2016   • Pain in right shin  10/17/2016   • Seasonal allergies 05/06/2016   • Left forearm pain 05/06/2016   • Left lateral epicondylitis 05/06/2016   • Left elbow pain 05/06/2016   • Thrombocytopenia (HCC) 12/30/2015   • Scoliosis 10/09/2015   • Benign prostatic hyperplasia with lower urinary tract symptoms 02/12/2015   • Vitamin D insufficiency 11/21/2013   • Postnasal drip 05/03/2012   • Primary insomnia 10/11/2010       Current Outpatient Medications   Medication Sig Dispense Refill   • fluticasone (FLONASE) 50 MCG/ACT nasal spray USE 2 SPRAYS IN EACH NOSTRIL DAILY 16 g 5   • tamsulosin (FLOMAX) 0.4 MG capsule TAKE 1 CAPSULE DAILY 90 Cap 3   • simvastatin (ZOCOR) 40 MG Tab TAKE 1 TABLET EVERY EVENING 90 Tab 3   • memantine (NAMENDA) 5 MG Tab Take 1 Tab by mouth 2 times a day. 180 Tab 3   • cyclobenzaprine (FLEXERIL) 5 MG tablet Take 1-2 Tabs by mouth 3 times a day as needed. 90 Tab 0   • meclizine (ANTIVERT) 25 MG Tab Take 0.5-1 Tabs by mouth 3 times a day as needed for Dizziness or Vertigo. 30 Tab 0   • ketoconazole (NIZORAL) 2 % Cream Apply 1 Application to affected area(s) every day. Apply to affected area(s) every day. 1 Tube 0   • Multiple Vitamins-Minerals (MULTIVITAMIN ADULTS PO) Take  by mouth.     • Ascorbic Acid (VITAMIN C PO) Take 1,000 mg by mouth.     • Cholecalciferol (VITAMIN D PO) Take  by mouth.     • Vitamin E 400 UNIT Tab Take  by mouth.     • Omega-3 Fatty Acids (FISH OIL) 1000 MG Cap capsule Take 1,000 mg by mouth 3 times a day, with meals.     • GLUCOSAMINE CHONDROITIN COMPLX PO Take  by mouth.     • cetirizine (ZYRTEC) 10 MG Tab Take 10 mg by mouth every day.     • imiquimod (ALDARA) 5 % cream      • PICATO 0.015 % Gel      • albuterol 108 (90 Base) MCG/ACT Aero Soln inhalation aerosol Inhale 2 Puffs by mouth every 6 hours as needed for Shortness of Breath. 8.5 g 0   • aspirin (ASA) 81 MG Chew Tab chewable tablet CHEW AND SWALLOW 1 TABLET BY MOUTH ONCE DAILY 100 Tab 0   • betamethasone valerate (VALISONE) 0.1 % CREA  Apply 1 Application to affected area(s) 2 times a day. Apply to affected area(s) 2 times a day. 1 Tube 2   • fluorouracil (EFUDEX) 5 % cream Apply 1 Application to affected area(s) 2 times a day. Use 2-4 weeks as directed- apply bid. 1 Tube 0     No current facility-administered medications for this visit.         Patient is taking medications as noted in medication list.  Current supplements as per medication list.     Allergies: Seasonal    Current social contact/activities: Spends time with family and friends. Recently traveled for a wedding.     Is patient current with immunizations? No, due for FLU, PNEUMOVAX (PPSV23) and PREVNAR (PCV13) . Patient is interested in receiving NONE today.    He  reports that he has never smoked. He has never used smokeless tobacco. He reports current alcohol use of about 4.2 oz of alcohol per week. He reports that he does not use drugs.  Counseling given: Not Answered      DPA/Advanced directive: Patient has Advanced Directive, but it is not on file. Instructed to bring in a copy to scan into their chart.    ROS:    Gait: Uses no assistive device   Ostomy: No   Other tubes: No   Amputations: No   Chronic oxygen use No   Last eye exam About 2 months ago   Wears hearing aids: No   : Denies any urinary leakage during the last 6 months      Screening:    Reviewed     Depression Screening    Little interest or pleasure in doing things?  0 - not at all  Feeling down, depressed, or hopeless? 0 - not at all  Patient Health Questionnaire Score: 0    If depressive symptoms identified deferred to follow up visit unless specifically addressed in assessment and plan.    Interpretation of PHQ-9 Total Score   Score Severity   1-4 No Depression   5-9 Mild Depression   10-14 Moderate Depression   15-19 Moderately Severe Depression   20-27 Severe Depression    Screening for Cognitive Impairment    Three Minute Recall (river, nation, finger)  2/3    [place] clock face with all 12 numbers and set the  hands to show 10 past 11.  Yes    If cognitive concerns identified, deferred for follow up unless specifically addressed in assessment and plan.    Fall Risk Assessment    Has the patient had two or more falls in the last year or any fall with injury in the last year?  No  If fall risk identified, deferred for follow up unless specifically addressed in assessment and plan.    Safety Assessment    Throw rugs on floor.  Yes  Handrails on all stairs.  Yes  Good lighting in all hallways.  Yes  Difficulty hearing.  No  Patient counseled about all safety risks that were identified.    Functional Assessment ADLs    Are there any barriers preventing you from cooking for yourself or meeting nutritional needs?  No.    Are there any barriers preventing you from driving safely or obtaining transportation?  No.    Are there any barriers preventing you from using a telephone or calling for help?  No.    Are there any barriers preventing you from shopping?  No.    Are there any barriers preventing you from taking care of your own finances?  No.    Are there any barriers preventing you from managing your medications?  No.    Are there any barriers preventing you from showering, bathing or dressing yourself?  No.    Are you currently engaging in any exercise or physical activity?  Yes.  Pickle ball and hiking.   What is your perception of your health?  Excellent.    Health Maintenance Summary                IMM ZOSTER VACCINES Overdue 12/12/2016      Done 10/17/2016 Imm Admin: Zoster Vaccine Live (ZVL) (Zostavax) - HISTORICAL DATA    COLONOSCOPY Next Due 9/12/2023      Done 9/12/2018 REFERRAL TO GI FOR COLONOSCOPY     Patient has more history with this topic...    IMM DTaP/Tdap/Td Vaccine Next Due 10/16/2023      Done 10/16/2013 Imm Admin: Tdap Vaccine          Patient Care Team:  Eva Rand M.D. as PCP - General (Family Medicine)  Mag Smith M.D. as Consulting Physician (Dermatology)  Eva Rand M.D. as Consulting  "Physician (Family Medicine)  Hari Cummings D.C. as Consulting Physician (Chiropractic)  Mag Smith M.D. (Dermatology)    Social History     Tobacco Use   • Smoking status: Never Smoker   • Smokeless tobacco: Never Used   Substance Use Topics   • Alcohol use: Yes     Alcohol/week: 4.2 oz     Types: 4 Glasses of wine, 3 Cans of beer per week     Comment: one a day   • Drug use: No     Family History   Problem Relation Age of Onset   • Alcohol/Drug Father         alcohol   • Alcohol/Drug Brother         alcohol   • Sleep Apnea Brother    • Cancer Brother    • Cancer Brother         brain   • Heart Disease Neg Hx      He  has a past medical history of Basal cell carcinoma, Chickenpox, Dyslipidemia, [place] measles, Groin strain (10/21/2011), Hemorrhoid, Hyperlipidemia, Insomnia, Mumps, Right hip pain (11/21/2013), S/P colonoscopy (7/2013), S/P colonoscopy with polypectomy, Scoliosis, Sleep apnea, and Tonsillitis.   Past Surgical History:   Procedure Laterality Date   • BLEPHAROPLASTY  11/14/2012    Performed by Florentin Naylor M.D. at [facility]   • INGUINAL HERNIA REPAIR  3/17/2009    Performed by SUDHA BARRETT at [facility]   • TONSILLECTOMY           Exam:     /72 (BP Location: Left arm, Patient Position: Sitting, BP Cuff Size: Adult)   Pulse (!) 56   Temp 36.7 °C (98.1 °F) (Temporal)   Resp 16   Ht 1.803 m (5' 11\")   Wt 81.4 kg (179 lb 6.4 oz)   SpO2 97%  Body mass index is 25.02 kg/m².    Hearing good.    Dentition good  Alert, oriented in no acute distress.  Eye contact is good, speech goal directed, affect calm  Constitutional: He appears well-developed and well-nourished. He appears not diaphoretic. No distress.   HENT: Right Ear: External ear normal. Left Ear: External ear normal. Tympanic membranes clear and intact.   Nose: Nose normal.   Mouth/Throat: Oropharynx is clear and moist. No oropharyngeal exudate.     Eyes: Conjunctivae and extraocular motions " are normal. Pupils are equal, round, and reactive to light. No scleral icterus.   Neck: Normal range of motion. Neck supple. No thyromegaly present.   Cardiovascular: Normal rate, regular rhythm, normal heart sounds and intact distal pulses.  Exam reveals no gallop and no friction rub.  No murmur heard. No carotid bruits.   Pulmonary/Chest: Effort normal and breath sounds normal. No respiratory distress. No wheezes. No rales.   Abdominal: Soft. Bowel sounds are normal. He exhibits no distension and no mass. No tenderness. No rebound and no guarding.   Lymphadenopathy:  He has no cervical adenopathy. No inguinal adenopathy.   Neurological:He is alert. He has normal reflexes. No cranial nerve deficit. He exhibits normal muscle tone.   Skin: Skin is warm and dry. No rash noted. He is not diaphoretic. Left medial thigh region with ~6 mm small erythematous area.   Psychiatric: He has a normal mood and affect. His behavior is normal.   Musculoskeletal: He exhibits no edema. Normal strength throughout. 2+ DTR throughout.        Ref. Range 10/19/2020 06:16   WBC Latest Ref Range: 4.8 - 10.8 K/uL 6.6   RBC Latest Ref Range: 4.70 - 6.10 M/uL 4.74   Hemoglobin Latest Ref Range: 14.0 - 18.0 g/dL 15.5   Hematocrit Latest Ref Range: 42.0 - 52.0 % 47.6   MCV Latest Ref Range: 81.4 - 97.8 fL 100.4 (H)   MCH Latest Ref Range: 27.0 - 33.0 pg 32.7   MCHC Latest Ref Range: 33.7 - 35.3 g/dL 32.6 (L)   RDW Latest Ref Range: 35.9 - 50.0 fL 49.7   Platelet Count Latest Ref Range: 164 - 446 K/uL 171   MPV Latest Ref Range: 9.0 - 12.9 fL 10.2   Neutrophils-Polys Latest Ref Range: 44.00 - 72.00 % 50.80   Neutrophils (Absolute) Latest Ref Range: 1.82 - 7.42 K/uL 3.37   Lymphocytes Latest Ref Range: 22.00 - 41.00 % 36.60   Lymphs (Absolute) Latest Ref Range: 1.00 - 4.80 K/uL 2.43   Monocytes Latest Ref Range: 0.00 - 13.40 % 9.30   Monos (Absolute) Latest Ref Range: 0.00 - 0.85 K/uL 0.62   Eosinophils Latest Ref Range: 0.00 - 6.90 % 2.70   Eos  (Absolute) Latest Ref Range: 0.00 - 0.51 K/uL 0.18   Basophils Latest Ref Range: 0.00 - 1.80 % 0.30   Baso (Absolute) Latest Ref Range: 0.00 - 0.12 K/uL 0.02   Immature Granulocytes Latest Ref Range: 0.00 - 0.90 % 0.30   Immature Granulocytes (abs) Latest Ref Range: 0.00 - 0.11 K/uL 0.02   Nucleated RBC Latest Units: /100 WBC 0.00   NRBC (Absolute) Latest Units: K/uL 0.00   Sodium Latest Ref Range: 135 - 145 mmol/L 142   Potassium Latest Ref Range: 3.6 - 5.5 mmol/L 4.5   Chloride Latest Ref Range: 96 - 112 mmol/L 103   Co2 Latest Ref Range: 20 - 33 mmol/L 30   Anion Gap Latest Ref Range: 7.0 - 16.0  9.0   Glucose Latest Ref Range: 65 - 99 mg/dL 89   Bun Latest Ref Range: 8 - 22 mg/dL 21   Creatinine Latest Ref Range: 0.50 - 1.40 mg/dL 1.05   GFR If  Latest Ref Range: >60 mL/min/1.73 m 2 >60   GFR If Non  Latest Ref Range: >60 mL/min/1.73 m 2 >60   Calcium Latest Ref Range: 8.5 - 10.5 mg/dL 10.3   AST(SGOT) Latest Ref Range: 12 - 45 U/L 30   ALT(SGPT) Latest Ref Range: 2 - 50 U/L 33   Alkaline Phosphatase Latest Ref Range: 30 - 99 U/L 58   Total Bilirubin Latest Ref Range: 0.1 - 1.5 mg/dL 0.6   Albumin Latest Ref Range: 3.2 - 4.9 g/dL 4.3   Total Protein Latest Ref Range: 6.0 - 8.2 g/dL 6.8   Globulin Latest Ref Range: 1.9 - 3.5 g/dL 2.5   A-G Ratio Latest Units: g/dL 1.7   Glycohemoglobin Latest Ref Range: 0.0 - 5.6 % 5.8 (H)   Estim. Avg Glu Latest Units: mg/dL 120   Cholesterol,Tot Latest Ref Range: 100 - 199 mg/dL 179   Triglycerides Latest Ref Range: 0 - 149 mg/dL 91   HDL Latest Ref Range: >=40 mg/dL 71   LDL Latest Ref Range: <100 mg/dL 90   25-Hydroxy   Vitamin D 25 Latest Ref Range: 30 - 100 ng/mL 41   Prostatic Specific Antigen Tot Latest Ref Range: 0.00 - 4.00 ng/mL 1.76   TSH Latest Ref Range: 0.380 - 5.330 uIU/mL 3.110       Assessment and Plan. The following treatment and monitoring plan is recommended:      66 yo male for routine well exam.     1. Well adult exam    -Discussed healthy diet and routine exercise.      2. Memory changes- stable, continue current medication and routine follow-up with neurology.    3. Obstructive sleep apnea syndrome -stable.  Continue with routine CPAP use.  Follow-up with pulmonary.    4. Chronic insomnia-has been off Ambien therapy.  Has seen a sleep psychologist which is on hold currently.   -Continue melatonin as needed.  Overall stable.   Continue good sleep hygiene.  Continue to monitor.    5. History of vertigo -has been overall stable.  Meclizine as needed.  Monitor.  Follow-up as needed.    6. Seasonal allergic rhinitis due to pollen -overall stable.    -Recommend use of nasal sinus wash, Flonase, OTC oral medication and acupuncture therapy as needed.    7. Nasal congestion -appears has some allergy component.    -Monitor for possible reflux type symptoms.  Otherwise recommendations as above.    8. History of basal cell carcinoma-stable.  Continue routine follow-up with dermatology.    9. Benign prostatic hyperplasia with nocturia -stable.  Continue current medication.    10. Pure hypercholesterolemia-stable.  Continue current medication and heart healthy diet.  Continue routine exercise.    11.  Impaired fasting glucose -overall stable hemoglobin A1c.   -Encourage patient to continue to work on healthy/low sugar diet and routine exercise.  Encourage patient to continue to reduce alcohol intake.  We will continue monitor in future.    12. Vitamin D insufficiency -stable on last labs.   -Continue current supplement therapy.  Continue to monitor.    13. Elevated MCV -mildly elevated.  May be associated with routine alcohol intake.    -Overall stable.  Will assess further lab work. VITAMIN B12    FOLATE   14. History of colon polyps -stable.     2018- due 5 years for colonoscopy.   15. Chronic bilateral low back pain without sciatica      hx of scoliosis-stable.  -Recommend routine conditioning exercises and working with physical therapy.   Follow-up as needed.   16. Hip problem- right -stable.  -Recommend routine conditioning exercises and working with physical therapy.  Follow-up as needed.    17. Hearing disorder, unspecified laterality -stable.  Continue to monitor.    18. Tick bite, initial encounter -appears tick was caught early and removed. Has been about 3 days since tick bite. Slight residual erythema of skin noted.  Picture appears to be possible deer tick.  -Recommend continue to monitor at this time. Consider further lab work in future as needed.  Follow-up if any new or worsening symptoms.    19. Need for 23-polyvalent pneumococcal polysaccharide vaccine  PneumoVax PPV23 =>1yo   20. Need for hepatitis C screening test  HEP C VIRUS ANTIBODY   21.    Presents of orthotic device-for feet.  Referral to podiatry for new orthotics.    Recommend working on self care with adequate nutrition and fluid intake, routine exercise, adequate sleep and stress management.     Services suggested: No services needed at this time  Health Care Screening recommendations as per orders if indicated.  Referrals offered: PT/OT/Nutrition counseling/Behavioral Health/Smoking cessation as per orders if indicated.    Discussion today about general wellness and lifestyle habits:    · Prevent falls and reduce trip hazards; Cautioned about securing or removing rugs.  · Have a working fire alarm and carbon monoxide detector;   · Engage in regular physical activity and social activities       Follow-up: as needed.   Follow up for routine annual well exam.     This medical record contains text that has been entered with the assistance of computer voice recognition and dictation software.  Therefore, it may contain unintended errors in text, spelling, punctuation, or grammar.

## 2020-10-29 ENCOUNTER — HOSPITAL ENCOUNTER (OUTPATIENT)
Dept: LAB | Facility: MEDICAL CENTER | Age: 65
End: 2020-10-29
Attending: FAMILY MEDICINE
Payer: MEDICARE

## 2020-10-29 DIAGNOSIS — Z11.59 NEED FOR HEPATITIS C SCREENING TEST: ICD-10-CM

## 2020-10-29 DIAGNOSIS — R71.8 ELEVATED MCV: ICD-10-CM

## 2020-10-29 LAB
FOLATE SERPL-MCNC: 24.9 NG/ML
HCV AB SER QL: NORMAL
VIT B12 SERPL-MCNC: 597 PG/ML (ref 211–911)

## 2020-10-29 PROCEDURE — 86803 HEPATITIS C AB TEST: CPT

## 2020-10-29 PROCEDURE — 82746 ASSAY OF FOLIC ACID SERUM: CPT

## 2020-10-29 PROCEDURE — 36415 COLL VENOUS BLD VENIPUNCTURE: CPT

## 2020-10-29 PROCEDURE — 82607 VITAMIN B-12: CPT

## 2021-01-11 ENCOUNTER — TELEPHONE (OUTPATIENT)
Dept: SLEEP MEDICINE | Facility: MEDICAL CENTER | Age: 66
End: 2021-01-11

## 2021-01-11 DIAGNOSIS — G47.33 OBSTRUCTIVE SLEEP APNEA SYNDROME: ICD-10-CM

## 2021-01-11 NOTE — TELEPHONE ENCOUNTER
Patient is having troubles with his CPAP - believes it is not putting out the correct pressure.   I spoke to Estefanía and patient is new to [facility] and states they should be able to provide a new machine to patient.  Please sign order

## 2021-01-14 ENCOUNTER — OFFICE VISIT (OUTPATIENT)
Dept: NEUROLOGY | Facility: MEDICAL CENTER | Age: 66
End: 2021-01-14
Attending: PSYCHIATRY & NEUROLOGY
Payer: MEDICARE

## 2021-01-14 VITALS
BODY MASS INDEX: 28.72 KG/M2 | RESPIRATION RATE: 14 BRPM | HEIGHT: 61 IN | SYSTOLIC BLOOD PRESSURE: 118 MMHG | DIASTOLIC BLOOD PRESSURE: 84 MMHG | OXYGEN SATURATION: 97 % | HEART RATE: 65 BPM | TEMPERATURE: 97.9 F | WEIGHT: 152.12 LBS

## 2021-01-14 DIAGNOSIS — R41.89 PERSISTENT COGNITIVE IMPAIRMENT: ICD-10-CM

## 2021-01-14 PROCEDURE — 99212 OFFICE O/P EST SF 10 MIN: CPT | Performed by: PSYCHIATRY & NEUROLOGY

## 2021-01-14 PROCEDURE — 99215 OFFICE O/P EST HI 40 MIN: CPT | Performed by: PSYCHIATRY & NEUROLOGY

## 2021-01-14 PROCEDURE — 99202 OFFICE O/P NEW SF 15 MIN: CPT | Performed by: PSYCHIATRY & NEUROLOGY

## 2021-01-14 ASSESSMENT — ENCOUNTER SYMPTOMS
NERVOUS/ANXIOUS: 0
INSOMNIA: 0
FALLS: 0
MEMORY LOSS: 1
DEPRESSION: 1

## 2021-01-14 ASSESSMENT — FIBROSIS 4 INDEX: FIB4 SCORE: 2.05

## 2021-01-14 ASSESSMENT — PATIENT HEALTH QUESTIONNAIRE - PHQ9: CLINICAL INTERPRETATION OF PHQ2 SCORE: 0
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
## 2021-02-03 ENCOUNTER — OFFICE VISIT (OUTPATIENT)
Dept: MEDICAL GROUP | Facility: IMAGING CENTER | Age: 66
End: 2021-02-03
Payer: MEDICARE

## 2021-02-03 VITALS
OXYGEN SATURATION: 96 % | HEART RATE: 64 BPM | RESPIRATION RATE: 16 BRPM | WEIGHT: 175 LBS | SYSTOLIC BLOOD PRESSURE: 134 MMHG | BODY MASS INDEX: 24.5 KG/M2 | TEMPERATURE: 98.4 F | HEIGHT: 71 IN | DIASTOLIC BLOOD PRESSURE: 60 MMHG

## 2021-02-03 DIAGNOSIS — M25.512 LEFT SHOULDER PAIN, UNSPECIFIED CHRONICITY: ICD-10-CM

## 2021-02-03 DIAGNOSIS — Z85.828 HISTORY OF BASAL CELL CARCINOMA (BCC) OF SKIN: ICD-10-CM

## 2021-02-03 DIAGNOSIS — Z77.29: ICD-10-CM

## 2021-02-03 DIAGNOSIS — L57.0 AK (ACTINIC KERATOSIS): ICD-10-CM

## 2021-02-03 DIAGNOSIS — J39.2 THROAT IRRITATION: ICD-10-CM

## 2021-02-03 PROCEDURE — 99214 OFFICE O/P EST MOD 30 MIN: CPT | Performed by: FAMILY MEDICINE

## 2021-02-03 SDOH — HEALTH STABILITY: MENTAL HEALTH: HOW OFTEN DO YOU HAVE A DRINK CONTAINING ALCOHOL?: 2-3 TIMES A WEEK

## 2021-02-03 SDOH — HEALTH STABILITY: MENTAL HEALTH: HOW MANY STANDARD DRINKS CONTAINING ALCOHOL DO YOU HAVE ON A TYPICAL DAY?: 1 OR 2

## 2021-02-03 ASSESSMENT — PAIN SCALES - GENERAL: PAINLEVEL: 8=MODERATE-SEVERE PAIN

## 2021-02-03 ASSESSMENT — FIBROSIS 4 INDEX: FIB4 SCORE: 2.05
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
## 2021-02-11 ENCOUNTER — TELEPHONE (OUTPATIENT)
Dept: MEDICAL GROUP | Facility: IMAGING CENTER | Age: 66
End: 2021-02-11

## 2021-02-11 DIAGNOSIS — M62.830 MUSCLE SPASM OF BACK: ICD-10-CM

## 2021-02-11 DIAGNOSIS — E78.5 HYPERLIPIDEMIA, UNSPECIFIED HYPERLIPIDEMIA TYPE: ICD-10-CM

## 2021-02-11 DIAGNOSIS — R42 DIZZINESS: ICD-10-CM

## 2021-02-11 DIAGNOSIS — J30.1 SEASONAL ALLERGIC RHINITIS DUE TO POLLEN: ICD-10-CM

## 2021-02-11 RX ORDER — TAMSULOSIN HYDROCHLORIDE 0.4 MG/1
0.4 CAPSULE ORAL DAILY
Qty: 90 CAPSULE | Refills: 3 | Status: SHIPPED | OUTPATIENT
Start: 2021-02-11 | End: 2021-05-05 | Stop reason: SDUPTHER

## 2021-02-11 RX ORDER — SIMVASTATIN 40 MG
TABLET ORAL
Qty: 90 TABLET | Refills: 3 | Status: SHIPPED | OUTPATIENT
Start: 2021-02-11 | End: 2021-05-05 | Stop reason: SDUPTHER

## 2021-02-11 RX ORDER — FLUTICASONE PROPIONATE 50 MCG
SPRAY, SUSPENSION (ML) NASAL
Qty: 16 G | Refills: 5 | Status: SHIPPED | OUTPATIENT
Start: 2021-02-11 | End: 2021-10-14 | Stop reason: SDUPTHER

## 2021-02-11 RX ORDER — MECLIZINE HYDROCHLORIDE 25 MG/1
12.5-25 TABLET ORAL 3 TIMES DAILY PRN
Qty: 30 TABLET | Refills: 0 | Status: SHIPPED
Start: 2021-02-11 | End: 2021-09-02

## 2021-02-11 RX ORDER — CYCLOBENZAPRINE HCL 5 MG
5-10 TABLET ORAL 3 TIMES DAILY PRN
Qty: 90 TABLET | Refills: 0 | Status: SHIPPED
Start: 2021-02-11 | End: 2021-12-02

## 2021-02-11 NOTE — TELEPHONE ENCOUNTER
Received message from [facility] mail order pharmacy. They state patient is requesting refills on his medications through their pharmacy. Patient is requesting tamsulosin, simvastatin, cyclobenzaprine, meclizine and fluticasone.

## 2021-02-19 ENCOUNTER — OFFICE VISIT (OUTPATIENT)
Dept: DERMATOLOGY | Facility: IMAGING CENTER | Age: 66
End: 2021-02-19
Payer: MEDICARE

## 2021-02-19 VITALS — TEMPERATURE: 98.3 F | WEIGHT: 172 LBS | HEIGHT: 72 IN | BODY MASS INDEX: 23.3 KG/M2

## 2021-02-19 DIAGNOSIS — L82.1 SEBORRHEIC KERATOSIS: ICD-10-CM

## 2021-02-19 DIAGNOSIS — S70.01XA CONTUSION OF RIGHT HIP, INITIAL ENCOUNTER: ICD-10-CM

## 2021-02-19 DIAGNOSIS — L21.9 SEBORRHEA: ICD-10-CM

## 2021-02-19 DIAGNOSIS — Z12.83 SKIN CANCER SCREENING: ICD-10-CM

## 2021-02-19 DIAGNOSIS — L90.8 SKIN AGING: ICD-10-CM

## 2021-02-19 DIAGNOSIS — L81.4 LENTIGO: ICD-10-CM

## 2021-02-19 DIAGNOSIS — L57.0 ACTINIC KERATOSIS: ICD-10-CM

## 2021-02-19 DIAGNOSIS — L60.3 BRITTLE NAILS: ICD-10-CM

## 2021-02-19 PROBLEM — H91.10 PRESBYCUSIS: Status: ACTIVE | Noted: 2021-02-19

## 2021-02-19 PROBLEM — H90.3 SENSORINEURAL HEARING LOSS, BILATERAL: Status: ACTIVE | Noted: 2021-02-19

## 2021-02-19 PROBLEM — H91.92 HEARING LOSS OF LEFT EAR: Status: ACTIVE | Noted: 2021-02-19

## 2021-02-19 PROBLEM — H93.12 RINGING IN LEFT EAR: Status: ACTIVE | Noted: 2021-02-19

## 2021-02-19 PROCEDURE — 17003 DESTRUCT PREMALG LES 2-14: CPT | Performed by: DERMATOLOGY

## 2021-02-19 PROCEDURE — 17000 DESTRUCT PREMALG LESION: CPT | Performed by: DERMATOLOGY

## 2021-02-19 PROCEDURE — 99203 OFFICE O/P NEW LOW 30 MIN: CPT | Mod: 25 | Performed by: DERMATOLOGY

## 2021-02-19 ASSESSMENT — FIBROSIS 4 INDEX: FIB4 SCORE: 2.05

## 2021-02-20 NOTE — PROGRESS NOTES
CC: FRED    Subjective: New patient here for FRED, would like his scalp, face and ears looked over as he has had PDT treatments for AK's.     Fingernails have also become very brittle since Covid started. Washing hands more since March last year.    Redness of beard dist after shaving earlier in week    Would like to discuss possibly refilling some creams that prior derm had prescribed (keptoconazole, betamethasone, metronidazole).    Fall playing pickelball in last 1 week, bruising on right hip but able to bear weight.  Not terribly painful.    History of skin cancer: Yes, Details: BCC scalp 2020, BCC moh's around eye  History of precancers/actinic keratoses: Yes, Details: Treated with PDT levulan, cryo  History of biopsies:Yes, Details: yes, see above  History of blistering/severe sunburns:Yes, Details: young adult  Family history of skin cancer:Yes, Details: unsure of type, Father  Family history of atypical moles:No    ROS: no fevers/chills. No itch.  No cough.  No constipation, no heart palp.  No temp intolerance  DermPMH: hx NMSC  No problem-specific Assessment & Plan notes found for this encounter.    Relevant PMH:mult med comorbidities  Social:non smoker    PE: Gen:WDWN male in NAD.  Skin: Scalp/face/eyes/lips/neck/chest/back/arms/legs/hands/feet/buttocks - without suspicious lesions noted.  Genitals exam declined  -scattered thin hyperkeratotic papules on erythematous base on scalp, face, left shoulder X 2 and left leg X 1  -erythematous patches on lower face. Scaling in left ear canal  -scattered thin waxy papules, few on torso and arms/legs, appearing benign  -splitting of fingernails, mult  -bruise/contusion large on right hip/lateral thigh, not appreciably tender to light palpation    Labs - TSH WNL Oct 2020    A/P: AKs: left shoulder/leg  -counseled on diagnosis and treatment, including risks/benefits/alternatives of cyrotherapy  -LN2 25 sec X 2 cycles X 3lesions  -f/u if persists at 1 month    AKs,  scalp:  -reviewed LN2, Efudex vs PDT. Elects PDT.   Reviewed trx and prefers PDT, PA today    Hx of skin cancer:  -cont'd sunprotection and skin cancer surveillance  -Q 6mo-annual exam recommended; f/u suspicious lesions PRN    Seborrhea, face:   -rec HCT OTC + Lotrimin OTC BID PRN  -rec OTC antidandruff shampoos - pick 2 and alternate    Stucco keratoses: ankles/feet:  -b/r  -carmol/amlactin cream BID-Qday PRN    Splitting nails: recent TSH WNL  -likely due to dehydration/rehydration  -moisturizer use encouraged  -consider moderating hand washing, as feasible    Right hip contusion:   -rec assessment by PCP/UC for any worsening pain, possible imaging    I have reviewed medications relevant to my specialty.

## 2021-03-03 DIAGNOSIS — Z23 NEED FOR VACCINATION: ICD-10-CM

## 2021-03-18 ENCOUNTER — OFFICE VISIT (OUTPATIENT)
Dept: DERMATOLOGY | Facility: IMAGING CENTER | Age: 66
End: 2021-03-18
Payer: MEDICARE

## 2021-03-18 DIAGNOSIS — L57.0 ACTINIC KERATOSIS: ICD-10-CM

## 2021-03-18 PROCEDURE — 99999 PR NO CHARGE: CPT | Performed by: DERMATOLOGY

## 2021-03-18 PROCEDURE — 96573 PDT DSTR PRMLG LES PHYS/QHP: CPT | Performed by: DERMATOLOGY

## 2021-03-18 NOTE — PROGRESS NOTES
Patient here for PDT treatment. Administered per protocol, see other visit on this date for further details.

## 2021-03-18 NOTE — PROGRESS NOTES
PDT Treatment  AKs: scalp predominant - crown and peripheral hairline. Some extension to forehead.  One superficial erosion, after close hair cut yesterday, anterior forehead.      Patient provided counseling sheets provided/reviewed including expectations and side effects:  Post care discussed, including the absolute need to avoid sunlight for 2 days (48 hours) post-procedure, and the need to wear sun protection. Patient may experience sunburn like redness, discomfort, swelling, and scabbing. Must wear zinc oxide sunscreen at all times during the week of healing.    Patient aware to call me with any questions or concerns.  See ALA + blue light treatment was administered per treatment protocol scanned into patient chart (see attached).     Scalp treated, 1.5-2 hour incubation prior to 16+ min blue light exposure, tolerated well.  Will monitor abraded site for healing and RTC for any growth/changes/PRN    RTC for routine skin checks, prn any issues with treatment

## 2021-04-01 NOTE — PROGRESS NOTES
CC: Follow up for GISSEL on AutoPap 9.5-13 cm water     HPI:  Mr. Nick Rodriguez is a 64 year-old man whose original sleep study was performed in [facility] in 2012 and showed an RDI of 28.5, supine RDI 48.5, and a REM RDI of 52.3 with a tanisha saturation of 75%.  He was last seen for 220 when his compliance was excellent and his AHI normalized to 2.4.  His settings were auto titrating CPAP 9.5-13 cm water.    Significant comorbidities and modifying factors include insomnia, seasonal allergies, BPH, dyslipidemia, non-smoker, up-to-date with influenza vaccination, dyslipidemia,  normal BMI, and intermittent chronic low back pain.        Patient Active Problem List    Diagnosis Date Noted   • Sensorineural hearing loss, bilateral 02/19/2021   • Ringing in left ear 02/19/2021   • Presbycusis 02/19/2021   • Hearing loss of left ear 02/19/2021   • Persistent cognitive impairment 01/14/2021   • History of colon polyps 10/15/2019   • Elevated fasting glucose 10/16/2018   • History of basal cell carcinoma 10/16/2018   • Non-smoker 09/13/2018   • Chronic insomnia 08/23/2018   • Memory changes 04/29/2018   • Pure hypercholesterolemia 11/14/2017   • Chronic bilateral low back pain without sciatica 05/30/2017   • Obstructive sleep apnea syndrome 10/17/2016   • Pain in right shin 10/17/2016   • Seasonal allergies 05/06/2016   • Left forearm pain 05/06/2016   • Left lateral epicondylitis 05/06/2016   • Left elbow pain 05/06/2016   • Thrombocytopenia (HCC) 12/30/2015   • Scoliosis 10/09/2015   • Benign prostatic hyperplasia with lower urinary tract symptoms 02/12/2015   • Vitamin D insufficiency 11/21/2013   • Postnasal drip 05/03/2012   • Primary insomnia 10/11/2010       Past Medical History:   Diagnosis Date   • Basal cell carcinoma     dermatology- Dr. Smith   • Chickenpox    • Dyslipidemia    • Yoruba measles    • Groin strain 10/21/2011     ICD-10 transition   • Hemorrhoid    • Hyperlipidemia    • Insomnia    • Mumps     • Right hip pain 11/21/2013   • S/P colonoscopy 7/2013    negative, repeat in 5 years   • S/P colonoscopy with polypectomy     age 53, due age   • Scoliosis    • Sleep apnea    • Tonsillitis         Past Surgical History:   Procedure Laterality Date   • BLEPHAROPLASTY  11/14/2012    Performed by Florentin Naylor M.D. at [facility]   • INGUINAL HERNIA REPAIR  3/17/2009    Performed by SUDHA BARRETT at SURGERY SAME DAY [facility]   • TONSILLECTOMY         Family History   Problem Relation Age of Onset   • Alcohol/Drug Father         alcohol   • Alcohol/Drug Brother         alcohol   • Sleep Apnea Brother    • Cancer Brother    • Cancer Brother         brain   • Heart Disease Neg Hx        Social History     Socioeconomic History   • Marital status:      Spouse name: Not on file   • Number of children: Not on file   • Years of education: Not on file   • Highest education level: Not on file   Occupational History   • Occupation: ADMINISTRATION     Employer: [employer]   Tobacco Use   • Smoking status: Never Smoker   • Smokeless tobacco: Never Used   Substance and Sexual Activity   • Alcohol use: Yes     Alcohol/week: 4.2 oz     Types: 4 Glasses of wine, 3 Cans of beer per week     Comment: less than every day, 2-3 per week now   • Drug use: Not Currently     Types: Marijuana     Comment: occasionally   • Sexual activity: Yes     Partners: Female   Other Topics Concern   • Not on file   Social History Narrative    , 3 children.      Social Determinants of Health     Financial Resource Strain:    • Difficulty of Paying Living Expenses:    Food Insecurity:    • Worried About Running Out of Food in the Last Year:    • Ran Out of Food in the Last Year:    Transportation Needs:    • Lack of Transportation (Medical):    • Lack of Transportation (Non-Medical):    Physical Activity:    • Days of Exercise per Week:    • Minutes of Exercise per Session:    Stress:    • Feeling of  "Stress :    Social Connections:    • Frequency of Communication with Friends and Family:    • Frequency of Social Gatherings with Friends and Family:    • Attends [employer] Services:    • Active Member of Clubs or Organizations:    • Attends Club or Organization Meetings:    • Marital Status:    Intimate Partner Violence:    • Fear of Current or Ex-Partner:    • Emotionally Abused:    • Physically Abused:    • Sexually Abused:        Current Outpatient Medications   Medication Sig Dispense Refill   • cyclobenzaprine (FLEXERIL) 5 mg tablet Take 1-2 Tablets by mouth 3 times a day as needed. 90 tablet 0   • fluticasone (FLONASE) 50 MCG/ACT nasal spray USE 2 SPRAYS IN EACH NOSTRIL DAILY 16 g 5   • meclizine (ANTIVERT) 25 MG Tab Take 0.5-1 Tablets by mouth 3 times a day as needed for Dizziness or Vertigo. 30 tablet 0   • simvastatin (ZOCOR) 40 MG Tab TAKE 1 TABLET EVERY EVENING 90 tablet 3   • tamsulosin (FLOMAX) 0.4 MG capsule Take 1 capsule by mouth every day. 90 capsule 3   • memantine (NAMENDA) 5 MG Tab Take 1 Tab by mouth 2 times a day. (Patient not taking: Reported on 2/3/2021) 180 Tab 3   • Multiple Vitamins-Minerals (MULTIVITAMIN ADULTS PO) Take  by mouth.     • Ascorbic Acid (VITAMIN C PO) Take 1,000 mg by mouth.     • Cholecalciferol (VITAMIN D PO) Take  by mouth.     • Vitamin E 400 UNIT Tab Take  by mouth.     • Omega-3 Fatty Acids (FISH OIL) 1000 MG Cap capsule Take 1,000 mg by mouth 3 times a day, with meals.     • GLUCOSAMINE CHONDROITIN COMPLX PO Take  by mouth.     • cetirizine (ZYRTEC) 10 MG Tab Take 10 mg by mouth every day.     • aspirin (ASA) 81 MG Chew Tab chewable tablet CHEW AND SWALLOW 1 TABLET BY MOUTH ONCE DAILY 100 Tab 0     No current facility-administered medications for this visit.    \"CURRENT RX\"    ALLERGIES: Seasonal    ROS  ***  Constitutional: Denies fever, chills, sweats,  weight loss, fatigue  Cardiovascular: Denies chest pain, tightness, palpitations, swelling in legs/feet, " fainting, difficulty breathing when lying down but gets better when sitting up.   Respiratory: Denies shortness of breath, cough, sputum, wheezing, painful breathing, coughing up blood.   Sleep: per HPI  Gastrointestinal: Denies  difficulty swallowing, nausea, abdominal pain, diarrhea, constipation, heartburn.  Musculoskeletal: Denies painful joints, sore muscles, back pain.        PHYSICAL EXAM  ***    There were no vitals taken for this visit.  Appearance: Well-nourished, well-developed, no acute distress  Eyes:  PERRLA, EOMI  ENMT: masked  Neck: Supple, trachea midline, no masses  Respiratory effort:  No intercostal retractions or use of accessory muscles  Lung auscultation:  No wheezes rhonchi rubs or rales  Cardiac: No murmurs, rubs, or gallops; regular rhythm, normal rate; no edema  Abdomen:  No tenderness, no organomegaly.  Musculoskeletal:  Grossly normal; gait and station normal; digits and nails normal  Skin:  No rashes, petechiae, cyanosis  Neurologic: without focal signs; oriented to person, time, place, and purpose; judgement intact  Psychiatric:  No depression, anxiety, agitation      Medical Decision Making       The medical record was reviewed in its entirety including the referral notes, records from primary care, consultants notes, hospital records, lab, imaging, microbiology, immunology, and immunizations.  Care gaps identified and reviewed with the patient.    Diagnostic and titration nocturnal polysomnogram's, home sleep apnea tests, continuous nocturnal oximetry results, multiple sleep latency tests, and compliance reports reviewed.  There are no diagnoses linked to this encounter.    PLAN:     Has been advised to continue the current ***, clean equipment frequently, and get new mask and supplies as allowed by insurance and DME. Advised about potential problems including nasal obstruction/stuffiness, mask leak or discomfort , frequent awakenings, chill or dryness of the upper airway, noise,  inconvenience, and lack of benefit. Recommend an earlier appointment, if significant treatment barriers develop.    The risks of untreated sleep apnea were discussed with the patient at length. Patients with GISSEL are at increased risk of cardiovascular disease including coronary artery disease, systemic arterial hypertension, pulmonary arterial hypertension, cardiac arrythmias, and stroke. GISSEL patients have an increased risk of motor vehicle accidents, type 2 diabetes, chronic kidney disease, and non-alcoholic liver disease. The patient was advised to avoid driving a motor vehicle when drowsy.    Positive airway pressure will favorably impact many of the adverse conditions and effects provoked by GISSEL.    Have advised the patient to follow up with the appropriate healthcare practitioners for all other medical problems and issues.    Mask wearing, handwashing, and social distancing protocols reviewed and encouraged.    No follow-ups on file.

## 2021-04-02 ENCOUNTER — PATIENT MESSAGE (OUTPATIENT)
Dept: HEALTH INFORMATION MANAGEMENT | Facility: OTHER | Age: 66
End: 2021-04-02

## 2021-04-06 ENCOUNTER — APPOINTMENT (OUTPATIENT)
Dept: SLEEP MEDICINE | Facility: MEDICAL CENTER | Age: 66
End: 2021-04-06
Payer: MEDICARE

## 2021-04-14 NOTE — PROGRESS NOTES
CC: Follow up for GISSEL on AutoPap 9.5-13 cm water   HPI:   Mr. Nick Rodriguez is a 64 year-old man whose original sleep study was performed in [facility] in 2012 and showed an RDI of 28.5, supine RDI 48.5, and a REM RDI of 52.3 with a tanisha saturation of 75%.  He was last seen 4/2/2020 when his compliance was excellent and his AHI normalized at 2.4.  Retired in September 2020.  He got a new machine in January.  Today, 4/15/2021, his 30-day compliance is 100% for 7 hours and 37 minutes.  On auto titrating CPAP 9.4-13 cm water his average residual estimated apnea-hypopnea index is 3.5.  His median leak is only 0.2 L/min and in the past month he has not exceeded the set threshold 24 L/min.  The patient reports improved symptoms using positive airway pressure. Has experienced no significant problems with mask fit, mask leak, pressure tolerance, excessive airway dryness, nasal congestion, aerophagia, or chest pain.   Has seen Dr. Brown.   Significant comorbidities and modifying factors include insomnia, seasonal allergies, BPH, dyslipidemia, non-smoker, up-to-date with influenza vaccination, dyslipidemia, normal BMI, and intermittent chronic low back pain.    Patient Active Problem List    Diagnosis Date Noted   • Sensorineural hearing loss, bilateral 02/19/2021   • Ringing in left ear 02/19/2021   • Presbycusis 02/19/2021   • Hearing loss of left ear 02/19/2021   • Persistent cognitive impairment 01/14/2021   • History of colon polyps 10/15/2019   • Elevated fasting glucose 10/16/2018   • History of basal cell carcinoma 10/16/2018   • Non-smoker 09/13/2018   • Chronic insomnia 08/23/2018   • Memory changes 04/29/2018   • Pure hypercholesterolemia 11/14/2017   • Chronic bilateral low back pain without sciatica 05/30/2017   • Obstructive sleep apnea syndrome 10/17/2016   • Pain in right shin 10/17/2016   • Seasonal allergies 05/06/2016   • Left forearm pain 05/06/2016   • Left lateral epicondylitis 05/06/2016    • Left elbow pain 05/06/2016   • Thrombocytopenia (HCC) 12/30/2015   • Scoliosis 10/09/2015   • Benign prostatic hyperplasia with lower urinary tract symptoms 02/12/2015   • Vitamin D insufficiency 11/21/2013   • Postnasal drip 05/03/2012   • Primary insomnia 10/11/2010       Past Medical History:   Diagnosis Date   • Basal cell carcinoma     dermatology- Dr. Smith   • Chickenpox    • Dyslipidemia    • Beninese measles    • Groin strain 10/21/2011     ICD-10 transition   • Hemorrhoid    • Hyperlipidemia    • Insomnia    • Mumps    • Right hip pain 11/21/2013   • S/P colonoscopy 7/2013    negative, repeat in 5 years   • S/P colonoscopy with polypectomy     age 53, due age   • Scoliosis    • Sleep apnea    • Tonsillitis         Past Surgical History:   Procedure Laterality Date   • BLEPHAROPLASTY  11/14/2012    Performed by Florentin Naylor M.D. at [facility]   • INGUINAL HERNIA REPAIR  3/17/2009    Performed by SUDHA BARRETT at SURGERY SAME DAY [facility]S   • TONSILLECTOMY         Family History   Problem Relation Age of Onset   • Alcohol/Drug Father         alcohol   • Alcohol/Drug Brother         alcohol   • Sleep Apnea Brother    • Cancer Brother    • Cancer Brother         brain   • Heart Disease Neg Hx        Social History     Socioeconomic History   • Marital status:      Spouse name: Not on file   • Number of children: Not on file   • Years of education: Not on file   • Highest education level: Not on file   Occupational History   • Occupation: ADMINISTRATION     Employer: [employer]   Tobacco Use   • Smoking status: Never Smoker   • Smokeless tobacco: Never Used   Substance and Sexual Activity   • Alcohol use: Yes     Alcohol/week: 4.2 oz     Types: 4 Glasses of wine, 3 Cans of beer per week     Comment: less than every day, 2-3 per week now   • Drug use: Not Currently     Types: Marijuana     Comment: occasionally   • Sexual activity: Yes     Partners: Female   Other  Topics Concern   • Not on file   Social History Narrative    , 3 children.      Social Determinants of Health     Financial Resource Strain:    • Difficulty of Paying Living Expenses:    Food Insecurity:    • Worried About Running Out of Food in the Last Year:    • Ran Out of Food in the Last Year:    Transportation Needs:    • Lack of Transportation (Medical):    • Lack of Transportation (Non-Medical):    Physical Activity:    • Days of Exercise per Week:    • Minutes of Exercise per Session:    Stress:    • Feeling of Stress :    Social Connections:    • Frequency of Communication with Friends and Family:    • Frequency of Social Gatherings with Friends and Family:    • Attends Faith Services:    • Active Member of Clubs or Organizations:    • Attends Club or Organization Meetings:    • Marital Status:    Intimate Partner Violence:    • Fear of Current or Ex-Partner:    • Emotionally Abused:    • Physically Abused:    • Sexually Abused:        Current Outpatient Medications   Medication Sig Dispense Refill   • cyclobenzaprine (FLEXERIL) 5 mg tablet Take 1-2 Tablets by mouth 3 times a day as needed. 90 tablet 0   • fluticasone (FLONASE) 50 MCG/ACT nasal spray USE 2 SPRAYS IN EACH NOSTRIL DAILY 16 g 5   • meclizine (ANTIVERT) 25 MG Tab Take 0.5-1 Tablets by mouth 3 times a day as needed for Dizziness or Vertigo. 30 tablet 0   • simvastatin (ZOCOR) 40 MG Tab TAKE 1 TABLET EVERY EVENING 90 tablet 3   • tamsulosin (FLOMAX) 0.4 MG capsule Take 1 capsule by mouth every day. 90 capsule 3   • Multiple Vitamins-Minerals (MULTIVITAMIN ADULTS PO) Take  by mouth.     • Ascorbic Acid (VITAMIN C PO) Take 1,000 mg by mouth.     • Cholecalciferol (VITAMIN D PO) Take  by mouth.     • Vitamin E 400 UNIT Tab Take  by mouth.     • Omega-3 Fatty Acids (FISH OIL) 1000 MG Cap capsule Take 1,000 mg by mouth 3 times a day, with meals.     • GLUCOSAMINE CHONDROITIN COMPLX PO Take  by mouth.     • cetirizine (ZYRTEC) 10 MG Tab  "Take 10 mg by mouth every day.     • aspirin (ASA) 81 MG Chew Tab chewable tablet CHEW AND SWALLOW 1 TABLET BY MOUTH ONCE DAILY 100 Tab 0   • memantine (NAMENDA) 5 MG Tab Take 1 Tab by mouth 2 times a day. (Patient not taking: Reported on 2/3/2021) 180 Tab 3     No current facility-administered medications for this visit.    \"CURRENT RX\"    ALLERGIES: Seasonal    ROS  Constitutional: Denies fever, chills, sweats,  weight loss, fatigue  Cardiovascular: Denies chest pain, tightness, palpitations, swelling in legs/feet, fainting, difficulty breathing when lying down but gets better when sitting up.   Respiratory: Denies shortness of breath, cough, sputum, wheezing, painful breathing, coughing up blood.   Sleep: per HPI  Gastrointestinal: Denies  difficulty swallowing, nausea, abdominal pain, diarrhea, constipation, heartburn.  Musculoskeletal: shoulder pain  Neuro: early demntia     PHYSICAL EXAM  Normal BMI    /80 (BP Location: Left arm, Patient Position: Sitting, BP Cuff Size: Adult)   Pulse 81   Resp 16   Ht 1.803 m (5' 11\")   Wt 81.2 kg (179 lb)   SpO2 97%   BMI 24.97 kg/m²   Appearance: Well-nourished, well-developed, no acute distress  Eyes:  PERRLA, EOMI; glasses  ENMT: masked  Neck: Supple, trachea midline, no masses  Respiratory effort:  No intercostal retractions or use of accessory muscles  Lung auscultation:  No wheezes rhonchi rubs or rales  Cardiac: No murmurs, rubs, or gallops; regular rhythm, normal rate; no edema  Abdomen:  No tenderness, no organomegaly.  Musculoskeletal:  Grossly normal; gait and station normal; digits and nails normal  Skin:  No rashes, petechiae, cyanosis  Neurologic: without focal signs; oriented to person, time, place, and purpose; judgement intact  Psychiatric:  No depression, anxiety, agitation      Medical Decision Making       The medical record was reviewed in its entirety including the referral notes, records from primary care, consultants notes, hospital " records, lab, imaging, microbiology, immunology, and immunizations.  Care gaps identified and reviewed with the patient.    Diagnostic and titration nocturnal polysomnogram's, home sleep apnea tests, continuous nocturnal oximetry results, multiple sleep latency tests, and compliance reports reviewed.  1. Obstructive sleep apnea syndrome  - DME Mask and Supplies    2. Non-smoker    3. Elevated fasting glucose    4. Chronic insomnia    5. Benign prostatic hyperplasia with nocturia    6. Pure hypercholesterolemia    7. Seasonal allergies    8. Memory issues followed by neuro    PLAN:   Has been advised to continue the current  CPAP 9.4-13 cm water, clean equipment frequently, and get new mask and supplies as allowed by insurance and DME. Advised about potential problems including nasal obstruction/stuffiness, mask leak or discomfort , frequent awakenings, chill or dryness of the upper airway, noise, inconvenience, and lack of benefit. Recommend an earlier appointment, if significant treatment barriers develop.    The risks of untreated sleep apnea were discussed with the patient at length. Patients with GISSEL are at increased risk of cardiovascular disease including coronary artery disease, systemic arterial hypertension, pulmonary arterial hypertension, cardiac arrythmias, and stroke. GISSEL patients have an increased risk of motor vehicle accidents, type 2 diabetes, chronic kidney disease, and non-alcoholic liver disease. The patient was advised to avoid driving a motor vehicle when drowsy.    Positive airway pressure will favorably impact many of the adverse conditions and effects provoked by GISSEL.    Have advised the patient to follow up with the appropriate healthcare practitioners for all other medical problems and issues.    Mask wearing, handwashing, and social distancing protocols reviewed and encouraged.    Return in about 1 year (around 4/15/2022).    Total time 30 minutes

## 2021-04-15 ENCOUNTER — OFFICE VISIT (OUTPATIENT)
Dept: SLEEP MEDICINE | Facility: MEDICAL CENTER | Age: 66
End: 2021-04-15
Payer: MEDICARE

## 2021-04-15 VITALS
DIASTOLIC BLOOD PRESSURE: 80 MMHG | WEIGHT: 179 LBS | RESPIRATION RATE: 16 BRPM | BODY MASS INDEX: 25.06 KG/M2 | HEIGHT: 71 IN | HEART RATE: 81 BPM | SYSTOLIC BLOOD PRESSURE: 124 MMHG | OXYGEN SATURATION: 97 %

## 2021-04-15 DIAGNOSIS — Z78.9 NON-SMOKER: ICD-10-CM

## 2021-04-15 DIAGNOSIS — N40.1 BENIGN PROSTATIC HYPERPLASIA WITH NOCTURIA: ICD-10-CM

## 2021-04-15 DIAGNOSIS — J30.2 SEASONAL ALLERGIES: ICD-10-CM

## 2021-04-15 DIAGNOSIS — E78.00 PURE HYPERCHOLESTEROLEMIA: ICD-10-CM

## 2021-04-15 DIAGNOSIS — R35.1 BENIGN PROSTATIC HYPERPLASIA WITH NOCTURIA: ICD-10-CM

## 2021-04-15 DIAGNOSIS — F51.04 CHRONIC INSOMNIA: ICD-10-CM

## 2021-04-15 DIAGNOSIS — G47.33 OBSTRUCTIVE SLEEP APNEA SYNDROME: ICD-10-CM

## 2021-04-15 DIAGNOSIS — R73.01 ELEVATED FASTING GLUCOSE: ICD-10-CM

## 2021-04-15 PROCEDURE — 99214 OFFICE O/P EST MOD 30 MIN: CPT | Performed by: INTERNAL MEDICINE

## 2021-04-15 ASSESSMENT — FIBROSIS 4 INDEX: FIB4 SCORE: 2.05

## 2021-05-05 ENCOUNTER — TELEPHONE (OUTPATIENT)
Dept: MEDICAL GROUP | Facility: IMAGING CENTER | Age: 66
End: 2021-05-05

## 2021-05-05 DIAGNOSIS — N40.1 BENIGN PROSTATIC HYPERPLASIA WITH NOCTURIA: ICD-10-CM

## 2021-05-05 DIAGNOSIS — E78.5 HYPERLIPIDEMIA, UNSPECIFIED HYPERLIPIDEMIA TYPE: ICD-10-CM

## 2021-05-05 DIAGNOSIS — R35.1 BENIGN PROSTATIC HYPERPLASIA WITH NOCTURIA: ICD-10-CM

## 2021-05-05 RX ORDER — TAMSULOSIN HYDROCHLORIDE 0.4 MG/1
0.4 CAPSULE ORAL DAILY
Qty: 100 CAPSULE | Refills: 3 | Status: SHIPPED | OUTPATIENT
Start: 2021-05-05 | End: 2022-04-14 | Stop reason: SDUPTHER

## 2021-05-05 RX ORDER — SIMVASTATIN 40 MG
TABLET ORAL
Qty: 100 TABLET | Refills: 3 | Status: SHIPPED | OUTPATIENT
Start: 2021-05-05 | End: 2022-04-14 | Stop reason: SDUPTHER

## 2021-05-05 NOTE — TELEPHONE ENCOUNTER
VOICEMAIL  1. Caller Name:[email or web address]                     Call Back Number: [phone]    2. Message: Noor from [email or web address] mail order lvm stating, his insurance only covers quantity 100 for medication fills. They need ne scripts for all medication to say 100 quantity     3. Patient approves office to leave a detailed voicemail/MyChart message: yes

## 2021-05-28 ENCOUNTER — OFFICE VISIT (OUTPATIENT)
Dept: NEUROLOGY | Facility: MEDICAL CENTER | Age: 66
End: 2021-05-28
Attending: PSYCHIATRY & NEUROLOGY
Payer: MEDICARE

## 2021-05-28 VITALS
SYSTOLIC BLOOD PRESSURE: 118 MMHG | OXYGEN SATURATION: 97 % | BODY MASS INDEX: 24.88 KG/M2 | DIASTOLIC BLOOD PRESSURE: 75 MMHG | TEMPERATURE: 98.6 F | WEIGHT: 177.69 LBS | RESPIRATION RATE: 14 BRPM | HEIGHT: 71 IN | HEART RATE: 73 BPM

## 2021-05-28 DIAGNOSIS — G31.84 AMNESTIC MCI (MILD COGNITIVE IMPAIRMENT WITH MEMORY LOSS): Primary | ICD-10-CM

## 2021-05-28 PROCEDURE — 99212 OFFICE O/P EST SF 10 MIN: CPT | Performed by: PSYCHIATRY & NEUROLOGY

## 2021-05-28 PROCEDURE — 99215 OFFICE O/P EST HI 40 MIN: CPT | Performed by: PSYCHIATRY & NEUROLOGY

## 2021-05-28 RX ORDER — IMIQUIMOD 12.5 MG/.25G
CREAM TOPICAL
COMMUNITY
End: 2022-12-12

## 2021-05-28 RX ORDER — SIMVASTATIN 10 MG
TABLET ORAL
COMMUNITY
End: 2021-08-27

## 2021-05-28 RX ORDER — TAMSULOSIN HYDROCHLORIDE 0.4 MG/1
CAPSULE ORAL
COMMUNITY
End: 2021-05-28

## 2021-05-28 ASSESSMENT — FIBROSIS 4 INDEX: FIB4 SCORE: 2.05
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
## 2021-06-18 ENCOUNTER — OFFICE VISIT (OUTPATIENT)
Dept: DERMATOLOGY | Facility: IMAGING CENTER | Age: 66
End: 2021-06-18
Payer: MEDICARE

## 2021-06-18 DIAGNOSIS — L03.116 CELLULITIS OF LEFT LOWER EXTREMITY: ICD-10-CM

## 2021-06-18 DIAGNOSIS — L57.0 ACTINIC KERATOSIS: ICD-10-CM

## 2021-06-18 PROCEDURE — 99214 OFFICE O/P EST MOD 30 MIN: CPT | Performed by: DERMATOLOGY

## 2021-06-18 RX ORDER — CEPHALEXIN 500 MG/1
500 CAPSULE ORAL 3 TIMES DAILY
Qty: 30 CAPSULE | Refills: 0 | Status: SHIPPED | OUTPATIENT
Start: 2021-06-18 | End: 2021-10-14

## 2021-06-18 RX ORDER — FLUOROURACIL 50 MG/G
CREAM TOPICAL
Qty: 40 G | Refills: 0 | Status: SHIPPED | OUTPATIENT
Start: 2021-06-18 | End: 2021-12-02

## 2021-06-18 NOTE — PROGRESS NOTES
CC: pdt fv    Subjective: previously patient here for pdt fv on scalp/ forehead     pt states process was smooth but aks are still present, desires additional trx.      Toe sore X 1 week, tender to touch.  NO discharge.  When dog stepped on it, became more painful.    History of skin cancer: Yes, Details: BCC scalp 2020, BCC moh's around eye  History of precancers/actinic keratoses: Yes, Details: Treated with PDT levulan, cryo  History of biopsies:Yes, Details: yes, see above  History of blistering/severe sunburns:Yes, Details: young adult  Family history of skin cancer:Yes, Details: unsure of type, Father  Family history of atypical moles:No    ROS: no fevers/chills. No itch.  No cough.      DermPMH: hx NMSC  No problem-specific Assessment & Plan notes found for this encounter.    Relevant PMH:mult med comorbidities  Social:non smoker    PE: Gen:WDWN male in NAD.  Skin: focal  -scattered thin hyperkeratotic papules on erythematous base on scalp  -erythema, little edema of left great toe.  No pustule/paronychial discharge noted      A/P: cellulitis, soft tissue infection, likely, left great toe:  -reviewed dx/tx  -keflex 500mg PO TID X 10 days  -if no response in 48 hours and/or worsening/progressive - seek UC    AKs, scalp:  -reviewed LN2, Efudex vs PDT. Elects PDT in Fall 2021.  PA today; will plan for 2 hour incubation  -will treat in interim - Efudex cream BID X 2-4 weeks, Rx supplied    I have reviewed medications relevant to my specialty.

## 2021-08-27 ENCOUNTER — OFFICE VISIT (OUTPATIENT)
Dept: MEDICAL GROUP | Facility: IMAGING CENTER | Age: 66
End: 2021-08-27
Payer: MEDICARE

## 2021-08-27 VITALS
RESPIRATION RATE: 12 BRPM | WEIGHT: 175 LBS | OXYGEN SATURATION: 96 % | HEIGHT: 71 IN | BODY MASS INDEX: 24.5 KG/M2 | SYSTOLIC BLOOD PRESSURE: 110 MMHG | HEART RATE: 65 BPM | TEMPERATURE: 98.4 F | DIASTOLIC BLOOD PRESSURE: 70 MMHG

## 2021-08-27 DIAGNOSIS — R05.8 DRY COUGH: ICD-10-CM

## 2021-08-27 DIAGNOSIS — R41.3 MEMORY CHANGES: ICD-10-CM

## 2021-08-27 DIAGNOSIS — J30.89 ENVIRONMENTAL AND SEASONAL ALLERGIES: ICD-10-CM

## 2021-08-27 PROCEDURE — 99213 OFFICE O/P EST LOW 20 MIN: CPT | Performed by: FAMILY MEDICINE

## 2021-08-27 RX ORDER — LEVOCETIRIZINE DIHYDROCHLORIDE 5 MG/1
1 TABLET, FILM COATED ORAL DAILY
Qty: 30 TABLET | Refills: 3 | Status: SHIPPED | OUTPATIENT
Start: 2021-08-27 | End: 2021-09-13 | Stop reason: SDUPTHER

## 2021-08-27 RX ORDER — ALBUTEROL SULFATE 90 UG/1
2 AEROSOL, METERED RESPIRATORY (INHALATION) EVERY 6 HOURS PRN
Qty: 1 EACH | Refills: 1 | Status: SHIPPED | OUTPATIENT
Start: 2021-08-27 | End: 2021-09-20 | Stop reason: SDUPTHER

## 2021-08-27 RX ORDER — BENZONATATE 100 MG/1
100 CAPSULE ORAL 3 TIMES DAILY PRN
Qty: 60 CAPSULE | Refills: 0 | Status: SHIPPED
Start: 2021-08-27 | End: 2021-08-30

## 2021-08-27 ASSESSMENT — FIBROSIS 4 INDEX: FIB4 SCORE: 2.05

## 2021-08-27 ASSESSMENT — PAIN SCALES - GENERAL: PAINLEVEL: NO PAIN
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
## 2021-09-02 ENCOUNTER — OFFICE VISIT (OUTPATIENT)
Dept: NEUROLOGY | Facility: MEDICAL CENTER | Age: 66
End: 2021-09-02
Attending: PSYCHIATRY & NEUROLOGY
Payer: MEDICARE

## 2021-09-02 VITALS
SYSTOLIC BLOOD PRESSURE: 110 MMHG | HEART RATE: 67 BPM | BODY MASS INDEX: 24.6 KG/M2 | DIASTOLIC BLOOD PRESSURE: 60 MMHG | TEMPERATURE: 98.5 F | OXYGEN SATURATION: 94 % | WEIGHT: 175.71 LBS | HEIGHT: 71 IN

## 2021-09-02 DIAGNOSIS — G31.84 AMNESTIC MCI (MILD COGNITIVE IMPAIRMENT WITH MEMORY LOSS): ICD-10-CM

## 2021-09-02 PROCEDURE — 99214 OFFICE O/P EST MOD 30 MIN: CPT | Performed by: PSYCHIATRY & NEUROLOGY

## 2021-09-02 PROCEDURE — 99212 OFFICE O/P EST SF 10 MIN: CPT | Performed by: PSYCHIATRY & NEUROLOGY

## 2021-09-02 ASSESSMENT — FIBROSIS 4 INDEX: FIB4 SCORE: 2.05
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
## 2021-09-02 NOTE — Clinical Note
Kamran needs a formal evaluation with KIMMY BALBUENA PHD>>  in December 2021 or January 2022    Reason: Mild Cognitive Impairment.    Please send this patient's MR# to [facility] too.    Rick

## 2021-10-14 ENCOUNTER — OFFICE VISIT (OUTPATIENT)
Dept: MEDICAL GROUP | Facility: IMAGING CENTER | Age: 66
End: 2021-10-14
Payer: MEDICARE

## 2021-10-14 ENCOUNTER — HOSPITAL ENCOUNTER (OUTPATIENT)
Dept: RADIOLOGY | Facility: MEDICAL CENTER | Age: 66
End: 2021-10-14
Attending: FAMILY MEDICINE
Payer: MEDICARE

## 2021-10-14 VITALS
DIASTOLIC BLOOD PRESSURE: 74 MMHG | RESPIRATION RATE: 12 BRPM | OXYGEN SATURATION: 96 % | TEMPERATURE: 97.3 F | HEART RATE: 68 BPM | SYSTOLIC BLOOD PRESSURE: 112 MMHG | WEIGHT: 182 LBS | BODY MASS INDEX: 25.48 KG/M2 | HEIGHT: 71 IN

## 2021-10-14 DIAGNOSIS — J30.89 ENVIRONMENTAL AND SEASONAL ALLERGIES: ICD-10-CM

## 2021-10-14 DIAGNOSIS — Z71.85 IMMUNIZATION COUNSELING: ICD-10-CM

## 2021-10-14 DIAGNOSIS — Z23 NEED FOR INFLUENZA VACCINATION: ICD-10-CM

## 2021-10-14 DIAGNOSIS — R05.9 COUGH: ICD-10-CM

## 2021-10-14 PROCEDURE — 71046 X-RAY EXAM CHEST 2 VIEWS: CPT

## 2021-10-14 PROCEDURE — 90662 IIV NO PRSV INCREASED AG IM: CPT | Performed by: FAMILY MEDICINE

## 2021-10-14 PROCEDURE — 99214 OFFICE O/P EST MOD 30 MIN: CPT | Mod: 25 | Performed by: FAMILY MEDICINE

## 2021-10-14 PROCEDURE — G0008 ADMIN INFLUENZA VIRUS VAC: HCPCS | Performed by: FAMILY MEDICINE

## 2021-10-14 RX ORDER — MONTELUKAST SODIUM 10 MG/1
10 TABLET ORAL DAILY
Qty: 30 TABLET | Refills: 3 | Status: SHIPPED | OUTPATIENT
Start: 2021-10-14 | End: 2022-02-09

## 2021-10-14 RX ORDER — MONTELUKAST SODIUM 10 MG/1
10 TABLET ORAL DAILY
Qty: 30 TABLET | Refills: 3 | Status: SHIPPED
Start: 2021-10-14 | End: 2021-10-14

## 2021-10-14 RX ORDER — BENZONATATE 100 MG/1
CAPSULE ORAL
Qty: 60 CAPSULE | Refills: 0 | Status: SHIPPED | OUTPATIENT
Start: 2021-10-14 | End: 2022-07-19

## 2021-10-14 RX ORDER — FLUTICASONE PROPIONATE 50 MCG
SPRAY, SUSPENSION (ML) NASAL
Qty: 16 G | Refills: 5 | Status: SHIPPED | OUTPATIENT
Start: 2021-10-14 | End: 2022-11-02

## 2021-10-14 ASSESSMENT — FIBROSIS 4 INDEX: FIB4 SCORE: 2.08

## 2021-10-14 ASSESSMENT — PAIN SCALES - GENERAL: PAINLEVEL: NO PAIN
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
## 2021-11-18 ENCOUNTER — OFFICE VISIT (OUTPATIENT)
Dept: DERMATOLOGY | Facility: IMAGING CENTER | Age: 66
End: 2021-11-18
Payer: MEDICARE

## 2021-11-18 DIAGNOSIS — L57.0 ACTINIC KERATOSIS: ICD-10-CM

## 2021-11-18 PROCEDURE — 96573 PDT DSTR PRMLG LES PHYS/QHP: CPT | Performed by: DERMATOLOGY

## 2021-11-18 PROCEDURE — 99999 PR NO CHARGE: CPT | Performed by: DERMATOLOGY

## 2021-11-18 NOTE — PROGRESS NOTES
PDT Treatment    AKs:   I counseled the patient regarding the risks of PDT, per pre-procedure counseling sheet, which was provided to the patient.   Post care discussed, including the absolute need to avoid sunlight for 2 days (48 hours) post-procedure, and the need to wear sun protection. Patient may experience sunburn like redness, discomfort, swelling, and scabbing. Must wear zinc oxide sunscreen at all times during the week of healing.    Patient aware to call me with any questions or concerns.  See ALA + blue light treatment was administered per treatment protocol scanned into patient chart (see attached).     RTC for FRED 6 months, prn any issues with treatment

## 2021-12-02 ENCOUNTER — OFFICE VISIT (OUTPATIENT)
Dept: MEDICAL GROUP | Facility: IMAGING CENTER | Age: 66
End: 2021-12-02
Payer: MEDICARE

## 2021-12-02 VITALS
HEART RATE: 68 BPM | TEMPERATURE: 98.3 F | DIASTOLIC BLOOD PRESSURE: 72 MMHG | BODY MASS INDEX: 24.91 KG/M2 | WEIGHT: 174 LBS | HEIGHT: 70 IN | RESPIRATION RATE: 12 BRPM | SYSTOLIC BLOOD PRESSURE: 118 MMHG | OXYGEN SATURATION: 98 %

## 2021-12-02 DIAGNOSIS — R41.89 PERSISTENT COGNITIVE IMPAIRMENT: ICD-10-CM

## 2021-12-02 DIAGNOSIS — M54.50 CHRONIC BILATERAL LOW BACK PAIN WITHOUT SCIATICA: ICD-10-CM

## 2021-12-02 DIAGNOSIS — N40.1 BENIGN PROSTATIC HYPERPLASIA WITH NOCTURIA: ICD-10-CM

## 2021-12-02 DIAGNOSIS — M41.9 SCOLIOSIS, UNSPECIFIED SCOLIOSIS TYPE, UNSPECIFIED SPINAL REGION: ICD-10-CM

## 2021-12-02 DIAGNOSIS — Z86.010 HISTORY OF COLON POLYPS: ICD-10-CM

## 2021-12-02 DIAGNOSIS — L57.0 AK (ACTINIC KERATOSIS): ICD-10-CM

## 2021-12-02 DIAGNOSIS — Z85.828 HISTORY OF BASAL CELL CARCINOMA: ICD-10-CM

## 2021-12-02 DIAGNOSIS — B07.8 OTHER VIRAL WARTS: ICD-10-CM

## 2021-12-02 DIAGNOSIS — R35.1 BENIGN PROSTATIC HYPERPLASIA WITH NOCTURIA: ICD-10-CM

## 2021-12-02 DIAGNOSIS — D69.6 THROMBOCYTOPENIA (HCC): ICD-10-CM

## 2021-12-02 DIAGNOSIS — E78.00 PURE HYPERCHOLESTEROLEMIA: ICD-10-CM

## 2021-12-02 DIAGNOSIS — L82.1 SK (SEBORRHEIC KERATOSIS): ICD-10-CM

## 2021-12-02 DIAGNOSIS — F51.04 CHRONIC INSOMNIA: ICD-10-CM

## 2021-12-02 DIAGNOSIS — E55.9 VITAMIN D INSUFFICIENCY: ICD-10-CM

## 2021-12-02 DIAGNOSIS — Z12.83 SKIN CANCER SCREENING: ICD-10-CM

## 2021-12-02 DIAGNOSIS — H90.3 SENSORINEURAL HEARING LOSS, BILATERAL: ICD-10-CM

## 2021-12-02 DIAGNOSIS — G89.29 CHRONIC BILATERAL LOW BACK PAIN WITHOUT SCIATICA: ICD-10-CM

## 2021-12-02 DIAGNOSIS — J30.89 ENVIRONMENTAL AND SEASONAL ALLERGIES: ICD-10-CM

## 2021-12-02 DIAGNOSIS — R71.8 ELEVATED MCV: ICD-10-CM

## 2021-12-02 DIAGNOSIS — R05.8 DRY COUGH: ICD-10-CM

## 2021-12-02 DIAGNOSIS — Z12.5 SCREENING FOR PROSTATE CANCER: ICD-10-CM

## 2021-12-02 DIAGNOSIS — Z00.00 MEDICARE ANNUAL WELLNESS VISIT, SUBSEQUENT: ICD-10-CM

## 2021-12-02 DIAGNOSIS — G47.33 OBSTRUCTIVE SLEEP APNEA SYNDROME: ICD-10-CM

## 2021-12-02 DIAGNOSIS — R41.3 MEMORY CHANGES: ICD-10-CM

## 2021-12-02 PROCEDURE — 17110 DESTRUCTION B9 LES UP TO 14: CPT | Performed by: FAMILY MEDICINE

## 2021-12-02 PROCEDURE — 99213 OFFICE O/P EST LOW 20 MIN: CPT | Mod: 25 | Performed by: FAMILY MEDICINE

## 2021-12-02 PROCEDURE — G0439 PPPS, SUBSEQ VISIT: HCPCS | Performed by: FAMILY MEDICINE

## 2021-12-02 PROCEDURE — 17000 DESTRUCT PREMALG LESION: CPT | Mod: 59 | Performed by: FAMILY MEDICINE

## 2021-12-02 RX ORDER — ALBUTEROL SULFATE 90 UG/1
2 AEROSOL, METERED RESPIRATORY (INHALATION) EVERY 6 HOURS PRN
Qty: 1 EACH | Refills: 1 | Status: SHIPPED | OUTPATIENT
Start: 2021-12-02 | End: 2023-03-01

## 2021-12-02 SDOH — ECONOMIC STABILITY: TRANSPORTATION INSECURITY
IN THE PAST 12 MONTHS, HAS THE LACK OF TRANSPORTATION KEPT YOU FROM MEDICAL APPOINTMENTS OR FROM GETTING MEDICATIONS?: NO

## 2021-12-02 SDOH — HEALTH STABILITY: MENTAL HEALTH
STRESS IS WHEN SOMEONE FEELS TENSE, NERVOUS, ANXIOUS, OR CAN'T SLEEP AT NIGHT BECAUSE THEIR MIND IS TROUBLED. HOW STRESSED ARE YOU?: TO SOME EXTENT

## 2021-12-02 SDOH — HEALTH STABILITY: PHYSICAL HEALTH: ON AVERAGE, HOW MANY DAYS PER WEEK DO YOU ENGAGE IN MODERATE TO STRENUOUS EXERCISE (LIKE A BRISK WALK)?: 6 DAYS

## 2021-12-02 SDOH — ECONOMIC STABILITY: FOOD INSECURITY: WITHIN THE PAST 12 MONTHS, YOU WORRIED THAT YOUR FOOD WOULD RUN OUT BEFORE YOU GOT MONEY TO BUY MORE.: NEVER TRUE

## 2021-12-02 SDOH — ECONOMIC STABILITY: FOOD INSECURITY: WITHIN THE PAST 12 MONTHS, THE FOOD YOU BOUGHT JUST DIDN'T LAST AND YOU DIDN'T HAVE MONEY TO GET MORE.: NEVER TRUE

## 2021-12-02 SDOH — ECONOMIC STABILITY: INCOME INSECURITY: HOW HARD IS IT FOR YOU TO PAY FOR THE VERY BASICS LIKE FOOD, HOUSING, MEDICAL CARE, AND HEATING?: NOT HARD AT ALL

## 2021-12-02 SDOH — ECONOMIC STABILITY: INCOME INSECURITY: IN THE LAST 12 MONTHS, WAS THERE A TIME WHEN YOU WERE NOT ABLE TO PAY THE MORTGAGE OR RENT ON TIME?: NO

## 2021-12-02 SDOH — ECONOMIC STABILITY: HOUSING INSECURITY
IN THE LAST 12 MONTHS, WAS THERE A TIME WHEN YOU DID NOT HAVE A STEADY PLACE TO SLEEP OR SLEPT IN A SHELTER (INCLUDING NOW)?: NO

## 2021-12-02 SDOH — ECONOMIC STABILITY: TRANSPORTATION INSECURITY
IN THE PAST 12 MONTHS, HAS LACK OF TRANSPORTATION KEPT YOU FROM MEETINGS, WORK, OR FROM GETTING THINGS NEEDED FOR DAILY LIVING?: NO

## 2021-12-02 SDOH — ECONOMIC STABILITY: HOUSING INSECURITY

## 2021-12-02 SDOH — ECONOMIC STABILITY: TRANSPORTATION INSECURITY
IN THE PAST 12 MONTHS, HAS LACK OF RELIABLE TRANSPORTATION KEPT YOU FROM MEDICAL APPOINTMENTS, MEETINGS, WORK OR FROM GETTING THINGS NEEDED FOR DAILY LIVING?: NO

## 2021-12-02 SDOH — HEALTH STABILITY: PHYSICAL HEALTH: ON AVERAGE, HOW MANY MINUTES DO YOU ENGAGE IN EXERCISE AT THIS LEVEL?: 60 MIN

## 2021-12-02 ASSESSMENT — PATIENT HEALTH QUESTIONNAIRE - PHQ9: CLINICAL INTERPRETATION OF PHQ2 SCORE: 0

## 2021-12-02 ASSESSMENT — PAIN SCALES - GENERAL: PAINLEVEL: NO PAIN

## 2021-12-02 ASSESSMENT — SOCIAL DETERMINANTS OF HEALTH (SDOH)
HOW OFTEN DO YOU ATTENT MEETINGS OF THE CLUB OR ORGANIZATION YOU BELONG TO?: MORE THAN 4 TIMES PER YEAR
HOW OFTEN DO YOU ATTENT MEETINGS OF THE CLUB OR ORGANIZATION YOU BELONG TO?: MORE THAN 4 TIMES PER YEAR
HOW OFTEN DO YOU ATTEND CHURCH OR RELIGIOUS SERVICES?: 1 TO 4 TIMES PER YEAR
WITHIN THE PAST 12 MONTHS, YOU WORRIED THAT YOUR FOOD WOULD RUN OUT BEFORE YOU GOT THE MONEY TO BUY MORE: NEVER TRUE
IN A TYPICAL WEEK, HOW MANY TIMES DO YOU TALK ON THE PHONE WITH FAMILY, FRIENDS, OR NEIGHBORS?: MORE THAN THREE TIMES A WEEK
IN A TYPICAL WEEK, HOW MANY TIMES DO YOU TALK ON THE PHONE WITH FAMILY, FRIENDS, OR NEIGHBORS?: MORE THAN THREE TIMES A WEEK
DO YOU BELONG TO ANY CLUBS OR ORGANIZATIONS SUCH AS CHURCH GROUPS UNIONS, FRATERNAL OR ATHLETIC GROUPS, OR SCHOOL GROUPS?: NO
HOW OFTEN DO YOU GET TOGETHER WITH FRIENDS OR RELATIVES?: MORE THAN THREE TIMES A WEEK
HOW MANY DRINKS CONTAINING ALCOHOL DO YOU HAVE ON A TYPICAL DAY WHEN YOU ARE DRINKING: 1 OR 2
HOW OFTEN DO YOU GET TOGETHER WITH FRIENDS OR RELATIVES?: MORE THAN THREE TIMES A WEEK
HOW OFTEN DO YOU HAVE SIX OR MORE DRINKS ON ONE OCCASION: NEVER
HOW HARD IS IT FOR YOU TO PAY FOR THE VERY BASICS LIKE FOOD, HOUSING, MEDICAL CARE, AND HEATING?: NOT HARD AT ALL
HOW OFTEN DO YOU HAVE A DRINK CONTAINING ALCOHOL: 4 OR MORE TIMES A WEEK
DO YOU BELONG TO ANY CLUBS OR ORGANIZATIONS SUCH AS CHURCH GROUPS UNIONS, FRATERNAL OR ATHLETIC GROUPS, OR SCHOOL GROUPS?: NO
HOW OFTEN DO YOU ATTEND CHURCH OR RELIGIOUS SERVICES?: 1 TO 4 TIMES PER YEAR

## 2021-12-02 ASSESSMENT — FIBROSIS 4 INDEX: FIB4 SCORE: 2.08

## 2021-12-02 ASSESSMENT — LIFESTYLE VARIABLES
HOW MANY STANDARD DRINKS CONTAINING ALCOHOL DO YOU HAVE ON A TYPICAL DAY: 1 OR 2
HOW OFTEN DO YOU HAVE SIX OR MORE DRINKS ON ONE OCCASION: NEVER
HOW OFTEN DO YOU HAVE A DRINK CONTAINING ALCOHOL: 4 OR MORE TIMES A WEEK

## 2021-12-02 ASSESSMENT — ENCOUNTER SYMPTOMS: GENERAL WELL-BEING: GOOD

## 2021-12-02 ASSESSMENT — ACTIVITIES OF DAILY LIVING (ADL): BATHING_REQUIRES_ASSISTANCE: 0

## 2021-12-02 NOTE — PROGRESS NOTES
Chief Complaint   Patient presents with   • Annual Exam         HPI:  Kamran is a 66 y.o. here for Medicare Annual Wellness Visit    Questions about medications. Overall feel in good health.   Challenges this year- smoke in air, diarrhea- [place], improved.     Rare cough now, dry, overall much better. Still with rhinorrhea a little at night and morning. May use benzonatate as needed, will request refill in future as needed.     Allergies- not using singulair. Xyzal as needed. Albuterol as needed.     Dermatology- has had scalp treatments. Saw Dr. Smith in past.   Recently saw Dr. Ronquillo. Hx of basal cancer.   Will need full skin check in future.   Has 2 lesions he is concerned about on left and right chest areas.     Memory issues- neurology, Dr. Richmond. No medication currently.   Has had testing.     Chronic insomnia and GISSEL- Sleep is okay. Good with CPAP.  Melatonin. Off ambien.     Chronic back pain- hx of scoliosis, L5 region, 10-15 years. Chiropractor and massage help. Tightness of legs. Hikes a lot. Medial elbow right occasionally.   Had prior flexeril prescription, not using regularly, will discontinue use.     Low vitamin D on prior labs.   Elevated MCV on prior labs.   Low platelets on prior labs.     Bilateral hearing loss- Hearing loss- L>R, L tinnitus. Currently stable. [employer] checked. He will plan to check annually.     BPH- on flomax therapy.   Hx of colon polyps.     Giving blood tomorrow. States discussed power red.   Asa therapy and current recommendations discussed.     Patient Active Problem List    Diagnosis Date Noted   • Sensorineural hearing loss, bilateral 02/19/2021   • Ringing in left ear 02/19/2021   • Presbycusis 02/19/2021   • Hearing loss of left ear 02/19/2021   • Persistent cognitive impairment 01/14/2021   • History of colon polyps 10/15/2019   • Elevated fasting glucose 10/16/2018   • History of basal cell carcinoma 10/16/2018   • Non-smoker 09/13/2018   • Chronic insomnia 08/23/2018   •  Memory changes 04/29/2018   • Pure hypercholesterolemia 11/14/2017   • Chronic bilateral low back pain without sciatica 05/30/2017   • Obstructive sleep apnea syndrome 10/17/2016   • Pain in right shin 10/17/2016   • Seasonal allergies 05/06/2016   • Thrombocytopenia (HCC) 12/30/2015   • Scoliosis 10/09/2015   • Benign prostatic hyperplasia with lower urinary tract symptoms 02/12/2015   • Vitamin D insufficiency 11/21/2013   • Postnasal drip 05/03/2012       Current Outpatient Medications   Medication Sig Dispense Refill   • albuterol 108 (90 Base) MCG/ACT Aero Soln inhalation aerosol Inhale 2 Puffs every 6 hours as needed for Shortness of Breath. 1 Each 1   • ASPIRIN 81 PO Take  by mouth.     • fluticasone (FLONASE) 50 MCG/ACT nasal spray USE 2 SPRAYS IN EACH NOSTRIL DAILY 16 g 5   • simvastatin (ZOCOR) 40 MG Tab TAKE 1 TABLET EVERY EVENING 100 tablet 3   • tamsulosin (FLOMAX) 0.4 MG capsule Take 1 capsule by mouth every day. 100 capsule 3   • Multiple Vitamins-Minerals (MULTIVITAMIN ADULTS PO) Take  by mouth.     • Ascorbic Acid (VITAMIN C PO) Take 1,000 mg by mouth.     • Cholecalciferol (VITAMIN D PO) Take  by mouth.     • Vitamin E 400 UNIT Tab Take  by mouth.     • Omega-3 Fatty Acids (FISH OIL) 1000 MG Cap capsule Take 1,000 mg by mouth 3 times a day, with meals.     • GLUCOSAMINE CHONDROITIN COMPLX PO Take  by mouth.     • benzonatate (TESSALON) 100 MG Cap TAKE ONE CAPSULE BY MOUTH THREE TIMES DAILY AS NEEDED FOR COUGH (Patient not taking: Reported on 12/2/2021) 60 Capsule 0   • montelukast (SINGULAIR) 10 MG Tab Take 1 Tablet by mouth every day. (Patient not taking: Reported on 12/2/2021) 30 Tablet 3   • Levocetirizine Dihydrochloride (XYZAL) 5 MG Tab Take 1 Tablet by mouth every day. (Patient not taking: Reported on 10/14/2021) 30 Tablet 3   • imiquimod (ALDARA) 5 % cream  (Patient not taking: Reported on 10/14/2021)       No current facility-administered medications for this visit.        Patient is taking  medications as noted in medication list.  Current supplements as per medication list.     Allergies: Seasonal    Current social contact/activities: yes     Is patient current with immunizations? No, due for SHINGRIX (Shingles). Patient is interested in receiving SHINGRIX (Shingles) today.    He  reports that he has never smoked. He has never used smokeless tobacco. He reports current alcohol use of about 3.6 oz of alcohol per week. He reports previous drug use. Drug: Marijuana.  Counseling given: Yes        DPA/Advanced directive: Patient has Advanced Directive, but it is not on file. Instructed to bring in a copy to scan into their chart.    ROS:    Gait: Uses no assistive device   Ostomy: No   Other tubes: No   Amputations: No   Chronic oxygen use No   Last eye exam 08/2021   Wears hearing aids: No   : Denies any urinary leakage during the last 6 months      Screening:    reviewed    Depression Screening    Little interest or pleasure in doing things?  0 - not at all  Feeling down, depressed, or hopeless? 0 - not at all  Patient Health Questionnaire Score: 0    If depressive symptoms identified deferred to follow up visit unless specifically addressed in assessment and plan.    Interpretation of PHQ-9 Total Score   Score Severity   1-4 No Depression   5-9 Mild Depression   10-14 Moderate Depression   15-19 Moderately Severe Depression   20-27 Severe Depression    Screening for Cognitive Impairment    Three Minute Recall (captain, garden, picture)  1/3    [place] clock face with all 12 numbers and set the hands to show 5 past 8.  No    If cognitive concerns identified, deferred for follow up unless specifically addressed in assessment and plan.    Fall Risk Assessment    Has the patient had two or more falls in the last year or any fall with injury in the last year?  No  If fall risk identified, deferred for follow up unless specifically addressed in assessment and plan.    Safety Assessment    Throw rugs on floor.   Yes  Handrails on all stairs.  No  Good lighting in all hallways.  Yes  Difficulty hearing.  No  Patient counseled about all safety risks that were identified.    Functional Assessment ADLs    Are there any barriers preventing you from cooking for yourself or meeting nutritional needs?  No.    Are there any barriers preventing you from driving safely or obtaining transportation?  No.    Are there any barriers preventing you from using a telephone or calling for help?  No.    Are there any barriers preventing you from shopping?  No.    Are there any barriers preventing you from taking care of your own finances?  No.    Are there any barriers preventing you from managing your medications?  No.    Are there any barriers preventing you from showering, bathing or dressing yourself?  No.    Are you currently engaging in any exercise or physical activity?  Yes.     What is your perception of your health?  Good.    Health Maintenance Summary          Overdue - IMM ZOSTER VACCINES (2 of 3) Overdue since 12/12/2016    10/17/2016  Imm Admin: Zoster Vaccine Live (ZVL) (Zostavax) - HISTORICAL DATA          Overdue - Annual Wellness Visit (Once) Overdue - never done    No completion history exists for this topic.          IMM DTaP/Tdap/Td Vaccine (2 - Td or Tdap) Next due on 10/16/2023    10/16/2013  Imm Admin: Tdap Vaccine          COLORECTAL CANCER SCREENING (COLONOSCOPY - Every 10 Years) Next due on 9/12/2028 05/28/2019  OCCULT BLOOD FECES IMMUNOASSAY    09/12/2018  REFERRAL TO GI FOR COLONOSCOPY    10/13/2015  OCCULT BLOOD FECES IMMUNOASSAY    10/09/2014  OCCULT BLOOD FECES IMMUNOASSAY    09/19/2013  OCCULT BLOOD FECES IMMUNOASSAY    Only the first 5 history entries have been loaded, but more history exists.          IMM PNEUMOCOCCAL VACCINE: 65+ Years (Series Information) Completed    10/21/2020  Imm Admin: Pneumococcal polysaccharide vaccine (PPSV-23)    10/04/2019  Imm Admin: Pneumococcal Conjugate Vaccine  (Prevnar/PCV-13)          HEPATITIS C SCREENING  Completed    10/29/2020  HEP C VIRUS ANTIBODY          IMM INFLUENZA (Series Information) Completed    10/14/2021  Imm Admin: Influenza Vaccine Adult HD    09/28/2020  Imm Admin: Influenza Vaccine Quad Inj (Pf)    10/04/2019  Imm Admin: Influenza Vaccine Adult HD    10/05/2018  Imm Admin: Influenza Vaccine Adult HD    10/05/2017  Imm Admin: Influenza Vaccine Quad Inj (Pf)    Only the first 5 history entries have been loaded, but more history exists.          COVID-19 Vaccine (Series Information) Completed    10/23/2021  Imm Admin: Moderna SARS-CoV-2 Vaccine    04/01/2021  Imm Admin: Moderna SARS-CoV-2 Vaccine    03/04/2021  Imm Admin: Moderna SARS-CoV-2 Vaccine          IMM HEP B VACCINE (Series Information) Aged Out    No completion history exists for this topic.          IMM MENINGOCOCCAL VACCINE (MCV4) (Series Information) Aged Out    No completion history exists for this topic.                Patient Care Team:  Eva Rand M.D. as PCP - General (Family Medicine)  Mag Smith M.D. as Consulting Physician (Dermatology)  Eva Rand M.D. as Consulting Physician (Family Medicine)  Hari Cummings D.C. as Consulting Physician (Chiropractic)  Mag Smtih M.D. (Dermatology)    Social History     Tobacco Use   • Smoking status: Never Smoker   • Smokeless tobacco: Never Used   Vaping Use   • Vaping Use: Never used   Substance Use Topics   • Alcohol use: Yes     Alcohol/week: 3.6 oz     Types: 3 Glasses of wine, 3 Cans of beer per week     Comment: less than every day, 2-3 per week now   • Drug use: Not Currently     Types: Marijuana     Comment: occasionally     Family History   Problem Relation Age of Onset   • Alcohol/Drug Father         alcohol   • Alcohol/Drug Brother         alcohol   • Sleep Apnea Brother    • Cancer Brother    • Cancer Brother         brain   • Heart Disease Neg Hx      He  has a past medical history of Basal cell carcinoma, Chickenpox,  "Dyslipidemia, [place] measles, Groin strain (10/21/2011), Hemorrhoid, Hyperlipidemia, Insomnia, Left lateral epicondylitis (5/6/2016), Mumps, Right hip pain (11/21/2013), S/P colonoscopy (7/2013), S/P colonoscopy with polypectomy, Scoliosis, Sleep apnea, and Tonsillitis.   Past Surgical History:   Procedure Laterality Date   • BLEPHAROPLASTY  11/14/2012    Performed by Florentin Naylor M.D. at SURGERY SURGICAL [facility] ORS   • INGUINAL HERNIA REPAIR  3/17/2009    Performed by SUDHA BARRETT at SURGERY SAME DAY [facility] ORS   • TONSILLECTOMY           Exam:     /72   Pulse 68   Temp 36.8 °C (98.3 °F)   Resp 12   Ht 1.765 m (5' 9.5\")   Wt 78.9 kg (174 lb)   SpO2 98%  Body mass index is 25.33 kg/m².    Hearing good.    Dentition good  Alert, oriented in no acute distress.  Eye contact is good, speech goal directed, affect calm  Constitutional: He appears well-developed and well-nourished. He appears not diaphoretic. No distress.   HENT: Right Ear: External ear normal. Left Ear: External ear normal. Tympanic membranes clear and intact.   Nose: Nose normal.   Mouth/Throat: Oropharynx is clear and moist. No oropharyngeal exudate.     Eyes: Conjunctivae and extraocular motions are normal. Pupils are equal, round, and reactive to light. No scleral icterus.   Neck: Normal range of motion. Neck supple. No thyromegaly present.   Cardiovascular: Normal rate, regular rhythm, normal heart sounds and intact distal pulses.  Exam reveals no gallop and no friction rub.  No murmur heard. No carotid bruits.   Pulmonary/Chest: Effort normal and breath sounds normal. No respiratory distress. No wheezes. No rales.   Abdominal: Soft. Bowel sounds are normal. He exhibits no distension and no mass. No tenderness. No rebound and no guarding.   Lymphadenopathy:  He has no cervical adenopathy. No inguinal adenopathy.   Neurological:He is alert. He has normal reflexes. No cranial nerve deficit. He exhibits normal muscle tone. "   Skin: Skin is warm and dry. No rash noted. He is not diaphoretic. No erythema. Patchy pink areas of scalp.   Right supraclavicular region with ~ 6x6 mm whitish verrucous growth, left upper check region with light brown lesion with stuck on appearance which appears benign, left upper forearm region with small scaly white lesion with underlying pink.   Psychiatric: He has a normal mood and affect. His behavior is normal.   Musculoskeletal: He exhibits no edema. Normal strength throughout. 2+ DTR throughout.     Procedure note: Area right upper chest and left upper arm areas described above on skin were treated with liquid nitrogen for 30 seconds. Patient tolerated well.       Assessment and Plan. The following treatment and monitoring plan is recommended:      65 yo male for AWV.     1. Medicare annual wellness visit, subsequent   -Recommend healthy diet and routine exercise.      2. Dry cough- improved. Component associated with postnasal drainage and allergens.  May use medications as needed.  albuterol 108 (90 Base) MCG/ACT Aero Soln inhalation aerosol   3. Environmental and seasonal allergies - stable, continue current medication.     4. History of basal cell carcinoma  Referral to Dermatology   5. SK (seborrheic keratosis) - left chest, appears benign. Patient reassured.   May monitor and follow-up if any further changes.    6. Other viral warts-supraclavicular region.  Treated with liquid nitrogen.  Follow up in 2 weeks.    7. AK (actinic keratosis) -left upper arm.  Treated with liquid nitrogen.    8. Pure hypercholesterolemia  Complete lab work.  Recommend low-cholesterol, high-fiber diet and routine exercise.  Discussed further evaluation with CT cardiac scoring.  Continue monitor. CT-CARDIAC SCORING    Comp Metabolic Panel    Lipid Profile    TSH WITH REFLEX TO FT4   9. Memory changes- stable.  Followed by neurology.  Patient may further look into neuropsychology evaluation.    10. Persistent cognitive  impairment-stable.  As above.    11. Obstructive sleep apnea syndrome-stable.  Continue regular CPAP use.  Monitor.  Follow-up with pulmonary routinely.    12. Chronic insomnia-currently improved off medication therapy.  May have intermittent symptoms.  Recommend sleep hygiene.  Monitor.    13. Chronic bilateral low back pain without sciatica-stable.  Medication as needed in future.  Recommend routine stretching and strengthening exercises.    14. Scoliosis, unspecified scoliosis type, unspecified spinal region-able.  Medications needed in future.  Recommend routine stretching and strengthening exercises.    15. Vitamin D insufficiency-monitor labs. VITAMIN D,25 HYDROXY   16. Elevated MCV-monitor lab work. CBC WITH DIFFERENTIAL   17. Thrombocytopenia (HCC)-monitor lab work.    18. Sensorineural hearing loss, bilateral-recommend monitor routinely.  May benefit from annual hearing exam.  Due to some memory changes would benefit from early use of hearing aids if any further decline in hearing was reviewed with patient.    19. Benign prostatic hyperplasia with nocturia-stable.  Continue current medication.    20. History of colon polyps -stable. Screening up to date.     21. Screening for prostate cancer  PROSTATE SPECIFIC AG SCREENING   22.    Screening for skin cancer- referral to dermatology.     He will plan to schedule for shingrix in future.   Recommended to complete cardiovascular exercise, routine stretching and strengthening, and balance exercises.      Services suggested: No services needed at this time  Health Care Screening recommendations as per orders if indicated.  Referrals offered: PT/OT/Nutrition counseling/Behavioral Health/Smoking cessation as per orders if indicated.    Discussion today about general wellness and lifestyle habits:    · Prevent falls and reduce trip hazards; Cautioned about securing or removing rugs.  · Have a working fire alarm and carbon monoxide detector;   · Engage in regular  physical activity and social activities       Follow-up: Return in about 2 weeks (around 12/16/2021).     This medical record contains text that has been entered with the assistance of computer voice recognition and dictation software.  Therefore, it may contain unintended errors in text, spelling, punctuation, or grammar.

## 2021-12-09 RX ORDER — FLUOROURACIL 50 MG/G
CREAM TOPICAL
Qty: 40 G | Refills: 0 | Status: SHIPPED | OUTPATIENT
Start: 2021-12-09 | End: 2022-01-13

## 2021-12-16 ENCOUNTER — NON-PROVIDER VISIT (OUTPATIENT)
Dept: MEDICAL GROUP | Facility: IMAGING CENTER | Age: 66
End: 2021-12-16
Payer: MEDICARE

## 2021-12-16 DIAGNOSIS — Z23 NEED FOR VACCINATION: ICD-10-CM

## 2021-12-16 PROCEDURE — 90750 HZV VACC RECOMBINANT IM: CPT | Performed by: FAMILY MEDICINE

## 2021-12-16 PROCEDURE — 90471 IMMUNIZATION ADMIN: CPT | Performed by: FAMILY MEDICINE

## 2021-12-16 NOTE — PROGRESS NOTES
"Kamran Rodriguez is a 66 y.o. male here for a non-provider visit for:   SHINGRIX (Shingles)    Reason for immunization: Overdue/Provider Recommended  Immunization records indicate need for vaccine: Yes, confirmed with Epic  Minimum interval has been met for this vaccine: Yes  ABN completed: Not Indicated    VIS Dated  10/30/19 was given to patient: Yes  All IAC Questionnaire questions were answered \"No.\"    Patient tolerated injection and no adverse effects were observed or reported: Yes    Pt scheduled for next dose in series: No  "

## 2022-01-13 RX ORDER — FLUOROURACIL 50 MG/G
CREAM TOPICAL
Qty: 40 G | Refills: 0 | Status: SHIPPED
Start: 2022-01-13 | End: 2022-04-14

## 2022-03-03 ENCOUNTER — PATIENT MESSAGE (OUTPATIENT)
Dept: SLEEP MEDICINE | Facility: MEDICAL CENTER | Age: 67
End: 2022-03-03
Payer: MEDICARE

## 2022-03-03 DIAGNOSIS — G47.33 OSA (OBSTRUCTIVE SLEEP APNEA): ICD-10-CM

## 2022-03-04 NOTE — PATIENT COMMUNICATION
Patient needs a new Rx for PAP supplies.    Last OV: 04/2021    Next OV: N/A    DME: DME:  [facility] /  [phone] / fax [phone]

## 2022-03-30 ENCOUNTER — OFFICE VISIT (OUTPATIENT)
Dept: SLEEP MEDICINE | Facility: MEDICAL CENTER | Age: 67
End: 2022-03-30
Payer: MEDICARE

## 2022-03-30 VITALS
BODY MASS INDEX: 24.92 KG/M2 | SYSTOLIC BLOOD PRESSURE: 120 MMHG | WEIGHT: 178 LBS | HEART RATE: 64 BPM | RESPIRATION RATE: 16 BRPM | DIASTOLIC BLOOD PRESSURE: 80 MMHG | HEIGHT: 71 IN | OXYGEN SATURATION: 98 %

## 2022-03-30 DIAGNOSIS — G47.33 OSA (OBSTRUCTIVE SLEEP APNEA): ICD-10-CM

## 2022-03-30 PROCEDURE — 99214 OFFICE O/P EST MOD 30 MIN: CPT | Performed by: INTERNAL MEDICINE

## 2022-03-30 ASSESSMENT — FIBROSIS 4 INDEX: FIB4 SCORE: 2.08

## 2022-03-30 NOTE — PROGRESS NOTES
CC: Follow-up for GISSEL on auto titrating CPAP 9.5-13 cm water      HPI:   Mr. Nick Rodriguez is a 66 year-old man whose original sleep study was performed in [facility] in 2012 and showed an RDI of 28.5, a supine RDI of 48.5, and a REM RDI of 52.3 with a tanisha saturation of 75%.  He was last seen 4/15/2021 when his compliance was 100% for 7 hours and 37 minutes with a normalized AHI of 3.5.    Retired in September 2020.    Today, 3/30/2022, his 30-day compliance is 97% for 7 hours and 43 minutes.  On auto titrating CPAP 9.4-13 cm water his average residual estimated apnea-hypopnea index is 2.6.  His median pressure 10.6 cm water.  He does not have any significant leak.      The patient reports improved symptoms using positive airway pressure. Has experienced no significant problems with mask fit, mask leak, pressure tolerance, excessive airway dryness, nasal congestion, aerophagia, or chest pain.     Has seen Dr. Brown previously.     Significant comorbidities and modifying factors include insomnia, seasonal allergies, BPH, dyslipidemia, non-smoker, up-to-date with influenza vaccination, dyslipidemia, normal BMI, and intermittent chronic low back pain.          Patient Active Problem List    Diagnosis Date Noted   • Sensorineural hearing loss, bilateral 02/19/2021   • Ringing in left ear 02/19/2021   • Presbycusis 02/19/2021   • Hearing loss of left ear 02/19/2021   • Persistent cognitive impairment 01/14/2021   • History of colon polyps 10/15/2019   • Elevated fasting glucose 10/16/2018   • History of basal cell carcinoma 10/16/2018   • Non-smoker 09/13/2018   • Chronic insomnia 08/23/2018   • Memory changes 04/29/2018   • Pure hypercholesterolemia 11/14/2017   • Chronic bilateral low back pain without sciatica 05/30/2017   • Obstructive sleep apnea syndrome 10/17/2016   • Pain in right shin 10/17/2016   • Seasonal allergies 05/06/2016   • Thrombocytopenia (HCC) 12/30/2015   • Scoliosis 10/09/2015   •  Benign prostatic hyperplasia with lower urinary tract symptoms 02/12/2015   • Vitamin D insufficiency 11/21/2013   • Postnasal drip 05/03/2012       Past Medical History:   Diagnosis Date   • Basal cell carcinoma     dermatology- Dr. Smith   • Chickenpox    • Dyslipidemia    • Chinese measles    • Groin strain 10/21/2011     ICD-10 transition   • Hemorrhoid    • Hyperlipidemia    • Insomnia    • Left lateral epicondylitis 5/6/2016   • Mumps    • Right hip pain 11/21/2013   • S/P colonoscopy 7/2013    negative, repeat in 5 years   • S/P colonoscopy with polypectomy     age 53, due age   • Scoliosis    • Sleep apnea    • Tonsillitis         Past Surgical History:   Procedure Laterality Date   • BLEPHAROPLASTY  11/14/2012    Performed by Florentin Naylor M.D. at [facility]   • INGUINAL HERNIA REPAIR  3/17/2009    Performed by SUDHA BARRETT at [facility]   • TONSILLECTOMY         Family History   Problem Relation Age of Onset   • Alcohol/Drug Father         alcohol   • Alcohol/Drug Brother         alcohol   • Sleep Apnea Brother    • Cancer Brother    • Cancer Brother         brain   • Heart Disease Neg Hx        Social History     Socioeconomic History   • Marital status:      Spouse name: Not on file   • Number of children: Not on file   • Years of education: Not on file   • Highest education level: Master's degree (e.g., MA, MS, Piedad, MEd, MSW, LEILANI)   Occupational History   • Occupation: ADMINISTRATION     Employer: [employer]   Tobacco Use   • Smoking status: Never Smoker   • Smokeless tobacco: Never Used   Vaping Use   • Vaping Use: Never used   Substance and Sexual Activity   • Alcohol use: Yes     Alcohol/week: 3.6 oz     Types: 3 Glasses of wine, 3 Cans of beer per week     Comment: less than every day, 2-3 per week now   • Drug use: Not Currently     Types: Marijuana     Comment: occasionally   • Sexual activity: Yes     Partners: Female   Other Topics  Concern   • Not on file   Social History Narrative    , 3 children.      Social Determinants of Health     Financial Resource Strain: Low Risk    • Difficulty of Paying Living Expenses: Not hard at all   Food Insecurity: No Food Insecurity   • Worried About Running Out of Food in the Last Year: Never true   • Ran Out of Food in the Last Year: Never true   Transportation Needs: No Transportation Needs   • Lack of Transportation (Medical): No   • Lack of Transportation (Non-Medical): No   Physical Activity: Sufficiently Active   • Days of Exercise per Week: 6 days   • Minutes of Exercise per Session: 60 min   Stress: Stress Concern Present   • Feeling of Stress : To some extent   Social Connections: Socially Integrated   • Frequency of Communication with Friends and Family: More than three times a week   • Frequency of Social Gatherings with Friends and Family: More than three times a week   • Attends Hindu Services: 1 to 4 times per year   • Active Member of Clubs or Organizations: No   • Attends Club or Organization Meetings: More than 4 times per year   • Marital Status:    Intimate Partner Violence: Not on file   Housing Stability: Unknown   • Unable to Pay for Housing in the Last Year: No   • Number of Places Lived in the Last Year: Not on file   • Unstable Housing in the Last Year: No       Current Outpatient Medications   Medication Sig Dispense Refill   • montelukast (SINGULAIR) 10 MG Tab TAKE 1 TABLET BY MOUTH EVERY DAY 30 Tablet 3   • fluorouracil (EFUDEX) 5 % cream APPLY TO FACE/SCALP 2XDAY X 2-4WKS FOR ROUGH,GRITTY PRE-SKIN CANCERS.STOP WHEN RED/BLISTERED/CRUSTING FOR 3-5 DAYS OR AT 4WKS 40 g 0   • albuterol 108 (90 Base) MCG/ACT Aero Soln inhalation aerosol Inhale 2 Puffs every 6 hours as needed for Shortness of Breath. 1 Each 1   • fluticasone (FLONASE) 50 MCG/ACT nasal spray USE 2 SPRAYS IN EACH NOSTRIL DAILY 16 g 5   • simvastatin (ZOCOR) 40 MG Tab TAKE 1 TABLET EVERY EVENING 100  "tablet 3   • tamsulosin (FLOMAX) 0.4 MG capsule Take 1 capsule by mouth every day. 100 capsule 3   • Multiple Vitamins-Minerals (MULTIVITAMIN ADULTS PO) Take  by mouth.     • Ascorbic Acid (VITAMIN C PO) Take 1,000 mg by mouth.     • Cholecalciferol (VITAMIN D PO) Take  by mouth.     • Vitamin E 400 UNIT Tab Take  by mouth.     • Omega-3 Fatty Acids (FISH OIL) 1000 MG Cap capsule Take 1,000 mg by mouth 3 times a day, with meals.     • GLUCOSAMINE CHONDROITIN COMPLX PO Take  by mouth.     • ASPIRIN 81 PO Take  by mouth.     • benzonatate (TESSALON) 100 MG Cap TAKE ONE CAPSULE BY MOUTH THREE TIMES DAILY AS NEEDED FOR COUGH (Patient not taking: No sig reported) 60 Capsule 0   • Levocetirizine Dihydrochloride (XYZAL) 5 MG Tab Take 1 Tablet by mouth every day. (Patient not taking: No sig reported) 30 Tablet 3   • imiquimod (ALDARA) 5 % cream  (Patient not taking: No sig reported)       No current facility-administered medications for this visit.    \"CURRENT RX\"    ALLERGIES: Seasonal    ROS    Constitutional: Denies fever, chills, sweats,  weight loss, fatigue  Cardiovascular: Denies chest pain, tightness, palpitations, swelling in legs/feet, fainting, difficulty breathing when lying down but gets better when sitting up.   Respiratory: Denies shortness of breath, cough, sputum, wheezing, painful breathing, coughing up blood.   Sleep: per HPI  Gastrointestinal: Denies  difficulty swallowing, nausea, abdominal pain, diarrhea, constipation, heartburn.  Musculoskeletal: Denies painful joints, sore muscles, back pain.        PHYSICAL EXAM      /80 (BP Location: Right arm, Patient Position: Sitting, BP Cuff Size: Adult)   Pulse 64   Resp 16   Ht 1.803 m (5' 11\")   Wt 80.7 kg (178 lb)   SpO2 98%   BMI 24.83 kg/m²   Appearance: Well-nourished, well-developed, no acute distress  Eyes:  PERRLA, EOMI  ENMT: masked  Neck: Supple, trachea midline, no masses  Respiratory effort:  No intercostal retractions or use of " accessory muscles  Lung auscultation:  No wheezes rhonchi rubs or rales  Cardiac: No murmurs, rubs, or gallops; regular rhythm, normal rate; no edema  Abdomen:  No tenderness, no organomegaly.  Musculoskeletal:  Grossly normal; gait and station normal; digits and nails normal  Skin:  No rashes, petechiae, cyanosis  Neurologic: without focal signs; oriented to person, time, place, and purpose; judgement intact  Psychiatric:  No depression, anxiety, agitation      Medical Decision Making       The medical record was reviewed in its entirety including the referral notes, records from primary care, consultants notes, hospital records, lab, imaging, microbiology, immunology, and immunizations.  Care gaps identified and reviewed with the patient.    Diagnostic and titration nocturnal polysomnogram's, home sleep apnea tests, continuous nocturnal oximetry results, multiple sleep latency tests, and compliance reports reviewed.  1. GISSEL (obstructive sleep apnea)  - DME Mask and Supplies      PLAN:   Has been advised to continue the current  APAP 9.4-13 cm water, clean equipment frequently, and get new mask and supplies as allowed by insurance and DME. Advised about potential problems including nasal obstruction/stuffiness, mask leak or discomfort , frequent awakenings, chill or dryness of the upper airway, noise, inconvenience, and lack of benefit. Recommend an earlier appointment, if significant treatment barriers develop.    The risks of untreated sleep apnea were discussed with the patient at length. Patients with GISSEL are at increased risk of cardiovascular disease including coronary artery disease, systemic arterial hypertension, pulmonary arterial hypertension, cardiac arrythmias, and stroke. GISSEL patients have an increased risk of motor vehicle accidents, type 2 diabetes, chronic kidney disease, and non-alcoholic liver disease. The patient was advised to avoid driving a motor vehicle when drowsy.    Positive airway  pressure will favorably impact many of the adverse conditions and effects provoked by GISSEL.    Have advised the patient to follow up with the appropriate healthcare practitioners for all other medical problems and issues.    N95 mask wearing, handwashing, and social distancing protocols reviewed and encouraged.    Return in about 1 year (around 3/30/2023).    Total time 30 minutes      Equipment replacement schedule:  Mask cushion every month  Nasal pillows 2 times per month  Mask every 6 months  Head gear every 6 months  Tubing every 3 months  Ultra-fine filters 2 times per month  Foam filter every 6 months  Humidifier chamber every 6 months  Chin strap every 6 months

## 2022-04-05 ENCOUNTER — TELEPHONE (OUTPATIENT)
Dept: NEUROLOGY | Facility: MEDICAL CENTER | Age: 67
End: 2022-04-05
Payer: MEDICARE

## 2022-04-05 NOTE — TELEPHONE ENCOUNTER
Called Pt back in regards to VM, Pt let us know he will be accompanied by his daughter on 4/7, and added son to contact list.  EVANS Seymour.

## 2022-04-07 ENCOUNTER — OFFICE VISIT (OUTPATIENT)
Dept: NEUROLOGY | Facility: MEDICAL CENTER | Age: 67
End: 2022-04-07
Attending: PSYCHIATRY & NEUROLOGY
Payer: MEDICARE

## 2022-04-07 VITALS
SYSTOLIC BLOOD PRESSURE: 128 MMHG | DIASTOLIC BLOOD PRESSURE: 76 MMHG | WEIGHT: 177.32 LBS | OXYGEN SATURATION: 97 % | HEART RATE: 60 BPM | HEIGHT: 71 IN | BODY MASS INDEX: 24.82 KG/M2

## 2022-04-07 DIAGNOSIS — G31.84 AMNESTIC MCI (MILD COGNITIVE IMPAIRMENT WITH MEMORY LOSS): ICD-10-CM

## 2022-04-07 DIAGNOSIS — G96.89 OTHER SPECIFIED DISORDERS OF CENTRAL NERVOUS SYSTEM: ICD-10-CM

## 2022-04-07 PROCEDURE — 99215 OFFICE O/P EST HI 40 MIN: CPT | Performed by: PSYCHIATRY & NEUROLOGY

## 2022-04-07 PROCEDURE — 99417 PROLNG OP E/M EACH 15 MIN: CPT | Performed by: PSYCHIATRY & NEUROLOGY

## 2022-04-07 ASSESSMENT — PATIENT HEALTH QUESTIONNAIRE - PHQ9: CLINICAL INTERPRETATION OF PHQ2 SCORE: 0

## 2022-04-07 ASSESSMENT — FIBROSIS 4 INDEX: FIB4 SCORE: 2.08

## 2022-04-07 NOTE — PROGRESS NOTES
"Neuro Note:     Follow up visit- here with daughter and wife.     Reason: Mild Cognitive Impairment.     Nick Rodriguez 66  y.o. right handed gentleman who was the  of the [employer]  at [employer]. He retired about 1 year.     He had noticed in the fall of 2018 that he had 4 close family members die over a 3 month period. He clearly admitted to having appropriate stress related to this issue.  He recalls at that time [place] to [place] and he felt \"out of himself\" and he was taken to a local hospital for an evaluation and he felt much better and was discharged from the hospital. He was seen by Dr. Garcia and then Dr. Becky WEST and most recently with Dr. Junior in January 2021.    Daughter gives information that struggling to remember things can be increased by stress or anxiety. He has less anxiety since retiring from work but still has problems remembering names.      He has been off Namenda since September 2019.     He has been off Zolpidem and Ambien for nearly 2 years.     He has not had any gait/balance decline or evolutionary chagnes in the last 12 months     Since the last visit with me, he is more aware of his inability in spelling words such as during emailing. Works such as \"[employer]\" and she forgets friends names.     Wife says today that Kamran may be forgetful very slightly ; she has noticed when he is on the computer that he may open multi tabs and tends to not close each tab but eventually there can be too many tabs open (he is aware of this).     There is no consistent repeating of information when he says anything or is told anything in the last 6 to 12 months.     He continues to play pickleball and still has difficulty remembering the score (immediately) but if he stays focused mentally he will remember the score. If he is able to spend 30 seconds of focused mental time, he usually can bring the word back into his mind.     ADL(s)- minimal issues with spelling of " certain words for the last 2 years. He feels capable of reading book or papers with reasonable understanding.     Nick  has not had involuntary movements,headaches,visual disturbance(s) in the last 3 months.      Patient Active Problem List    Diagnosis Date Noted   • Sensorineural hearing loss, bilateral 02/19/2021   • Ringing in left ear 02/19/2021   • Presbycusis 02/19/2021   • Hearing loss of left ear 02/19/2021   • Persistent cognitive impairment 01/14/2021   • History of colon polyps 10/15/2019   • Elevated fasting glucose 10/16/2018   • History of basal cell carcinoma 10/16/2018   • Non-smoker 09/13/2018   • Chronic insomnia 08/23/2018   • Memory changes 04/29/2018   • Pure hypercholesterolemia 11/14/2017   • Chronic bilateral low back pain without sciatica 05/30/2017   • Obstructive sleep apnea syndrome 10/17/2016   • Pain in right shin 10/17/2016   • Seasonal allergies 05/06/2016   • Thrombocytopenia (HCC) 12/30/2015   • Scoliosis 10/09/2015   • Benign prostatic hyperplasia with lower urinary tract symptoms 02/12/2015   • Vitamin D insufficiency 11/21/2013   • Postnasal drip 05/03/2012       Past medical history:   Past Medical History:   Diagnosis Date   • Basal cell carcinoma     dermatology- Dr. Smith   • Chickenpox    • Dyslipidemia    • French measles    • Groin strain 10/21/2011     ICD-10 transition   • Hemorrhoid    • Hyperlipidemia    • Insomnia    • Left lateral epicondylitis 5/6/2016   • Mumps    • Right hip pain 11/21/2013   • S/P colonoscopy 7/2013    negative, repeat in 5 years   • S/P colonoscopy with polypectomy     age 53, due age   • Scoliosis    • Sleep apnea    • Tonsillitis        Past surgical history:   Past Surgical History:   Procedure Laterality Date   • BLEPHAROPLASTY  11/14/2012    Performed by Florentin Naylor M.D. at [facility]   • INGUINAL HERNIA REPAIR  3/17/2009    Performed by SUDHA BARRETT at [facility]   • TONSILLECTOMY            Social history:   Social History     Socioeconomic History   • Marital status:      Spouse name: Not on file   • Number of children: Not on file   • Years of education: Not on file   • Highest education level: Master's degree (e.g., MA, MS, Piedad, MEd, MSW, LEILANI)   Occupational History   • Occupation: ADMINISTRATION     Employer: [employer]   Tobacco Use   • Smoking status: Never Smoker   • Smokeless tobacco: Never Used   Vaping Use   • Vaping Use: Never used   Substance and Sexual Activity   • Alcohol use: Yes     Alcohol/week: 3.6 oz     Types: 3 Glasses of wine, 3 Cans of beer per week     Comment: less than every day, 2-3 per week now   • Drug use: Not Currently     Types: Marijuana     Comment: occasionally   • Sexual activity: Yes     Partners: Female   Other Topics Concern   • Not on file   Social History Narrative    , 3 children.      Social Determinants of Health     Financial Resource Strain: Low Risk    • Difficulty of Paying Living Expenses: Not hard at all   Food Insecurity: No Food Insecurity   • Worried About Running Out of Food in the Last Year: Never true   • Ran Out of Food in the Last Year: Never true   Transportation Needs: No Transportation Needs   • Lack of Transportation (Medical): No   • Lack of Transportation (Non-Medical): No   Physical Activity: Sufficiently Active   • Days of Exercise per Week: 6 days   • Minutes of Exercise per Session: 60 min   Stress: Stress Concern Present   • Feeling of Stress : To some extent   Social Connections: Socially Integrated   • Frequency of Communication with Friends and Family: More than three times a week   • Frequency of Social Gatherings with Friends and Family: More than three times a week   • Attends Jain Services: 1 to 4 times per year   • Active Member of Clubs or Organizations: No   • Attends Club or Organization Meetings: More than 4 times per year   • Marital Status:    Intimate Partner Violence: Not on  "file   Housing Stability: Unknown   • Unable to Pay for Housing in the Last Year: No   • Number of Places Lived in the Last Year: Not on file   • Unstable Housing in the Last Year: No       Family history:   Family History   Problem Relation Age of Onset   • Alcohol/Drug Father         alcohol   • Alcohol/Drug Brother         alcohol   • Sleep Apnea Brother    • Cancer Brother    • Cancer Brother         brain   • Heart Disease Neg Hx          Current medications:   Current Outpatient Medications   Medication   • montelukast (SINGULAIR) 10 MG Tab   • fluorouracil (EFUDEX) 5 % cream   • albuterol 108 (90 Base) MCG/ACT Aero Soln inhalation aerosol   • benzonatate (TESSALON) 100 MG Cap   • fluticasone (FLONASE) 50 MCG/ACT nasal spray   • Levocetirizine Dihydrochloride (XYZAL) 5 MG Tab   • imiquimod (ALDARA) 5 % cream   • simvastatin (ZOCOR) 40 MG Tab   • tamsulosin (FLOMAX) 0.4 MG capsule   • Multiple Vitamins-Minerals (MULTIVITAMIN ADULTS PO)   • Ascorbic Acid (VITAMIN C PO)   • Cholecalciferol (VITAMIN D PO)   • Vitamin E 400 UNIT Tab   • Omega-3 Fatty Acids (FISH OIL) 1000 MG Cap capsule   • GLUCOSAMINE CHONDROITIN COMPLX PO   • ASPIRIN 81 PO     No current facility-administered medications for this visit.       Medication Allergy:  Allergies   Allergen Reactions   • Seasonal            Physical examination:   Vitals:    04/07/22 1532   BP: 128/76   BP Location: Right arm   Patient Position: Sitting   BP Cuff Size: Adult   Pulse: 60   SpO2: 97%   Weight: 80.4 kg (177 lb 5.1 oz)   Height: 1.803 m (5' 11\")       Normal cephalic atraumatic.  There is full range of movement around the neck in all directions without restrictions or discrete pain evoked triggers.  No lower extremity edema.      Neurological  Exam:      [place] Cognitive Assessment (MOCA) Version 7.1    Years of Education: 2 Master's Degrees    TOTAL SCORE: 24/30  (to be scanned into the MEDIA section in the E.M.R.)          Mental status: Awake, alert " and fully oriented to person, place, time and situation. Normal attention, concentration and fund of knowledge for education level.  Did not appear/act combative,irritable,anxious,paranoid/delusional or aggressive to or with me.    Speech and language: Speech is fluent without errors, clear, intact to repetition and intact to naming.     Follows 3 step motor commands in sequence without significant delay and correctly.    Cranial nerve exam:  II: Pupils are equally round and reactive to light. Visual fields are intact by confrontation.  III, IV, VI: EOMI, no diplopia, no ptosis.  V: Sensation to light touch is normal over V1-3 distributions bilaterally.  .  VII: Facial movements are symmetrical. There is no facial droop. .  VIII: Hearing intact to soft speech and finger rub bilaterally  IX: Palate elevates symmetrically, uvula is midline. Dysarthria is not present.  XI: Shoulder shrug are symmetrical and strong.   XII: Tongue protrudes midline.      Motor exam:  Muscle tone is normal in all 4 limbs. and No abnormal movements appreciated.    Muscle strength:    Neck Flexors/Extensors: 5/5       Right  Left  Deltoid   5/5  5/5      Biceps   5/5  5/5  Triceps  5/5  5/5   Wrist extensors 5/5  5/5  Wrist flexors  5/5  5/5     5/5  5/5  Interossei  5/5  5/5  Thenar (APB)  5/5  5/5   Hip flexors  5/5  5/5  Quadriceps  5/5  5/5    Hamstrings  5/5  5/5  Dorsiflexors  5/5  5/5  Plantarflexors  5/5  5/5  Toe extension  5/5  5/5        Coordination (finger-to-nose, heel/knee/shin, rapid alternating movements) was normal.     There was no ataxia, no tremors, and no dysmetria.     Station and gait were normal. Easily stands up from exam chair without retropulsion,veering,leaning,swaying (to either side).       Labs and Tests:     NEUROIMAGING:     IMPRESSION:     1.  Minimal supratentorial white matter disease most consistent with microvascular ischemic change. Common findings for the patient's age.  2.  Unremarkable IACs. No  evidence of acoustic schwannoma. No evidence of active inflammatory mastoid disease.             Exam Ended: 10/14/19  3:50 PM Last Resulted: 10/14/19  4:13 PM                 Impression/Plans/Recommendations:      1. Mild Cognitive Impairment- amnestic predominance and without any subacute decline.     Functional Activity Questionairre of 9-12 range  (per daughter  today)     Global Deterioration Scale in the 3 range per daughter today.       MOCA score today of 24.   Additional factors or contributors to Brain Health issues can be summarized in several books/references which I discussed as well today.      Goals going forwards include:     A. Paying attention to one's risk factors and reducing their prevalence or potential impact on one's changing memory/thinking> an excellent example would be to stop smoking, reduce or eliminate alcohol use (depending on the amount and frequency of usage), maintain good blood pressure control by buying a digital arm blood pressure cuff such as an OMRON Series 3 or 5 and checking one's blood pressure atleast 3 days per week (in the am and early afternoon) that the numbers are under 140/90 and working as needed with the primary care doctor  to optimize blood pressure control).     B. Encouraging proper sleep hygiene which for most adults is 7 to 8 hours of uninterrupted sleep per night.       C. Encouraging some form of exercise preferable aerobic forms to be done (4 to 5 days per week- 30 minutes minimum per day)> 150 minutes per week as a goal. Example activities could include fast walking (up a slight incline), jogging, cycling (road or stationary), swimming,tennis. Essentially, even basic walking on a flat surface or a treadmill would be better than doing nothing.     D. Maintaining or forming new social contacts with family and friends  and a positive attitude despite the concerns and/or ongoing issues with thinking and/or memory.     E. Eating well which means a diet low in  salt  (under 2 grams per day), sugar and saturated fat.     F. Maintaining one's BMI (Body Mass Index) under 30.     G. Life style factors reviewed today.    H. Brain MRI to be ordered to evaluate frontal and temporal lobes and a referral to Valerie Olson PhD for a formal evaluation.          I have performed  a history and physical exam and a directed /focused  ROS today.     Total time spent today or this patient's care was 61  minutes and included reviewing  the diagnostic workup to date (such as labs and imaging as well as interpreting such tests relevant to this patient's neurological condition),  reviewing/obtaining separately obtained history (from patient and/or accompanying ffamily member)  for today's neurological problem(s) ,counseling and educating the patient and family member on issues related to cognition/memory and cognitive health factors and documenting  the clinical information in the EMR.     Follow up in 6 months or so.      Rick Richmond MD  [place] [facility].   [facility]

## 2022-06-20 ENCOUNTER — TELEPHONE (OUTPATIENT)
Dept: MEDICAL GROUP | Facility: IMAGING CENTER | Age: 67
End: 2022-06-20
Payer: MEDICARE

## 2022-06-21 NOTE — TELEPHONE ENCOUNTER
----- Message from Eva Rand M.D. sent at 6/17/2022 10:59 PM PDT -----  Regarding: FW: CT CARDIAC SCORE  Please advise pt to complete his fasting labs at his earliest convenience.   ----- Message -----  From: Melodie Francis  Sent: 6/15/2022  10:18 AM PDT  To: Eva Rand M.D.  Subject: RE: CT CARDIAC SCORE                             Perfect.  I have scheduled him if you can let him know to complete his labs as well if you want those done?  He wasn't aware of the orders.     Thanks!!!!    ----- Message -----  From: Eva Rand M.D.  Sent: 6/15/2022  10:04 AM PDT  To: Melodie Francis  Subject: RE: CT CARDIAC SCORE                             Yes, I will notify pt. Thank you.   ----- Message -----  From: Melodie Francis  Sent: 6/15/2022   9:41 AM PDT  To: Eva Rand M.D.  Subject: CT CARDIAC SCORE                                 Hello Doctor,    Do you still want the cardiac score completed?    If so can you notify patient and myself so we can schedule.    Thank you,    Melodie

## 2022-06-23 ENCOUNTER — HOSPITAL ENCOUNTER (OUTPATIENT)
Dept: RADIOLOGY | Facility: MEDICAL CENTER | Age: 67
End: 2022-06-23
Attending: PSYCHIATRY & NEUROLOGY
Payer: MEDICARE

## 2022-06-23 DIAGNOSIS — G31.84 AMNESTIC MCI (MILD COGNITIVE IMPAIRMENT WITH MEMORY LOSS): ICD-10-CM

## 2022-06-23 DIAGNOSIS — G96.89 OTHER SPECIFIED DISORDERS OF CENTRAL NERVOUS SYSTEM: ICD-10-CM

## 2022-06-23 PROCEDURE — 70551 MRI BRAIN STEM W/O DYE: CPT | Mod: MG

## 2022-06-28 ENCOUNTER — HOSPITAL ENCOUNTER (OUTPATIENT)
Dept: RADIOLOGY | Facility: MEDICAL CENTER | Age: 67
End: 2022-06-28
Attending: FAMILY MEDICINE
Payer: COMMERCIAL

## 2022-06-28 DIAGNOSIS — E78.00 PURE HYPERCHOLESTEROLEMIA: ICD-10-CM

## 2022-06-28 PROCEDURE — 4410556 CT-CARDIAC SCORING (SELF PAY ONLY)

## 2022-07-14 ENCOUNTER — OFFICE VISIT (OUTPATIENT)
Dept: MEDICAL GROUP | Facility: IMAGING CENTER | Age: 67
End: 2022-07-14
Payer: MEDICARE

## 2022-07-14 VITALS
WEIGHT: 174.6 LBS | SYSTOLIC BLOOD PRESSURE: 134 MMHG | TEMPERATURE: 98.7 F | HEIGHT: 71 IN | RESPIRATION RATE: 14 BRPM | DIASTOLIC BLOOD PRESSURE: 80 MMHG | HEART RATE: 61 BPM | BODY MASS INDEX: 24.44 KG/M2 | OXYGEN SATURATION: 97 %

## 2022-07-14 DIAGNOSIS — R41.3 MEMORY CHANGES: ICD-10-CM

## 2022-07-14 DIAGNOSIS — R93.1 ELEVATED CORONARY ARTERY CALCIUM SCORE: ICD-10-CM

## 2022-07-14 DIAGNOSIS — G47.33 OBSTRUCTIVE SLEEP APNEA SYNDROME: ICD-10-CM

## 2022-07-14 DIAGNOSIS — M25.521 RIGHT ELBOW PAIN: ICD-10-CM

## 2022-07-14 DIAGNOSIS — J30.2 SEASONAL ALLERGIES: ICD-10-CM

## 2022-07-14 DIAGNOSIS — E78.5 HYPERLIPIDEMIA, UNSPECIFIED HYPERLIPIDEMIA TYPE: ICD-10-CM

## 2022-07-14 DIAGNOSIS — D69.6 THROMBOCYTOPENIA (HCC): ICD-10-CM

## 2022-07-14 PROCEDURE — 99214 OFFICE O/P EST MOD 30 MIN: CPT | Performed by: FAMILY MEDICINE

## 2022-07-14 ASSESSMENT — PAIN SCALES - GENERAL: PAINLEVEL: NO PAIN

## 2022-07-14 ASSESSMENT — FIBROSIS 4 INDEX: FIB4 SCORE: 2.08

## 2022-07-14 NOTE — PATIENT INSTRUCTIONS
Complete labs at a [facility] lab. Fast 8-10 hours.   Referral to cardiology- you will get a message after processing.   Please mychart message name of psychologist you plan to see.   Please eat a low cholesterol/high fiber diet and exercise routinely.

## 2022-07-14 NOTE — PROGRESS NOTES
Annual Health Assessment Questions:    1.  Are you currently engaging in any exercise or physical activity? Yes    2.  How would you describe your mood or emotional well-being today? good    3.  Have you had any falls in the last year? No    4.  Have you noticed any problems with your balance or had difficulty walking? No    5.  In the last six months have you experienced any leakage of urine? No    6. DPA/Advanced Directive: Patient has Advanced Directive, but it is not on file. Instructed to bring in a copy to scan into their chart.       SUBJECTIVE:    Chief Complaint   Patient presents with   • Hypercholesterolemia Follow-up     Cardiac CT results   • Seasonal Allergies     Increased with doing yard work   • Elbow Pain     Right x 1-2 weeks       HPI:     Nick Rodriguez is a 66 y.o. male with gissel, chronic insomnia, hx of memory changes, chronic low back/hip pain, hypercholesterolemia, and hx of basal cell carcinoma here for follow up CT cardiac scoring.     CT cardiac calcium score elevated.   Never smoked tobacco. Second hand smoke exposure.   Father had issues with smoking and alcohol.   He is physically active wondering about stress test. No chest past or shortness of breath with activities.   Stress with memory issues. Having further testing.   Cannot spell out simple words at time or cannot come up with it initially.   Neuropsychology testing.  Plans to go back to counseling. And also marriage counseling in future.   Drink one alcohol drink a day.   Last 3 days taking asa.   Hyperlipidemia- simvastatin 40 mg daily.   Slightly low platelets on prior labs- improved on last labs.     On cpap for GISSEL- compliant on therapy.     Allergies jeff brush. Wears mask.   Xyzal-ordered it. Will use as needed.     Did something to right elbow removing jeff brush. Used chopping down motion. Went to [facility] in past. Would like to go to physical therapy. Behind left knee last week with symptoms but improved.       Covid booster- waited previously.       ROS:  No recent fevers or chills. No nausea or vomiting. No diarrhea. No chest pains or shortness of breath. No lower extremity edema.    Current Outpatient Medications on File Prior to Visit   Medication Sig Dispense Refill   • simvastatin (ZOCOR) 40 MG Tab TAKE 1 TABLET EVERY EVENING 100 Tablet 3   • tamsulosin (FLOMAX) 0.4 MG capsule Take 1 Capsule by mouth every day. 100 Capsule 3   • albuterol 108 (90 Base) MCG/ACT Aero Soln inhalation aerosol Inhale 2 Puffs every 6 hours as needed for Shortness of Breath. 1 Each 1   • ASPIRIN 81 PO Take  by mouth.     • fluticasone (FLONASE) 50 MCG/ACT nasal spray USE 2 SPRAYS IN EACH NOSTRIL DAILY 16 g 5   • Multiple Vitamins-Minerals (MULTIVITAMIN ADULTS PO) Take  by mouth.     • Ascorbic Acid (VITAMIN C PO) Take 1,000 mg by mouth.     • Cholecalciferol (VITAMIN D PO) Take  by mouth.     • Vitamin E 400 UNIT Tab Take  by mouth.     • Omega-3 Fatty Acids (FISH OIL) 1000 MG Cap capsule Take 1,000 mg by mouth 3 times a day, with meals.     • GLUCOSAMINE CHONDROITIN COMPLX PO Take  by mouth.     • benzonatate (TESSALON) 100 MG Cap TAKE ONE CAPSULE BY MOUTH THREE TIMES DAILY AS NEEDED FOR COUGH 60 Capsule 0   • Levocetirizine Dihydrochloride (XYZAL) 5 MG Tab Take 1 Tablet by mouth every day. (Patient not taking: Reported on 7/14/2022) 30 Tablet 3   • imiquimod (ALDARA) 5 % cream        No current facility-administered medications on file prior to visit.       Allergies   Allergen Reactions   • Seasonal        Patient Active Problem List    Diagnosis Date Noted   • Sensorineural hearing loss, bilateral 02/19/2021   • Ringing in left ear 02/19/2021   • Presbycusis 02/19/2021   • Hearing loss of left ear 02/19/2021   • Persistent cognitive impairment 01/14/2021   • History of colon polyps 10/15/2019   • Elevated fasting glucose 10/16/2018   • History of basal cell carcinoma 10/16/2018   • Non-smoker 09/13/2018   • Chronic insomnia  "08/23/2018   • Memory changes 04/29/2018   • Pure hypercholesterolemia 11/14/2017   • Chronic bilateral low back pain without sciatica 05/30/2017   • Obstructive sleep apnea syndrome 10/17/2016   • Pain in right shin 10/17/2016   • Seasonal allergies 05/06/2016   • Thrombocytopenia (HCC) 12/30/2015   • Scoliosis 10/09/2015   • Benign prostatic hyperplasia with lower urinary tract symptoms 02/12/2015   • Vitamin D insufficiency 11/21/2013   • Postnasal drip 05/03/2012       Past Medical History:   Diagnosis Date   • Basal cell carcinoma     dermatology- Dr. Smith   • Chickenpox    • Dyslipidemia    • Telugu measles    • Groin strain 10/21/2011     ICD-10 transition   • Hemorrhoid    • Hyperlipidemia    • Insomnia    • Left lateral epicondylitis 5/6/2016   • Mumps    • Right hip pain 11/21/2013   • S/P colonoscopy 7/2013    negative, repeat in 5 years   • S/P colonoscopy with polypectomy     age 53, due age   • Scoliosis    • Sleep apnea    • Tonsillitis          OBJECTIVE:   /80   Pulse 61   Temp 37.1 °C (98.7 °F)   Resp 14   Ht 1.803 m (5' 11\")   Wt 79.2 kg (174 lb 9.6 oz)   SpO2 97%   BMI 24.35 kg/m²   General: Well-developed well-nourished male, no acute distress  Neck: supple, no lymphadenopathy- cervical or supraclavicular, no thyromegaly  Cardiovascular: regular rate and rhythm, no murmurs, gallops, rubs  Lungs: clear to auscultation bilaterally, no wheezes, crackles, or rhonchi  Abdomen: +bowel sounds, soft, nontender, nondistended, no rebound, no guarding, no hepatosplenomegaly  Extremities: no cyanosis, clubbing, edema  Upper extremity: right side with tenderness to palpation- right lateral elbow and radial side proximal forearm TTP, slightly positive yergason's and speed's test, slight discomfort with wrist extension against resistance, full ROM, full strength, neurovascular intact.   Skin: Warm and dry  Psych: appropriate mood and affect    CT-CARDIAC SCORING  Order: [id number]   Status: Final " result     Visible to patient: Yes (seen)     Next appt: 07/21/2022 at 09:00 AM in Neurology (Valerie Mast, Ph.D.)     Dx: Pure hypercholesterolemia     0 Result Notes     1 Patient Communication    Details    Reading Physician Reading Date Result Priority   Jacinto Simon M.D.  [phone] 6/28/2022 Routine     Narrative & Impression     6/28/2022 2:43 PM     HISTORY/REASON FOR EXAM:  hyperlipidemia        TECHNIQUE/EXAM DESCRIPTION:  Multi-detector, helical imaging of the heart was performed with ECG gating and suspended respiration. Postprocessing was performed on a three-dimensional computer workstation. Diastolic phase images were evaluated for coronary artery calcification, and a   quantitative assessment provided.     Low dose optimization technique was utilized for this CT exam including automated exposure control and adjustment of the mA and/or kV according to patient size.     COMPARISON: None.     FINDINGS:     Coronary calcification:  LMA - 0.0  LCX - 22.0  LAD - 341.5  RCA - 443.5     Total Calcium Score: 807.0     Percentile: Calcium score is above the 75th percentile for the patient's age and sex.     Other findings:  Heart: Normal size.  Lungs: Clear.  Mediastinum: Normal.  Upper abdomen: Normal.     IMPRESSION:     Calcium Score of 400 or greater:     You have very significant cholesterol (plaque) build-up in the arteries that supply blood flow to your heart. This does not mean you have any blockages in your arteries that require an intervention at this time, but you are at high risk of developing   heart disease or a stroke. Therefore, you need to be aggressive about preventing further plaque build-up. We recommend treating this plaque with a healthy low saturated fat, low sugar, mostly plant based diet and regular exercise. We highly recommend the   use of aspirin (low dose, 81 mg once a day) and a cholesterol medication to help prevent further plaque build-up; please discuss these  recommendations with your doctor. If you smoke, this likely has contributed to your cholesterol build-up, and you   should quit as soon as possible. Please let your doctor know if you need medications or other resources to help you quit. If you are overweight, we also recommend gradual weight loss until you reach a normal weight. Each of these recommendations will   lower your future risk of heart disease and stroke, and can even help decrease the amount of plaque you currently have. We recommend working with your personal physician to lower your risk of heart disease. We do not routinely recommend any further   testing based on this test result, however a stress test or coronary CT angiogram could be considered.     Guidelines based upon the American College of Cardiology 2018 recommendations.                Exam Ended: 06/28/22  3:00 PM Last Resulted: 06/28/22  3:11 PM               ASSESSMENT/PLAN:    66 y.o. male with arlen, chronic insomnia, hx of memory changes, chronic low back/hip pain, hypercholesterolemia, and hx of basal cell carcinoma here      1. Elevated coronary artery calcium score   Hx of second hand smoke exposure. BP stable.   Reviewed results.   He is on statin therapy.   Reviewed currently asa guidelines/recommendations, he will plan to further discuss with cardiology.  REFERRAL TO CARDIOLOGY    Cardiac Stress Test Treadmill Only   2. Hyperlipidemia, unspecified hyperlipidemia type - stable, continue current medication. Low cholesterol/high fiber diet and routine exercise.   Monitor, prior labs orders in system.     3. Thrombocytopenia (HCC) - improved on prior labs. Stable. Monitor. Prior labs orders in system.     4. Memory changes- stable, follow up with neurology.     5. Obstructive sleep apnea syndrome - stable, continue CPAP.     6. Seasonal allergies   Recommend use mask when outdoors or doing yard work, use air filter, shower prior to bedtime, and limit medication as needed. Consider nasal  rinse. Reviewed precautions and potential side effects of medication therapy. Follow up as needed.     7. Right elbow pain- appears component due to biceps as well as epicondylar region.   Modify activities, otc medication as needed. May use elbow brace.   Referral to physical therapy. Referral to Physical Therapy     Return in about 6 weeks (around 8/25/2022).    This medical record contains text that has been entered with the assistance of computer voice recognition and dictation software.  Therefore, it may contain unintended errors in text, spelling, punctuation, or grammar.

## 2022-07-21 ENCOUNTER — OFFICE VISIT (OUTPATIENT)
Dept: NEUROLOGY | Facility: MEDICAL CENTER | Age: 67
End: 2022-07-21
Attending: CLINICAL NEUROPSYCHOLOGIST
Payer: MEDICARE

## 2022-07-21 DIAGNOSIS — G31.84 MILD COGNITIVE IMPAIRMENT: ICD-10-CM

## 2022-07-21 PROCEDURE — 96137 PSYCL/NRPSYC TST PHY/QHP EA: CPT | Performed by: CLINICAL NEUROPSYCHOLOGIST

## 2022-07-21 PROCEDURE — 96116 NUBHVL XM PHYS/QHP 1ST HR: CPT | Performed by: CLINICAL NEUROPSYCHOLOGIST

## 2022-07-21 PROCEDURE — 96136 PSYCL/NRPSYC TST PHY/QHP 1ST: CPT | Performed by: CLINICAL NEUROPSYCHOLOGIST

## 2022-07-21 NOTE — PROGRESS NOTES
Neurobehavioral Status Exam and Neuropsychological Testing    Patient name: Nick Rodriguez  Referral source: Rick Richmond M.D.   MRN: [id number]  Evaluation date: 07/21/2022   YOB: 1955  Neuropsychologist: Valerie Olson, Ph.D.         This evaluation was conducted for clinical treatment planning and may not be valid for other purposes. Potential risks and benefits, limits of confidentiality, and test procedures were discussed. Following this discussion, the patient consented to complete the evaluation. Information for this section was obtained from the medical record and a clinical interview with the patient, his wife, and daughter conducted on 07/21/2022. Information included in this section is intended as a reference and should not be interpreted in the absence of the complete neuropsychological report. Please refer to the neuropsychological report for additional information including test results, impressions, and recommendations.     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 Dr. Richmond referred the patient for a neuropsychological evaluation to characterize his cognitive concerns, aid in differential diagnosis, and treatment planning.    Patient Active Problem List   Diagnosis   • Postnasal drip   • Vitamin D insufficiency   • Benign prostatic hyperplasia with lower urinary tract symptoms   • Scoliosis   • Thrombocytopenia (HCC)   • Seasonal allergies   • Obstructive sleep apnea syndrome   • Pain in right shin   • Chronic bilateral low back pain without sciatica   • Pure hypercholesterolemia   • Memory changes   • Chronic insomnia   • Non-smoker   • Elevated fasting glucose   • History of basal cell carcinoma   • History of colon polyps   • Persistent cognitive impairment   • Sensorineural hearing loss, bilateral   • Ringing  in left ear   • Presbycusis   • Hearing loss of left ear   • CAD (coronary artery disease) by coronary artery calcium score of 807 Agatston done as screening, no symptoms     Past Medical History:   Diagnosis Date   • Basal cell carcinoma     dermatology- Dr. Smith   • Chickenpox    • Dyslipidemia    • Tajik measles    • Groin strain 10/21/2011     ICD-10 transition   • Hemorrhoid    • Hyperlipidemia    • Insomnia    • Left lateral epicondylitis 5/6/2016   • Mumps    • Right hip pain 11/21/2013   • S/P colonoscopy 7/2013    negative, repeat in 5 years   • S/P colonoscopy with polypectomy     age 53, due age   • Scoliosis    • Sleep apnea    • Tonsillitis       Past Surgical History:   Procedure Laterality Date   • BLEPHAROPLASTY  11/14/2012    Performed by Florentin Naylor M.D. at [facility]   • INGUINAL HERNIA REPAIR  3/17/2009    Performed by SUDHA BARRETT at [facility]   • TONSILLECTOMY        Family History   Problem Relation Age of Onset   • Alcohol/Drug Father         alcohol   • Alcohol/Drug Brother         alcohol   • Sleep Apnea Brother    • Cancer Brother    • Cancer Brother         brain   • Heart Disease Neg Hx         Current Outpatient Medications:   •  simvastatin (ZOCOR) 40 MG Tab, TAKE 1 TABLET EVERY EVENING, Disp: 100 Tablet, Rfl: 3  •  tamsulosin (FLOMAX) 0.4 MG capsule, Take 1 Capsule by mouth every day., Disp: 100 Capsule, Rfl: 3  •  albuterol 108 (90 Base) MCG/ACT Aero Soln inhalation aerosol, Inhale 2 Puffs every 6 hours as needed for Shortness of Breath., Disp: 1 Each, Rfl: 1  •  ASPIRIN 81 PO, Take  by mouth., Disp: , Rfl:   •  fluticasone (FLONASE) 50 MCG/ACT nasal spray, USE 2 SPRAYS IN EACH NOSTRIL DAILY, Disp: 16 g, Rfl: 5  •  imiquimod (ALDARA) 5 % cream, , Disp: , Rfl:   •  Multiple Vitamins-Minerals (MULTIVITAMIN ADULTS PO), Take  by mouth., Disp: , Rfl:   •  Ascorbic Acid (VITAMIN C PO), Take 1,000 mg by mouth., Disp: , Rfl:   •   Cholecalciferol (VITAMIN D PO), Take  by mouth., Disp: , Rfl:   •  Vitamin E 400 UNIT Tab, Take  by mouth., Disp: , Rfl:   •  Omega-3 Fatty Acids (FISH OIL) 1000 MG Cap capsule, Take 1,000 mg by mouth 3 times a day, with meals., Disp: , Rfl:   •  GLUCOSAMINE CHONDROITIN COMPLX PO, Take  by mouth., Disp: , Rfl:   •  Levocetirizine Dihydrochloride (XYZAL) 5 MG Tab, Take 1 Tablet by mouth every day., Disp: 30 Tablet, Rfl: 3      Social History     Tobacco Use   • Smoking status: Never Smoker   • Smokeless tobacco: Never Used   Vaping Use   • Vaping Use: Never used   Substance and Sexual Activity   • Alcohol use: Yes     Alcohol/week: 3.6 oz     Types: 3 Glasses of wine, 3 Cans of beer per week     Comment: less than every day, 4x a week   • Drug use: Yes     Types: Marijuana, Oral     Comment: occasionally   • Sexual activity: Yes     Partners: Female      Measures Administered: [place] Diagnostic Aphasia Examination (BDAE): Commands, Word Repetition, Sentence Repetition, Responsive Naming, & Praxis); [place] Naming Test (BNT); Brief Visuospatial Memory Test - Revised (BVMT-R); Sudha-Guzman Executive Functioning System (DKEFS): Color-Word Interference (CWI), Stover Test (TT), & Verbal Fluency (VF); Executive Clock Drawing Test (CLOX); Generalized Anxiety Disorder 7 Item Scale (YOHANNES-7); Freire Verbal Learning Test - Revised (HVLT-R); Mini-Mental State Examination - 2nd Ed. Orientation (MMSE-2); Patient Health Questionnaire (PHQ-9); Repeatable Battery for the Assessment of Neuropsychological Status Line Orientation (RBANS LO); State-Trait Anxiety Inventory (STAI); Test of Premorbid Functioning (ToPF); Trail Making Test A & B (TMT); Wechsler Adult Intelligence Scale - 4th Ed. (WAIS-IV): Block Design (BD), Digit Span (DS), Matrix Reasoning (MR), Similarities (SI), Vocabulary (VC), Information (IN), & Coding (CD); Wechsler Memory Scale - 4th Ed. Logical Memory (WMS-IV LM). Informant Questionnaires: Activities of Daily Living  Questionnaire (ADLQ) and Neuropsychiatric Inventory Questionnaire (NPI-Q).    Behavioral Observations: The patient arrived at the clinic on time and was accompanied by his wife and daughter, who participated in the clinical interview. Everybody wore facemasks given the COVID-19 pandemic. He was well groomed and dressed. He was alert and oriented to situation, person, and place, but incompletely oriented to time (MMSE-2 Orientation = 9/10; incorrect date). He was pleasant and always maintained appropriate interpersonal boundaries. Rapport was easily established. Gait was unremarkable. No gross abnormal movements or motor symptoms were observed. He exhibited mild word finding problems during the clinical interview. Comprehension was adequate for conversation ad test instructions. Speech rate, volume, articulation, and prosody were normal. Speech content was logical and appropriate to context. Mood was euthymic. He reported he was anxious about testing at the beginning of the session, but appeared to relax as the session progressed. Affect was mood-congruent and appropriate to the situation. Insight into cognitive and emotional functioning was intact. There was no indication of hallucinations, delusions, or thought disorder. Vision and hearing were adequate for the evaluation. He was attentive throughout the evaluation and had no difficulties with retention of task demands. He perceived his performance as poor and made negative remarks at times, but responded well to prompts and encouragement from the examiner. Overall, he was engaged and cooperative throughout testing.      Valerie Olson, Ph.D.          Clinical Neuropsychologist          Department of Neurology          [facility]           One hour and 20 minutes were spent interviewing the patient (neurobehavioral status exam: 71262 = 1). Test administration and scoring were completed in 5 hours and 33 minutes (27716 = 1, 36252 = 10).

## 2022-07-21 NOTE — PATIENT INSTRUCTIONS
Thank you for your effort during today's evaluation. We will have a feedback session to discuss your results on 08/12/2022 at 9am.

## 2022-07-26 ENCOUNTER — OFFICE VISIT (OUTPATIENT)
Dept: CARDIOLOGY | Facility: MEDICAL CENTER | Age: 67
End: 2022-07-26
Attending: FAMILY MEDICINE
Payer: MEDICARE

## 2022-07-26 VITALS
HEIGHT: 71 IN | WEIGHT: 172 LBS | OXYGEN SATURATION: 96 % | BODY MASS INDEX: 24.08 KG/M2 | SYSTOLIC BLOOD PRESSURE: 130 MMHG | RESPIRATION RATE: 18 BRPM | HEART RATE: 68 BPM | DIASTOLIC BLOOD PRESSURE: 68 MMHG

## 2022-07-26 DIAGNOSIS — R93.1 ELEVATED CORONARY ARTERY CALCIUM SCORE: ICD-10-CM

## 2022-07-26 DIAGNOSIS — E78.00 PURE HYPERCHOLESTEROLEMIA: Primary | ICD-10-CM

## 2022-07-26 PROBLEM — I25.10 CAD (CORONARY ARTERY DISEASE): Chronic | Status: ACTIVE | Noted: 2022-07-26

## 2022-07-26 PROBLEM — I25.10 CAD (CORONARY ARTERY DISEASE): Status: ACTIVE | Noted: 2022-07-26

## 2022-07-26 LAB — EKG IMPRESSION: NORMAL

## 2022-07-26 PROCEDURE — 99204 OFFICE O/P NEW MOD 45 MIN: CPT | Mod: 25 | Performed by: INTERNAL MEDICINE

## 2022-07-26 PROCEDURE — 93000 ELECTROCARDIOGRAM COMPLETE: CPT | Performed by: INTERNAL MEDICINE

## 2022-07-26 ASSESSMENT — ANXIETY QUESTIONNAIRES
2. NOT BEING ABLE TO STOP OR CONTROL WORRYING: NOT AT ALL
3. WORRYING TOO MUCH ABOUT DIFFERENT THINGS: SEVERAL DAYS
4. TROUBLE RELAXING: SEVERAL DAYS
IF YOU CHECKED OFF ANY PROBLEMS ON THIS QUESTIONNAIRE, HOW DIFFICULT HAVE THESE PROBLEMS MADE IT FOR YOU TO DO YOUR WORK, TAKE CARE OF THINGS AT HOME, OR GET ALONG WITH OTHER PEOPLE: SOMEWHAT DIFFICULT
5. BEING SO RESTLESS THAT IT IS HARD TO SIT STILL: NOT AT ALL
1. FEELING NERVOUS, ANXIOUS, OR ON EDGE: SEVERAL DAYS
6. BECOMING EASILY ANNOYED OR IRRITABLE: NOT AT ALL
GAD7 TOTAL SCORE: 3
7. FEELING AFRAID AS IF SOMETHING AWFUL MIGHT HAPPEN: NOT AT ALL

## 2022-07-26 ASSESSMENT — ENCOUNTER SYMPTOMS
ENDOCRINE NEGATIVE: 1
CARDIOVASCULAR NEGATIVE: 1
RESPIRATORY NEGATIVE: 1
GASTROINTESTINAL NEGATIVE: 1
CONSTITUTIONAL NEGATIVE: 1
MEMORY LOSS: 1
NEUROLOGICAL NEGATIVE: 1

## 2022-07-26 ASSESSMENT — PATIENT HEALTH QUESTIONNAIRE - PHQ9: CLINICAL INTERPRETATION OF PHQ2 SCORE: 0

## 2022-07-26 ASSESSMENT — FIBROSIS 4 INDEX: FIB4 SCORE: 2.08

## 2022-07-26 NOTE — PROGRESS NOTES
Cardiology Initial Consultation Note    Date of note:    7/26/2022    Primary Care Provider: Eva Rand M.D.  Referring Provider: Eva Rand M.D.     Patient Name: Nick Rodriguez   YOB: 1955  MRN:              [id number]    Chief Complaint   Patient presents with   • Establish Care     F/V Dx:  Elevated coronary artery calcium score        History of Present Illness: Mr. Nick Rodriguez is a 66 y.o. male whose current medical problems include cognitive impairment/memory issues that is currently being worked up who is here for cardiac consultation for elevated coronary artery calcium score.    Patient reports no chest pain, no shortness of breath, no decrease in exercise tolerance, no palpitations, no orthopnea, no PND.  It seems the coronary artery calcium score was done as a screening test.  He is pretty active and that he will hike up a hill with his dog about 2 miles or more and he will walk with his wife on flat surfaces almost daily without any symptoms.    The current issue he is dealing with is having some memory issues.  He also has noticed, only when he gets up in the morning, he will feel some mild lightheadedness and dizziness but once he stands for a few minutes it gets better.  It does not happen the rest of the day.    Cardiovascular Risk Factors:  1. Smoking status:   Tobacco Use: Low Risk    • Smoking Tobacco Use: Never Smoker   • Smokeless Tobacco Use: Never Used     2. Type II Diabetes Mellitus:   Lab Results   Component Value Date/Time    HBA1C 5.8 (H) 10/19/2020 06:16 AM    HBA1C 5.9 (H) 09/04/2019 06:54 AM     3. Hypertension: None  4. Dyslipidemia: He is on simvastatin  Cholesterol,Tot   Date Value Ref Range Status   10/19/2020 179 100 - 199 mg/dL Final     LDL   Date Value Ref Range Status   10/19/2020 90 <100 mg/dL Final     HDL   Date Value Ref Range Status   10/19/2020 71 >=40 mg/dL Final     Triglycerides   Date Value Ref Range Status   10/19/2020 91 0 -  149 mg/dL Final     5. Family history of early Coronary Artery Disease in a first degree relative (Male less than 55 years of age; Female less than 65 years of age): No  6. Obesity and/or Metabolic Syndrome: No  7. Sedentary lifestyle: No    Past Medical History:   Diagnosis Date   • Basal cell carcinoma     dermatology- Dr. Smith   • Chickenpox    • Dyslipidemia    • Micronesian measles    • Groin strain 10/21/2011     ICD-10 transition   • Hemorrhoid    • Hyperlipidemia    • Insomnia    • Left lateral epicondylitis 5/6/2016   • Mumps    • Right hip pain 11/21/2013   • S/P colonoscopy 7/2013    negative, repeat in 5 years   • S/P colonoscopy with polypectomy     age 53, due age   • Scoliosis    • Sleep apnea    • Tonsillitis          Past Surgical History:   Procedure Laterality Date   • BLEPHAROPLASTY  11/14/2012    Performed by Florentin Naylor M.D. at [facility]   • INGUINAL HERNIA REPAIR  3/17/2009    Performed by SUDHA BARRETT at [facility]   • TONSILLECTOMY           Current Outpatient Medications   Medication Sig Dispense Refill   • simvastatin (ZOCOR) 40 MG Tab TAKE 1 TABLET EVERY EVENING 100 Tablet 3   • tamsulosin (FLOMAX) 0.4 MG capsule Take 1 Capsule by mouth every day. 100 Capsule 3   • albuterol 108 (90 Base) MCG/ACT Aero Soln inhalation aerosol Inhale 2 Puffs every 6 hours as needed for Shortness of Breath. 1 Each 1   • ASPIRIN 81 PO Take  by mouth.     • fluticasone (FLONASE) 50 MCG/ACT nasal spray USE 2 SPRAYS IN EACH NOSTRIL DAILY 16 g 5   • Levocetirizine Dihydrochloride (XYZAL) 5 MG Tab Take 1 Tablet by mouth every day. 30 Tablet 3   • imiquimod (ALDARA) 5 % cream      • Multiple Vitamins-Minerals (MULTIVITAMIN ADULTS PO) Take  by mouth.     • Ascorbic Acid (VITAMIN C PO) Take 1,000 mg by mouth.     • Cholecalciferol (VITAMIN D PO) Take  by mouth.     • Vitamin E 400 UNIT Tab Take  by mouth.     • Omega-3 Fatty Acids (FISH OIL) 1000 MG Cap capsule Take 1,000 mg  by mouth 3 times a day, with meals.     • GLUCOSAMINE CHONDROITIN COMPLX PO Take  by mouth.       No current facility-administered medications for this visit.         Allergies   Allergen Reactions   • Seasonal          Family History   Problem Relation Age of Onset   • Alcohol/Drug Father         alcohol   • Alcohol/Drug Brother         alcohol   • Sleep Apnea Brother    • Cancer Brother    • Cancer Brother         brain   • Heart Disease Neg Hx          Social History     Socioeconomic History   • Marital status:      Spouse name: Not on file   • Number of children: Not on file   • Years of education: Not on file   • Highest education level: Master's degree (e.g., MA, MS, P[place], MEd, MSW, LEILANI)   Occupational History   • Occupation: ADMINISTRATION     Employer: [employer]   Tobacco Use   • Smoking status: Never Smoker   • Smokeless tobacco: Never Used   Vaping Use   • Vaping Use: Never used   Substance and Sexual Activity   • Alcohol use: Yes     Alcohol/week: 3.6 oz     Types: 3 Glasses of wine, 3 Cans of beer per week     Comment: less than every day, 4x a week   • Drug use: Yes     Types: Marijuana, Oral     Comment: occasionally   • Sexual activity: Yes     Partners: Female   Other Topics Concern   • Not on file   Social History Narrative    , 3 children.      Social Determinants of Health     Financial Resource Strain: Low Risk    • Difficulty of Paying Living Expenses: Not hard at all   Food Insecurity: No Food Insecurity   • Worried About Running Out of Food in the Last Year: Never true   • Ran Out of Food in the Last Year: Never true   Transportation Needs: No Transportation Needs   • Lack of Transportation (Medical): No   • Lack of Transportation (Non-Medical): No   Physical Activity: Sufficiently Active   • Days of Exercise per Week: 6 days   • Minutes of Exercise per Session: 60 min   Stress: Stress Concern Present   • Feeling of Stress : To some extent   Social Connections: Socially  "Integrated   • Frequency of Communication with Friends and Family: More than three times a week   • Frequency of Social Gatherings with Friends and Family: More than three times a week   • Attends Hoahaoism Services: 1 to 4 times per year   • Active Member of Clubs or Organizations: No   • Attends Club or Organization Meetings: More than 4 times per year   • Marital Status:    Intimate Partner Violence: Not on file   Housing Stability: Unknown   • Unable to Pay for Housing in the Last Year: No   • Number of Places Lived in the Last Year: Not on file   • Unstable Housing in the Last Year: No       Review of Systems   Constitutional: Negative.   HENT: Negative.    Cardiovascular: Negative.    Respiratory: Negative.    Endocrine: Negative.    Skin: Negative.    Gastrointestinal: Negative.    Genitourinary: Negative.    Neurological: Negative.    Psychiatric/Behavioral: Positive for memory loss.         Physical Exam:  Ambulatory Vitals  /68 (BP Location: Left arm, Patient Position: Sitting, BP Cuff Size: Adult)   Pulse 68   Resp 18   Ht 1.803 m (5' 11\")   Wt 78 kg (172 lb)   SpO2 96%    Oxygen Therapy:  Pulse Oximetry: 96 %  BP Readings from Last 4 Encounters:   07/26/22 130/68   07/14/22 134/80   04/07/22 128/76   03/30/22 120/80       Weight/BMI: Body mass index is 23.99 kg/m².  Wt Readings from Last 4 Encounters:   07/26/22 78 kg (172 lb)   07/14/22 79.2 kg (174 lb 9.6 oz)   04/07/22 80.4 kg (177 lb 5.1 oz)   03/30/22 80.7 kg (178 lb)       Physical Exam  Constitutional:       Appearance: Normal appearance. He is normal weight.   HENT:      Head: Normocephalic and atraumatic.      Mouth/Throat:      Mouth: Mucous membranes are moist.      Pharynx: Oropharynx is clear.   Eyes:      Extraocular Movements: Extraocular movements intact.      Conjunctiva/sclera: Conjunctivae normal.   Cardiovascular:      Rate and Rhythm: Normal rate and regular rhythm.      Pulses: Normal pulses.      Heart sounds: " Normal heart sounds. No murmur heard.    No friction rub. No gallop.   Pulmonary:      Effort: Pulmonary effort is normal.      Breath sounds: Normal breath sounds. No wheezing or rales.   Abdominal:      General: Bowel sounds are normal.      Palpations: Abdomen is soft.   Skin:     General: Skin is warm and dry.   Neurological:      Mental Status: He is alert and oriented to person, place, and time. Mental status is at baseline.   Psychiatric:         Mood and Affect: Mood normal.          Lab Data Review:  Lab Results   Component Value Date/Time    CHOLSTRLTOT 179 10/19/2020 06:16 AM    LDL 90 10/19/2020 06:16 AM    HDL 71 10/19/2020 06:16 AM    TRIGLYCERIDE 91 10/19/2020 06:16 AM       Lab Results   Component Value Date/Time    SODIUM 142 10/19/2020 06:16 AM    POTASSIUM 4.5 10/19/2020 06:16 AM    CHLORIDE 103 10/19/2020 06:16 AM    CO2 30 10/19/2020 06:16 AM    GLUCOSE 89 10/19/2020 06:16 AM    BUN 21 10/19/2020 06:16 AM    CREATININE 1.05 10/19/2020 06:16 AM    CREATININE 1.19 10/01/2010 12:00 AM    BUNCREATRAT 16 10/01/2010 12:00 AM    GLOMRATE >59 10/01/2010 12:00 AM     Lab Results   Component Value Date/Time    ALKPHOSPHAT 58 10/19/2020 06:16 AM    ASTSGOT 30 10/19/2020 06:16 AM    ALTSGPT 33 10/19/2020 06:16 AM    TBILIRUBIN 0.6 10/19/2020 06:16 AM      Lab Results   Component Value Date/Time    WBC 6.6 10/19/2020 06:16 AM    WBC 5.7 10/01/2010 12:00 AM     Lab Results   Component Value Date/Time    HBA1C 5.8 (H) 10/19/2020 06:16 AM    HBA1C 5.9 (H) 09/04/2019 06:54 AM         Cardiac Imaging and Procedures Review:    EKG dated 7/26/2022: My personal interpretation is sinus rhythm, rate of 58 bpm, leftward axis    Coronary artery calcium score on 6/28/2022 shows at otal calcium score of 807 Chad with 22 in the left circumflex artery, 341.5 in the left anterior descending artery, and for 43.5 in the right coronary artery.    The observed calcium score of 807 is at percentile 84 for subjects of the  same age, gender, and race/ethnicity who are free of clinical cardiovascular disease and treated diabetes.    The estimated ZAMARRIPA 10-year risk for cardiovascular heart disease event for a person with this risk factor profile including coronary calcium is 12.2%.  The estimated 10-year risk of cardiovascular heart disease event for this risk factor profile if we did not factor in their coronary artery calcium score would be 6.2%.  This has been discussed in detail with the patient.    He did have a nuclear stress test done 3/15/2010 that had some mild subtle fixed defect in the apex and inferolateral wall as well as anterolateral wall that they thought might be artifact versus prior infarct there was no ischemia with normal stress LVEF.    Assessment & Plan     1. Elevated coronary artery calcium score  We had a discussion regarding what an elevated coronary artery calcium score means.  We discussed calcium it is a marker for plaque and his overall plaque burden is high at 84 percentile for subjects of the same age gender and race/ethnicity who are free of clinical cardiovascular disease and treated diabetes.  We also went over the Zamarripa 10-year risk which incorporates coronary artery calcium as part of the cardiovascular heart disease event.  With his coronary calcium score his risk is 12.2% versus 6.2% if coronary calcium score had not been used.  As he is asymptomatic and active, would recommend secondary prevention guidelines for coronary artery disease.  He has already been started on an aspirin 81 mg orally once a day and I would continue that.  He is already on simvastatin 40 mg orally once a day.  Last lipid profile in 2020, 8 LDL was 91.  Would try and push the LDL to less than 70.  He has lab tests ordered by Dr. Rand and I recommended he go and have that done.  I will message this chart to Dr. Rand and she can consider increasing the simvastatin to 80 mg or changing over to a atorvastatin 80 mg if the LDL is  not at goal.  -EKG    2. Pure hypercholesterolemia  On simvastatin 40 mg orally once a day.  He will need to go and have repeat lipids done.      Shared Medical Decision Making:  All of patient's excellent questions were answered to the best of my knowledge and to patient's satisfaction.  It was a pleasure seeing Mr. Nick Rodriguez in my clinic today. No follow-ups on file. Patient is aware to call the cardiology clinic with any questions or concerns.      Kristen Bertrand MD  [facility]  [address]  [place] [address]  Phone:  [phone]  Fax:  [phone]    Please note that this dictation was created using voice recognition software. I have made every reasonable attempt to correct obvious errors, but it is possible there are errors of grammar and possibly content that I did not discover before finalizing the note.

## 2022-07-26 NOTE — PATIENT INSTRUCTIONS
Go in for lab test.  With your coronary calcium score being elevated, I recommend continuing with aspirin 81 mg orally once a day and checking your lipids and increasing the simvastatin if needed (I will let Dr. Rand know).

## 2022-08-12 ENCOUNTER — OFFICE VISIT (OUTPATIENT)
Dept: NEUROLOGY | Facility: MEDICAL CENTER | Age: 67
End: 2022-08-12
Attending: CLINICAL NEUROPSYCHOLOGIST
Payer: MEDICARE

## 2022-08-12 DIAGNOSIS — G31.84 MILD COGNITIVE IMPAIRMENT: ICD-10-CM

## 2022-08-12 PROCEDURE — 96133 NRPSYC TST EVAL PHYS/QHP EA: CPT | Performed by: CLINICAL NEUROPSYCHOLOGIST

## 2022-08-12 PROCEDURE — 96132 NRPSYC TST EVAL PHYS/QHP 1ST: CPT | Performed by: CLINICAL NEUROPSYCHOLOGIST

## 2022-08-12 NOTE — PROGRESS NOTES
NEUROPSYCHOLOGICAL FEEDBACK    Name: Nick Rodriguez    MRN: [id number]   YOB: 1955   Date of Feedback: 08/12/2022  Referred by: Rick Richmond M.D.  Evaluated by: Valerie Olson, Ph.D.        The patient and his son were seen for an interactive feedback session regarding the patient's neuropsychological evaluation on 07/21/2022. They reported the patient has been stable in terms of cognitive, functional, emotional, and physical functioning since the neuropsychological evaluation. I discussed the results of the evaluation and the recommendations in detail with the patient as well as his son and they expressed understanding. The discussion included psychoeducation about mild cognitive impairment, semantic primary progressive aphasia, vascular cognitive impairment, and different dementia etiologies. Psychoeducation was also provided regarding how anxiety, mood disturbance, poor sleep, elevated stress, and hearing decline can affect cognition. Additional information was provided regarding lifestyle factors associated with brain health. He agreed to referals to behavioral health and speech therapy. The patient and his son were afforded time to ask questions and all their questions were addressed.      Valerie Olson, Ph.D.                                                                             Clinical Neuropsychologist                                                                               Department of Neurology                                                                      [facility]    Forty-seven minutes were spent conducting an interactive feedback session with the patient and his son ([id number]; billed as part of the neuropsychological evaluation).

## 2022-08-12 NOTE — PROGRESS NOTES
"CONFIDENTIAL NEUROPSYCHOLOGICAL EVALUATION REPORT    Patient name: Nick Rodriguez  Referral source: Rick Richmond M.D.   MRN: [id number]  Evaluation date: 07/21/2022   YOB: 1955  Neuropsychologist: Valerie Olson, Ph.D.         This evaluation was conducted for clinical treatment planning and may not be valid for other purposes. Potential risks and benefits, limits of confidentiality, and test procedures were discussed. Following this discussion, Mr. Rodriguez consented to complete the evaluation. Information for this report was obtained from the medical record, neuropsychological testing, and a clinical interview with Mr. Rodriguez, his wife, and daughter conducted on 07/21/2022. Feedback, including review of results and recommendations, was conducted on 08/12/2022.     Background and Referral Information: Mr. 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 Dr. Richmond referred Mr. Rodriguez for a neuropsychological evaluation to characterize his cognitive concerns, aid in differential diagnosis, and treatment planning. Medical history is relevant for coronary artery disease (CAD), obstructive sleep apnea (GISSEL; using CPAP nightly for the past 10 years), hypercholesterolemia, bilateral sensorineural hearing loss, vitamin D insufficiency (supplementing), benign prostatic hyperplasia, basal cell carcinoma (resected without chemotherapy or radiation), colon polyps, scoliosis, and thrombocytopenia.     Previous Studies: Neurological exam (04/07/2022) was unremarkable. Cognitive screener (04/07/2022) was below expectation (Phong Cognitive Assessment; MOCA = 24/30). MRI of the brain (06/23/2022) documented: \"1. Mild supratentorial white matter disease most consistent with microvascular ischemic change. Common findings for Mr. Rodriguez's age. 2. Concerning the given history of new onset memory " "loss, no imaging findings indicative of specific neurodegenerative disease. No significant cerebral atrophy or disproportionate temporal lobe atrophy.\"     Neuropsychological Findings and Impressions    Presenting Concerns Summary: Mr. Rodriguez reported he had an episode of not recognizing people he knew coupled with difficulty talking and walking while he was at his mother's  in 2019. His wife and daughter noted the episode lasted approximately 2 to 3 hours and he was taken to the emergency room. MRI of the brain that was negative for stroke, but he was told he may have had a transient ischemic attack. Following this episode, Mr. Rodriguez reported he began noticing cognitive problems that have mildly worsen over time. His wife and daughter noted that frustration and anxiety exacerbate his difficulties. Currently he has problems with short-term memory, attention, and aspects of language (word finding), and executive function. He remains independent in all basic and instrumental activities of daily living (ADLs). On self-report measures, he denied significant emotional distress for the past two weeks, but reported increased anxiety at the beginning of the evaluation. During the clinical interview, he reported increased anxiety and frustration related to his cognitive problems and grief associated with family members passing away recently (please see below the recommendations section for additional information).    Results Summary/Interpretation: Mr. Rodriguez's estimated premorbid intellectual ability was average to high average. Basic auditory attention and working memory was below expectation. Language was characterized by deficits in verbal fluency (phonemic and semantic), vocabulary knowledge, and general fund of knowledge. This is indicative of reduced semantic knowledge. Sentence repetition was also below expectation, though he only made minor omission errors on the longer sentences, suggesting difficulties with " auditory working memory may have interfered with performance. Remaining aspects of language were within normal limits. Qualitatively, he exhibited mild word finding problems during the clinical interview. His language deficits likely interfered with his performance on a measure of oral processing speed (color naming) as it involves semantic knowledge. All other aspects of processing speed were intact. Executive functioning was notable for deficits in phonemic verbal fluency (due to language deficits), response inhibition (due to numerous errors), and freehand clock drawing/conceptualization (due to organizational difficulties). Remaining aspects of executive functioning were within normal limits. Memory was variable. [place] verbal memory (wordlist) was below expectation, while structured verbal memory (stories) was intact (encoding, delayed recall, and recognition). Visual memory (figures) was characterized by deficient encoding and delayed recall, but within normal limits recognition. This overall pattern of memory performance is indicative of attentional and executive function difficulties interfering with encoding and retrieval of information, as performance improved with added structure and visual cues. Visual perception/constructional abilities and motor praxis were intact.    Impression: Mr. Rodriguez's cognitive profile revealed impairments in auditory attention/working memory, and aspects of language and executive functioning. These deficits interfered with his memory and processing speed performance. His pattern of language deficits was indicative of a marked decline in semantic knowledge relative to his premorbid level of functioning. Based on his history, reported functional status, and cognitive profile, he meets criteria for mild neurocognitive disorder (mild cognitive impairment). 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 SPPA is a language variant of frontotemporal neurodegeneration. Although neuroimaging did not show disproportional frontal-temporal volume loss, this is not unusual in the early stages of the disease. His attention/working memory and executive function deficits are consistent with what can be seen in patients with vascular cognitive impairment. As such, given his multiple vascular risk factors, cerebrovascular contributions cannot be ruled out. However, neuroimaging did not show advanced microvascular ischemic disease. Reported sleep maintenance problems may be an additional contributor to his cognitive difficulties. Mood disturbance, including grief, and increased anxiety are likely exacerbating his cognitive problems, but do not fully account for his deficits. His current pattern of performance (intact story and visual recognition memory) is not consistent with what is typically seen in the early stages of Alzheimer's disease. This evaluation may serve as a baseline for longitudinal tracking.    Recommendations:    Based on the present results, Mr. Rodriguez is recommended for a repeat neuropsychological evaluation in the next 12 to 18 months or sooner, if there is a considerable change in cognitive or emotional status. At that time, diagnostic impressions and treatment recommendations will be revised and updated as necessary. Additional testing will permit comparisons over time to better identify stability, potential improvement, or possible decline.  In light of his cognitive deficits, increased oversight and assistance with complex instrumental activities of daily living (e.g., financial and medication management) from a family member or other trusted individual are recommended.   Given his language deficits, Mr. Rodriguez will likely benefit from engagement in speech therapy with a cognitive rehabilitation component.  This was discussed during the feedback session and a referral was placed for [facility] (https://[employer].Talkdesk/; [address] [place] [address]; [phone]).  Mr. Rodriguez will likely benefit from the initiation of individual counseling sessions to further assess and treat reported symptoms of anxiety, stress, and grief. If interested, he may also benefit from a psychiatry consultation. Evidence-based treatments such as cognitive behavioral therapy, acceptance and commitment therapy, and mindfulness-based interventions may be particularly beneficial. A referral was placed for [facility] (https://www.Wishdates/[facility]/team-[facility]/; [phone]). The following are additional options for psychotherapy and psychiatry in the community:  [facility] (https://www.[employer]/health-services/behavioral-health; 513.249.3047 or [phone])  [facility] (https://www.[facility].Talkdesk; [phone])  [facility] (https://Kavam.com.Talkdesk; 369.192.5337)  [facility] (https://www.[facility].Talkdesk/index.html; 196.253.7839)  [facility] (https://Curves.Talkdesk; 702.769.8319)   A directory of providers can also be found on the Psychology Today website (www.psychologytoday.com)  Considering his history of hearing loss, continued follow up with audiology is recommended to rule out possible sensory contributions to his cognitive decline in the future.   Given his mild cognitive deficits, it is recommended that Mr. Rodriguez be closely monitored by his family or other trusted individual for possible future changes regarding driving skills. If this becomes an area of concern in the future, a formal driving evaluation conducted by an occupational therapist with training and expertise in this area to ensure his safety and that of others on the road should be conducted:  [facility] (https://www.[facility].org/Health-Services/Physical-Therapy; 172.409.4103)  Known request for  reevaluation (https://[employer]/pdfforms/dld23a.pdf)  Continued use of environmental compensatory strategies is recommended to reduce cognitive demands. This may include setting scheduled routines, having an established location for essential items (e.g., wallet, glasses, and keys), completing tasks when there are no time demands, completing one task at a time, breaking down demanding tasks into smaller components, working on projects for shorter periods, maximizing time when he feels the most alert, minimizing external distractions, paraphrasing and repeating to be learned information, and using external aids for reminders (e.g., planner, calendar, setting alarms, labels, to-do lists) consistently.   Mr. Rodriguez reported sleep maintenance problems in the context of a longstanding history of insomnia, which may exacerbate his cognitive difficulties. He may benefit from education regarding sleep hygiene and healthy sleep habits, including maintenance of a regular sleep/wake cycle and integrating relaxation exercises before bed. This was provided during the feedback session. Additional strategies include: The light emitted by phone screens, TVs, and iPads can mimic daylight and suppress the body's natural release of melatonin, making it harder to fall asleep; it is recommended to stop screen time about an hour before bed; develop a bedtime routine; keep the bedroom at a comfortable temperature; use low lighting in the evenings; avoid consuming large meals and caffeine close to bedtime; avoid napping during the day, as it can disturb the normal pattern of sleep and wakefulness; and exercise can promote good sleep, particularly when completed in the morning or early afternoon.  Individuals with mild cognitive impairment are at increased risk of developing dementia in the future. As such,   Michael is encouraged to regularly engage in social and physical recreation, as well as cognitively stimulating activities (e.g., puzzles, games, reading, exercise, social gatherings) to promote brain health and emotional well-being. The book Living with Mild Cognitive Impairment: A Guide to Maximizing Brain Health and Reducing Risk of Dementia by Jeanie Young may be a helpful resource for Mr. Rodriguez and his family.  In light of his medical history, cerebrovascular disease represents a significant risk factor for future cognitive decline, and Mr. Rodriguez is encouraged to reduce vascular risk factors per his providers' treatment recommendations (e.g., healthy diet, exercise, and medication adherence).  The books Biohack Your Brain: How to Boost Cognitive Health, Performance & Power by Dr. Usha Velazquez and Undo It!: How Simple Lifestyle Changes Can Reverse Most Chronic Diseases by Chema Sood and Tatum Sood may be helpful resources for Mr. Rodriguez and his family.  If not already done so, Mr. Rodriguez and his family members are encouraged to discuss legal and financial affairs, such as establishing a will and power of , to assist Mr. Rodriguez with future financial and healthcare decisions. Information regarding these issues can be found at www.caringinfo.org or www.agingwithdignity.org/5wishes.html.   Mr. Rodriguez and his family may benefit from resources available through the American Heart Association (https://www.heart.org/ or 1-683.559.8727), which offers psychoeducational information and services for individuals with vascular risk factors.   Mr. Rodriguez and his family and his family may also benefit from resources available through the National Aphasia Association (https://www.aphasia.org/stories/the-abcs-of-wqm-sxgwjmnxtcayo-fzoqjru-progressive-aphasia/?gclid=[id number]_wcB), The Association for Frontotemporal Degeneration  (https://www.theaftd.org/; 4-666-788-1851), and the [facility] (https://www.brain.[facility]/dementia/wlxiett-btcmbnjdudv-opuodte/index.html), which offer psychoeducational information and services for individuals with PPA.    Presenting Concerns:     Cognitive Functioning: Mr. Rodriguez reported he had an episode of not recognizing people he knew coupled with difficulty talking and walking while he was at his mother's  in 2019. His wife and daughter noted the episode lasted approximately 2 to 3 hours and he was taken to the emergency room. He had an MRI of the brain that was negative for stroke, but was told he may have had a transient ischemic attack. Following this episode, Mr. Rodriguez reported he began noticing cognitive problems that have mildly worsen over time. His family members noted that frustration and anxiety exacerbate his difficulties. Examples of Mr. Rodriguez's current concerns included misplacing items, difficulty retaining new information, trouble remembering names, occasionally forgetting conversations, and repeating questions about planned activities. His wife and daughter noted reminders are partially beneficial, but he uses a calendar effectively. They also noted observing difficulty multitasking, distractibility, and his mind wandering during conversations as he thinks about what to say. Mr. Rodriguez reported word-finding problems that have become more prominent in recent months. Additionally, it takes him longer to comprehend complex information compared to before. His daughter noted Mr. Rodriguez takes also longer to make decisions and plans and has been asking for help more frequently in this regard. His wife denied observing significant declines in problem solving skills. Difficulties with visual perception and navigation were denied.     Functional Abilities: Mr. Rodriguez reported he is independent and fully capable in all self-care and instrumental ADLs including  medication management, finances, shopping, scheduling appointments, household responsibilities, and driving (no recent tickets, accidents, or confusion). His family members corroborated Mr. Rodriguez's report, noting he and his wife have always managed bill payments and finances together.     Emotional Functioning: Mr. Rodriguez reported increased anxiety and frustration associated with his memory and word finding problems. His family members agreed. They have observed an increase in stress and worry when he has difficulties expressing himself, resulting in frustration and irritability. Mr. Rodriguez reported there were multiple deaths in the family from 2018 to 2019, including his brother, mother, and brother in law, who was his best friend. He described mild ongoing grief including occasional sadness associated with this. He also reported longstanding insomnia. Currently, he is having sleep maintenance problems as we wakes up around 2 AM and may have difficulty falling back asleep. His wife noted he has been waking up earlier in the mornings as well. He is sleeping an average of 6 hours per night and denied significant daytime fatigue. He takes melatonin as a sleep aide, which has been beneficial. He denied loss of interest, anhedonia, suicidal ideation, decreased energy, appetite changes, and traumatic-stress related symptoms. There were no reports of manic symptoms, social withdrawal, samia personality changes, hallucinations, or delusions.      Sensory/Physical/Motor Changes: Mr. Rodriguez has history of bilateral hearing loss. He reported he underwent an audiology evaluation in the Fall of 2021 and hearing aids were not recommended. He also noted mild bilateral intermittent tinnitus. He wears glasses and denied recent declines in his vision. He reported he has been having a light intermittent headache (left forehead) for the past couple of weeks. He noted persistent right elbow pain due to a recent injury. He noted the pain  level is not interfering with daily functioning and he has follow up with orthopedics. He also stated his legs feel weak for less than minute when he wakes up in the morning. He denied incontinence, balance problems, falls within the past 6 months, and fine or gross motor problems.     Medical History: Remarkable for CAD, GISSEL (using CPAP nightly for the past 10 years), hypercholesterolemia, bilateral sensorineural hearing loss, vitamin D insufficiency (supplementing), benign prostatic hyperplasia, basal cell carcinoma (resected without chemotherapy or radiation), colon polyps, scoliosis, and thrombocytopenia. Other than the episode described above, he denied any other history of stroke-like symptoms. There is no reported history of known seizures or traumatic head injuries. Surgical history includes colonoscopy with polypectomy, blepharoplasty, inguinal hernia repair, and tonsillectomy. Hospitalizations within the past month were denied.    Mental Health History: As noted above, he reported a history of insomnia. He noted he took Ambien for approximately 5 years before he began taking melatonin. He reported he engaged in 3 counseling sessions in early 2019. He denied any other history of mental health treatment or diagnosis, including psychiatric hospitalizations.     Family Medical History: Remarkable for dementia (mother was in assisted living for 2 to 3 years and passed away when she was 94 years old), brain tumor (brother), sleep apnea, and alcohol use disorder.    Medications and Supplements: Simvastatin, tamsulosin, albuterol, aspirin, fluticasone, levocetirizine, imiquimod, multivitamin, vitamin C, vitamin D, vitamin D, omega-3 fatty acids, and glucosamine.     Psychosocial History: Mr. Rodriguez was born and raised in [place], [place]. He denied a known history of birth complications or early developmental delays. He earned a high school diploma, a bachelor's degree, and then a master's degree in higher  education. He then obtained a second master's degree in business administration. He reported he had difficulties with word pronunciation as a child and engaged in speech therapy in second and third grade, which resolved the problem. He denied any other history of learning disorders, attention problems, or academic difficulties. 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 Previously, he worked as the  at a [employer] in [place]. He retired when he was 64 to 65 years old. He has resided in [place], [place], for approximately 28 years. He currently lives with his wife of 36 years in a private residence. They have 3 children and 4 grandchildren. He reported good social support from family and friends. Hobbies and activities include monthly meetings with former coworkers, reading, hiking, skiing, and playing pickle ball.     Substance Use: Mr. Rodriguez reported he drinks an average of one to 2 glasses of wine or beer per day. He noted he has been consuming edible marijuana 2 to 3 times per month for the past 6 months. Current and past use of nicotine products was denied. He reported he drinks approximately 2.5 cups of coffee per day. There is no reported history of substance use disorder or treatment.     Measures Administered: [place] Diagnostic Aphasia Examination 3rd Ed. (BDAE-3): Commands, Word Repetition, Sentence Repetition, Responsive Naming, & Praxis; [place] Naming Test (BNT); Brief Visuospatial Memory Test - Revised (BVMT-R); Sudha-Guzman Executive Functioning System (DKEFS): Color-Word Interference (CWI), [place] Test (TT), & Verbal Fluency (VF); Executive Clock Drawing Test (CLOX); Generalized Anxiety Disorder 7 Item Scale (YOHANNES-7); Freire Verbal Learning Test - Revised (HVLT-R); Mini-Mental State Examination - 2nd Ed. Orientation (MMSE-2); Patient Health Questionnaire (PHQ-9); Repeatable Battery for the Assessment of Neuropsychological Status Line Orientation (RBANS LO);  State-Trait Anxiety Inventory (STAI); Test of Premorbid Functioning (ToPF); Trail Making Test A & B (TMT); Wechsler Adult Intelligence Scale - 4th Ed. (WAIS-IV): Block Design (BD), Digit Span (DS), Matrix Reasoning (MR), Similarities (SI), Vocabulary (VC), Information (IN), & Coding (CD); Wechsler Memory Scale - 4th Ed. Logical Memory (WMS-IV LM). Informant Questionnaires: Activities of Daily Living Questionnaire (ADLQ) and Neuropsychiatric Inventory Questionnaire (NPI-Q).     Behavioral Observations: Mr. Rodriguez arrived at the clinic on time and was accompanied by his wife and daughter, who participated in the clinical interview. Everybody wore facemasks given the COVID-19 pandemic. He was well groomed and dressed. He was alert and oriented to situation, person, and place, but incompletely oriented to time (MMSE-2 Orientation = 9/10; incorrect date). He was pleasant and always maintained appropriate interpersonal boundaries. Rapport was easily established. Gait was unremarkable. No gross abnormal movements or motor symptoms were observed. He exhibited mild word finding problems during the clinical interview. Comprehension was adequate for conversation and test instructions. Speech rate, volume, articulation, and prosody were normal. Speech content was logical and appropriate to context. Mood was euthymic. He reported he was anxious about testing at the beginning of the session, but appeared to relax as the session progressed. Affect was mood-congruent and appropriate to the situation. Insight into cognitive and emotional functioning was intact. There was no indication of hallucinations, delusions, or thought disorder. Vision and hearing were adequate for the evaluation. He was attentive throughout the evaluation and had no difficulties with retention of task demands. He perceived his performance as poor and made negative remarks at times, but responded well to prompts and encouragement from the examiner. Overall, he  was engaged and cooperative throughout testing.    Results & Key Findings: Please note that scores reported are for professional use only. For diagnostic purposes, a performance score that falls below the 9th percentile of the reference group for that measure may be considered a cognitive deficit depending on the overall pattern of performance. The following clinical descriptors identify performance within the range of percentile scores indicated in the parentheses: Exceptionally High Score (>98th), Above Average Score (91st-97th), High Average Score (75th-90th), Average Score (25th-74th), Low Average Score (9th-24th), Below Average Score (3rd-8th), and Exceptionally Low Score (<2nd). Please see the attached test results summary table in the appendix for a list of measures administered as well as raw and normative scores, which are listed in the designated columns. All such scores are based on age-corrected norms and certain scores may be adjusted for education and/or other demographic factors as appropriate (e.g., TMT, COWAT, Category Fluency, and BNT). Individual subtests and VCI of the WAIS-IV were fully demographically adjusted to better account for Mr. Rodriguez's level of education and occupational attainment. WMS-IV LM was also fully demographically adjusted.      Data Validity: Mr. Rodriguez's performance on embedded and standalone validity measures was within the valid range, suggesting he appropriately engaged in testing. The following test results are considered an accurate representation of his current level of cognitive functioning.    Premorbid Functioning: A predicted estimate of premorbid intellectual functioning based on age and his performance on a single word-reading test fell within the average range (TOPF). Based on demographic factors, his premorbid ability was estimated in the high average range (TOPF). Taking this information together with his high level of educational and occupational attainment,  his premorbid ability is likely in the high average range.     Cognitive Functioning:    Mr. Rodriguez's performance on some measures in the following areas was below expectations:    Attention/Working Memory/Processing Speed: Performance on an overall measure of basic auditory attention span (forward number repetition) and working memory was (backward/sequenced number repetition) was below average (WAIS-IV DS). Rapid color naming was also below average (DKEFS CWI). Simple word reading speed was low average (DKEFS CWI). Visuomotor number sequencing speed (TMT A) and rapid code transcription (WAIS-IV CD) were both average.  Language: Performance on an overall index of verbal comprehension/reasoning was exceptionally low (WAIS-V VCI), with low average abstract verbal reasoning (SI), below average vocabulary knowledge (VC), and exceptionally low general fund of knowledge (IN). Semantic verbal fluency was exceptionally low (Animals). Phonemic verbal fluency was below average (COWAT FAS). Visual confrontation naming was exceptionally low (BNT). Single word reading was average (TOPF). Auditory comprehension of complex ideational material was intact (BDAE-3). Auditory comprehension of single and multiple step commands was intact (BDAE-3). Responsive naming was intact (BDAE-3). Single word repetition was intact (BDAE-3). Sentence repetition was impaired (BDAE-3).   Memory: Total recall of a wordlist across three repeated learning trials was exceptionally low. Delayed recall remained exceptionally low, with 100% retention rate of information. Recognition discrimination of the wordlist was also exceptionally low, with 9/12 target words correctly identified and 3 false positive errors (HVLT-R). Immediate and delayed recall of short stories were average and low average, respectively (48% retention rate). Delayed recognition of story details was average (WMS LM). Total recall of an array of simple geometric figures across three  repeated learning trials was below average. Delayed recall was exceptionally low (60% retention rate). Delayed recognition discrimination of the figures was within normal limits with 6/6 target figures correctly identified and nil false positive errors (BVMT-R).   Executive Functioning: Phonemic verbal fluency was below average (DKEFS VF). Freehand clock drawing/conceptualization was below average; points were lost for not anchoring the numbers initially, adding section marks, drawing the numbers outside the clock face, and the hour hand incorrectly placed (CLOX 1). Response inhibition was low average for time, but below average for number of errors (DKEFS CWI). Response inhibition with a set shifting component was average (DKEFS CWI). Abstract verbal reasoning was low average (WAIS-IV SI). Visuomotor set shifting (TMT B), abstract/deductive visual reasoning (WAIS-IV MR), and spatial planning/problem solving (DKEFS TT) were all average.    Mr. Sepulvedas performance in the following areas was within normal expectations:     Visual Perception/Construction: Judgment of line orientation (RBANS LO), simple figure copy (BVMT-R Copy), copy of a clock (CLOX 2), abstract/deductive visual reasoning (WAIS-IV MR), and construction of block designs (WAIS-IV BD).  Motor: Motor praxis was intact for natural gestures, conventional gestures, use of pretended objects, and bucco-facial/respiratory movements (BDAE-3).    Self-Report Questionnaires: Mr. Sepulvedas responses on self-report inventories of emotional functioning were indicative of minimal levels of depression (PHQ-9) and anxiety (YOHANNES-7) over the past two weeks. He reported significant anxiety at the beginning of the evaluation (STAI).    Informant Questionnaires: His wife and daughter completed a questionnaire about Mr. Rodriguez's ability to function independently, implying a minimal level of impairment in activities of daily living (ADLQ). On a questionnaire regarding  neuropsychiatric symptoms, they reported observing mild agitation and irritability coupled with moderate anxiety and waking up earlier (NPI-Q).      Thank you for allowing me to participate in Mr. Rodriguez's care. If I can be of further assistance, please do not hesitate to contact me.      Valerie Olson, Ph.D.          Clinical Neuropsychologist          Department of Neurology          [facility]            Appendix:            This report was created using voice recognition software. I have made every reasonable attempt to avoid dictation errors, but this document may contain an error not identified before finalizing. If the error changes the accuracy of the document, I would appreciate it being brought to my attention. Thank you.    One hour and 20 minutes were spent interviewing Mr. Rodriguez (07/21/2022; neurobehavioral status exam: 25211 = 1). Test administration and scoring were completed in 5 hours and 33 minutes (07/21/2022; 90851 = 1, 83402 = 10). Four hours and 52 minutes were spent in clinical decision-making, chart review, records reviews, interpretation of test results and clinical data, integration of patient data, interactive feedback to Mr. Rodriguez and his son, and report preparation (07/21/2022 - 08/14/2022; test evaluation services: 83438 = 1, 78670 = 4).

## 2022-09-12 ENCOUNTER — HOSPITAL ENCOUNTER (OUTPATIENT)
Dept: RADIOLOGY | Facility: MEDICAL CENTER | Age: 67
End: 2022-09-12
Attending: FAMILY MEDICINE
Payer: MEDICARE

## 2022-09-12 PROCEDURE — 93018 CV STRESS TEST I&R ONLY: CPT | Performed by: INTERNAL MEDICINE

## 2022-09-12 PROCEDURE — 93017 CV STRESS TEST TRACING ONLY: CPT

## 2022-12-04 SDOH — HEALTH STABILITY: MENTAL HEALTH
STRESS IS WHEN SOMEONE FEELS TENSE, NERVOUS, ANXIOUS, OR CAN'T SLEEP AT NIGHT BECAUSE THEIR MIND IS TROUBLED. HOW STRESSED ARE YOU?: ONLY A LITTLE

## 2022-12-04 SDOH — HEALTH STABILITY: PHYSICAL HEALTH: ON AVERAGE, HOW MANY DAYS PER WEEK DO YOU ENGAGE IN MODERATE TO STRENUOUS EXERCISE (LIKE A BRISK WALK)?: 6 DAYS

## 2022-12-04 SDOH — ECONOMIC STABILITY: FOOD INSECURITY: WITHIN THE PAST 12 MONTHS, THE FOOD YOU BOUGHT JUST DIDN'T LAST AND YOU DIDN'T HAVE MONEY TO GET MORE.: NEVER TRUE

## 2022-12-04 SDOH — ECONOMIC STABILITY: FOOD INSECURITY: WITHIN THE PAST 12 MONTHS, YOU WORRIED THAT YOUR FOOD WOULD RUN OUT BEFORE YOU GOT MONEY TO BUY MORE.: NEVER TRUE

## 2022-12-04 SDOH — ECONOMIC STABILITY: INCOME INSECURITY: IN THE LAST 12 MONTHS, WAS THERE A TIME WHEN YOU WERE NOT ABLE TO PAY THE MORTGAGE OR RENT ON TIME?: NO

## 2022-12-04 SDOH — HEALTH STABILITY: PHYSICAL HEALTH: ON AVERAGE, HOW MANY MINUTES DO YOU ENGAGE IN EXERCISE AT THIS LEVEL?: 60 MIN

## 2022-12-04 SDOH — ECONOMIC STABILITY: INCOME INSECURITY: HOW HARD IS IT FOR YOU TO PAY FOR THE VERY BASICS LIKE FOOD, HOUSING, MEDICAL CARE, AND HEATING?: NOT HARD AT ALL

## 2022-12-04 SDOH — ECONOMIC STABILITY: HOUSING INSECURITY: IN THE LAST 12 MONTHS, HOW MANY PLACES HAVE YOU LIVED?: 1

## 2022-12-04 ASSESSMENT — SOCIAL DETERMINANTS OF HEALTH (SDOH)
WITHIN THE PAST 12 MONTHS, YOU WORRIED THAT YOUR FOOD WOULD RUN OUT BEFORE YOU GOT THE MONEY TO BUY MORE: NEVER TRUE
DO YOU BELONG TO ANY CLUBS OR ORGANIZATIONS SUCH AS CHURCH GROUPS UNIONS, FRATERNAL OR ATHLETIC GROUPS, OR SCHOOL GROUPS?: YES
HOW OFTEN DO YOU ATTEND CHURCH OR RELIGIOUS SERVICES?: 1 TO 4 TIMES PER YEAR
HOW OFTEN DO YOU ATTENT MEETINGS OF THE CLUB OR ORGANIZATION YOU BELONG TO?: MORE THAN 4 TIMES PER YEAR
HOW OFTEN DO YOU GET TOGETHER WITH FRIENDS OR RELATIVES?: TWICE A WEEK
HOW OFTEN DO YOU HAVE SIX OR MORE DRINKS ON ONE OCCASION: NEVER
HOW OFTEN DO YOU GET TOGETHER WITH FRIENDS OR RELATIVES?: TWICE A WEEK
IN A TYPICAL WEEK, HOW MANY TIMES DO YOU TALK ON THE PHONE WITH FAMILY, FRIENDS, OR NEIGHBORS?: MORE THAN THREE TIMES A WEEK
HOW OFTEN DO YOU ATTENT MEETINGS OF THE CLUB OR ORGANIZATION YOU BELONG TO?: MORE THAN 4 TIMES PER YEAR
HOW HARD IS IT FOR YOU TO PAY FOR THE VERY BASICS LIKE FOOD, HOUSING, MEDICAL CARE, AND HEATING?: NOT HARD AT ALL
HOW OFTEN DO YOU HAVE A DRINK CONTAINING ALCOHOL: 4 OR MORE TIMES A WEEK
DO YOU BELONG TO ANY CLUBS OR ORGANIZATIONS SUCH AS CHURCH GROUPS UNIONS, FRATERNAL OR ATHLETIC GROUPS, OR SCHOOL GROUPS?: YES
HOW MANY DRINKS CONTAINING ALCOHOL DO YOU HAVE ON A TYPICAL DAY WHEN YOU ARE DRINKING: 1 OR 2
IN A TYPICAL WEEK, HOW MANY TIMES DO YOU TALK ON THE PHONE WITH FAMILY, FRIENDS, OR NEIGHBORS?: MORE THAN THREE TIMES A WEEK
HOW OFTEN DO YOU ATTEND CHURCH OR RELIGIOUS SERVICES?: 1 TO 4 TIMES PER YEAR

## 2022-12-04 ASSESSMENT — LIFESTYLE VARIABLES
HOW MANY STANDARD DRINKS CONTAINING ALCOHOL DO YOU HAVE ON A TYPICAL DAY: 1 OR 2
AUDIT-C TOTAL SCORE: 4
HOW OFTEN DO YOU HAVE A DRINK CONTAINING ALCOHOL: 4 OR MORE TIMES A WEEK
HOW OFTEN DO YOU HAVE SIX OR MORE DRINKS ON ONE OCCASION: NEVER
SKIP TO QUESTIONS 9-10: 1

## 2022-12-07 ENCOUNTER — OFFICE VISIT (OUTPATIENT)
Dept: MEDICAL GROUP | Facility: IMAGING CENTER | Age: 67
End: 2022-12-07
Payer: MEDICARE

## 2022-12-07 VITALS
HEIGHT: 71 IN | RESPIRATION RATE: 17 BRPM | OXYGEN SATURATION: 93 % | WEIGHT: 170.4 LBS | BODY MASS INDEX: 23.85 KG/M2 | TEMPERATURE: 97.9 F | SYSTOLIC BLOOD PRESSURE: 120 MMHG | HEART RATE: 67 BPM | DIASTOLIC BLOOD PRESSURE: 80 MMHG

## 2022-12-07 DIAGNOSIS — Z13.1 SCREENING FOR DIABETES MELLITUS: ICD-10-CM

## 2022-12-07 DIAGNOSIS — N40.1 BENIGN PROSTATIC HYPERPLASIA WITH NOCTURIA: ICD-10-CM

## 2022-12-07 DIAGNOSIS — R35.1 BENIGN PROSTATIC HYPERPLASIA WITH NOCTURIA: ICD-10-CM

## 2022-12-07 DIAGNOSIS — Z01.84 IMMUNITY STATUS TESTING: ICD-10-CM

## 2022-12-07 DIAGNOSIS — G47.33 OSA ON CPAP: ICD-10-CM

## 2022-12-07 DIAGNOSIS — Z85.828 HISTORY OF BASAL CELL CARCINOMA: ICD-10-CM

## 2022-12-07 DIAGNOSIS — J30.2 SEASONAL ALLERGIES: ICD-10-CM

## 2022-12-07 DIAGNOSIS — L30.9 DERMATITIS: ICD-10-CM

## 2022-12-07 DIAGNOSIS — Z12.5 SCREENING FOR PROSTATE CANCER: ICD-10-CM

## 2022-12-07 DIAGNOSIS — Z00.00 MEDICARE ANNUAL WELLNESS VISIT, SUBSEQUENT: ICD-10-CM

## 2022-12-07 DIAGNOSIS — E78.00 PURE HYPERCHOLESTEROLEMIA: ICD-10-CM

## 2022-12-07 DIAGNOSIS — R09.82 POSTNASAL DRIP: ICD-10-CM

## 2022-12-07 DIAGNOSIS — Z12.11 SCREEN FOR COLON CANCER: ICD-10-CM

## 2022-12-07 DIAGNOSIS — D69.6 THROMBOCYTOPENIA (HCC): ICD-10-CM

## 2022-12-07 DIAGNOSIS — Z86.010 HISTORY OF COLON POLYPS: ICD-10-CM

## 2022-12-07 DIAGNOSIS — H90.3 SENSORINEURAL HEARING LOSS, BILATERAL: ICD-10-CM

## 2022-12-07 DIAGNOSIS — E55.9 VITAMIN D INSUFFICIENCY: ICD-10-CM

## 2022-12-07 DIAGNOSIS — R41.3 MEMORY CHANGES: ICD-10-CM

## 2022-12-07 DIAGNOSIS — R73.01 ELEVATED FASTING GLUCOSE: ICD-10-CM

## 2022-12-07 DIAGNOSIS — L57.0 AK (ACTINIC KERATOSIS): ICD-10-CM

## 2022-12-07 PROCEDURE — G0439 PPPS, SUBSEQ VISIT: HCPCS | Performed by: FAMILY MEDICINE

## 2022-12-07 RX ORDER — KETOCONAZOLE 20 MG/G
CREAM TOPICAL
COMMUNITY
Start: 2022-12-02 | End: 2022-12-12

## 2022-12-07 RX ORDER — CALCIPOTRIENE 50 UG/G
CREAM TOPICAL
COMMUNITY
Start: 2022-12-02

## 2022-12-07 RX ORDER — EMOLLIENT COMBINATION NO.10
EMULSION (GRAM) TOPICAL
COMMUNITY
Start: 2022-12-02 | End: 2022-12-07

## 2022-12-07 ASSESSMENT — ENCOUNTER SYMPTOMS: GENERAL WELL-BEING: GOOD

## 2022-12-07 ASSESSMENT — PATIENT HEALTH QUESTIONNAIRE - PHQ9: CLINICAL INTERPRETATION OF PHQ2 SCORE: 0

## 2022-12-07 ASSESSMENT — PAIN SCALES - GENERAL: PAINLEVEL: NO PAIN

## 2022-12-07 ASSESSMENT — ACTIVITIES OF DAILY LIVING (ADL): BATHING_REQUIRES_ASSISTANCE: 0

## 2022-12-07 NOTE — PROGRESS NOTES
Chief Complaint   Patient presents with    Annual Exam       HPI:  Nick is a 67 y.o. here for Medicare Annual Wellness Visit    GISSEL- on cpap past 10 years. Sees pulmonary. Cannot get in until next April or so. Considering new company.   Sleep-wakes up at 12:30, then up at 5-6 AM.  States he started taking medication at the time.  2 days a month sleeps all the way through.  Melatonin 10 mg nightly.  Recommended sleep log.    Memory issues- clinical neuropsychologist, Dr. Olson. Behavioral health and speech therapy- .    Dr. Richmond- April 2022.   Dr. Harris- psychology.   [facility]- ears checked, recheck in one year.  Some hearing loss bilaterally.    Sees dermatology routinely. Dr. Smith last week, sees twice a year. AKs.   Finger tips dry and cracking.     Hx of colon polyps, colonoscopy 2018- repeat in 5 years.     Postnasal drainage- nightly- flonase and albuterol in past.  He will plan to try Flonase again.    BPH-stable on Flomax therapy.    2020 labs, may have not done 2021 labs.   Hypercholesterolemia.  Elevated fasting glucose on prior labs.  Thrombocytopenia.  Vitamin D insufficiency.    Hepatitis B vaccine reviewed.      Patient Active Problem List    Diagnosis Date Noted    GISSEL on CPAP 12/07/2022    CAD (coronary artery disease) by coronary artery calcium score of 807 [place] done as screening, no symptoms 07/26/2022    Sensorineural hearing loss, bilateral 02/19/2021    Ringing in left ear 02/19/2021    Presbycusis 02/19/2021    Hearing loss of left ear 02/19/2021    Persistent cognitive impairment 01/14/2021    History of colon polyps 10/15/2019    Elevated fasting glucose 10/16/2018    History of basal cell carcinoma 10/16/2018    Non-smoker 09/13/2018    Chronic insomnia 08/23/2018    Memory changes 04/29/2018    Pure hypercholesterolemia 11/14/2017    Chronic bilateral low back pain without sciatica 05/30/2017    Obstructive sleep apnea syndrome 10/17/2016    Pain in right shin 10/17/2016    Seasonal  allergies 05/06/2016    Thrombocytopenia (HCC) 12/30/2015    Scoliosis 10/09/2015    Benign prostatic hyperplasia with lower urinary tract symptoms 02/12/2015    Vitamin D insufficiency 11/21/2013    Postnasal drip 05/03/2012       Current Outpatient Medications   Medication Sig Dispense Refill    calcipotriene (DOVONEX) 0.005 % Cream       ketoconazole (NIZORAL) 2 % Cream       fluticasone (FLONASE) 50 MCG/ACT nasal spray use 2 sprays in each nostril daily 16 g 3    simvastatin (ZOCOR) 40 MG Tab TAKE 1 TABLET EVERY EVENING 100 Tablet 3    tamsulosin (FLOMAX) 0.4 MG capsule Take 1 Capsule by mouth every day. 100 Capsule 3    albuterol 108 (90 Base) MCG/ACT Aero Soln inhalation aerosol Inhale 2 Puffs every 6 hours as needed for Shortness of Breath. 1 Each 1    ASPIRIN 81 PO Take  by mouth.      Levocetirizine Dihydrochloride (XYZAL) 5 MG Tab Take 1 Tablet by mouth every day. 30 Tablet 3    imiquimod (ALDARA) 5 % cream       Multiple Vitamins-Minerals (MULTIVITAMIN ADULTS PO) Take  by mouth.      Ascorbic Acid (VITAMIN C PO) Take 1,000 mg by mouth.      Cholecalciferol (VITAMIN D PO) Take  by mouth.      Vitamin E 400 UNIT Tab Take  by mouth.      Omega-3 Fatty Acids (FISH OIL) 1000 MG Cap capsule Take 1,000 mg by mouth 3 times a day, with meals.      GLUCOSAMINE CHONDROITIN COMPLX PO Take  by mouth.       No current facility-administered medications for this visit.        Patient is taking medications as noted in medication list.  Current supplements as per medication list.     Allergies: Seasonal    Current social contact/activities: Friends and family     Is patient current with immunizations? Yes.    He  reports that he has never smoked. He has never used smokeless tobacco. He reports current alcohol use of about 3.6 oz per week. He reports current drug use. Drugs: Marijuana and Oral.  Counseling given: Not Answered      ROS:    Gait: Uses no assistive device   Ostomy: No   Other tubes: No   Amputations: No    Chronic oxygen use No   Last eye exam: May 2022  Wears hearing aids: No   : Denies any urinary leakage during the last 6 months    Depression Screening  Little interest or pleasure in doing things?  0 - not at all  Feeling down, depressed, or hopeless? 0 - not at all  Patient Health Questionnaire Score: 0    If depressive symptoms identified deferred to follow up visit unless specifically addressed in assessment and plan.    Interpretation of PHQ-9 Total Score   Score Severity   1-4 No Depression   5-9 Mild Depression   10-14 Moderate Depression   15-19 Moderately Severe Depression   20-27 Severe Depression    Screening for Cognitive Impairment  Three Minute Recall (daughter, filemon, cooper)  2/3 2  [place] clock face with all 12 numbers and set the hands to show 10 past 11.  Yes 5  If cognitive concerns identified, deferred for follow up unless specifically addressed in assessment and plan.    Fall Risk Assessment  Has the patient had two or more falls in the last year or any fall with injury in the last year?  No  If fall risk identified, deferred for follow up unless specifically addressed in assessment and plan.    Safety Assessment  Throw rugs on floor.  Yes  Handrails on all stairs.  Yes  Good lighting in all hallways.  Yes  Difficulty hearing.  No  Patient counseled about all safety risks that were identified.    Functional Assessment ADLs  Are there any barriers preventing you from cooking for yourself or meeting nutritional needs?  No.    Are there any barriers preventing you from driving safely or obtaining transportation?  No.    Are there any barriers preventing you from using a telephone or calling for help?  No.    Are there any barriers preventing you from shopping?  No.    Are there any barriers preventing you from taking care of your own finances?  No.    Are there any barriers preventing you from managing your medications?  No.    Are there any barriers preventing you from showering, bathing or  dressing yourself?  No.    Are you currently engaging in any exercise or physical activity?  Yes.  Hiking   What is your perception of your health?  Good.    Advance Care Planning  Do you have an Advance Directive, Living Will, Durable Power of , or POLST? Yes    Living Will     is on file    Health Maintenance Summary            Overdue - IMM HEP B VACCINE (1 of 3 - 3-dose series) Overdue - never done      No completion history exists for this topic.              Overdue - Annual Wellness Visit (Every 366 Days) Overdue since 12/3/2022      12/02/2021  Visit Dx: Medicare annual wellness visit, subsequent              IMM DTaP/Tdap/Td Vaccine (2 - Td or Tdap) Next due on 10/16/2023      10/16/2013  Imm Admin: Tdap Vaccine              Ordered - COLORECTAL CANCER SCREENING (COLONOSCOPY - Every 10 Years) Ordered on 12/7/2022 05/28/2019  OCCULT BLOOD FECES IMMUNOASSAY    09/12/2018  REFERRAL TO GI FOR COLONOSCOPY    10/13/2015  OCCULT BLOOD FECES IMMUNOASSAY    10/09/2014  OCCULT BLOOD FECES IMMUNOASSAY    09/19/2013  OCCULT BLOOD FECES IMMUNOASSAY    Only the first 5 history entries have been loaded, but more history exists.              IMM PNEUMOCOCCAL VACCINE: 65+ Years (Series Information) Completed      10/21/2020  Imm Admin: Pneumococcal polysaccharide vaccine (PPSV-23)    10/04/2019  Imm Admin: Pneumococcal Conjugate Vaccine (Prevnar/PCV-13)              HEPATITIS C SCREENING  Completed      10/29/2020  HEP C VIRUS ANTIBODY              IMM ZOSTER VACCINES (Series Information) Completed      04/20/2022  Imm Admin: Zoster Vaccine Recombinant (RZV) (SHINGRIX)    12/16/2021  Imm Admin: Zoster Vaccine Recombinant (RZV) (SHINGRIX)    10/17/2016  Imm Admin: Zoster Vaccine Live (ZVL) (Zostavax) - HISTORICAL DATA              COVID-19 Vaccine (Series Information) Completed      09/15/2022  Imm Admin: MODERNA BIVALENT BOOSTER SARS-COV-2 VACCINE (6+)    10/23/2021  Imm Admin: MODERNA SARS-COV-2 VACCINE (12+)     04/01/2021  Imm Admin: MODERNA SARS-COV-2 VACCINE (12+)    03/04/2021  Imm Admin: MODERNA SARS-COV-2 VACCINE (12+)              IMM INFLUENZA (Series Information) Completed      10/31/2022  Imm Admin: Influenza Vaccine Quad Inj (Pf)    10/14/2021  Imm Admin: Influenza Vaccine Adult HD    09/28/2020  Imm Admin: Influenza Vaccine Quad Inj (Pf)    10/04/2019  Imm Admin: Influenza Vaccine Adult HD    10/05/2018  Imm Admin: Influenza Vaccine Adult HD    Only the first 5 history entries have been loaded, but more history exists.              IMM MENINGOCOCCAL ACWY VACCINE (Series Information) Aged Out      No completion history exists for this topic.                    Patient Care Team:  Eva Rand M.D. as PCP - General (Family Medicine)  Eva Rand M.D. as PCP - [facility] Paneled  Mag Smith M.D. as Consulting Physician (Dermatology)  Eva Rand M.D. as Consulting Physician (Family Medicine)  Hari Cummings D.C. as Consulting Physician (Chiropractic)  Mag Smith M.D. (Dermatology)    Social History     Tobacco Use    Smoking status: Never    Smokeless tobacco: Never   Vaping Use    Vaping Use: Never used   Substance Use Topics    Alcohol use: Yes     Alcohol/week: 3.6 oz     Types: 3 Glasses of wine, 3 Cans of beer per week     Comment: less than every day, 4x a week    Drug use: Yes     Types: Marijuana, Oral     Comment: occasionally     Family History   Problem Relation Age of Onset    Alcohol/Drug Father         alcohol    Alcohol/Drug Brother         alcohol    Sleep Apnea Brother     Cancer Brother     Cancer Brother         brain    Heart Disease Neg Hx      He  has a past medical history of Basal cell carcinoma, Chickenpox, Dyslipidemia, [place] measles, Groin strain (10/21/2011), Hemorrhoid, Hyperlipidemia, Insomnia, Left lateral epicondylitis (5/6/2016), Mumps, Right hip pain (11/21/2013), S/P colonoscopy (7/2013), S/P colonoscopy with polypectomy, Scoliosis, Sleep apnea, and Tonsillitis.   Past  "Surgical History:   Procedure Laterality Date    BLEPHAROPLASTY  11/14/2012    Performed by Florentin Naylor M.D. at SURGERY [facility] ORS    INGUINAL HERNIA REPAIR  3/17/2009    Performed by SUDHA BARRETT at SURGERY [facility]    TONSILLECTOMY         Exam:   /80 (BP Location: Left arm, Patient Position: Sitting, BP Cuff Size: Adult)   Pulse 67   Temp 36.6 °C (97.9 °F) (Temporal)   Resp 17   Ht 1.803 m (5' 11\")   Wt 77.3 kg (170 lb 6.4 oz)   SpO2 93%  Body mass index is 23.77 kg/m².    Hearing fair  Dentition good  Alert, oriented in no acute distress.  Eye contact is good, speech goal directed, affect calm  Constitutional: He appears well-developed and well-nourished. He appears not diaphoretic. No distress.   HENT: Right Ear: External ear normal. Left Ear: External ear normal. Tympanic membranes clear and intact.   Nose: Nose normal.   Mouth/Throat: Oropharynx is clear and moist. No oropharyngeal exudate.     Eyes: Conjunctivae and extraocular motions are normal. Pupils are equal, round, and reactive to light. No scleral icterus.   Neck: Normal range of motion. Neck supple. No thyromegaly present.   Cardiovascular: Normal rate, regular rhythm, normal heart sounds and intact distal pulses.  Exam reveals no gallop and no friction rub.  No murmur heard. No carotid bruits.   Pulmonary/Chest: Effort normal and breath sounds normal. No respiratory distress. No wheezes. No rales.   Abdominal: Soft. Bowel sounds are normal. He exhibits no distension and no mass. No tenderness. No rebound and no guarding.   Lymphadenopathy:  He has no cervical adenopathy. No inguinal adenopathy.   Neurological:He is alert. He has normal reflexes. No cranial nerve deficit. He exhibits normal muscle tone.   Skin: Skin is warm and dry. No rash noted. He is not diaphoretic. No erythema.  Patchy areas of pink and dry skin on scalp.  Slight dryness of fingertips.  Psychiatric: He has a normal mood and affect. " His behavior is normal.   Musculoskeletal: He exhibits no edema. Normal strength throughout. 2+ DTR throughout.       Assessment and Plan. The following treatment and monitoring plan is recommended:      67-year-old male for annual wellness visit.    1. Medicare annual wellness visit, subsequent   Recommend healthy diet and routine exercise as tolerated.       2. GISSEL on CPAP -stable.  Continue current therapy.  Patient may switch to a different company.  Follow-up with pulmonary routinely.       3. Memory changes-stable.  Continue routine follow-up with specialist.       4. Sensorineural hearing loss, bilateral-stable.  He will plan to have annual hearing test.       5. AK (actinic keratosis)-stable.  Continue to follow with dermatology.       6. History of basal cell carcinoma -stable.  Continue routine follow-up with dermatology.       7. Dermatitis-fingertips.  Avoid hot water.  Regular moisturization of area recommended.  Consider Aquaphor use.  Follow-up with dermatology if no further improvements.       8. History of colon polyps  Referral to GI for Colonoscopy      9. Postnasal drip -likely associate with some allergies.  He will try Flonase again.       10. Seasonal allergies -stable.  He will try Flonase again.       11. Benign prostatic hyperplasia with nocturia -stable.  Continue Flomax therapy.       12. Pure hypercholesterolemia -stable.  Continue statin therapy.  Recommend low-cholesterol, high-fiber diet and routine exercise as tolerated. Lipid Profile    TSH WITH REFLEX TO FT4      13. Elevated fasting glucose   Recommend low sugar/low processed food diet and routine exercise.  Monitor labs. HEMOGLOBIN A1C      14. Thrombocytopenia (HCC)-stable.  Monitor labs. CBC WITH DIFFERENTIAL      15. Vitamin D insufficiency -stable.  Continue current supplement.  Monitor labs. VITAMIN D,25 HYDROXY (DEFICIENCY)      16. Immunity status testing  HEP B SURFACE AB    HEP B SURFACE ANTIGEN      17. Screen for colon  cancer  Referral to GI for Colonoscopy      18. Screening for prostate cancer  PROSTATE SPECIFIC AG SCREENING      19. Screening for diabetes mellitus  Comp Metabolic Panel          Services suggested: No services needed at this time  Health Care Screening recommendations as per orders if indicated.  Referrals offered: PT/OT/Nutrition counseling/Behavioral Health/Smoking cessation as per orders if indicated.    Discussion today about general wellness and lifestyle habits:    Prevent falls and reduce trip hazards; Cautioned about securing or removing rugs.  Have a working fire alarm and carbon monoxide detector;   Engage in regular physical activity and social activities     Follow-up: Return in about 2 weeks (around 12/21/2022).  Recommend routine annual well visit.    This medical record contains text that has been entered with the assistance of computer voice recognition and dictation software.  Therefore, it may contain unintended errors in text, spelling, punctuation, or grammar.

## 2022-12-09 ENCOUNTER — TELEPHONE (OUTPATIENT)
Dept: MEDICAL GROUP | Facility: MEDICAL CENTER | Age: 67
End: 2022-12-09
Payer: MEDICARE

## 2022-12-09 NOTE — TELEPHONE ENCOUNTER
Trying to call patient for Medication review for [facility].     1  Attempt(s) made to contact patient.   Last voice mail left on 12/9/22    Awaiting patient call back.     Jaylen Alicea, Pharm. D

## 2022-12-12 ENCOUNTER — TELEPHONE (OUTPATIENT)
Dept: MEDICAL GROUP | Facility: MEDICAL CENTER | Age: 67
End: 2022-12-12
Payer: MEDICARE

## 2022-12-12 RX ORDER — IMIQUIMOD 12.5 MG/.25G
CREAM TOPICAL
COMMUNITY
Start: 2022-12-12

## 2022-12-12 RX ORDER — KETOCONAZOLE 20 MG/G
CREAM TOPICAL PRN
COMMUNITY
Start: 2022-12-12

## 2022-12-12 NOTE — TELEPHONE ENCOUNTER
22     Seasonal    Medication list was reviewed by the clinical pharmacist and updated to reflect the patient's current medications.  Was the patient contacted to verify the current medications list? YES  Was the patient preferred pharmacy contacted to verify current medications list? NO      Current Outpatient Medications:     ketoconazole (NIZORAL) 2 % Cream, as needed., Disp: , Rfl:     imiquimod (ALDARA) 5 % cream, prn, Disp: , Rfl:     calcipotriene (DOVONEX) 0.005 % Cream, , Disp: , Rfl:     fluticasone (FLONASE) 50 MCG/ACT nasal spray, use 2 sprays in each nostril daily, Disp: 16 g, Rfl: 3    simvastatin (ZOCOR) 40 MG Tab, TAKE 1 TABLET EVERY EVENING, Disp: 100 Tablet, Rfl: 3    tamsulosin (FLOMAX) 0.4 MG capsule, Take 1 Capsule by mouth every day., Disp: 100 Capsule, Rfl: 3    albuterol 108 (90 Base) MCG/ACT Aero Soln inhalation aerosol, Inhale 2 Puffs every 6 hours as needed for Shortness of Breath., Disp: 1 Each, Rfl: 1    ASPIRIN 81 PO, Take  by mouth., Disp: , Rfl:     Levocetirizine Dihydrochloride (XYZAL) 5 MG Tab, Take 1 Tablet by mouth every day., Disp: 30 Tablet, Rfl: 3    Multiple Vitamins-Minerals (MULTIVITAMIN ADULTS PO), Take  by mouth., Disp: , Rfl:     Ascorbic Acid (VITAMIN C PO), Take 1,000 mg by mouth., Disp: , Rfl:     Cholecalciferol (VITAMIN D PO), Take  by mouth., Disp: , Rfl:     Vitamin E 400 UNIT Tab, Take  by mouth., Disp: , Rfl:     Omega-3 Fatty Acids (FISH OIL) 1000 MG Cap capsule, Take 1,000 mg by mouth 3 times a day, with meals., Disp: , Rfl:     GLUCOSAMINE CHONDROITIN COMPLX PO, Take  by mouth., Disp: , Rfl:     Based on the pharmacist medication review, the following changes in the patient's medications are recommended based on plans preferred Tiers.   No changes needed at this time.     Pt will need new rx of Xyzal and it has  and he has been buying [facility] cetrizine OTC. Xyzal is the preferred antihistamine that is covered.     Jaylen Alicea, PharmD

## 2022-12-23 ENCOUNTER — OFFICE VISIT (OUTPATIENT)
Dept: MEDICAL GROUP | Facility: IMAGING CENTER | Age: 67
End: 2022-12-23
Payer: MEDICARE

## 2022-12-23 VITALS — RESPIRATION RATE: 17 BRPM | WEIGHT: 169.2 LBS | BODY MASS INDEX: 23.6 KG/M2

## 2022-12-23 DIAGNOSIS — E78.00 PURE HYPERCHOLESTEROLEMIA: ICD-10-CM

## 2022-12-23 ASSESSMENT — PAIN SCALES - GENERAL: PAINLEVEL: NO PAIN

## 2022-12-24 ENCOUNTER — HOSPITAL ENCOUNTER (OUTPATIENT)
Dept: LAB | Facility: MEDICAL CENTER | Age: 67
End: 2022-12-24
Attending: FAMILY MEDICINE
Payer: MEDICARE

## 2022-12-24 DIAGNOSIS — R73.01 ELEVATED FASTING GLUCOSE: ICD-10-CM

## 2022-12-24 DIAGNOSIS — E78.00 PURE HYPERCHOLESTEROLEMIA: ICD-10-CM

## 2022-12-24 DIAGNOSIS — E55.9 VITAMIN D INSUFFICIENCY: ICD-10-CM

## 2022-12-24 DIAGNOSIS — D69.6 THROMBOCYTOPENIA (HCC): ICD-10-CM

## 2022-12-24 DIAGNOSIS — Z12.5 SCREENING FOR PROSTATE CANCER: ICD-10-CM

## 2022-12-24 DIAGNOSIS — Z13.1 SCREENING FOR DIABETES MELLITUS: ICD-10-CM

## 2022-12-24 DIAGNOSIS — Z01.84 IMMUNITY STATUS TESTING: ICD-10-CM

## 2022-12-24 LAB
25(OH)D3 SERPL-MCNC: 39 NG/ML (ref 30–100)
ALBUMIN SERPL BCP-MCNC: 4.4 G/DL (ref 3.2–4.9)
ALBUMIN/GLOB SERPL: 1.7 G/DL
ALP SERPL-CCNC: 56 U/L (ref 30–99)
ALT SERPL-CCNC: 25 U/L (ref 2–50)
ANION GAP SERPL CALC-SCNC: 6 MMOL/L (ref 7–16)
AST SERPL-CCNC: 27 U/L (ref 12–45)
BASOPHILS # BLD AUTO: 0.4 % (ref 0–1.8)
BASOPHILS # BLD: 0.02 K/UL (ref 0–0.12)
BILIRUB SERPL-MCNC: 0.6 MG/DL (ref 0.1–1.5)
BUN SERPL-MCNC: 17 MG/DL (ref 8–22)
CALCIUM ALBUM COR SERPL-MCNC: 10 MG/DL (ref 8.5–10.5)
CALCIUM SERPL-MCNC: 10.3 MG/DL (ref 8.5–10.5)
CHLORIDE SERPL-SCNC: 103 MMOL/L (ref 96–112)
CHOLEST SERPL-MCNC: 174 MG/DL (ref 100–199)
CO2 SERPL-SCNC: 31 MMOL/L (ref 20–33)
CREAT SERPL-MCNC: 0.98 MG/DL (ref 0.5–1.4)
EOSINOPHIL # BLD AUTO: 0.07 K/UL (ref 0–0.51)
EOSINOPHIL NFR BLD: 1.3 % (ref 0–6.9)
ERYTHROCYTE [DISTWIDTH] IN BLOOD BY AUTOMATED COUNT: 51.8 FL (ref 35.9–50)
EST. AVERAGE GLUCOSE BLD GHB EST-MCNC: 120 MG/DL
FASTING STATUS PATIENT QL REPORTED: NORMAL
GFR SERPLBLD CREATININE-BSD FMLA CKD-EPI: 83 ML/MIN/1.73 M 2
GLOBULIN SER CALC-MCNC: 2.6 G/DL (ref 1.9–3.5)
GLUCOSE SERPL-MCNC: 97 MG/DL (ref 65–99)
HBA1C MFR BLD: 5.8 % (ref 4–5.6)
HBV SURFACE AB SERPL IA-ACNC: <3.5 MIU/ML (ref 0–10)
HBV SURFACE AG SER QL: NORMAL
HCT VFR BLD AUTO: 48.9 % (ref 42–52)
HDLC SERPL-MCNC: 70 MG/DL
HGB BLD-MCNC: 15.8 G/DL (ref 14–18)
IMM GRANULOCYTES # BLD AUTO: 0.01 K/UL (ref 0–0.11)
IMM GRANULOCYTES NFR BLD AUTO: 0.2 % (ref 0–0.9)
LDLC SERPL CALC-MCNC: 85 MG/DL
LYMPHOCYTES # BLD AUTO: 1.79 K/UL (ref 1–4.8)
LYMPHOCYTES NFR BLD: 33.3 % (ref 22–41)
MCH RBC QN AUTO: 32.8 PG (ref 27–33)
MCHC RBC AUTO-ENTMCNC: 32.3 G/DL (ref 33.7–35.3)
MCV RBC AUTO: 101.5 FL (ref 81.4–97.8)
MONOCYTES # BLD AUTO: 0.42 K/UL (ref 0–0.85)
MONOCYTES NFR BLD AUTO: 7.8 % (ref 0–13.4)
NEUTROPHILS # BLD AUTO: 3.07 K/UL (ref 1.82–7.42)
NEUTROPHILS NFR BLD: 57 % (ref 44–72)
NRBC # BLD AUTO: 0 K/UL
NRBC BLD-RTO: 0 /100 WBC
PLATELET # BLD AUTO: 180 K/UL (ref 164–446)
PMV BLD AUTO: 10 FL (ref 9–12.9)
POTASSIUM SERPL-SCNC: 4.6 MMOL/L (ref 3.6–5.5)
PROT SERPL-MCNC: 7 G/DL (ref 6–8.2)
PSA SERPL-MCNC: 2.39 NG/ML (ref 0–4)
RBC # BLD AUTO: 4.82 M/UL (ref 4.7–6.1)
SODIUM SERPL-SCNC: 140 MMOL/L (ref 135–145)
TRIGL SERPL-MCNC: 95 MG/DL (ref 0–149)
TSH SERPL DL<=0.005 MIU/L-ACNC: 3.15 UIU/ML (ref 0.38–5.33)
WBC # BLD AUTO: 5.4 K/UL (ref 4.8–10.8)

## 2022-12-24 PROCEDURE — 82306 VITAMIN D 25 HYDROXY: CPT

## 2022-12-24 PROCEDURE — 80061 LIPID PANEL: CPT

## 2022-12-24 PROCEDURE — 86706 HEP B SURFACE ANTIBODY: CPT

## 2022-12-24 PROCEDURE — 84443 ASSAY THYROID STIM HORMONE: CPT

## 2022-12-24 PROCEDURE — 80053 COMPREHEN METABOLIC PANEL: CPT

## 2022-12-24 PROCEDURE — 36415 COLL VENOUS BLD VENIPUNCTURE: CPT

## 2022-12-24 PROCEDURE — 87340 HEPATITIS B SURFACE AG IA: CPT

## 2022-12-24 PROCEDURE — 83036 HEMOGLOBIN GLYCOSYLATED A1C: CPT

## 2022-12-24 PROCEDURE — 85025 COMPLETE CBC W/AUTO DIFF WBC: CPT

## 2022-12-24 PROCEDURE — 84153 ASSAY OF PSA TOTAL: CPT

## 2023-01-27 ENCOUNTER — OFFICE VISIT (OUTPATIENT)
Dept: MEDICAL GROUP | Facility: IMAGING CENTER | Age: 68
End: 2023-01-27
Payer: MEDICARE

## 2023-01-27 VITALS
BODY MASS INDEX: 23.6 KG/M2 | WEIGHT: 168.6 LBS | DIASTOLIC BLOOD PRESSURE: 64 MMHG | HEIGHT: 71 IN | RESPIRATION RATE: 17 BRPM | HEART RATE: 58 BPM | OXYGEN SATURATION: 99 % | SYSTOLIC BLOOD PRESSURE: 110 MMHG | TEMPERATURE: 97.9 F

## 2023-01-27 DIAGNOSIS — R71.8 ELEVATED MCV: ICD-10-CM

## 2023-01-27 DIAGNOSIS — Z23 NEED FOR HEPATITIS B VACCINATION: ICD-10-CM

## 2023-01-27 DIAGNOSIS — R89.9 ABNORMAL LABORATORY TEST RESULT: ICD-10-CM

## 2023-01-27 DIAGNOSIS — M62.89 EXERCISE-INDUCED LEG FATIGUE: ICD-10-CM

## 2023-01-27 DIAGNOSIS — R73.09 ELEVATED HEMOGLOBIN A1C: ICD-10-CM

## 2023-01-27 DIAGNOSIS — L30.9 DERMATITIS: ICD-10-CM

## 2023-01-27 PROCEDURE — 99214 OFFICE O/P EST MOD 30 MIN: CPT | Mod: 25 | Performed by: FAMILY MEDICINE

## 2023-01-27 PROCEDURE — G0010 ADMIN HEPATITIS B VACCINE: HCPCS | Performed by: FAMILY MEDICINE

## 2023-01-27 PROCEDURE — 90746 HEPB VACCINE 3 DOSE ADULT IM: CPT | Performed by: FAMILY MEDICINE

## 2023-01-27 ASSESSMENT — FIBROSIS 4 INDEX: FIB4 SCORE: 2.07

## 2023-01-27 ASSESSMENT — PATIENT HEALTH QUESTIONNAIRE - PHQ9: CLINICAL INTERPRETATION OF PHQ2 SCORE: 0

## 2023-01-27 ASSESSMENT — PAIN SCALES - GENERAL: PAINLEVEL: NO PAIN

## 2023-01-27 NOTE — PATIENT INSTRUCTIONS
Aquaphor and cotton gloves in the evening recommended.  You may also use on your fingertips cortisone 10 over-the-counter.

## 2023-01-27 NOTE — PROGRESS NOTES
SUBJECTIVE:    Chief Complaint   Patient presents with    Lab Results    Follow-Up     Dry hands/nails breaking       HPI:     Nick Rodriguez is a 67 y.o. male with arlen, chronic insomnia, hx of memory changes, chronic low back/hip pain, hypercholesterolemia, and hx of basal cell carcinoma    IFG/a1c 5.8%- no issues in past.   Has had more desserts than usual.     MCV elevated-he does have wine with dinner, has been doing this more so past year.  He will plan to reduce his wine intake.  MCHC decreased.    Hepatitis B titers do not show immunity.    Has had some cracking at his fingertips around his nail areas.    Patient does feel his legs and upper thighs are more fatigue with skiing this year compared to prior year.  He is retired and in his third year.  He does not feel like his activities have changed much.  He has not done conditioning exercises prior to skiing.  However, he is otherwise active without other sports/exercise activities without issues.    Seeing [facility] therapies- Eli Dos Santos, MS, CCC-SLP  Patient states he is given permission to review his medical records if needed.      ROS:  No recent fevers or chills. No nausea or vomiting. No diarrhea. No chest pains or shortness of breath. No lower extremity edema.    Current Outpatient Medications on File Prior to Visit   Medication Sig Dispense Refill    ketoconazole (NIZORAL) 2 % Cream as needed.      imiquimod (ALDARA) 5 % cream prn      calcipotriene (DOVONEX) 0.005 % Cream       fluticasone (FLONASE) 50 MCG/ACT nasal spray use 2 sprays in each nostril daily 16 g 3    simvastatin (ZOCOR) 40 MG Tab TAKE 1 TABLET EVERY EVENING 100 Tablet 3    tamsulosin (FLOMAX) 0.4 MG capsule Take 1 Capsule by mouth every day. 100 Capsule 3    albuterol 108 (90 Base) MCG/ACT Aero Soln inhalation aerosol Inhale 2 Puffs every 6 hours as needed for Shortness of Breath. 1 Each 1    ASPIRIN 81 PO Take  by mouth.      Levocetirizine Dihydrochloride (XYZAL) 5  MG Tab Take 1 Tablet by mouth every day. 30 Tablet 3    Multiple Vitamins-Minerals (MULTIVITAMIN ADULTS PO) Take  by mouth.      Ascorbic Acid (VITAMIN C PO) Take 1,000 mg by mouth.      Cholecalciferol (VITAMIN D PO) Take  by mouth.      Vitamin E 400 UNIT Tab Take  by mouth.      Omega-3 Fatty Acids (FISH OIL) 1000 MG Cap capsule Take 1,000 mg by mouth 3 times a day, with meals.      GLUCOSAMINE CHONDROITIN COMPLX PO Take  by mouth.       No current facility-administered medications on file prior to visit.       Allergies   Allergen Reactions    Seasonal        Patient Active Problem List    Diagnosis Date Noted    GISSEL on CPAP 12/07/2022    CAD (coronary artery disease) by coronary artery calcium score of 807 Agatston done as screening, no symptoms 07/26/2022    Sensorineural hearing loss, bilateral 02/19/2021    Ringing in left ear 02/19/2021    Presbycusis 02/19/2021    Hearing loss of left ear 02/19/2021    Persistent cognitive impairment 01/14/2021    History of colon polyps 10/15/2019    Elevated fasting glucose 10/16/2018    History of basal cell carcinoma 10/16/2018    Non-smoker 09/13/2018    Chronic insomnia 08/23/2018    Memory changes 04/29/2018    Pure hypercholesterolemia 11/14/2017    Chronic bilateral low back pain without sciatica 05/30/2017    Obstructive sleep apnea syndrome 10/17/2016    Pain in right shin 10/17/2016    Seasonal allergies 05/06/2016    Thrombocytopenia (HCC) 12/30/2015    Scoliosis 10/09/2015    Benign prostatic hyperplasia with lower urinary tract symptoms 02/12/2015    Vitamin D insufficiency 11/21/2013    Postnasal drip 05/03/2012       Past Medical History:   Diagnosis Date    Basal cell carcinoma     dermatology- Dr. Smith    Chickenpox     Dyslipidemia     [place] measles     Groin strain 10/21/2011     ICD-10 transition    Hemorrhoid     Hyperlipidemia     Insomnia     Left lateral epicondylitis 5/6/2016    Mumps     Right hip pain 11/21/2013    S/P colonoscopy 7/2013     "negative, repeat in 5 years    S/P colonoscopy with polypectomy     age 53, due age    Scoliosis     Sleep apnea     Tonsillitis          OBJECTIVE:   /64 (BP Location: Left arm, Patient Position: Sitting, BP Cuff Size: Adult)   Pulse (!) 58   Temp 36.6 °C (97.9 °F) (Temporal)   Resp 17   Ht 1.803 m (5' 11\")   Wt 76.5 kg (168 lb 9.6 oz)   SpO2 99%   BMI 23.51 kg/m²   General: Well-developed well-nourished male, no acute distress  Neck: supple, no lymphadenopathy- cervical or supraclavicular, no thyromegaly  Cardiovascular: regular rate and rhythm, no murmurs, gallops, rubs  Lungs: clear to auscultation bilaterally, no wheezes, crackles, or rhonchi  Abdomen: +bowel sounds, soft, nontender, nondistended, no rebound, no guarding, no hepatosplenomegaly  Extremities: no cyanosis, clubbing, edema.  Slight dryness and slight cracking of fingertips noted.  Skin: Warm and dry  Psych: appropriate mood and affect       Latest Reference Range & Units 12/24/22 07:31   WBC 4.8 - 10.8 K/uL 5.4   RBC 4.70 - 6.10 M/uL 4.82   Hemoglobin 14.0 - 18.0 g/dL 15.8   Hematocrit 42.0 - 52.0 % 48.9   MCV 81.4 - 97.8 fL 101.5 (H)   MCH 27.0 - 33.0 pg 32.8   MCHC 33.7 - 35.3 g/dL 32.3 (L)   RDW 35.9 - 50.0 fL 51.8 (H)   Platelet Count 164 - 446 K/uL 180   MPV 9.0 - 12.9 fL 10.0   Neutrophils-Polys 44.00 - 72.00 % 57.00   Neutrophils (Absolute) 1.82 - 7.42 K/uL 3.07   Lymphocytes 22.00 - 41.00 % 33.30   Lymphs (Absolute) 1.00 - 4.80 K/uL 1.79   Monocytes 0.00 - 13.40 % 7.80   Monos (Absolute) 0.00 - 0.85 K/uL 0.42   Eosinophils 0.00 - 6.90 % 1.30   Eos (Absolute) 0.00 - 0.51 K/uL 0.07   Basophils 0.00 - 1.80 % 0.40   Baso (Absolute) 0.00 - 0.12 K/uL 0.02   Immature Granulocytes 0.00 - 0.90 % 0.20   Immature Granulocytes (abs) 0.00 - 0.11 K/uL 0.01   Nucleated RBC /100 WBC 0.00   NRBC (Absolute) K/uL 0.00   Sodium 135 - 145 mmol/L 140   Potassium 3.6 - 5.5 mmol/L 4.6   Chloride 96 - 112 mmol/L 103   Co2 20 - 33 mmol/L 31   Anion Gap " 7.0 - 16.0  6.0 (L)   Glucose 65 - 99 mg/dL 97   Bun 8 - 22 mg/dL 17   Creatinine 0.50 - 1.40 mg/dL 0.98   GFR (CKD-EPI) >60 mL/min/1.73 m 2 83   Calcium 8.5 - 10.5 mg/dL 10.3   Correct Calcium 8.5 - 10.5 mg/dL 10.0   AST(SGOT) 12 - 45 U/L 27   ALT(SGPT) 2 - 50 U/L 25   Alkaline Phosphatase 30 - 99 U/L 56   Total Bilirubin 0.1 - 1.5 mg/dL 0.6   Albumin 3.2 - 4.9 g/dL 4.4   Total Protein 6.0 - 8.2 g/dL 7.0   Globulin 1.9 - 3.5 g/dL 2.6   A-G Ratio g/dL 1.7   Glycohemoglobin 4.0 - 5.6 % 5.8 (H)   Estim. Avg Glu mg/dL 120   Fasting Status  Fasting   Cholesterol,Tot 100 - 199 mg/dL 174   Triglycerides 0 - 149 mg/dL 95   HDL >=40 mg/dL 70   LDL <100 mg/dL 85   25-Hydroxy   Vitamin D 25 30 - 100 ng/mL 39   Prostatic Specific Antigen Tot 0.00 - 4.00 ng/mL 2.39   TSH 0.380 - 5.330 uIU/mL 3.150   Hep B Surface Antibody Quant 0.00 - 10.00 mIU/mL <3.50   Hepatitis B Surface Antigen Non-Reactive  Non-Reactive   (H): Data is abnormally high  (L): Data is abnormally low      ASSESSMENT/PLAN:    67 y.o.male with arlen, chronic insomnia, hx of memory changes, chronic low back/hip pain, hypercholesterolemia, and hx of basal cell carcinoma      1. Elevated hemoglobin A1c   Recommend low sugar/low processed food diet and routine exercise.  We will continue monitor in future.  Plan to repeat in 6 months.       2. Elevated MCV -assess labs.  Advised patient to decrease alcohol intake.  Monitor.  Consider repeat CBC in 3 months. VITAMIN B12    FOLATE    IRON/TOTAL IRON BIND    FERRITIN      3. Abnormal laboratory test result -decrease in MCHC, assess labs as above.       4. Need for hepatitis B vaccination  Hep B Adult 20+      5. Dermatitis -fingertips.  Avoid hot water, recommend Aquaphor and cotton gloves at nighttime, he may use cortisone 10 as needed.  Monitor.  Follow-up as needed if no further improvements.       6. Exercise-induced leg fatigue -appears associated with activity of skiing.  He is otherwise active with other  sports/exercise activities.  He would benefit from conditioning exercises prior to skiing.  We will continue to monitor at this time.           Return in about 3 months (around 4/27/2023).    This medical record contains text that has been entered with the assistance of computer voice recognition and dictation software.  Therefore, it may contain unintended errors in text, spelling, punctuation, or grammar.

## 2023-02-14 ENCOUNTER — APPOINTMENT (OUTPATIENT)
Dept: MEDICAL GROUP | Facility: IMAGING CENTER | Age: 68
End: 2023-02-14
Payer: MEDICARE

## 2023-02-27 ENCOUNTER — APPOINTMENT (OUTPATIENT)
Dept: MEDICAL GROUP | Facility: IMAGING CENTER | Age: 68
End: 2023-02-27
Payer: MEDICARE

## 2023-03-03 DIAGNOSIS — R05.8 DRY COUGH: ICD-10-CM

## 2023-03-03 RX ORDER — ALBUTEROL SULFATE 90 UG/1
2 AEROSOL, METERED RESPIRATORY (INHALATION) EVERY 6 HOURS PRN
Qty: 18 G | Refills: 0 | Status: CANCELLED | OUTPATIENT
Start: 2023-03-03

## 2023-04-03 ENCOUNTER — TELEPHONE (OUTPATIENT)
Dept: MEDICAL GROUP | Facility: IMAGING CENTER | Age: 68
End: 2023-04-03
Payer: MEDICARE

## 2023-04-03 NOTE — TELEPHONE ENCOUNTER
Dr. Rand   Pt called stated that his wife Kathy had vertigo , nausea and dizziness since Sat. They are heading to urgent care. They will follow up afterwards.

## 2023-04-04 ENCOUNTER — TELEPHONE (OUTPATIENT)
Dept: MEDICAL GROUP | Facility: IMAGING CENTER | Age: 68
End: 2023-04-04
Payer: MEDICARE

## 2023-04-04 NOTE — TELEPHONE ENCOUNTER
Caller Name: Eli Speech Pathologist   Call Back Number:     How would the patient prefer to be contacted with a response: Phone call do NOT leave a detailed message    Received a call from Eli wanting to speak to Dr. Rand to touch basis on pt's plan of care of for speech therapy. She will also be faxing over a report.       Dr. Rand could you please return Eli's phone call.     Thank you

## 2023-04-06 NOTE — TELEPHONE ENCOUNTER
Spoke with Eli, speech therapy.   Seeing him weekly.   Will see him 2 times a week for speech therapy. Cognitive therapy.   [facility].   Working on work findings and memory.  To do list.   Has noticed a lot of anxiety when doing things perfectly.   Wondering about more imaging. Advised will defer to neurology for imaging.   Has been advised to write things down.   Recommend working on management of anxiety.   She will plan to discussed with his therapist.   Patient has scheduled follow up visit in future.

## 2023-04-28 ENCOUNTER — OFFICE VISIT (OUTPATIENT)
Dept: MEDICAL GROUP | Facility: IMAGING CENTER | Age: 68
End: 2023-04-28
Payer: MEDICARE

## 2023-04-28 VITALS
RESPIRATION RATE: 17 BRPM | DIASTOLIC BLOOD PRESSURE: 70 MMHG | HEART RATE: 66 BPM | SYSTOLIC BLOOD PRESSURE: 122 MMHG | WEIGHT: 167.8 LBS | TEMPERATURE: 98.3 F | OXYGEN SATURATION: 95 % | HEIGHT: 71 IN | BODY MASS INDEX: 23.49 KG/M2

## 2023-04-28 DIAGNOSIS — Z85.828 HISTORY OF BASAL CELL CARCINOMA: ICD-10-CM

## 2023-04-28 DIAGNOSIS — R71.8 ELEVATED MCV: ICD-10-CM

## 2023-04-28 DIAGNOSIS — R73.09 ELEVATED HEMOGLOBIN A1C: ICD-10-CM

## 2023-04-28 DIAGNOSIS — J30.2 SEASONAL ALLERGIES: ICD-10-CM

## 2023-04-28 DIAGNOSIS — E78.00 PURE HYPERCHOLESTEROLEMIA: ICD-10-CM

## 2023-04-28 DIAGNOSIS — G47.33 OSA ON CPAP: ICD-10-CM

## 2023-04-28 DIAGNOSIS — G31.84 MCI (MILD COGNITIVE IMPAIRMENT): ICD-10-CM

## 2023-04-28 PROCEDURE — 99214 OFFICE O/P EST MOD 30 MIN: CPT | Performed by: FAMILY MEDICINE

## 2023-04-28 ASSESSMENT — PAIN SCALES - GENERAL: PAINLEVEL: NO PAIN

## 2023-04-28 ASSESSMENT — FIBROSIS 4 INDEX: FIB4 SCORE: 2.07

## 2023-04-28 NOTE — PATIENT INSTRUCTIONS
-Plan to use flonase nasal spray for allergies and try to avoid oral allergy medication use at this time.   -Please complete labs within a month.  You must fast 8 to 10 hours.  Lab orders are in the system which you can complete at any [facility] lab.  Please follow-up for a visit after your lab work is complete in a month.

## 2023-04-28 NOTE — PROGRESS NOTES
SUBJECTIVE:    Chief Complaint   Patient presents with    Follow-Up     Fv on visits with specialists       HPI:     Nick Rodriguez is a 67 y.o. male with gissel, chronic insomnia, hx of memory changes, chronic low back/hip pain, hypercholesterolemia, and hx of basal cell carcinoma here for follow up of medical issues.     Memory issues- has been seeing Dr. Harris and  Eli Goodman. Doing overall well.   Difficulty with names.   Very helpful with continued therapy.   Told he is doing well, he feels the same.   1-2 times a week visit.   Some times 3 days.   Wants to make things better for family. Wife is going through health issues of sister. Looking for couples therapy.     Seasonal allergies- flonase. Stable.    Xyzal- not taking.     Has been skiing- thus has a red face.   Dermatology routinely a few weeks ago.   Hx of skin cancer.     GISSEL on CPAP- stable.         ROS:  No recent fevers or chills. No nausea or vomiting. No diarrhea. No chest pains or shortness of breath. No lower extremity edema.    Current Outpatient Medications on File Prior to Visit   Medication Sig Dispense Refill    albuterol 108 (90 Base) MCG/ACT Aero Soln inhalation aerosol INHALE 2 PUFFS EVERY 6 HOURS AS NEEDED FOR SHORTNESS OF BREATH 18 g 0    ketoconazole (NIZORAL) 2 % Cream as needed.      imiquimod (ALDARA) 5 % cream prn      calcipotriene (DOVONEX) 0.005 % Cream       fluticasone (FLONASE) 50 MCG/ACT nasal spray use 2 sprays in each nostril daily 16 g 3    simvastatin (ZOCOR) 40 MG Tab TAKE 1 TABLET EVERY EVENING 100 Tablet 3    tamsulosin (FLOMAX) 0.4 MG capsule Take 1 Capsule by mouth every day. 100 Capsule 3    ASPIRIN 81 PO Take  by mouth.      Levocetirizine Dihydrochloride (XYZAL) 5 MG Tab Take 1 Tablet by mouth every day. 30 Tablet 3    Multiple Vitamins-Minerals (MULTIVITAMIN ADULTS PO) Take  by mouth.      Ascorbic Acid (VITAMIN C PO) Take 1,000 mg by mouth.      Cholecalciferol (VITAMIN D PO) Take  by mouth.       Vitamin E 400 UNIT Tab Take  by mouth.      Omega-3 Fatty Acids (FISH OIL) 1000 MG Cap capsule Take 1,000 mg by mouth 3 times a day, with meals.      GLUCOSAMINE CHONDROITIN COMPLX PO Take  by mouth.       No current facility-administered medications on file prior to visit.       Allergies   Allergen Reactions    Seasonal        Patient Active Problem List    Diagnosis Date Noted    GISSEL on CPAP 12/07/2022    CAD (coronary artery disease) by coronary artery calcium score of 807 Agatston done as screening, no symptoms 07/26/2022    Sensorineural hearing loss, bilateral 02/19/2021    Ringing in left ear 02/19/2021    Presbycusis 02/19/2021    Hearing loss of left ear 02/19/2021    Persistent cognitive impairment 01/14/2021    History of colon polyps 10/15/2019    Elevated fasting glucose 10/16/2018    History of basal cell carcinoma 10/16/2018    Non-smoker 09/13/2018    Chronic insomnia 08/23/2018    Memory changes 04/29/2018    Pure hypercholesterolemia 11/14/2017    Chronic bilateral low back pain without sciatica 05/30/2017    Obstructive sleep apnea syndrome 10/17/2016    Pain in right shin 10/17/2016    Seasonal allergies 05/06/2016    Scoliosis 10/09/2015    Benign prostatic hyperplasia with lower urinary tract symptoms 02/12/2015    Vitamin D insufficiency 11/21/2013    Postnasal drip 05/03/2012       Past Medical History:   Diagnosis Date    Basal cell carcinoma     dermatology- Dr. Smith    Chickenpox     Dyslipidemia     [place] measles     Groin strain 10/21/2011     ICD-10 transition    Hemorrhoid     Hyperlipidemia     Insomnia     Left lateral epicondylitis 5/6/2016    Mumps     Right hip pain 11/21/2013    S/P colonoscopy 7/2013    negative, repeat in 5 years    S/P colonoscopy with polypectomy     age 53, due age    Scoliosis     Sleep apnea     Tonsillitis          OBJECTIVE:   /70 (BP Location: Left arm, Patient Position: Sitting, BP Cuff Size: Adult)   Pulse 66   Temp 36.8 °C (98.3 °F)  "(Temporal)   Resp 17   Ht 1.803 m (5' 11\")   Wt 76.1 kg (167 lb 12.8 oz)   SpO2 95%   BMI 23.40 kg/m²   General: Well-developed well-nourished male, no acute distress  Neck: supple, no lymphadenopathy- cervical or supraclavicular, no thyromegaly  Cardiovascular: regular rate and rhythm, no murmurs, gallops, rubs  Lungs: clear to auscultation bilaterally, no wheezes, crackles, or rhonchi  Abdomen: +bowel sounds, soft, nontender, nondistended, no rebound, no guarding, no hepatosplenomegaly  Extremities: no cyanosis, clubbing, edema  Skin: Warm and dry  Psych: appropriate mood and affect      ASSESSMENT/PLAN:    67 y.o.male     1. MCI (mild cognitive impairment) -stable. Continue current therapy.        2. Seasonal allergies -stable, continue flonase as needed. He will remain off xyzal therapy. Monitor.        3. History of basal cell carcinoma   Recommend routine follow up with dermatology.        4. GISSEL on CPAP -stable, continue routine cpap use.        5. Elevated hemoglobin A1c   Recommend healthy diet and routine exercise. Monitor in future.  HEMOGLOBIN A1C      6. Pure hypercholesterolemia - stable, continue current medication. Recommend healthy diet and routine exercise.  Lipid Profile    TSH WITH REFLEX TO FT4      7. Elevated MCV   Monitor.  FERRITIN    IRON/TOTAL IRON BIND    FOLATE    VITAMIN B12    CBC WITHOUT DIFFERENTIAL          Follow up in one month.     This medical record contains text that has been entered with the assistance of computer voice recognition and dictation software.  Therefore, it may contain unintended errors in text, spelling, punctuation, or grammar.                            "

## 2023-05-09 NOTE — PATIENT PROFILE ADULT - .
23-Sep-2019 13:51:43

## 2023-05-26 ENCOUNTER — TELEPHONE (OUTPATIENT)
Dept: HEALTH INFORMATION MANAGEMENT | Facility: OTHER | Age: 68
End: 2023-05-26

## 2023-06-19 NOTE — PROGRESS NOTES
CC: Annual visit for GISSEL on auto titrating CPAP          HPI:     Mr. Nick Rodriguez is a 67year-old man whose original sleep study was performed in [facility] in 2012 and showed an RDI of 28.5, a supine RDI of 48.5, and a REM RDI of 52.3 with a tanisha saturation of 75%.    When last seen on 3/30/2022 his 30-day compliance was excellent and his AHI normalized at 2.6 on CPAP 9.4-13 CWP.    Today, 6/20/2023, his 30-day compliance is 100% for 6 hours and 30 minutes.  On auto titrating CPAP 9.4-13 CWP his average residual estimated apnea-hypopnea index is normal 2.4.    May take mask off at 4 in the AM.    The patient reports improved symptoms using positive airway pressure. Has experienced no significant problems with mask fit, mask leak, pressure tolerance, excessive airway dryness, nasal congestion, aerophagia, or chest pain.      Has seen Dr. Brown previously.  He retired in September 2020.     Significant comorbidities and modifying factors include elevated CT scoring calcium score 807, cognitive impairment, insomnia, seasonal allergies, BPH, dyslipidemia, non-smoker, dyslipidemia, normal BMI, and intermittent chronic low back pain.           Patient Active Problem List    Diagnosis Date Noted    GISSEL on CPAP 12/07/2022    CAD (coronary artery disease) by coronary artery calcium score of 807 Agatston done as screening, no symptoms 07/26/2022    Sensorineural hearing loss, bilateral 02/19/2021    Ringing in left ear 02/19/2021    Presbycusis 02/19/2021    Hearing loss of left ear 02/19/2021    Persistent cognitive impairment 01/14/2021    History of colon polyps 10/15/2019    Elevated fasting glucose 10/16/2018    History of basal cell carcinoma 10/16/2018    Non-smoker 09/13/2018    Chronic insomnia 08/23/2018    Memory changes 04/29/2018    Pure hypercholesterolemia 11/14/2017    Chronic bilateral low back pain without sciatica 05/30/2017    Obstructive sleep apnea syndrome 10/17/2016    Pain in right  shin 10/17/2016    Seasonal allergies 05/06/2016    Scoliosis 10/09/2015    Benign prostatic hyperplasia with lower urinary tract symptoms 02/12/2015    Vitamin D insufficiency 11/21/2013    Postnasal drip 05/03/2012       Past Medical History:   Diagnosis Date    Basal cell carcinoma     dermatology- Dr. Smith    Chickenpox     Dyslipidemia     [place]aham[place] measles     Groin strain 10/21/2011     ICD-10 transition    Hemorrhoid     Hyperlipidemia     Insomnia     Left lateral epicondylitis 5/6/2016    Mumps     Right hip pain 11/21/2013    S/P colonoscopy 7/2013    negative, repeat in 5 years    S/P colonoscopy with polypectomy     age 53, due age    Scoliosis     Sleep apnea     Tonsillitis         Past Surgical History:   Procedure Laterality Date    BLEPHAROPLASTY  11/14/2012    Performed by Florentin Naylor M.D. at [facility]    INGUINAL HERNIA REPAIR  3/17/2009    [facility]    TONSILLECTOMY         Family History   Problem Relation Age of Onset    Alcohol/Drug Father         alcohol    Alcohol/Drug Brother         alcohol    Sleep Apnea Brother     Cancer Brother     Cancer Brother         brain    Heart Disease Neg Hx        Social History     Socioeconomic History    Marital status:      Spouse name: Not on file    Number of children: Not on file    Years of education: Not on file    Highest education level: Master's degree (e.g., MA, MS, Piedad, MEd, MSW, LEILANI)   Occupational History    Occupation: ADMINISTRATION     Employer: [employer]   Tobacco Use    Smoking status: Never    Smokeless tobacco: Never   Vaping Use    Vaping Use: Never used   Substance and Sexual Activity    Alcohol use: Yes     Alcohol/week: 3.6 oz     Types: 3 Glasses of wine, 3 Cans of beer per week     Comment: less than every day, 4x a week    Drug use: Yes     Types: Marijuana, Oral     Comment: occasionally    Sexual activity: Yes     Partners: Female    Other Topics Concern    Not on file   Social History Narrative    , 3 children.      Social Determinants of Health     Financial Resource Strain: Low Risk  (12/4/2022)    Overall Financial Resource Strain (CARDIA)     Difficulty of Paying Living Expenses: Not hard at all   Food Insecurity: No Food Insecurity (12/4/2022)    Hunger Vital Sign     Worried About Running Out of Food in the Last Year: Never true     Ran Out of Food in the Last Year: Never true   Transportation Needs: No Transportation Needs (12/4/2022)    PRAPARE - Transportation     Lack of Transportation (Medical): No     Lack of Transportation (Non-Medical): No   Physical Activity: Sufficiently Active (12/4/2022)    Exercise Vital Sign     Days of Exercise per Week: 6 days     Minutes of Exercise per Session: 60 min   Stress: No Stress Concern Present (12/4/2022)    [place] [employer] - Occupational Stress Questionnaire     Feeling of Stress : Only a little   Social Connections: Socially Integrated (12/4/2022)    Social Connection and Isolation Panel [NHANES]     Frequency of Communication with Friends and Family: More than three times a week     Frequency of Social Gatherings with Friends and Family: Twice a week     Attends [employer] Services: 1 to 4 times per year     Active Member of Clubs or Organizations: Yes     Attends Club or Organization Meetings: More than 4 times per year     Marital Status:    Intimate Partner Violence: Not on file   Housing Stability: Low Risk  (12/4/2022)    Housing Stability Vital Sign     Unable to Pay for Housing in the Last Year: No     Number of Places Lived in the Last Year: 1     Unstable Housing in the Last Year: No       Current Outpatient Medications   Medication Sig Dispense Refill    simvastatin (ZOCOR) 40 MG Tab TAKE 1 TABLET EVERY EVENING 100 Tablet 3    tamsulosin (FLOMAX) 0.4 MG capsule Take 1 Capsule by mouth every day. 100 Capsule 3    ketoconazole (NIZORAL) 2 %  "Cream as needed.      fluticasone (FLONASE) 50 MCG/ACT nasal spray use 2 sprays in each nostril daily 16 g 3    ASPIRIN 81 PO Take  by mouth.      Multiple Vitamins-Minerals (MULTIVITAMIN ADULTS PO) Take  by mouth.      Ascorbic Acid (VITAMIN C PO) Take 1,000 mg by mouth.      Cholecalciferol (VITAMIN D PO) Take  by mouth.      Vitamin E 400 UNIT Tab Take  by mouth.      Omega-3 Fatty Acids (FISH OIL) 1000 MG Cap capsule Take 1,000 mg by mouth 3 times a day, with meals.      GLUCOSAMINE CHONDROITIN COMPLX PO Take  by mouth.      albuterol 108 (90 Base) MCG/ACT Aero Soln inhalation aerosol INHALE 2 PUFFS EVERY 6 HOURS AS NEEDED FOR SHORTNESS OF BREATH (Patient not taking: Reported on 6/20/2023) 18 g 0    imiquimod (ALDARA) 5 % cream prn      calcipotriene (DOVONEX) 0.005 % Cream        No current facility-administered medications for this visit.    \"CURRENT RX\"    ALLERGIES: Seasonal    ROS  Nasal: occasional congestion  Constitutional: Denies fever, chills, sweats,  weight loss, fatigue  Cardiovascular: Denies chest pain, tightness, palpitations, swelling in legs/feet, fainting, difficulty breathing when lying down but gets better when sitting up.   Respiratory: Denies shortness of breath, cough, sputum, wheezing, painful breathing, coughing up blood.   Sleep: per HPI  Gastrointestinal: Denies  difficulty swallowing, nausea, abdominal pain, diarrhea, constipation, heartburn.  Musculoskeletal: Denies painful joints, sore muscles, back pain.        PHYSICAL EXAM      /82 (BP Location: Left arm, Patient Position: Sitting, BP Cuff Size: Adult)   Pulse (!) 56   Resp 16   Ht 1.791 m (5' 10.5\")   Wt 76.1 kg (167 lb 12.8 oz)   SpO2 98%   BMI 23.74 kg/m²   Appearance: Well-nourished, well-developed, no acute distress  Eyes:  PERRLA, EOMI  ENMT: without change  Neck: Supple, trachea midline, no masses  Respiratory effort:  No intercostal retractions or use of accessory muscles  Lung auscultation:  No wheezes " rhonchi rubs or rales  Cardiac: No murmurs, rubs, or gallops; regular rhythm, normal rate; no edema  Abdomen:  No tenderness, no organomegaly.  Musculoskeletal:  Grossly normal; gait and station normal; digits and nails normal  Skin:  No rashes, petechiae, cyanosis  Neurologic: without focal signs; oriented to person, time, place, and purpose; judgement intact  Psychiatric:  No depression, anxiety, agitation      Medical Decision Making       The medical record was reviewed in its entirety including the referral notes, records from primary care, consultants notes, hospital records, lab, imaging, microbiology, immunology, and immunizations.  Care gaps identified and reviewed with the patient.    Diagnostic and titration nocturnal polysomnogram's, home sleep apnea tests, continuous nocturnal oximetry results, multiple sleep latency tests, and compliance reports reviewed.  1. GISSEL (obstructive sleep apnea)  - DME Mask and Supplies      PLAN:   Has been advised to continue the current PAP, clean equipment frequently, and get new mask and supplies as allowed by insurance and DME. Advised about potential problems including nasal obstruction/stuffiness, mask leak or discomfort , frequent awakenings, chill or dryness of the upper airway, noise, inconvenience, and lack of benefit. Recommend an earlier appointment, if significant treatment barriers develop.    The risks of untreated sleep apnea were discussed with the patient at length. Patients with GISSEL are at increased risk of cardiovascular disease including systemic arterial hypertension, pulmonary arterial hypertension (transient or fixed), TIA, and an elevated risk of stroke, heart attack, sudden death, or arrhythmia between the hours of 11 PM and 6 AM. GISSEL patients have an increased risk of motor vehicle accidents, type 2 diabetes, GERD, repetitive mechanical trauma to pharyngeal muscles with inflammation and denervation, frequent EEG arousals leading to nonrestorative  sleep, excessive daytime sleepiness, fatigue, depression, poor pain control, irritability, and lower quality of life.  The patient was advised to avoid driving a motor vehicle when drowsy.    Positive airway pressure will favorably impact many of the adverse conditions and effects provoked by GISSEL.    Have advised the patient to follow up with the appropriate healthcare practitioners for all other medical problems and issues.    Return in about 1 year (around 6/20/2024).    Total time 30 minutes

## 2023-06-20 ENCOUNTER — OFFICE VISIT (OUTPATIENT)
Dept: SLEEP MEDICINE | Facility: MEDICAL CENTER | Age: 68
End: 2023-06-20
Attending: INTERNAL MEDICINE
Payer: MEDICARE

## 2023-06-20 VITALS
HEART RATE: 56 BPM | SYSTOLIC BLOOD PRESSURE: 122 MMHG | BODY MASS INDEX: 23.49 KG/M2 | OXYGEN SATURATION: 98 % | RESPIRATION RATE: 16 BRPM | HEIGHT: 71 IN | DIASTOLIC BLOOD PRESSURE: 82 MMHG | WEIGHT: 167.8 LBS

## 2023-06-20 DIAGNOSIS — G47.33 OSA (OBSTRUCTIVE SLEEP APNEA): ICD-10-CM

## 2023-06-20 PROCEDURE — 99214 OFFICE O/P EST MOD 30 MIN: CPT | Performed by: INTERNAL MEDICINE

## 2023-06-20 PROCEDURE — 3079F DIAST BP 80-89 MM HG: CPT | Performed by: INTERNAL MEDICINE

## 2023-06-20 PROCEDURE — 3074F SYST BP LT 130 MM HG: CPT | Performed by: INTERNAL MEDICINE

## 2023-06-20 PROCEDURE — 99213 OFFICE O/P EST LOW 20 MIN: CPT | Performed by: INTERNAL MEDICINE

## 2023-06-20 ASSESSMENT — FIBROSIS 4 INDEX: FIB4 SCORE: 2.07

## 2023-08-03 ENCOUNTER — OFFICE VISIT (OUTPATIENT)
Dept: MEDICAL GROUP | Facility: IMAGING CENTER | Age: 68
End: 2023-08-03
Payer: MEDICARE

## 2023-08-03 VITALS
BODY MASS INDEX: 23.35 KG/M2 | WEIGHT: 166.8 LBS | DIASTOLIC BLOOD PRESSURE: 60 MMHG | OXYGEN SATURATION: 100 % | RESPIRATION RATE: 16 BRPM | SYSTOLIC BLOOD PRESSURE: 116 MMHG | TEMPERATURE: 97.5 F | HEIGHT: 71 IN | HEART RATE: 60 BPM

## 2023-08-03 DIAGNOSIS — R00.1 BRADYCARDIA: ICD-10-CM

## 2023-08-03 DIAGNOSIS — G31.84 MCI (MILD COGNITIVE IMPAIRMENT): ICD-10-CM

## 2023-08-03 DIAGNOSIS — Z71.85 IMMUNIZATION COUNSELING: ICD-10-CM

## 2023-08-03 DIAGNOSIS — R07.9 CHEST PAIN, UNSPECIFIED TYPE: ICD-10-CM

## 2023-08-03 PROCEDURE — 1126F AMNT PAIN NOTED NONE PRSNT: CPT | Performed by: FAMILY MEDICINE

## 2023-08-03 PROCEDURE — 99214 OFFICE O/P EST MOD 30 MIN: CPT | Performed by: FAMILY MEDICINE

## 2023-08-03 PROCEDURE — 3074F SYST BP LT 130 MM HG: CPT | Performed by: FAMILY MEDICINE

## 2023-08-03 PROCEDURE — 3078F DIAST BP <80 MM HG: CPT | Performed by: FAMILY MEDICINE

## 2023-08-03 PROCEDURE — 93000 ELECTROCARDIOGRAM COMPLETE: CPT | Performed by: FAMILY MEDICINE

## 2023-08-03 ASSESSMENT — FIBROSIS 4 INDEX: FIB4 SCORE: 2.07

## 2023-08-03 ASSESSMENT — PAIN SCALES - GENERAL: PAINLEVEL: NO PAIN

## 2023-08-03 NOTE — PROGRESS NOTES
SUBJECTIVE:    Chief Complaint   Patient presents with    Other     Pt states he has been having sharp pain on his left side of his chest. Started a couple weeks ago.     Medication Management       HPI:     Nick Rodriguez is a 67 y.o. male here for left side chest pain symptoms.   Left somewhat outer upper chest region with brief sharp pain, noticed a couple weeks ago.   -120/50-60.  Notices symptoms mid morning. Eating breakfast.   Hikes daily, does not notice chest pain with activities.   Yard work, breaking branches. Right handed. Possibly could have strained or tweaked area.     Doing well with working with memory issues with current therapies.     Questions on immunizations for future.       ROS:  No recent fevers or chills. No nausea or vomiting. No diarrhea. No chest pains or shortness of breath. No lower extremity edema.    Current Outpatient Medications on File Prior to Visit   Medication Sig Dispense Refill    simvastatin (ZOCOR) 40 MG Tab TAKE 1 TABLET EVERY EVENING 100 Tablet 3    tamsulosin (FLOMAX) 0.4 MG capsule Take 1 Capsule by mouth every day. 100 Capsule 3    albuterol 108 (90 Base) MCG/ACT Aero Soln inhalation aerosol INHALE 2 PUFFS EVERY 6 HOURS AS NEEDED FOR SHORTNESS OF BREATH 18 g 0    ketoconazole (NIZORAL) 2 % Cream as needed.      fluticasone (FLONASE) 50 MCG/ACT nasal spray use 2 sprays in each nostril daily 16 g 3    ASPIRIN 81 PO Take  by mouth.      Multiple Vitamins-Minerals (MULTIVITAMIN ADULTS PO) Take  by mouth.      Ascorbic Acid (VITAMIN C PO) Take 1,000 mg by mouth.      Cholecalciferol (VITAMIN D PO) Take  by mouth.      Vitamin E 400 UNIT Tab Take  by mouth.      Omega-3 Fatty Acids (FISH OIL) 1000 MG Cap capsule Take 1,000 mg by mouth 3 times a day, with meals.      GLUCOSAMINE CHONDROITIN COMPLX PO Take  by mouth.      imiquimod (ALDARA) 5 % cream prn      calcipotriene (DOVONEX) 0.005 % Cream  (Patient not taking: Reported on 8/3/2023)       No current  "facility-administered medications on file prior to visit.       Allergies   Allergen Reactions    Seasonal        Patient Active Problem List    Diagnosis Date Noted    GISSEL on CPAP 12/07/2022    CAD (coronary artery disease) by coronary artery calcium score of 807 Agatston done as screening, no symptoms 07/26/2022    Sensorineural hearing loss, bilateral 02/19/2021    Ringing in left ear 02/19/2021    Presbycusis 02/19/2021    Hearing loss of left ear 02/19/2021    Persistent cognitive impairment 01/14/2021    History of colon polyps 10/15/2019    Elevated fasting glucose 10/16/2018    History of basal cell carcinoma 10/16/2018    Non-smoker 09/13/2018    Chronic insomnia 08/23/2018    Memory changes 04/29/2018    Pure hypercholesterolemia 11/14/2017    Chronic bilateral low back pain without sciatica 05/30/2017    Obstructive sleep apnea syndrome 10/17/2016    Pain in right shin 10/17/2016    Seasonal allergies 05/06/2016    Scoliosis 10/09/2015    Benign prostatic hyperplasia with lower urinary tract symptoms 02/12/2015    Vitamin D insufficiency 11/21/2013    Postnasal drip 05/03/2012       Past Medical History:   Diagnosis Date    Basal cell carcinoma     dermatology- Dr. Smith    Chickenpox     Dyslipidemia     Portuguese measles     Groin strain 10/21/2011     ICD-10 transition    Hemorrhoid     Hyperlipidemia     Insomnia     Left lateral epicondylitis 5/6/2016    Mumps     Right hip pain 11/21/2013    S/P colonoscopy 7/2013    negative, repeat in 5 years    S/P colonoscopy with polypectomy     age 53, due age    Scoliosis     Sleep apnea     Tonsillitis          OBJECTIVE:   /60 (BP Location: Left arm, Patient Position: Sitting, BP Cuff Size: Adult)   Pulse 60   Temp 36.4 °C (97.5 °F) (Temporal)   Resp 16   Ht 1.791 m (5' 10.5\")   Wt 75.7 kg (166 lb 12.8 oz)   SpO2 100%   BMI 23.60 kg/m²   General: Well-developed well-nourished male, no acute distress  Neck: supple, no lymphadenopathy- cervical or " supraclavicular, no thyromegaly  Cardiovascular: regular rate and rhythm, no murmurs, gallops, rubs  Lungs: clear to auscultation bilaterally, no wheezes, crackles, or rhonchi  Chest wall: left upper more lateral chest was region with slight TTP of muscle area.  Abdomen: +bowel sounds, soft, nontender, nondistended, no rebound, no guarding, no hepatosplenomegaly  Extremities: no cyanosis, clubbing, edema  Skin: Warm and dry  Psych: appropriate mood and affect    EKG: HR 51, sinus, left anterior fascicular block, similar to prior ekg.     ASSESSMENT/PLAN:    67 y.o.male     1. Chest pain, unspecified type  - appears more so musculoskeletal etiology at this time.   Modify activities. Discussed consider conditioning exercises.   Complete labs in system and ekg.   OTC medication if needed.  DX-CHEST-2 VIEWS    EKG      2. Bradycardia -incidental on ekg, notes might occasionally have slight dizziness. Obtain holter evaluation.  HOLTER - Cardiology Performed (48HR)      3. MCI (mild cognitive impairment) -stable, continue current therapy.        4. Immunization counseling   Reviewed recommendations.         Return in about 2 weeks (around 8/17/2023).    This medical record contains text that has been entered with the assistance of computer voice recognition and dictation software.  Therefore, it may contain unintended errors in text, spelling, punctuation, or grammar.

## 2023-08-15 ENCOUNTER — NON-PROVIDER VISIT (OUTPATIENT)
Dept: CARDIOLOGY | Facility: MEDICAL CENTER | Age: 68
End: 2023-08-15
Attending: FAMILY MEDICINE
Payer: MEDICARE

## 2023-08-15 DIAGNOSIS — I47.10 SVT (SUPRAVENTRICULAR TACHYCARDIA) (HCC): ICD-10-CM

## 2023-08-15 DIAGNOSIS — I49.1 PREMATURE ATRIAL CONTRACTIONS: ICD-10-CM

## 2023-08-15 DIAGNOSIS — I49.3 PVCS (PREMATURE VENTRICULAR CONTRACTIONS): ICD-10-CM

## 2023-08-15 DIAGNOSIS — I47.29 NSVT (NONSUSTAINED VENTRICULAR TACHYCARDIA) (HCC): ICD-10-CM

## 2023-08-15 DIAGNOSIS — R00.1 BRADYCARDIA: ICD-10-CM

## 2023-08-15 PROCEDURE — 93225 XTRNL ECG REC<48 HRS REC: CPT

## 2023-08-15 PROCEDURE — 93227 XTRNL ECG REC<48 HR R&I: CPT | Performed by: INTERNAL MEDICINE

## 2023-08-15 PROCEDURE — 93226 XTRNL ECG REC<48 HR SCAN A/R: CPT

## 2023-08-15 NOTE — PROGRESS NOTES
Patient enrolled in the 48 hour three channel ePatch Holter monitoring program per Eva Rand MD.  >Office hook-up, serial # [id number].  >Currently pending EOS.

## 2023-08-21 ENCOUNTER — TELEPHONE (OUTPATIENT)
Dept: CARDIOLOGY | Facility: MEDICAL CENTER | Age: 68
End: 2023-08-21
Payer: MEDICARE

## 2023-08-21 PROCEDURE — 93227 XTRNL ECG REC<48 HR R&I: CPT | Performed by: INTERNAL MEDICINE

## 2023-08-22 NOTE — TELEPHONE ENCOUNTER
BioTel EOS to 's nurse, Macho, on 8/21/2023  Established patient  
Caller:   - Kamran    Topic/issue: Kamran is returning your call.     Callback Number: [phone]    Thank you,   Jess RAMESH   
MK: Please read EOS report in CH absence per care team. Thanks!  
Phone Number Called: [phone]    Call outcome: Did not leave a detailed message. Requested patient to call back.    Message: Called to discuss his call.     
Unable to Assess

## 2023-08-24 ENCOUNTER — OFFICE VISIT (OUTPATIENT)
Dept: MEDICAL GROUP | Facility: IMAGING CENTER | Age: 68
End: 2023-08-24
Payer: MEDICARE

## 2023-08-24 VITALS
SYSTOLIC BLOOD PRESSURE: 100 MMHG | BODY MASS INDEX: 23.63 KG/M2 | DIASTOLIC BLOOD PRESSURE: 60 MMHG | OXYGEN SATURATION: 98 % | HEIGHT: 71 IN | RESPIRATION RATE: 16 BRPM | TEMPERATURE: 97.8 F | HEART RATE: 75 BPM | WEIGHT: 168.8 LBS

## 2023-08-24 DIAGNOSIS — R07.9 CHEST PAIN, UNSPECIFIED TYPE: ICD-10-CM

## 2023-08-24 DIAGNOSIS — R00.1 BRADYCARDIA: ICD-10-CM

## 2023-08-24 DIAGNOSIS — Z71.85 IMMUNIZATION COUNSELING: ICD-10-CM

## 2023-08-24 DIAGNOSIS — I47.10 SVT (SUPRAVENTRICULAR TACHYCARDIA) (HCC): ICD-10-CM

## 2023-08-24 PROBLEM — H91.13 PRESBYCUSIS OF BOTH EARS: Status: ACTIVE | Noted: 2023-08-24

## 2023-08-24 PROBLEM — H91.8X3 ASYMMETRICAL HEARING LOSS: Status: ACTIVE | Noted: 2023-08-24

## 2023-08-24 PROCEDURE — 99213 OFFICE O/P EST LOW 20 MIN: CPT | Performed by: FAMILY MEDICINE

## 2023-08-24 PROCEDURE — 3074F SYST BP LT 130 MM HG: CPT | Performed by: FAMILY MEDICINE

## 2023-08-24 PROCEDURE — 1126F AMNT PAIN NOTED NONE PRSNT: CPT | Performed by: FAMILY MEDICINE

## 2023-08-24 PROCEDURE — 3078F DIAST BP <80 MM HG: CPT | Performed by: FAMILY MEDICINE

## 2023-08-24 ASSESSMENT — PAIN SCALES - GENERAL: PAINLEVEL: NO PAIN

## 2023-08-24 ASSESSMENT — FIBROSIS 4 INDEX: FIB4 SCORE: 2.07

## 2023-08-24 NOTE — PROGRESS NOTES
SUBJECTIVE:    Chief Complaint   Patient presents with    Follow-Up     Holter monitor results     Immunizations     Traveling soon and has questions on vaccines        HPI:     Nick Rodriguez is a 67 y.o. male here for review of holter monitor and prior chest symptoms.   Did not notice any significant symptoms while wearing holter monitor.   No significant symptoms, occasionally once a week might noticed very slight symptoms, previously noted brief sharp pain noted.    11/2022- stress test negative.   CXR order in system.     [place]   [place]aving.   Inquiring about vaccine- covid and flu for fall.   Due for Hep B vaccine series.     ROS:  No recent fevers or chills. No nausea or vomiting. No diarrhea. No chest pains or shortness of breath. No lower extremity edema.    Current Outpatient Medications on File Prior to Visit   Medication Sig Dispense Refill    simvastatin (ZOCOR) 40 MG Tab TAKE 1 TABLET EVERY EVENING 100 Tablet 3    tamsulosin (FLOMAX) 0.4 MG capsule Take 1 Capsule by mouth every day. 100 Capsule 3    albuterol 108 (90 Base) MCG/ACT Aero Soln inhalation aerosol INHALE 2 PUFFS EVERY 6 HOURS AS NEEDED FOR SHORTNESS OF BREATH 18 g 0    ketoconazole (NIZORAL) 2 % Cream as needed.      imiquimod (ALDARA) 5 % cream prn      calcipotriene (DOVONEX) 0.005 % Cream       fluticasone (FLONASE) 50 MCG/ACT nasal spray use 2 sprays in each nostril daily 16 g 3    ASPIRIN 81 PO Take  by mouth.      Multiple Vitamins-Minerals (MULTIVITAMIN ADULTS PO) Take  by mouth.      Ascorbic Acid (VITAMIN C PO) Take 1,000 mg by mouth.      Cholecalciferol (VITAMIN D PO) Take  by mouth.      Vitamin E 400 UNIT Tab Take  by mouth.      Omega-3 Fatty Acids (FISH OIL) 1000 MG Cap capsule Take 1,000 mg by mouth 3 times a day, with meals.      GLUCOSAMINE CHONDROITIN COMPLX PO Take  by mouth.       No current facility-administered medications on file prior to visit.       Allergies   Allergen  "Reactions    Seasonal        Patient Active Problem List    Diagnosis Date Noted    GISSEL on CPAP 12/07/2022    CAD (coronary artery disease) by coronary artery calcium score of 807 Agatston done as screening, no symptoms 07/26/2022    Sensorineural hearing loss, bilateral 02/19/2021    Ringing in left ear 02/19/2021    Presbycusis 02/19/2021    Hearing loss of left ear 02/19/2021    Persistent cognitive impairment 01/14/2021    History of colon polyps 10/15/2019    Elevated fasting glucose 10/16/2018    History of basal cell carcinoma 10/16/2018    Non-smoker 09/13/2018    Chronic insomnia 08/23/2018    Memory changes 04/29/2018    Pure hypercholesterolemia 11/14/2017    Chronic bilateral low back pain without sciatica 05/30/2017    Obstructive sleep apnea syndrome 10/17/2016    Pain in right shin 10/17/2016    Seasonal allergies 05/06/2016    Scoliosis 10/09/2015    Benign prostatic hyperplasia with lower urinary tract symptoms 02/12/2015    Vitamin D insufficiency 11/21/2013    Postnasal drip 05/03/2012       Past Medical History:   Diagnosis Date    Basal cell carcinoma     dermatology- Dr. Smith    Chickenpox     Dyslipidemia     [place] measles     Groin strain 10/21/2011     ICD-10 transition    Hemorrhoid     Hyperlipidemia     Insomnia     Left lateral epicondylitis 5/6/2016    Mumps     Right hip pain 11/21/2013    S/P colonoscopy 7/2013    negative, repeat in 5 years    S/P colonoscopy with polypectomy     age 53, due age    Scoliosis     Sleep apnea     Tonsillitis          OBJECTIVE:   /60 (BP Location: Left arm, Patient Position: Sitting, BP Cuff Size: Adult)   Pulse 75   Temp 36.6 °C (97.8 °F) (Temporal)   Resp 16   Ht 1.791 m (5' 10.5\")   Wt 76.6 kg (168 lb 12.8 oz)   SpO2 98%   BMI 23.88 kg/m²   General: Well-developed well-nourished male, no acute distress  Neck: supple, no lymphadenopathy- cervical or supraclavicular, no thyromegaly  Cardiovascular: regular rate and rhythm, no murmurs, " gallops, rubs  Lungs: clear to auscultation bilaterally, no wheezes, crackles, or rhonchi  Abdomen: +bowel sounds, soft, nontender, nondistended, no rebound, no guarding, no hepatosplenomegaly  Extremities: no cyanosis, clubbing, edema  Skin: Warm and dry  Psych: appropriate mood and affect    Impression  Performed by: ECG EPIPHANY  Procedure: BioTel   Dates of monitorin/15/2023 - 2023.     Findings:   1.  Predominant rhythm was sinus rhythm with an average heart rate of 66 bpm.   2.  There was 1 episode of nonsustained ventricular tachycardia lasting 3 beats in duration.   3.  No episodes of atrial fibrillation   4.  There were 3 episodes of nonsustained SVT.  Longest episode of SVT lasted 11 beats in duration.   5.  No significant pauses or high-grade AV block.   6.  Rare PVCs < 1.0%   7.  Rare PACs < 1.0%   8.  Patient triggered events correlated with sinus rhythm.     Electronically signed by   Shiraz Parker MD   2023   10:45 AM        Last Resulted: 23          ASSESSMENT/PLAN:    67 y.o.male       1. Chest pain, unspecified type- improved, notes occasional sight symptoms likely msk etiology. No correlation of symptoms with holter findings.          2. Bradycardia -avg HR 66. Asymptomatic. Stable. Monitor.     3.      SVT- brief episode noted. Asymptomatic. Stable. Monitor.     Reviewed holter results. Follow up if any worsening.     4. Immunization counseling   Reviewed recommendations for immunizations.   He is considering immunizations prior to travel.   He will plan on Hep B vaccine for future.            Follow up in 3 months, sooner as needed.     This medical record contains text that has been entered with the assistance of computer voice recognition and dictation software.  Therefore, it may contain unintended errors in text, spelling, punctuation, or grammar.

## 2023-09-05 ENCOUNTER — APPOINTMENT (OUTPATIENT)
Dept: RADIOLOGY | Facility: MEDICAL CENTER | Age: 68
End: 2023-09-05
Attending: FAMILY MEDICINE
Payer: MEDICARE

## 2023-09-05 DIAGNOSIS — R07.9 CHEST PAIN, UNSPECIFIED TYPE: ICD-10-CM

## 2023-09-05 PROCEDURE — 71046 X-RAY EXAM CHEST 2 VIEWS: CPT

## 2023-09-08 ENCOUNTER — NON-PROVIDER VISIT (OUTPATIENT)
Dept: MEDICAL GROUP | Facility: IMAGING CENTER | Age: 68
End: 2023-09-08
Payer: MEDICARE

## 2023-09-08 DIAGNOSIS — Z23 NEED FOR INFLUENZA VACCINATION: ICD-10-CM

## 2023-09-08 PROCEDURE — 90662 IIV NO PRSV INCREASED AG IM: CPT | Performed by: FAMILY MEDICINE

## 2023-09-08 PROCEDURE — G0008 ADMIN INFLUENZA VIRUS VAC: HCPCS | Performed by: FAMILY MEDICINE

## 2023-09-08 NOTE — PROGRESS NOTES
"Kamran Rodriguez is a 67 y.o. male here for a non-provider visit for:   FLU    Reason for immunization: Annual Flu Vaccine  Immunization records indicate need for vaccine: Yes, confirmed with Epic  Minimum interval has been met for this vaccine: Yes  ABN completed: Yes    VIS Dated   was given to patient: Yes  All IAC Questionnaire questions were answered \"No.\"    Patient tolerated injection and no adverse effects were observed or reported: Yes    Pt scheduled for next dose in series: Not Indicated  "

## 2023-10-17 ENCOUNTER — HOSPITAL ENCOUNTER (OUTPATIENT)
Dept: LAB | Facility: MEDICAL CENTER | Age: 68
End: 2023-10-17
Attending: FAMILY MEDICINE
Payer: MEDICARE

## 2023-10-17 DIAGNOSIS — E78.00 PURE HYPERCHOLESTEROLEMIA: ICD-10-CM

## 2023-10-17 DIAGNOSIS — R71.8 ELEVATED MCV: ICD-10-CM

## 2023-10-17 DIAGNOSIS — R73.09 ELEVATED HEMOGLOBIN A1C: ICD-10-CM

## 2023-10-17 LAB
CHOLEST SERPL-MCNC: 159 MG/DL (ref 100–199)
ERYTHROCYTE [DISTWIDTH] IN BLOOD BY AUTOMATED COUNT: 49.1 FL (ref 35.9–50)
EST. AVERAGE GLUCOSE BLD GHB EST-MCNC: 126 MG/DL
FERRITIN SERPL-MCNC: 65 NG/ML (ref 22–322)
FOLATE SERPL-MCNC: 20.1 NG/ML
HBA1C MFR BLD: 6 % (ref 4–5.6)
HCT VFR BLD AUTO: 45.4 % (ref 42–52)
HDLC SERPL-MCNC: 74 MG/DL
HGB BLD-MCNC: 15 G/DL (ref 14–18)
IRON SATN MFR SERPL: 37 % (ref 15–55)
IRON SERPL-MCNC: 116 UG/DL (ref 50–180)
LDLC SERPL CALC-MCNC: 70 MG/DL
MCH RBC QN AUTO: 32.6 PG (ref 27–33)
MCHC RBC AUTO-ENTMCNC: 33 G/DL (ref 32.3–36.5)
MCV RBC AUTO: 98.7 FL (ref 81.4–97.8)
PLATELET # BLD AUTO: 158 K/UL (ref 164–446)
PMV BLD AUTO: 10.1 FL (ref 9–12.9)
RBC # BLD AUTO: 4.6 M/UL (ref 4.7–6.1)
TIBC SERPL-MCNC: 317 UG/DL (ref 250–450)
TRIGL SERPL-MCNC: 77 MG/DL (ref 0–149)
TSH SERPL DL<=0.005 MIU/L-ACNC: 2.99 UIU/ML (ref 0.38–5.33)
UIBC SERPL-MCNC: 201 UG/DL (ref 110–370)
VIT B12 SERPL-MCNC: 506 PG/ML (ref 211–911)
WBC # BLD AUTO: 4.8 K/UL (ref 4.8–10.8)

## 2023-10-17 PROCEDURE — 36415 COLL VENOUS BLD VENIPUNCTURE: CPT

## 2023-10-17 PROCEDURE — 84443 ASSAY THYROID STIM HORMONE: CPT

## 2023-10-17 PROCEDURE — 82607 VITAMIN B-12: CPT

## 2023-10-17 PROCEDURE — 83540 ASSAY OF IRON: CPT

## 2023-10-17 PROCEDURE — 82728 ASSAY OF FERRITIN: CPT

## 2023-10-17 PROCEDURE — 82746 ASSAY OF FOLIC ACID SERUM: CPT

## 2023-10-17 PROCEDURE — 85027 COMPLETE CBC AUTOMATED: CPT

## 2023-10-17 PROCEDURE — 83036 HEMOGLOBIN GLYCOSYLATED A1C: CPT

## 2023-10-17 PROCEDURE — 80061 LIPID PANEL: CPT

## 2023-10-17 PROCEDURE — 83550 IRON BINDING TEST: CPT

## 2023-10-25 ENCOUNTER — OFFICE VISIT (OUTPATIENT)
Dept: MEDICAL GROUP | Facility: IMAGING CENTER | Age: 68
End: 2023-10-25
Payer: MEDICARE

## 2023-10-25 VITALS
HEART RATE: 52 BPM | SYSTOLIC BLOOD PRESSURE: 122 MMHG | TEMPERATURE: 97.8 F | HEIGHT: 71 IN | BODY MASS INDEX: 23.88 KG/M2 | DIASTOLIC BLOOD PRESSURE: 70 MMHG | OXYGEN SATURATION: 97 % | RESPIRATION RATE: 16 BRPM | WEIGHT: 170.6 LBS

## 2023-10-25 DIAGNOSIS — E78.00 PURE HYPERCHOLESTEROLEMIA: ICD-10-CM

## 2023-10-25 DIAGNOSIS — Z01.84 IMMUNITY STATUS TESTING: ICD-10-CM

## 2023-10-25 DIAGNOSIS — D69.6 THROMBOCYTOPENIA (HCC): ICD-10-CM

## 2023-10-25 DIAGNOSIS — R73.09 ELEVATED HEMOGLOBIN A1C: ICD-10-CM

## 2023-10-25 DIAGNOSIS — E55.9 VITAMIN D INSUFFICIENCY: ICD-10-CM

## 2023-10-25 DIAGNOSIS — L98.9 SKIN LESIONS: ICD-10-CM

## 2023-10-25 DIAGNOSIS — Z23 NEED FOR TDAP VACCINATION: ICD-10-CM

## 2023-10-25 DIAGNOSIS — R73.03 PREDIABETES: ICD-10-CM

## 2023-10-25 DIAGNOSIS — Z12.5 SCREENING FOR PROSTATE CANCER: ICD-10-CM

## 2023-10-25 DIAGNOSIS — R07.89 CHEST WALL DISCOMFORT: ICD-10-CM

## 2023-10-25 PROCEDURE — 90715 TDAP VACCINE 7 YRS/> IM: CPT | Performed by: FAMILY MEDICINE

## 2023-10-25 PROCEDURE — 3078F DIAST BP <80 MM HG: CPT | Performed by: FAMILY MEDICINE

## 2023-10-25 PROCEDURE — 3074F SYST BP LT 130 MM HG: CPT | Performed by: FAMILY MEDICINE

## 2023-10-25 PROCEDURE — 1126F AMNT PAIN NOTED NONE PRSNT: CPT | Performed by: FAMILY MEDICINE

## 2023-10-25 PROCEDURE — 90471 IMMUNIZATION ADMIN: CPT | Performed by: FAMILY MEDICINE

## 2023-10-25 PROCEDURE — 99214 OFFICE O/P EST MOD 30 MIN: CPT | Mod: 25 | Performed by: FAMILY MEDICINE

## 2023-10-25 ASSESSMENT — PAIN SCALES - GENERAL: PAINLEVEL: NO PAIN

## 2023-10-25 ASSESSMENT — FIBROSIS 4 INDEX: FIB4 SCORE: 2.39

## 2023-10-25 NOTE — PROGRESS NOTES
SUBJECTIVE:    Chief Complaint   Patient presents with    Lab Results     Pure hypercholesterolemia        HPI:    Nick Rodriguez is a 68 y.o. male  with arlen, chronic insomnia, hx of memory changes, chronic low back/hip pain, hypercholesterolemia, and hx of basal cell carcinoma     Hypercholesterolemia-simvastatin 40 mg daily.  Stable labs.  LDL 70.     A1c from 5.8% increased to 6.0%. One alcoholic drink a day, most days. Feels overall does well with diet and exercise.     Slightly decreased platelet count 158, has been low in past.   Elevated MCV 98.7 improved from 101.5.   Folate and vitamin B12 normal.     Vitamin D low on past labs.     Skin lesions- Dr. Smith tomorrow appointment.   [place]     Left upper anterolateral chest wall discomfort, intermittently notices. Today without symptoms.   Possibly associated with his activities.   Has had ekg and cxr. Holter done.     ROS:  No recent fevers or chills. No nausea or vomiting. No diarrhea. No chest pains or shortness of breath. No lower extremity edema.    Current Outpatient Medications on File Prior to Visit   Medication Sig Dispense Refill    simvastatin (ZOCOR) 40 MG Tab TAKE 1 TABLET EVERY EVENING 100 Tablet 3    tamsulosin (FLOMAX) 0.4 MG capsule Take 1 Capsule by mouth every day. 100 Capsule 3    albuterol 108 (90 Base) MCG/ACT Aero Soln inhalation aerosol INHALE 2 PUFFS EVERY 6 HOURS AS NEEDED FOR SHORTNESS OF BREATH 18 g 0    ketoconazole (NIZORAL) 2 % Cream as needed.      imiquimod (ALDARA) 5 % cream prn      calcipotriene (DOVONEX) 0.005 % Cream       fluticasone (FLONASE) 50 MCG/ACT nasal spray use 2 sprays in each nostril daily 16 g 3    ASPIRIN 81 PO Take  by mouth.      Multiple Vitamins-Minerals (MULTIVITAMIN ADULTS PO) Take  by mouth.      Ascorbic Acid (VITAMIN C PO) Take 1,000 mg by mouth.      Cholecalciferol (VITAMIN D PO) Take  by mouth.      Vitamin E 400 UNIT Tab Take  by mouth.      Omega-3 Fatty  Acids (FISH OIL) 1000 MG Cap capsule Take 1,000 mg by mouth 3 times a day, with meals.      GLUCOSAMINE CHONDROITIN COMPLX PO Take  by mouth.       No current facility-administered medications on file prior to visit.       Allergies   Allergen Reactions    Seasonal        Patient Active Problem List    Diagnosis Date Noted    Presbycusis of both ears 08/24/2023    Asymmetrical hearing loss 08/24/2023    GISSEL on CPAP 12/07/2022    CAD (coronary artery disease) by coronary artery calcium score of 807 Agatston done as screening, no symptoms 07/26/2022    Sensorineural hearing loss, bilateral 02/19/2021    Ringing in left ear 02/19/2021    Presbycusis 02/19/2021    Hearing loss of left ear 02/19/2021    Persistent cognitive impairment 01/14/2021    History of colon polyps 10/15/2019    Elevated fasting glucose 10/16/2018    History of basal cell carcinoma 10/16/2018    Non-smoker 09/13/2018    Chronic insomnia 08/23/2018    Memory changes 04/29/2018    Pure hypercholesterolemia 11/14/2017    Chronic bilateral low back pain without sciatica 05/30/2017    Obstructive sleep apnea syndrome 10/17/2016    Pain in right shin 10/17/2016    Seasonal allergies 05/06/2016    Scoliosis 10/09/2015    Benign prostatic hyperplasia with lower urinary tract symptoms 02/12/2015    Vitamin D insufficiency 11/21/2013    Postnasal drip 05/03/2012       Past Medical History:   Diagnosis Date    Basal cell carcinoma     dermatology- Dr. Smith    Chickenpox     Dyslipidemia     Greek measles     Groin strain 10/21/2011     ICD-10 transition    Hemorrhoid     Hyperlipidemia     Insomnia     Left lateral epicondylitis 5/6/2016    Mumps     Right hip pain 11/21/2013    S/P colonoscopy 7/2013    negative, repeat in 5 years    S/P colonoscopy with polypectomy     age 53, due age    Scoliosis     Sleep apnea     Tonsillitis          OBJECTIVE:   /70 (BP Location: Left arm, Patient Position: Sitting, BP Cuff Size: Adult)   Pulse (!) 52   Temp  "36.6 °C (97.8 °F) (Temporal)   Resp 16   Ht 1.791 m (5' 10.5\")   Wt 77.4 kg (170 lb 9.6 oz)   SpO2 97%   BMI 24.13 kg/m²   General: Well-developed well-nourished male, no acute distress  Neck: supple, no lymphadenopathy- cervical or supraclavicular, no thyromegaly  Cardiovascular: regular rate and rhythm, no murmurs, gallops, rubs  Lungs: clear to auscultation bilaterally, no wheezes, crackles, or rhonchi  Chest wall: nontender to palpation  Abdomen: +bowel sounds, soft, nontender, nondistended, no rebound, no guarding, no hepatosplenomegaly  Extremities: no cyanosis, clubbing, edema  Skin: Warm and dry  Psych: appropriate mood and affect       Latest Reference Range & Units 10/17/23 06:08   WBC 4.8 - 10.8 K/uL 4.8   RBC 4.70 - 6.10 M/uL 4.60 (L)   Hemoglobin 14.0 - 18.0 g/dL 15.0   Hematocrit 42.0 - 52.0 % 45.4   MCV 81.4 - 97.8 fL 98.7 (H)   MCH 27.0 - 33.0 pg 32.6   MCHC 32.3 - 36.5 g/dL 33.0   RDW 35.9 - 50.0 fL 49.1   Platelet Count 164 - 446 K/uL 158 (L)   MPV 9.0 - 12.9 fL 10.1   Iron 50 - 180 ug/dL 116   Total Iron Binding 250 - 450 ug/dL 317   % Saturation 15 - 55 % 37   Unsat Iron Binding 110 - 370 ug/dL 201   Glycohemoglobin 4.0 - 5.6 % 6.0 (H)   Estim. Avg Glu mg/dL 126   Cholesterol,Tot 100 - 199 mg/dL 159   Triglycerides 0 - 149 mg/dL 77   HDL >=40 mg/dL 74   LDL <100 mg/dL 70   Ferritin 22.0 - 322.0 ng/mL 65.0   Folate -Folic Acid >4.0 ng/mL 20.1   Vitamin B12 -True Cobalamin 211 - 911 pg/mL 506   TSH 0.380 - 5.330 uIU/mL 2.990   (L): Data is abnormally low  (H): Data is abnormally high    Narrative & Impression     9/5/2023 8:38 AM     HISTORY/REASON FOR EXAM:  Chest Pain.        TECHNIQUE/EXAM DESCRIPTION AND NUMBER OF VIEWS:  Two views of the chest.     COMPARISON:  10/14/2021     FINDINGS:  Cardiac silhouette is normal in size.  No airspace infiltrate, edema, or pleural effusion.     IMPRESSION:     No acute process.           Exam Ended: 09/05/23  8:44 AM Last Resulted: 09/06/23  6:14 AM    "          ASSESSMENT/PLAN:    68 y.o.male       1. Pure hypercholesterolemia -stable.  Continue simvastatin 40 mg daily.  Continue low-cholesterol, high-fiber diet and routine exercise.  Plan to monitor in future. TSH WITH REFLEX TO FT4      2. Prediabetes -slight increase in A1c noted.  Discussed options of therapy.  Plan for referral to dietitian.  Recommend low processed food/low sugar/high-fiber diet and routine exercise.  Monitor labs in future. Referral to Nutrition Services    Comp Metabolic Panel      3. Elevated hemoglobin A1c -as above. Referral to Nutrition Services      4. Thrombocytopenia (HCC) -mild.  Monitor in future. CBC WITHOUT DIFFERENTIAL      5. Vitamin D insufficiency -on supplement therapy.  Monitor labs in future. VITAMIN D,25 HYDROXY (DEFICIENCY)      6. Skin lesions -patient will follow-up with dermatology for which she is scheduled tomorrow.       7. Chest wall discomfort -prior EKG, chest x-ray and Holter monitor completed.  Notes intermittent symptoms.  No symptoms today.  Patient to monitor activities for possible associations that trigger symptoms.       8. Immunity status testing  HEP B SURFACE ANTIGEN    HEP B SURFACE AB      9. Screening for prostate cancer  PROSTATE SPECIFIC AG SCREENING      10. Need for Tdap vaccination  Tdap =>6yo IM          Patient will plan to follow-up for his annual well visit in the next couple months.    This medical record contains text that has been entered with the assistance of computer voice recognition and dictation software.  Therefore, it may contain unintended errors in text, spelling, punctuation, or grammar.

## 2023-12-07 SDOH — HEALTH STABILITY: PHYSICAL HEALTH: ON AVERAGE, HOW MANY MINUTES DO YOU ENGAGE IN EXERCISE AT THIS LEVEL?: 60 MIN

## 2023-12-07 SDOH — ECONOMIC STABILITY: FOOD INSECURITY: WITHIN THE PAST 12 MONTHS, YOU WORRIED THAT YOUR FOOD WOULD RUN OUT BEFORE YOU GOT MONEY TO BUY MORE.: NEVER TRUE

## 2023-12-07 SDOH — HEALTH STABILITY: PHYSICAL HEALTH: ON AVERAGE, HOW MANY DAYS PER WEEK DO YOU ENGAGE IN MODERATE TO STRENUOUS EXERCISE (LIKE A BRISK WALK)?: 5 DAYS

## 2023-12-07 SDOH — ECONOMIC STABILITY: FOOD INSECURITY: WITHIN THE PAST 12 MONTHS, THE FOOD YOU BOUGHT JUST DIDN'T LAST AND YOU DIDN'T HAVE MONEY TO GET MORE.: NEVER TRUE

## 2023-12-07 SDOH — ECONOMIC STABILITY: INCOME INSECURITY: HOW HARD IS IT FOR YOU TO PAY FOR THE VERY BASICS LIKE FOOD, HOUSING, MEDICAL CARE, AND HEATING?: NOT HARD AT ALL

## 2023-12-07 SDOH — ECONOMIC STABILITY: INCOME INSECURITY: IN THE LAST 12 MONTHS, WAS THERE A TIME WHEN YOU WERE NOT ABLE TO PAY THE MORTGAGE OR RENT ON TIME?: NO

## 2023-12-07 SDOH — ECONOMIC STABILITY: HOUSING INSECURITY: IN THE LAST 12 MONTHS, HOW MANY PLACES HAVE YOU LIVED?: 1

## 2023-12-07 ASSESSMENT — SOCIAL DETERMINANTS OF HEALTH (SDOH)
HOW OFTEN DO YOU GET TOGETHER WITH FRIENDS OR RELATIVES?: THREE TIMES A WEEK
HOW OFTEN DO YOU HAVE A DRINK CONTAINING ALCOHOL: 2-3 TIMES A WEEK
WITHIN THE PAST 12 MONTHS, YOU WORRIED THAT YOUR FOOD WOULD RUN OUT BEFORE YOU GOT THE MONEY TO BUY MORE: NEVER TRUE
HOW HARD IS IT FOR YOU TO PAY FOR THE VERY BASICS LIKE FOOD, HOUSING, MEDICAL CARE, AND HEATING?: NOT HARD AT ALL
IN A TYPICAL WEEK, HOW MANY TIMES DO YOU TALK ON THE PHONE WITH FAMILY, FRIENDS, OR NEIGHBORS?: MORE THAN THREE TIMES A WEEK
HOW OFTEN DO YOU ATTEND CHURCH OR RELIGIOUS SERVICES?: 1 TO 4 TIMES PER YEAR
HOW OFTEN DO YOU HAVE SIX OR MORE DRINKS ON ONE OCCASION: NEVER
DO YOU BELONG TO ANY CLUBS OR ORGANIZATIONS SUCH AS CHURCH GROUPS UNIONS, FRATERNAL OR ATHLETIC GROUPS, OR SCHOOL GROUPS?: YES
HOW OFTEN DO YOU ATTENT MEETINGS OF THE CLUB OR ORGANIZATION YOU BELONG TO?: 1 TO 4 TIMES PER YEAR
HOW OFTEN DO YOU GET TOGETHER WITH FRIENDS OR RELATIVES?: THREE TIMES A WEEK
HOW OFTEN DO YOU ATTENT MEETINGS OF THE CLUB OR ORGANIZATION YOU BELONG TO?: 1 TO 4 TIMES PER YEAR
DO YOU BELONG TO ANY CLUBS OR ORGANIZATIONS SUCH AS CHURCH GROUPS UNIONS, FRATERNAL OR ATHLETIC GROUPS, OR SCHOOL GROUPS?: YES
IN A TYPICAL WEEK, HOW MANY TIMES DO YOU TALK ON THE PHONE WITH FAMILY, FRIENDS, OR NEIGHBORS?: MORE THAN THREE TIMES A WEEK
HOW OFTEN DO YOU ATTEND CHURCH OR RELIGIOUS SERVICES?: 1 TO 4 TIMES PER YEAR
HOW MANY DRINKS CONTAINING ALCOHOL DO YOU HAVE ON A TYPICAL DAY WHEN YOU ARE DRINKING: 1 OR 2

## 2023-12-07 ASSESSMENT — LIFESTYLE VARIABLES
HOW OFTEN DO YOU HAVE SIX OR MORE DRINKS ON ONE OCCASION: NEVER
HOW OFTEN DO YOU HAVE A DRINK CONTAINING ALCOHOL: 2-3 TIMES A WEEK
AUDIT-C TOTAL SCORE: 3
SKIP TO QUESTIONS 9-10: 1
HOW MANY STANDARD DRINKS CONTAINING ALCOHOL DO YOU HAVE ON A TYPICAL DAY: 1 OR 2

## 2023-12-08 ENCOUNTER — OFFICE VISIT (OUTPATIENT)
Dept: MEDICAL GROUP | Facility: IMAGING CENTER | Age: 68
End: 2023-12-08
Payer: MEDICARE

## 2023-12-08 VITALS
HEIGHT: 71 IN | RESPIRATION RATE: 16 BRPM | DIASTOLIC BLOOD PRESSURE: 60 MMHG | SYSTOLIC BLOOD PRESSURE: 112 MMHG | WEIGHT: 167.8 LBS | HEART RATE: 67 BPM | OXYGEN SATURATION: 98 % | TEMPERATURE: 98.8 F | BODY MASS INDEX: 23.49 KG/M2

## 2023-12-08 DIAGNOSIS — Z86.010 HISTORY OF COLON POLYPS: ICD-10-CM

## 2023-12-08 DIAGNOSIS — M41.9 SCOLIOSIS, UNSPECIFIED SCOLIOSIS TYPE, UNSPECIFIED SPINAL REGION: ICD-10-CM

## 2023-12-08 DIAGNOSIS — R41.89 PERSISTENT COGNITIVE IMPAIRMENT: ICD-10-CM

## 2023-12-08 DIAGNOSIS — N40.1 BENIGN PROSTATIC HYPERPLASIA WITH NOCTURIA: ICD-10-CM

## 2023-12-08 DIAGNOSIS — R35.1 BENIGN PROSTATIC HYPERPLASIA WITH NOCTURIA: ICD-10-CM

## 2023-12-08 DIAGNOSIS — G89.29 CHRONIC BILATERAL LOW BACK PAIN WITHOUT SCIATICA: ICD-10-CM

## 2023-12-08 DIAGNOSIS — R73.03 PREDIABETES: ICD-10-CM

## 2023-12-08 DIAGNOSIS — Z85.828 HISTORY OF BASAL CELL CARCINOMA: ICD-10-CM

## 2023-12-08 DIAGNOSIS — G47.33 OSA ON CPAP: ICD-10-CM

## 2023-12-08 DIAGNOSIS — M54.50 CHRONIC BILATERAL LOW BACK PAIN WITHOUT SCIATICA: ICD-10-CM

## 2023-12-08 DIAGNOSIS — Z23 NEED FOR HEPATITIS B VACCINATION: ICD-10-CM

## 2023-12-08 DIAGNOSIS — E55.9 VITAMIN D INSUFFICIENCY: ICD-10-CM

## 2023-12-08 DIAGNOSIS — Z12.11 SCREEN FOR COLON CANCER: ICD-10-CM

## 2023-12-08 DIAGNOSIS — D69.6 THROMBOCYTOPENIA (HCC): ICD-10-CM

## 2023-12-08 DIAGNOSIS — E78.00 PURE HYPERCHOLESTEROLEMIA: ICD-10-CM

## 2023-12-08 DIAGNOSIS — J30.2 SEASONAL ALLERGIES: ICD-10-CM

## 2023-12-08 DIAGNOSIS — R71.8 ELEVATED MCV: ICD-10-CM

## 2023-12-08 DIAGNOSIS — I25.10 CORONARY ARTERY DISEASE INVOLVING NATIVE CORONARY ARTERY OF NATIVE HEART WITHOUT ANGINA PECTORIS: Chronic | ICD-10-CM

## 2023-12-08 DIAGNOSIS — Z00.00 MEDICARE ANNUAL WELLNESS VISIT, SUBSEQUENT: ICD-10-CM

## 2023-12-08 DIAGNOSIS — H90.3 SENSORINEURAL HEARING LOSS, BILATERAL: ICD-10-CM

## 2023-12-08 PROCEDURE — 90746 HEPB VACCINE 3 DOSE ADULT IM: CPT | Performed by: FAMILY MEDICINE

## 2023-12-08 PROCEDURE — 3078F DIAST BP <80 MM HG: CPT | Performed by: FAMILY MEDICINE

## 2023-12-08 PROCEDURE — 3074F SYST BP LT 130 MM HG: CPT | Performed by: FAMILY MEDICINE

## 2023-12-08 PROCEDURE — G0010 ADMIN HEPATITIS B VACCINE: HCPCS | Performed by: FAMILY MEDICINE

## 2023-12-08 PROCEDURE — G0439 PPPS, SUBSEQ VISIT: HCPCS | Mod: 25 | Performed by: FAMILY MEDICINE

## 2023-12-08 ASSESSMENT — ACTIVITIES OF DAILY LIVING (ADL): BATHING_REQUIRES_ASSISTANCE: 0

## 2023-12-08 ASSESSMENT — ENCOUNTER SYMPTOMS: GENERAL WELL-BEING: GOOD

## 2023-12-08 ASSESSMENT — FIBROSIS 4 INDEX: FIB4 SCORE: 2.39

## 2023-12-08 ASSESSMENT — PATIENT HEALTH QUESTIONNAIRE - PHQ9: CLINICAL INTERPRETATION OF PHQ2 SCORE: 0

## 2023-12-08 ASSESSMENT — PAIN SCALES - GENERAL: PAINLEVEL: NO PAIN

## 2023-12-08 NOTE — PROGRESS NOTES
Chief Complaint   Patient presents with    Annual Exam       HPI:  Nick Rodriguez is a 68 y.o. here for Medicare Annual Wellness Visit   Specialists: Dr. Goodman, Dr. Harris, Dr. Olson, Dr. Domingo.     Persistent cognitive impairment- mild. Followed by neurology, neuropsychology. Working with psychologist and speech therapy.   CAD- elevated ct cardiac scoring.   Hypercholesterolemia- stable on simvastatin 40 mg daily.   Prediabetes-  GISSEL on cpap- stable. Daily use.   Hx of skin cancer- Right arm patch- leg, Dr. Smith, dermatology. Twice a year.  Hx of colon polyp, 2013 was normal on colonoscopy.   Low platelets- mild.   Mcv elevated- mild, mcv elevated- mild.   Vitamin D low.   Scoliosis/chronic low back pain- Notes someone is working on his back.  Hearing loss- followed by audiology.    Seasonal allergies- flonase. Otc medication as needed otherwise.   BPH- stable on flomax therapy.   Due for psa. Lab orders in system to complete.     Patient Active Problem List    Diagnosis Date Noted    Prediabetes 10/25/2023    Elevated hemoglobin A1c 10/25/2023    Presbycusis of both ears 08/24/2023    Asymmetrical hearing loss 08/24/2023    GISSEL on CPAP 12/07/2022    CAD (coronary artery disease) by coronary artery calcium score of 807 Agatston done as screening, no symptoms 07/26/2022    Sensorineural hearing loss, bilateral 02/19/2021    Ringing in left ear 02/19/2021    Presbycusis 02/19/2021    Hearing loss of left ear 02/19/2021    Persistent cognitive impairment 01/14/2021    History of colon polyps 10/15/2019    Elevated fasting glucose 10/16/2018    History of basal cell carcinoma 10/16/2018    Non-smoker 09/13/2018    Chronic insomnia 08/23/2018    Memory changes 04/29/2018    Pure hypercholesterolemia 11/14/2017    Chronic bilateral low back pain without sciatica 05/30/2017    Obstructive sleep apnea syndrome 10/17/2016    Pain in right shin 10/17/2016    Seasonal allergies 05/06/2016    Thrombocytopenia  (HCC)     Scoliosis 10/09/2015    Benign prostatic hyperplasia with lower urinary tract symptoms 02/12/2015    Vitamin D insufficiency 11/21/2013    Postnasal drip 05/03/2012       Current Outpatient Medications   Medication Sig Dispense Refill    simvastatin (ZOCOR) 40 MG Tab TAKE 1 TABLET EVERY EVENING 100 Tablet 3    tamsulosin (FLOMAX) 0.4 MG capsule Take 1 Capsule by mouth every day. 100 Capsule 3    albuterol 108 (90 Base) MCG/ACT Aero Soln inhalation aerosol INHALE 2 PUFFS EVERY 6 HOURS AS NEEDED FOR SHORTNESS OF BREATH 18 g 0    ketoconazole (NIZORAL) 2 % Cream as needed.      imiquimod (ALDARA) 5 % cream prn      calcipotriene (DOVONEX) 0.005 % Cream       fluticasone (FLONASE) 50 MCG/ACT nasal spray use 2 sprays in each nostril daily 16 g 3    ASPIRIN 81 PO Take  by mouth.      Multiple Vitamins-Minerals (MULTIVITAMIN ADULTS PO) Take  by mouth.      Ascorbic Acid (VITAMIN C PO) Take 1,000 mg by mouth.      Cholecalciferol (VITAMIN D PO) Take  by mouth.      Vitamin E 400 UNIT Tab Take  by mouth.      Omega-3 Fatty Acids (FISH OIL) 1000 MG Cap capsule Take 1,000 mg by mouth 3 times a day, with meals.      GLUCOSAMINE CHONDROITIN COMPLX PO Take  by mouth.       No current facility-administered medications for this visit.          Current supplements as per medication list.     Allergies: Seasonal    Current social contact/activities: Friends and family      He  reports that he has never smoked. He has never used smokeless tobacco. He reports current alcohol use of about 2.4 oz of alcohol per week. He reports current drug use. Drugs: Marijuana and Oral.  Counseling given: Not Answered      ROS:    Gait: Uses no assistive device  Ostomy: No  Other tubes: No  Amputations: No  Chronic oxygen use: No  Last eye exam: Sep 2023   Wears hearing aids: No   : Denies any urinary leakage during the last 6 months      Depression Screening  Little interest or pleasure in doing things?  0 - not at all  Feeling down,  depressed , or hopeless? 0 - not at all  Patient Health Questionnaire Score: 0     If depressive symptoms identified deferred to follow up visit unless specifically addressed in assessment and plan.    Interpretation of PHQ-9 Total Score   Score Severity   1-4 No Depression   5-9 Mild Depression   10-14 Moderate Depression   15-19 Moderately Severe Depression   20-27 Severe Depression    Screening for Cognitive Impairment  Do you or any of your friends or family members have any concern about your memory? No  Three Minute Recall (Banana, Sunrise, Chair) 1/3    [place] clock face with all 12 numbers and set the hands to show 20 past 8.  Yes 5  Cognitive concerns identified deferred for follow up unless specifically addressed in assessment and plan.    Fall Risk Assessment  Has the patient had two or more falls in the last year or any fall with injury in the last year?  No    Safety Assessment  Do you always wear your seatbelt?  Yes  Any changes to home needed to function safely? No  Difficulty hearing.  No  Patient counseled about all safety risks that were identified.    Functional Assessment ADLs  Are there any barriers preventing you from cooking for yourself or meeting nutritional needs?  No.    Are there any barriers preventing you from driving safely or obtaining transportation?  No.    Are there any barriers preventing you from using a telephone or calling for help?  No    Are there any barriers preventing you from shopping?  No.    Are there any barriers preventing you from taking care of your own finances?  No    Are there any barriers preventing you from managing your medications?  No    Are there any barriers preventing you from showering, bathing or dressing yourself? No    Are there any barriers preventing you from doing housework or laundry? No  Are there any barriers preventing you from using the toilet?No  Are you currently engaging in any exercise or physical activity?  Yes. Hiking, skiing, 12 min  training     Self-Assessment of Health  What is your perception of your health? Good  Do you sleep more than six hours a night? Yes  In the past 7 days, how much did pain keep you from doing your normal work? None  Do you spend quality time with family or friends (virtually or in person)? Yes  Do you usually eat a heart healthy diet that constists of a variety of fruits, vegetables, whole grains and fiber? Yes  Do you eat foods high in fat and/or Fast Food more than three times per week? No    Advance Care Planning  Do you have an Advance Directive, Living Will, Durable Power of , or POLST? No                 Health Maintenance Summary            Overdue - Hepatitis B Vaccine (Hep B) (2 of 3 - 19+ 3-dose series) Overdue since 2/24/2023 01/27/2023  Imm Admin: Hepatitis B Vaccine (Adol/Adult)              Overdue - COVID-19 Vaccine (4 - 2023-24 season) Overdue since 11/2/2023 09/07/2023  Imm Admin: MODERNA BIVALENT BOOSTER SARS-COV-2 VACCINE (6+)    09/15/2022  Imm Admin: MODERNA BIVALENT BOOSTER SARS-COV-2 VACCINE (6+)    10/23/2021  Imm Admin: MODERNA SARS-COV-2 VACCINE (12+)    04/01/2021  Imm Admin: MODERNA SARS-COV-2 VACCINE (12+)    03/04/2021  Imm Admin: MODERNA SARS-COV-2 VACCINE (12+)              Overdue - Annual Wellness Visit (Every 366 Days) Overdue since 12/8/2023 12/07/2022  Visit Dx: Medicare annual wellness visit, subsequent    12/02/2021  Visit Dx: Medicare annual wellness visit, subsequent              Colorectal Cancer Screening (Colonoscopy - Every 10 Years) Tentatively due on 9/12/2028 05/28/2019  OCCULT BLOOD FECES IMMUNOASSAY    09/12/2018  REFERRAL TO GI FOR COLONOSCOPY    10/13/2015  OCCULT BLOOD FECES IMMUNOASSAY    10/09/2014  OCCULT BLOOD FECES IMMUNOASSAY    09/19/2013  OCCULT BLOOD FECES IMMUNOASSAY    Only the first 5 history entries have been loaded, but more history exists.              IMM DTaP/Tdap/Td Vaccine (3 - Td or Tdap) Next due on 10/25/2033       10/25/2023  Imm Admin: Tdap Vaccine    10/16/2013  Imm Admin: Tdap Vaccine              Hepatitis A Vaccine (Hep A) (Series Information) Aged Out      10/16/2013  Imm Admin: Hepatitis A Vaccine, Adult              Pneumococcal Vaccine: 65+ Years (Series Information) Completed      10/21/2020  Imm Admin: Pneumococcal polysaccharide vaccine (PPSV-23)    10/04/2019  Imm Admin: Pneumococcal Conjugate Vaccine (Prevnar/PCV-13)              Hepatitis C Screening  Tentatively Complete      10/29/2020  Hepatitis C Antibody component of HEP C VIRUS ANTIBODY              Zoster (Shingles) Vaccines (Series Information) Completed      04/20/2022  Imm Admin: Zoster Vaccine Recombinant (RZV) (SHINGRIX)    12/16/2021  Imm Admin: Zoster Vaccine Recombinant (RZV) (SHINGRIX)    10/17/2016  Imm Admin: Zoster Vaccine Live (ZVL) (Zostavax) - HISTORICAL DATA              Influenza Vaccine (Series Information) Completed      09/08/2023  Imm Admin: Influenza Vaccine Adult HD    10/31/2022  Imm Admin: Influenza Vaccine Quad Inj (Pf)    10/14/2021  Imm Admin: Influenza Vaccine Adult HD    09/28/2020  Imm Admin: Influenza Vaccine Quad Inj (Pf)    10/04/2019  Imm Admin: Influenza Vaccine Adult HD    Only the first 5 history entries have been loaded, but more history exists.              HPV Vaccines (Series Information) Aged Out      No completion history exists for this topic.              Polio Vaccine (Inactivated Polio) (Series Information) Aged Out      No completion history exists for this topic.              Meningococcal Immunization (Series Information) Aged Out      No completion history exists for this topic.                    Patient Care Team:  Eva Rand M.D. as PCP - General (Family Medicine)  Eva Rand M.D. as PCP - [facility] Paneled  Mag Smith M.D. as Consulting Physician (Dermatology)  Eva Rand M.D. as Consulting Physician (Family Medicine)  Hari Cummings D.C. as Consulting Physician (Chiropractic)  Mag Smith  "M.D. (Dermatology)  [employer] (DME Supplier)        Social History     Tobacco Use    Smoking status: Never    Smokeless tobacco: Never   Vaping Use    Vaping Use: Never used   Substance Use Topics    Alcohol use: Yes     Alcohol/week: 2.4 oz     Types: 2 Glasses of wine, 2 Cans of beer per week     Comment: less than every day, 4x a week    Drug use: Yes     Types: Marijuana, Oral     Comment: occasionally     Family History   Problem Relation Age of Onset    Alcohol/Drug Father         alcohol    Alcohol/Drug Brother         alcohol    Sleep Apnea Brother     Cancer Brother     Cancer Brother         brain    Heart Disease Neg Hx      He  has a past medical history of Basal cell carcinoma, Chickenpox, Dyslipidemia, Indonesian measles, Groin strain (10/21/2011), Hemorrhoid, Hyperlipidemia, Insomnia, Left lateral epicondylitis (5/6/2016), Mumps, Right hip pain (11/21/2013), S/P colonoscopy (7/2013), S/P colonoscopy with polypectomy, Scoliosis, Sleep apnea, and Tonsillitis.   Past Surgical History:   Procedure Laterality Date    BLEPHAROPLASTY  11/14/2012    Performed by Florentin Naylor M.D. at [facility]    INGUINAL HERNIA REPAIR  3/17/2009    Performed by SUDHA BARRETT at [facility]    TONSILLECTOMY         Exam:   /60 (BP Location: Left arm, Patient Position: Sitting, BP Cuff Size: Adult)   Pulse 67   Temp 37.1 °C (98.8 °F) (Temporal)   Resp 16   Ht 1.791 m (5' 10.5\")   Wt 76.1 kg (167 lb 12.8 oz)   SpO2 98%  Body mass index is 23.74 kg/m².    Hearing good.    Dentition good  Alert, oriented in no acute distress.  Eye contact is good, speech goal directed, affect calm  Constitutional: He appears well-developed and well-nourished. He appears not diaphoretic. No distress.   HENT: Right Ear: External ear normal. Left Ear: External ear normal. Tympanic membranes clear and intact.   Nose: Nose normal.   Mouth/Throat: Oropharynx is clear and moist. No " oropharyngeal exudate.     Eyes: Conjunctivae and extraocular motions are normal. Pupils are equal, round, and reactive to light. No scleral icterus.   Neck: Normal range of motion. Neck supple. No thyromegaly present.   Cardiovascular: Normal rate, regular rhythm, normal heart sounds and intact distal pulses.  Exam reveals no gallop and no friction rub.  No murmur heard. No carotid bruits.   Pulmonary/Chest: Effort normal and breath sounds normal. No respiratory distress. No wheezes. No rales.   Abdominal: Soft. Bowel sounds are normal. He exhibits no distension and no mass. No tenderness. No rebound and no guarding.   Lymphadenopathy:  He has no cervical adenopathy. No inguinal adenopathy.   Neurological:He is alert. He has normal reflexes. No cranial nerve deficit. He exhibits normal muscle tone.   Skin: Skin is warm and dry. No rash noted. He is not diaphoretic. No erythema.   Psychiatric: He has a normal mood and affect. His behavior is normal.   Musculoskeletal: He exhibits no edema. Normal strength throughout. 2+ DTR throughout.       Assessment and Plan. The following treatment and monitoring plan is recommended:      67 yo male.     1. Medicare annual wellness visit, subsequent        2. Persistent cognitive impairment -stable. Continue current speech and counseling therapy. Continue routine follow up with neurology and neuropsychology.        3. History of basal cell carcinoma -stable. Continue routine dermatology follow up.        4. Coronary artery disease involving native coronary artery of native heart without angina pectoris -stable. Continue simvastatin 40 mg daily, low cholesterol/high fiber diet and routine exercise. Monitor.        5. Pure hypercholesterolemia -stable. Continue simvastatin 40 mg daily. Continue low cholesterol/high fiber diet and routine exercise. Monitor labs.        6. Prediabetes -stable.   Continue working on healthy diet and routine exercise. Monitor labs.        7. GISSEL on  CPAP -stable, continue cpap therapy.        8. History of colon polyps  Referral to GI for colonoscopy      9. Screen for colon cancer  Referral to GI for Colonoscopy      10. Thrombocytopenia (HCC) - mild, stable. Monitor labs in future.        11. Elevated MCV -stable. Monitor labs in future.        12. Vitamin D insufficiency -stable. Monitor labs.        13. Scoliosis, unspecified scoliosis type, unspecified spinal region -stable, continue current therapy.        14. Chronic bilateral low back pain without sciatica -stable. Continue current therapy.        15. Sensorineural hearing loss, bilateral -stable. Continue follow up with audiology.        16. Seasonal allergies -stable, continue flonase as needed.        17. Benign prostatic hyperplasia with nocturia -stable, continue flomax therapy.        18. Need for hepatitis B vaccination  Hep B Adult 20+      Fasting lab orders in system to complete.     Services suggested: No services needed at this time  Health Care Screening: Age-appropriate preventive services recommended by USPTF and ACIP covered by Medicare were discussed today. Services ordered if indicated and agreed upon by the patient.  Referrals offered: Community-based lifestyle interventions to reduce health risks and promote self-management and wellness, fall prevention, nutrition, physical activity, tobacco-use cessation, weight loss, and mental health services as per orders if indicated.    Discussion today about general wellness and lifestyle habits:    Prevent falls and reduce trip hazards; Cautioned about securing or removing rugs.  Have a working fire alarm and carbon monoxide detector;   Engage in regular physical activity and social activities     Follow-up: Return in about 3 months (around 3/8/2024).  Recommend routine AWV.     This medical record contains text that has been entered with the assistance of computer voice recognition and dictation software.  Therefore, it may contain  unintended errors in text, spelling, punctuation, or grammar.

## 2024-01-04 ENCOUNTER — OFFICE VISIT (OUTPATIENT)
Dept: MEDICAL GROUP | Facility: IMAGING CENTER | Age: 69
End: 2024-01-04
Payer: MEDICARE

## 2024-01-04 VITALS
OXYGEN SATURATION: 95 % | HEIGHT: 71 IN | TEMPERATURE: 97.6 F | SYSTOLIC BLOOD PRESSURE: 124 MMHG | RESPIRATION RATE: 15 BRPM | WEIGHT: 168.2 LBS | HEART RATE: 57 BPM | BODY MASS INDEX: 23.55 KG/M2 | DIASTOLIC BLOOD PRESSURE: 70 MMHG

## 2024-01-04 DIAGNOSIS — I47.10 SVT (SUPRAVENTRICULAR TACHYCARDIA) (HCC): ICD-10-CM

## 2024-01-04 DIAGNOSIS — G31.84 MCI (MILD COGNITIVE IMPAIRMENT): ICD-10-CM

## 2024-01-04 DIAGNOSIS — R07.89 CHEST WALL DISCOMFORT: ICD-10-CM

## 2024-01-04 DIAGNOSIS — D69.6 THROMBOCYTOPENIA (HCC): ICD-10-CM

## 2024-01-04 PROCEDURE — 3074F SYST BP LT 130 MM HG: CPT | Performed by: FAMILY MEDICINE

## 2024-01-04 PROCEDURE — 3078F DIAST BP <80 MM HG: CPT | Performed by: FAMILY MEDICINE

## 2024-01-04 PROCEDURE — 1126F AMNT PAIN NOTED NONE PRSNT: CPT | Performed by: FAMILY MEDICINE

## 2024-01-04 PROCEDURE — 99214 OFFICE O/P EST MOD 30 MIN: CPT | Performed by: FAMILY MEDICINE

## 2024-01-04 ASSESSMENT — PAIN SCALES - GENERAL: PAINLEVEL: NO PAIN

## 2024-01-04 ASSESSMENT — FIBROSIS 4 INDEX: FIB4 SCORE: 2.39

## 2024-01-04 ASSESSMENT — PATIENT HEALTH QUESTIONNAIRE - PHQ9: CLINICAL INTERPRETATION OF PHQ2 SCORE: 0

## 2024-01-04 NOTE — PROGRESS NOTES
SUBJECTIVE:    Chief Complaint   Patient presents with    Follow-Up     Discuss follow up treatment       HPI:     Nick Rodriguez is a 68 y.o. male with arlen, chronic insomnia, hx of memory changes, chronic low back/hip pain, hypercholesterolemia, and hx of basal cell carcinoma here to review medical issues.     Mild cognitive impairment. Would like to discuss specialist and current therapy.   Dr. Olson, PhD.  Recommend follow up.   Dr. Richmond- neurology. Recommend follow up.     Seeing someone weekly- learning, going well.   Dr. Harris- psychotherapy.   Eli Goodman- speech therapy- 2 times a week.   No issues with driving.     Left anterior upper chest wall with occasional slight symptoms, the move outward and upward. Works out regularly. Upper lateral region. Might noticed for a period then resolved. Difficult to describe.   Had cxr, EKG, and holter previously.    Holter- noted nonsustained svt episodes. Patient denies palpations.         ROS:  No recent fevers or chills. No nausea or vomiting. No diarrhea. No chest pain/pressure or shortness of breath. No lower extremity edema.    Current Outpatient Medications on File Prior to Visit   Medication Sig Dispense Refill    simvastatin (ZOCOR) 40 MG Tab TAKE 1 TABLET EVERY EVENING 100 Tablet 3    tamsulosin (FLOMAX) 0.4 MG capsule Take 1 Capsule by mouth every day. 100 Capsule 3    albuterol 108 (90 Base) MCG/ACT Aero Soln inhalation aerosol INHALE 2 PUFFS EVERY 6 HOURS AS NEEDED FOR SHORTNESS OF BREATH 18 g 0    ketoconazole (NIZORAL) 2 % Cream as needed.      imiquimod (ALDARA) 5 % cream prn      calcipotriene (DOVONEX) 0.005 % Cream       fluticasone (FLONASE) 50 MCG/ACT nasal spray use 2 sprays in each nostril daily 16 g 3    ASPIRIN 81 PO Take  by mouth.      Multiple Vitamins-Minerals (MULTIVITAMIN ADULTS PO) Take  by mouth.      Ascorbic Acid (VITAMIN C PO) Take 1,000 mg by mouth.      Cholecalciferol (VITAMIN D PO) Take  by mouth.      Vitamin E  400 UNIT Tab Take  by mouth.      Omega-3 Fatty Acids (FISH OIL) 1000 MG Cap capsule Take 1,000 mg by mouth 3 times a day, with meals.      GLUCOSAMINE CHONDROITIN COMPLX PO Take  by mouth.       No current facility-administered medications on file prior to visit.       Allergies   Allergen Reactions    Seasonal        Patient Active Problem List    Diagnosis Date Noted    Prediabetes 10/25/2023    Elevated hemoglobin A1c 10/25/2023    Presbycusis of both ears 08/24/2023    Asymmetrical hearing loss 08/24/2023    GISSEL on CPAP 12/07/2022    CAD (coronary artery disease) by coronary artery calcium score of 807 Agatston done as screening, no symptoms 07/26/2022    Sensorineural hearing loss, bilateral 02/19/2021    Ringing in left ear 02/19/2021    Presbycusis 02/19/2021    Hearing loss of left ear 02/19/2021    Persistent cognitive impairment 01/14/2021    History of colon polyps 10/15/2019    Elevated fasting glucose 10/16/2018    History of basal cell carcinoma 10/16/2018    Non-smoker 09/13/2018    Chronic insomnia 08/23/2018    Memory changes 04/29/2018    Pure hypercholesterolemia 11/14/2017    Chronic bilateral low back pain without sciatica 05/30/2017    Obstructive sleep apnea syndrome 10/17/2016    Pain in right shin 10/17/2016    Seasonal allergies 05/06/2016    Thrombocytopenia (HCC)     Scoliosis 10/09/2015    Benign prostatic hyperplasia with lower urinary tract symptoms 02/12/2015    Vitamin D insufficiency 11/21/2013    Postnasal drip 05/03/2012       Past Medical History:   Diagnosis Date    Basal cell carcinoma     dermatology- Dr. Smith    Chickenpox     Dyslipidemia     [place] measles     Groin strain 10/21/2011     ICD-10 transition    Hemorrhoid     Hyperlipidemia     Insomnia     Left lateral epicondylitis 5/6/2016    Mumps     Right hip pain 11/21/2013    S/P colonoscopy 7/2013    negative, repeat in 5 years    S/P colonoscopy with polypectomy     age 53, due age    Scoliosis     Sleep apnea      "Tonsillitis          OBJECTIVE:   /70 (BP Location: Left arm, Patient Position: Sitting, BP Cuff Size: Adult)   Pulse (!) 57   Temp 36.4 °C (97.6 °F) (Temporal)   Resp 15   Ht 1.791 m (5' 10.5\")   Wt 76.3 kg (168 lb 3.2 oz)   SpO2 95%   BMI 23.79 kg/m²   General: Well-developed well-nourished male, no acute distress  Neck: supple, no lymphadenopathy- cervical or supraclavicular, no thyromegaly  Chest wall: nontender to palpation  Cardiovascular: regular rate and rhythm, no murmurs, gallops, rubs  Lungs: clear to auscultation bilaterally, no wheezes, crackles, or rhonchi  Abdomen: +bowel sounds, soft, nontender, nondistended, no rebound, no guarding, no hepatosplenomegaly  Extremities: no cyanosis, clubbing, edema  Skin: Warm and dry  Psych: appropriate mood and affect      ASSESSMENT/PLAN:    68 y.o.male       1. MCI (mild cognitive impairment) -overall appears table. Continue current therapies. Encouraged continued therapy exercises.   Recommend follow up with neurology and neuropsychology per prior notes.        2. Chest wall discomfort- left upper anterior lateral region, occasional symptoms.   Recommend routine stretching.   Consider UC/ER in future as needed if any worsening or persistent symptoms.        3. SVT (supraventricular tachycardia) - nonsustained. Has been asymptomatic.   Discussed possible options of therapy as needed. Monitor.        4. Thrombocytopenia (HCC)- mild. Monitor.   Lab orders in system.        Recommend routine labs prior to next follow up visit.   Return in about 3 months (around 4/4/2024).    This medical record contains text that has been entered with the assistance of computer voice recognition and dictation software.  Therefore, it may contain unintended errors in text, spelling, punctuation, or grammar.                            "

## 2024-01-09 ENCOUNTER — TELEPHONE (OUTPATIENT)
Dept: NEUROLOGY | Facility: MEDICAL CENTER | Age: 69
End: 2024-01-09
Payer: MEDICARE

## 2024-01-09 NOTE — TELEPHONE ENCOUNTER
VOICEMAIL  1. Caller Name: Kamran Rodriguez                        Call Back Number: [phone]    3. Patient approves office to leave a detailed voicemail/MyChart message: yes    Patient called stating that he used to see Dr. Olson and Dr. Richmond. He is waning a call back. Patient did not say why he wanted a call back on the VM.

## 2024-01-26 ENCOUNTER — OFFICE VISIT (OUTPATIENT)
Dept: NEUROLOGY | Facility: MEDICAL CENTER | Age: 69
End: 2024-01-26
Attending: PSYCHIATRY & NEUROLOGY
Payer: MEDICARE

## 2024-01-26 VITALS
HEART RATE: 75 BPM | DIASTOLIC BLOOD PRESSURE: 66 MMHG | TEMPERATURE: 97.1 F | HEIGHT: 71 IN | WEIGHT: 171.3 LBS | OXYGEN SATURATION: 99 % | BODY MASS INDEX: 23.98 KG/M2 | SYSTOLIC BLOOD PRESSURE: 124 MMHG

## 2024-01-26 DIAGNOSIS — G31.84 AMNESTIC MCI (MILD COGNITIVE IMPAIRMENT WITH MEMORY LOSS): Primary | ICD-10-CM

## 2024-01-26 PROCEDURE — 3078F DIAST BP <80 MM HG: CPT | Performed by: PSYCHIATRY & NEUROLOGY

## 2024-01-26 PROCEDURE — 99204 OFFICE O/P NEW MOD 45 MIN: CPT | Performed by: PSYCHIATRY & NEUROLOGY

## 2024-01-26 PROCEDURE — 99212 OFFICE O/P EST SF 10 MIN: CPT | Performed by: PSYCHIATRY & NEUROLOGY

## 2024-01-26 PROCEDURE — 3074F SYST BP LT 130 MM HG: CPT | Performed by: PSYCHIATRY & NEUROLOGY

## 2024-01-26 ASSESSMENT — FIBROSIS 4 INDEX: FIB4 SCORE: 2.39

## 2024-01-26 NOTE — PROGRESS NOTES
"Neuro Note:     Follow up visit- here with daughter and wife.     Reason: Mild Cognitive Impairment.     Nick Rodriguez 66  y.o. right handed gentleman who was the  of the [employer]  at [employer]. He retired about 1 year.     He had noticed in the fall of 2018 that he had 4 close family members die over a 3 month period. He clearly admitted to having appropriate stress related to this issue.  He recalls at that time driving to [place] and he felt \"out of himself\" and he was taken to a local hospital for an evaluation and he felt much better and was discharged from the hospital. He was seen by Dr. Garcia and then Dr. Becky WEST and most recently with Dr. Junior in January 2021.     Daughter gives information that struggling to remember things can be increased by stress or anxiety. He has less anxiety since retiring from work but still has problems remembering names.     Daughter today says that he seems less anxious or frustrated as remembering words and speech therapy helping with word recall (2 times a week)     He has been off Namenda since September 2019.     He has been off Zolpidem and Ambien for nearly 2 years.     He has not had any gait/balance decline or evolutionary chagnes in the last 12 months     Since the last visit with me, he is more aware of his inability in spelling words such as during emailing. Works such as \"[employer]\" and she forgets friends names.      Wife says today that Kamran may be forgetful very slightly ; she has noticed when he is on the computer that he may open multi tabs and tends to not close each tab but eventually there can be too many tabs open (he is aware of this).     There is no consistent repeating of information when he says anything or is told anything in the last 6 to 12 months.     He continues to play pickleball and still has difficulty remembering the score (immediately) but if he stays focused mentally he will remember the score. If he is " able to spend 30 seconds of focused mental time, he usually can bring the word back into his mind.     ADL(s)- minimal issues with spelling of certain words for the last 2 years. He feels capable of reading book or papers with reasonable understanding.     Nick  has not had involuntary movements,headaches,visual disturbance(s) in the last 3 months.      Patient Active Problem List    Diagnosis Date Noted    Prediabetes 10/25/2023    Elevated hemoglobin A1c 10/25/2023    Presbycusis of both ears 08/24/2023    Asymmetrical hearing loss 08/24/2023    GISSEL on CPAP 12/07/2022    CAD (coronary artery disease) by coronary artery calcium score of 807 Agatston done as screening, no symptoms 07/26/2022    Sensorineural hearing loss, bilateral 02/19/2021    Ringing in left ear 02/19/2021    Presbycusis 02/19/2021    Hearing loss of left ear 02/19/2021    Persistent cognitive impairment 01/14/2021    History of colon polyps 10/15/2019    Elevated fasting glucose 10/16/2018    History of basal cell carcinoma 10/16/2018    Non-smoker 09/13/2018    Chronic insomnia 08/23/2018    Memory changes 04/29/2018    Pure hypercholesterolemia 11/14/2017    Chronic bilateral low back pain without sciatica 05/30/2017    Obstructive sleep apnea syndrome 10/17/2016    Pain in right shin 10/17/2016    Seasonal allergies 05/06/2016    Thrombocytopenia (HCC)     Scoliosis 10/09/2015    Benign prostatic hyperplasia with lower urinary tract symptoms 02/12/2015    Vitamin D insufficiency 11/21/2013    Postnasal drip 05/03/2012       Past medical history:   Past Medical History:   Diagnosis Date    Basal cell carcinoma     dermatology- Dr. Smith    Chickenpox     Dyslipidemia     [place] measles     Groin strain 10/21/2011     ICD-10 transition    Hemorrhoid     Hyperlipidemia     Insomnia     Left lateral epicondylitis 5/6/2016    Mumps     Right hip pain 11/21/2013    S/P colonoscopy 7/2013    negative, repeat in 5 years    S/P colonoscopy with  polypectomy     age 53, due age    Scoliosis     Sleep apnea     Tonsillitis        Past surgical history:   Past Surgical History:   Procedure Laterality Date    BLEPHAROPLASTY  11/14/2012    Performed by Florentin Naylor M.D. at [facility]    INGUINAL HERNIA REPAIR  3/17/2009    Performed by SUDHA BARRETT at [facility]    TONSILLECTOMY           Social history:   Social History     Socioeconomic History    Marital status:      Spouse name: Not on file    Number of children: Not on file    Years of education: Not on file    Highest education level: Master's degree (e.g., MA, MS, Piedad, MEd, MSW, LEILANI)   Occupational History    Occupation: ADMINISTRATION     Employer: [employer]   Tobacco Use    Smoking status: Never    Smokeless tobacco: Never   Vaping Use    Vaping Use: Never used   Substance and Sexual Activity    Alcohol use: Yes     Alcohol/week: 2.4 oz     Types: 2 Glasses of wine, 2 Cans of beer per week     Comment: less than every day, 4x a week    Drug use: Yes     Types: Marijuana, Oral     Comment: occasionally    Sexual activity: Yes     Partners: Female   Other Topics Concern    Not on file   Social History Narrative    , 3 children.      Social Determinants of Health     Financial Resource Strain: Low Risk  (12/7/2023)    Overall Financial Resource Strain (CARDIA)     Difficulty of Paying Living Expenses: Not hard at all   Food Insecurity: No Food Insecurity (12/7/2023)    Hunger Vital Sign     Worried About Running Out of Food in the Last Year: Never true     Ran Out of Food in the Last Year: Never true   Transportation Needs: No Transportation Needs (12/7/2023)    PRAPARE - Transportation     Lack of Transportation (Medical): No     Lack of Transportation (Non-Medical): No   Physical Activity: Sufficiently Active (12/7/2023)    Exercise Vital Sign     Days of Exercise per Week: 5 days     Minutes of Exercise per Session: 60 min   Stress:  No Stress Concern Present (12/7/2023)    [place] [place] [employer] - Occupational Stress Questionnaire     Feeling of Stress : Only a little   Social Connections: Socially Integrated (12/7/2023)    Social Connection and Isolation Panel [NHANES]     Frequency of Communication with Friends and Family: More than three times a week     Frequency of Social Gatherings with Friends and Family: Three times a week     Attends Congregational Services: 1 to 4 times per year     Active Member of Clubs or Organizations: Yes     Attends Club or Organization Meetings: 1 to 4 times per year     Marital Status:    Intimate Partner Violence: Not on file   Housing Stability: Low Risk  (12/7/2023)    Housing Stability Vital Sign     Unable to Pay for Housing in the Last Year: No     Number of Places Lived in the Last Year: 1     Unstable Housing in the Last Year: No       Family history:   Family History   Problem Relation Age of Onset    Alcohol/Drug Father         alcohol    Alcohol/Drug Brother         alcohol    Sleep Apnea Brother     Cancer Brother     Cancer Brother         brain    Heart Disease Neg Hx          Current medications:   Current Outpatient Medications   Medication    simvastatin (ZOCOR) 40 MG Tab    tamsulosin (FLOMAX) 0.4 MG capsule    albuterol 108 (90 Base) MCG/ACT Aero Soln inhalation aerosol    ketoconazole (NIZORAL) 2 % Cream    imiquimod (ALDARA) 5 % cream    calcipotriene (DOVONEX) 0.005 % Cream    fluticasone (FLONASE) 50 MCG/ACT nasal spray    ASPIRIN 81 PO    Multiple Vitamins-Minerals (MULTIVITAMIN ADULTS PO)    Ascorbic Acid (VITAMIN C PO)    Cholecalciferol (VITAMIN D PO)    Vitamin E 400 UNIT Tab    Omega-3 Fatty Acids (FISH OIL) 1000 MG Cap capsule    GLUCOSAMINE CHONDROITIN COMPLX PO     No current facility-administered medications for this visit.       Medication Allergy:  Allergies   Allergen Reactions    Seasonal            Physical examination:   Vitals:    01/26/24 1340   BP:  "124/66   BP Location: Left arm   Patient Position: Sitting   BP Cuff Size: Adult   Pulse: 75   Temp: 36.2 °C (97.1 °F)   TempSrc: Temporal   SpO2: 99%   Weight: 77.7 kg (171 lb 4.8 oz)   Height: 1.791 m (5' 10.51\")       Normal cephalic atraumatic.  There is full range of movement around the neck in all directions without restrictions or discrete pain evoked triggers.  No lower extremity edema.      Neurological  Exam:      Phong Cognitive Assessment (MOCA) Version 7.1    Years of Education: Master's Degree    TOTAL SCORE: 19/30  (to be scanned into the MEDIA section in the E.M.R.)    2 dimes, 1 nickel and 1 leslye> 61 cents.  (Mixed up the coins)      Mental status: Awake, alert and fully oriented to person, place, time, and situation. Normal attention and concentration.  Did not appear/act combative,irritable,anxious,paranoid/delusional or aggressive to or with me.    Speech and language: Speech is impaired with non fluency and definite language impairment    Follows 3 step motor commands in sequence without significant delay and correctly.    Cranial nerve exam:  II: Pupils are equally round and reactive to light. Visual fields are intact by confrontation.  III, IV, VI: EOMI, no diplopia, no ptosis.  V: Sensation to light touch is normal over V1-3 distributions bilaterally.  .  VII: Facial movements are symmetrical. There is no facial droop. .  VIII: Hearing intact to soft speech and finger rub bilaterally  IX: Palate elevates symmetrically, uvula is midline. Dysarthria is not present.  XI: Shoulder shrug are symmetrical and strong.   XII: Tongue protrudes midline.      Motor exam:  Muscle tone is normal in all 4 limbs.    Muscle strength:    Neck Flexors/Extensors: 5/5       Right  Left  Deltoid   5/5  5/5      Biceps   5/5  5/5  Triceps              5/5  5/5   Wrist extensors 5/5  5/5  Wrist flexors  5/5  5/5     5/5  5/5  Interossei  5/5  5/5  Thenar (APB)  5/5  5/5   Hip " flexors  5/5  5/5  Quadriceps  5/5  5/5    Hamstrings  5/5  5/5  Dorsiflexors  5/5  5/5  Plantarflexors  5/5  5/5  Toe extension  5/5  5/5        Reflexes:       Right  Left  Biceps   2/4  2/4  Triceps              2/4  2/4  Brachioradialis 2/4  2/4  Knee jerk  2/4  2/4  Ankle jerk  2/4  2/4     Frontal release signs are absent    bilaterally toes are downgoing to plantar stimulation..    Coordination (finger-to-nose, heel/knee/shin, rapid alternating movements) was normal.     There was no ataxia, no tremors, and no dysmetria.     Station and gait >> easily stands up from exam chair without retropulsion,veering,leaning,swaying (to either side).       Labs and Tests:    0 Result Notes       Component  Ref Range & Units 3 mo ago 3 yr ago   Folate -Folic Acid  >4.0 ng/mL 20.1 24.9 CM   Comment: Results obtained by dilution.   Resulting Agency M M              Narrative  Performed by: M  Fast 8-10 hours, OK to drink water as needed during fast,  take medications per your provider's instructions.  Request patient fasting?->Yes  Fasting Instructions:->Fast 8-10 hours, OK to drink water as  needed during fast, take medications per your provider's  instruction.      Specimen Collected: 10/17/23  6:08 AM Last Resulted: 10/17/23 12:14 PM           VITAMIN B12  Order: [id number]  Status: Final result       Visible to patient: Yes (seen)       Next appt: 02/08/2024 at 08:20 AM in [facility] (Eva Rand M.D.)       Dx: Elevated MCV    0 Result Notes       Component  Ref Range & Units 3 mo ago 3 yr ago   Vitamin B12 -True Cobalamin  211 - 911 pg/mL 506 597   Resulting Agency M M              Narrative  Performed by: M  Fast 8-10 hours, OK to drink water as needed during fast,  take medications per your provider's instructions.  Request patient fasting?->Yes  Fasting Instructions:->Fast 8-10 hours, OK to drink water as  needed during fast, take medications per your provider's  instruction.              Contains abnormal  data HEMOGLOBIN A1C  Order: [id number]  Status: Final result       Visible to patient: Yes (seen)       Next appt: 02/08/2024 at 08:20 AM in [facility] (Eva Rand M.D.)       Dx: Elevated hemoglobin A1c    0 Result Notes         Component  Ref Range & Units 3 mo ago 1 yr ago 3 yr ago 4 yr ago   Glycohemoglobin  4.0 - 5.6 % 6.0 High  5.8 High  CM 5.8 High  R, CM 5.9 High  R, CM   Comment: Increased risk for diabetes:  5.7 -6.4%  Diabetes:  >6.4%  Glycemic control for adults with diabetes:  <7.0%    The above interpretations are per ADA guidelines.  Diagnosis  of diabetes mellitus on the basis of elevated Hemoglobin A1c  should be confirmed by repeating the Hb A1c test.   Est Avg Glucose  mg/dL 126 120                      Component  Ref Range & Units 3 mo ago 1 yr ago 3 yr ago 4 yr ago 5 yr ago 6 yr ago 7 yr ago   Cholesterol,Tot  100 - 199 mg/dL 159 174 179 163 161 129 168   Triglycerides  0 - 149 mg/dL 77 95 91 95 63 79 131   HDL  >=40 mg/dL 74 70 71 68 68 53 56   LDL  <100 mg/dL 70 85 90 76 80 60 86   Resulting Agency M M M M M M M                        NEUROIMAGING:         Impression/Plans/Recommendations:    1. Amnestic MCI (mild cognitive impairment with memory loss)              Mild Cognitive Impairment- amnestic predominance and without any subacute decline.     Alzheimer's  Questionnaire today  of 9-12 range  (per daughter  today)> MCI category    Functional Activity Questionnaire score per wife today 4 today.        MOCA score today of 19/30      The Alzheimer's Questionnaire score today per family members  in the 7 to 12 range (daughter and wife) and son was an 8.  Additional factors or contributors to Brain Health issues can be summarized in several books/references which I discussed as well today.      Goals going forwards include:     A. Paying attention to one's risk factors and reducing their prevalence or potential impact on one's changing memory/thinking> an excellent example  would be to stop smoking, reduce or eliminate alcohol use (depending on the amount and frequency of usage), maintain good blood pressure control by buying a digital arm blood pressure cuff such as an OMRON Series 3 or 5 and checking one's blood pressure atleast 3 days per week (in the am and early afternoon) that the numbers are under 140/90 and working as needed with the primary care doctor  to optimize blood pressure control).     B. Encouraging proper sleep hygiene which for most adults is 7 to 8 hours of uninterrupted sleep per night.       C. Encouraging some form of exercise preferable aerobic forms to be done (4 to 5 days per week- 30 minutes minimum per day)> 150 minutes per week as a goal. Example activities could include fast walking (up a slight incline), jogging, cycling (road or stationary), swimming,tennis. Essentially, even basic walking on a flat surface or a treadmill would be better than doing nothing.     D. Maintaining or forming new social contacts with family and friends  and a positive attitude despite the concerns and/or ongoing issues with thinking and/or memory.     E. Eating well which means a diet low in salt  (under 2 grams per day), sugar and saturated fat.     F. Maintaining one's BMI (Body Mass Index) under 30.     G. Life style factors reviewed today.    H. Brain PET Scan discussed today and the consideration of this today; Leqembi discussed today and the pros and cons of such anti amyloid medication(s). Will hold off on such anti amyloid medications at this time.    I. Follow up with Dr. Olson ([facility]).    J. General Health Factors reviewed.        I have performed  a history and physical exam and a directed /focused  ROS today.    Total time spent today or this patient's care was 50 minutes and included reviewing  the diagnostic workup to date (such as labs and imaging as well as interpreting such tests relevant to this patient's neurological condition),  reviewing/obtaining  separately obtained history (from patient and/or accompanying ffamily member)  for today's neurological problem(s) ,counseling and educating the patient and family member on issues related to cognition/memory and cognitive health factors and documenting  the clinical information in the EMR.    Follow up in about 12 months or so        Rick Richmond MD  [place] [facility].   [facility]

## 2024-02-08 ENCOUNTER — OFFICE VISIT (OUTPATIENT)
Dept: MEDICAL GROUP | Facility: IMAGING CENTER | Age: 69
End: 2024-02-08
Payer: MEDICARE

## 2024-02-08 VITALS
HEIGHT: 71 IN | DIASTOLIC BLOOD PRESSURE: 70 MMHG | OXYGEN SATURATION: 96 % | TEMPERATURE: 98.1 F | BODY MASS INDEX: 23.69 KG/M2 | HEART RATE: 70 BPM | RESPIRATION RATE: 15 BRPM | WEIGHT: 169.2 LBS | SYSTOLIC BLOOD PRESSURE: 112 MMHG

## 2024-02-08 DIAGNOSIS — E55.9 VITAMIN D INSUFFICIENCY: ICD-10-CM

## 2024-02-08 DIAGNOSIS — R35.1 BENIGN PROSTATIC HYPERPLASIA WITH NOCTURIA: ICD-10-CM

## 2024-02-08 DIAGNOSIS — Z12.11 SCREEN FOR COLON CANCER: ICD-10-CM

## 2024-02-08 DIAGNOSIS — I25.10 CORONARY ARTERY DISEASE INVOLVING NATIVE CORONARY ARTERY OF NATIVE HEART WITHOUT ANGINA PECTORIS: Chronic | ICD-10-CM

## 2024-02-08 DIAGNOSIS — H91.8X3 ASYMMETRICAL HEARING LOSS: ICD-10-CM

## 2024-02-08 DIAGNOSIS — Z00.00 MEDICARE ANNUAL WELLNESS VISIT, SUBSEQUENT: ICD-10-CM

## 2024-02-08 DIAGNOSIS — N40.1 BENIGN PROSTATIC HYPERPLASIA WITH NOCTURIA: ICD-10-CM

## 2024-02-08 DIAGNOSIS — R73.03 PREDIABETES: ICD-10-CM

## 2024-02-08 DIAGNOSIS — H91.92 HEARING LOSS OF LEFT EAR, UNSPECIFIED HEARING LOSS TYPE: ICD-10-CM

## 2024-02-08 DIAGNOSIS — G31.84 AMNESTIC MCI (MILD COGNITIVE IMPAIRMENT WITH MEMORY LOSS): ICD-10-CM

## 2024-02-08 DIAGNOSIS — Z86.010 HISTORY OF COLON POLYPS: ICD-10-CM

## 2024-02-08 DIAGNOSIS — E78.00 PURE HYPERCHOLESTEROLEMIA: ICD-10-CM

## 2024-02-08 DIAGNOSIS — R09.89 CHEST WALL SYMPTOM OR COMPLAINT: ICD-10-CM

## 2024-02-08 DIAGNOSIS — D69.6 THROMBOCYTOPENIA (HCC): ICD-10-CM

## 2024-02-08 DIAGNOSIS — Z85.828 HISTORY OF BASAL CELL CARCINOMA: ICD-10-CM

## 2024-02-08 DIAGNOSIS — G47.33 OSA ON CPAP: ICD-10-CM

## 2024-02-08 PROCEDURE — 3078F DIAST BP <80 MM HG: CPT | Performed by: FAMILY MEDICINE

## 2024-02-08 PROCEDURE — 1126F AMNT PAIN NOTED NONE PRSNT: CPT | Performed by: FAMILY MEDICINE

## 2024-02-08 PROCEDURE — 3074F SYST BP LT 130 MM HG: CPT | Performed by: FAMILY MEDICINE

## 2024-02-08 PROCEDURE — G0439 PPPS, SUBSEQ VISIT: HCPCS | Performed by: FAMILY MEDICINE

## 2024-02-08 ASSESSMENT — ACTIVITIES OF DAILY LIVING (ADL): BATHING_REQUIRES_ASSISTANCE: 0

## 2024-02-08 ASSESSMENT — FIBROSIS 4 INDEX: FIB4 SCORE: 2.39

## 2024-02-08 ASSESSMENT — ENCOUNTER SYMPTOMS: GENERAL WELL-BEING: GOOD

## 2024-02-08 ASSESSMENT — PATIENT HEALTH QUESTIONNAIRE - PHQ9: CLINICAL INTERPRETATION OF PHQ2 SCORE: 0

## 2024-02-08 ASSESSMENT — PAIN SCALES - GENERAL: PAINLEVEL: NO PAIN

## 2024-02-08 NOTE — PROGRESS NOTES
Chief Complaint   Patient presents with    Annual Exam       HPI:  Nick Rodriguez is a 68 y.o. here for Medicare Annual Wellness Visit. Here with his wife.   Labs: 10/2023  Specialists: neurology, neuro-psychology, speech therapist, therapist- Yakelin Harris.   Dermatology- Dr. Smith.     MCI- stable. Has multiple specialists following.   Speech therapy- going well.     Prediabetes-A1c 6.0%  Hypercholesterolemia/cad- Simvastatin 40 mg daily.  Elevated mcv-carroll one drink 5 days a week. Overall less alcohol intake. Prior vitamin B12 and folate levels normal.   Low platelets-mild.   Low vitamin D-on supplement therapy.     BPH- stable on flomax.   Sometimes notes dizziness if bends down and gets up fast.     Gissel on cpap routinely, compliant on therapy. Does better with regular use.     Left ear hearing loss- having evaluated. Improved. Monitoring.     Hx of basal cell cancer skin- dermatology follows routinely.     Chest wall symptoms- occasional brief zap on left chest wall area. Had prior EKG, chest xray and holter monitor. Has noticed with raking leaves, not necessarily with more strenuous activities. No chest pain or shortness of breath with exercise.     Hx of colon polyps-2013- colonoscopy, normal. Prior referral placed.       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    Prediabetes 10/25/2023    Elevated hemoglobin A1c 10/25/2023    Presbycusis of both ears 08/24/2023    Asymmetrical hearing loss 08/24/2023    GISSEL on CPAP 12/07/2022    CAD (coronary artery disease) by coronary artery calcium score of 807 Agatston done as screening, no symptoms 07/26/2022    Sensorineural hearing loss, bilateral 02/19/2021    Ringing in left ear 02/19/2021    Presbycusis 02/19/2021    Hearing loss of left ear 02/19/2021    Persistent cognitive impairment 01/14/2021    History of colon polyps 10/15/2019    Elevated fasting glucose 10/16/2018     History of basal cell carcinoma 10/16/2018    Non-smoker 09/13/2018    Chronic insomnia 08/23/2018    Memory changes 04/29/2018    Pure hypercholesterolemia 11/14/2017    Chronic bilateral low back pain without sciatica 05/30/2017    Obstructive sleep apnea syndrome 10/17/2016    Pain in right shin 10/17/2016    Seasonal allergies 05/06/2016    Thrombocytopenia (HCC)     Scoliosis 10/09/2015    Benign prostatic hyperplasia with lower urinary tract symptoms 02/12/2015    Vitamin D insufficiency 11/21/2013    Postnasal drip 05/03/2012       Current Outpatient Medications   Medication Sig Dispense Refill    simvastatin (ZOCOR) 40 MG Tab TAKE 1 TABLET EVERY EVENING 100 Tablet 3    tamsulosin (FLOMAX) 0.4 MG capsule Take 1 Capsule by mouth every day. 100 Capsule 3    albuterol 108 (90 Base) MCG/ACT Aero Soln inhalation aerosol INHALE 2 PUFFS EVERY 6 HOURS AS NEEDED FOR SHORTNESS OF BREATH 18 g 0    ketoconazole (NIZORAL) 2 % Cream as needed.      imiquimod (ALDARA) 5 % cream prn      calcipotriene (DOVONEX) 0.005 % Cream       fluticasone (FLONASE) 50 MCG/ACT nasal spray use 2 sprays in each nostril daily 16 g 3    ASPIRIN 81 PO Take  by mouth.      Multiple Vitamins-Minerals (MULTIVITAMIN ADULTS PO) Take  by mouth.      Ascorbic Acid (VITAMIN C PO) Take 1,000 mg by mouth.      Cholecalciferol (VITAMIN D PO) Take  by mouth.      Vitamin E 400 UNIT Tab Take  by mouth.      Omega-3 Fatty Acids (FISH OIL) 1000 MG Cap capsule Take 1,000 mg by mouth 3 times a day, with meals.      GLUCOSAMINE CHONDROITIN COMPLX PO Take  by mouth.       No current facility-administered medications for this visit.          Current supplements as per medication list.     Allergies: Seasonal    Current social contact/activities: Friends and family      He  reports that he has never smoked. He has never used smokeless tobacco. He reports current alcohol use of about 3.0 oz of alcohol per week. He reports that he does not currently use drugs  after having used the following drugs: Marijuana and Oral.  Counseling given: Not Answered      ROS:    Gait: Uses no assistive device  Ostomy: No  Other tubes: No  Amputations: No  Chronic oxygen use: No  Last eye exam: Fall last year  Wears hearing aids: No   : Denies any urinary leakage during the last 6 months      Depression Screening  Little interest or pleasure in doing things?  0 - not at all  Feeling down, depressed , or hopeless? 0 - not at all  Patient Health Questionnaire Score: 0     If depressive symptoms identified deferred to follow up visit unless specifically addressed in assessment and plan.    Interpretation of PHQ-9 Total Score   Score Severity   1-4 No Depression   5-9 Mild Depression   10-14 Moderate Depression   15-19 Moderately Severe Depression   20-27 Severe Depression    Screening for Cognitive Impairment  Do you or any of your friends or family members have any concern about your memory? No  Three Minute Recall (Banana, Sunrise, Chair) 2/3    [place] clock face with all 12 numbers and set the hands to show 20 past 8.  Yes 5  Cognitive concerns identified deferred for follow up unless specifically addressed in assessment and plan.    Fall Risk Assessment  Has the patient had two or more falls in the last year or any fall with injury in the last year?  No    Safety Assessment  Do you always wear your seatbelt?  Yes  Any changes to home needed to function safely? No  Difficulty hearing.  No  Patient counseled about all safety risks that were identified.    Functional Assessment ADLs  Are there any barriers preventing you from cooking for yourself or meeting nutritional needs?  No.    Are there any barriers preventing you from driving safely or obtaining transportation?  No.    Are there any barriers preventing you from using a telephone or calling for help?  No    Are there any barriers preventing you from shopping?  No. Wife does shopping, sometimes he does help.   Are there any barriers  preventing you from taking care of your own finances?  No    Are there any barriers preventing you from managing your medications?  No    Are there any barriers preventing you from showering, bathing or dressing yourself? No    Are there any barriers preventing you from doing housework or laundry? No  Are there any barriers preventing you from using the toilet?No  Are you currently engaging in any exercise or physical activity?  Yes. Hike everyday sometimes twice a day, skiing, 20 min interval exercising every morning.     Self-Assessment of Health  What is your perception of your health? Good  Do you sleep more than six hours a night? Yes  In the past 7 days, how much did pain keep you from doing your normal work? None  Do you spend quality time with family or friends (virtually or in person)? Yes  Do you usually eat a heart healthy diet that constists of a variety of fruits, vegetables, whole grains and fiber? Yes  Do you eat foods high in fat and/or Fast Food more than three times per week? No    Advance Care Planning  Do you have an Advance Directive, Living Will, Durable Power of , or POLST? Yes    Living Will     is not on file - instructed patient to bring in a copy to scan into their chart      Health Maintenance Summary            Overdue - COVID-19 Vaccine (4 - 2023-24 season) Overdue since 11/2/2023 09/07/2023  Imm Admin: MODERNA BIVALENT BOOSTER SARS-COV-2 VACCINE (6+)    09/15/2022  Imm Admin: MODERNA BIVALENT BOOSTER SARS-COV-2 VACCINE (6+)    10/23/2021  Imm Admin: MODERNA SARS-COV-2 VACCINE (12+)    04/01/2021  Imm Admin: MODERNA SARS-COV-2 VACCINE (12+)    03/04/2021  Imm Admin: MODERNA SARS-COV-2 VACCINE (12+)              Overdue - Hepatitis B Vaccine (Hep B) (3 of 3 - 19+ 3-dose series) Overdue since 2/2/2024 12/08/2023  Imm Admin: Hepatitis B Vaccine (Adol/Adult)    01/27/2023  Imm Admin: Hepatitis B Vaccine (Adol/Adult)              Annual Wellness Visit (Yearly) Next due on  12/8/2024 12/08/2023  Visit Dx: Medicare annual wellness visit, subsequent    12/07/2022  Visit Dx: Medicare annual wellness visit, subsequent    12/02/2021  Visit Dx: Medicare annual wellness visit, subsequent    10/21/2020  Level of Service: [place] PREVENTIVE VISIT,EST,65 & OVER    10/04/2019  Level of Service: PB PBEVENTIVE VISIT,EST,65 & OVER    Only the first 5 history entries have been loaded, but more history exists.              Ordered - Colorectal Cancer Screening (Colonoscopy - Every 10 Years) Ordered on 12/8/2023 05/28/2019  OCCULT BLOOD FECES IMMUNOASSAY    09/12/2018  REFERRAL TO GI FOR COLONOSCOPY    10/13/2015  OCCULT BLOOD FECES IMMUNOASSAY    10/09/2014  OCCULT BLOOD FECES IMMUNOASSAY    09/19/2013  OCCULT BLOOD FECES IMMUNOASSAY    Only the first 5 history entries have been loaded, but more history exists.              IMM DTaP/Tdap/Td Vaccine (3 - Td or Tdap) Next due on 10/25/2033      10/25/2023  Imm Admin: Tdap Vaccine    10/16/2013  Imm Admin: Tdap Vaccine              Hepatitis A Vaccine (Hep A) (Series Information) Aged Out      10/16/2013  Imm Admin: Hepatitis A Vaccine, Adult              Pneumococcal Vaccine: 65+ Years (Series Information) Completed      10/21/2020  Imm Admin: Pneumococcal polysaccharide vaccine (PPSV-23)    10/04/2019  Imm Admin: Pneumococcal Conjugate Vaccine (Prevnar/PCV-13)              Hepatitis C Screening  Tentatively Complete      10/29/2020  Hepatitis C Antibody component of HEP C VIRUS ANTIBODY              Zoster (Shingles) Vaccines (Series Information) Completed      04/20/2022  Imm Admin: Zoster Vaccine Recombinant (RZV) (SHINGRIX)    12/16/2021  Imm Admin: Zoster Vaccine Recombinant (RZV) (SHINGRIX)    10/17/2016  Imm Admin: Zoster Vaccine Live (ZVL) (Zostavax) - HISTORICAL DATA              Influenza Vaccine (Series Information) Completed      09/08/2023  Imm Admin: Influenza Vaccine Adult HD    10/31/2022  Imm Admin: Influenza Vaccine Quad Inj (Pf)     10/14/2021  Imm Admin: Influenza Vaccine Adult HD    09/28/2020  Imm Admin: Influenza Vaccine Quad Inj (Pf)    10/04/2019  Imm Admin: Influenza Vaccine Adult HD    Only the first 5 history entries have been loaded, but more history exists.              HPV Vaccines (Series Information) Aged Out      No completion history exists for this topic.              Polio Vaccine (Inactivated Polio) (Series Information) Aged Out      No completion history exists for this topic.              Meningococcal Immunization (Series Information) Aged Out      No completion history exists for this topic.                    Patient Care Team:  Eva Rand M.D. as PCP - General (Family Medicine)  Eva Rand M.D. as PCP - [facility] Paneled  Mag Smith M.D. as Consulting Physician (Dermatology)  Eva Rand M.D. as Consulting Physician (Family Medicine)  Hari Cummings D.C. as Consulting Physician (Chiropractic)  Mag Smith M.D. (Dermatology)  [employer] (DME Supplier)        Social History     Tobacco Use    Smoking status: Never    Smokeless tobacco: Never   Vaping Use    Vaping Use: Never used   Substance Use Topics    Alcohol use: Yes     Alcohol/week: 3.0 oz     Types: 4 Glasses of wine, 1 Cans of beer per week     Comment: less than every day, 4x a week    Drug use: Not Currently     Types: Marijuana, Oral     Comment: occasionally     Family History   Problem Relation Age of Onset    Alcohol/Drug Father         alcohol    Alcohol abuse Father     Alcohol/Drug Brother         alcohol    Sleep Apnea Brother     Cancer Brother     Cancer Brother         Brain Cancer    Cancer Sister         Tumor    Heart Disease Neg Hx      He  has a past medical history of Allergy, Anxiety, Basal cell carcinoma, Chickenpox, Dyslipidemia, Burkinan measles, Groin strain (10/21/2011), Hemorrhoid, Hyperlipidemia, Insomnia, Left lateral epicondylitis (05/06/2016), Mumps, Right hip pain (11/21/2013), S/P colonoscopy (07/2013),  "S/P colonoscopy with polypectomy, Scoliosis, Sleep apnea, and Tonsillitis.   Past Surgical History:   Procedure Laterality Date    BLEPHAROPLASTY  11/14/2012    Performed by Florentin Naylor M.D. at [facility]    INGUINAL HERNIA REPAIR  3/17/2009    Performed by SUDHA BARRETT at [facility]    TONSILLECTOMY         Exam:   /70 (BP Location: Left arm, Patient Position: Sitting, BP Cuff Size: Adult)   Pulse 70   Temp 36.7 °C (98.1 °F) (Temporal)   Resp 15   Ht 1.791 m (5' 10.5\")   Wt 76.7 kg (169 lb 3.2 oz)   SpO2 96%  Body mass index is 23.93 kg/m².    Hearing good.    Dentition good  Alert, oriented in no acute distress.  Eye contact is good, speech goal directed, affect calm  Constitutional: He appears well-developed and well-nourished. He appears not diaphoretic. No distress.   HENT: Right Ear: External ear normal. Left Ear: External ear normal. Tympanic membranes clear and intact.   Nose: Nose normal.   Mouth/Throat: Oropharynx is clear and moist. No oropharyngeal exudate.     Eyes: Conjunctivae and extraocular motions are normal. Pupils are equal, round, and reactive to light. No scleral icterus.   Neck: Normal range of motion. Neck supple. No thyromegaly present.   Chest wall: without significant TTP. No spinal TTP  Cardiovascular: Normal rate, regular rhythm, normal heart sounds and intact distal pulses.  Exam reveals no gallop and no friction rub.  No murmur heard. No carotid bruits.   Pulmonary/Chest: Effort normal and breath sounds normal. No respiratory distress. No wheezes. No rales.   Abdominal: Soft. Bowel sounds are normal. He exhibits no distension and no mass. No tenderness. No rebound and no guarding.   Lymphadenopathy:  He has no cervical adenopathy. No inguinal adenopathy.   Neurological:He is alert. He has normal reflexes. No cranial nerve deficit. He exhibits normal muscle tone.   Skin: Skin is warm and dry. No rash noted. He is not " diaphoretic. No erythema.   Psychiatric: He has a normal mood and affect. His behavior is normal.   Musculoskeletal: He exhibits no edema. Normal strength throughout. 2+ DTR throughout.       Assessment and Plan. The following treatment and monitoring plan is recommended:    67 yo male    1. Medicare annual wellness visit, subsequent   Continue healthy diet and routine exercise.        2. Amnestic MCI (mild cognitive impairment with memory loss) -stable. Continue routine therapies and follow up with specialists-neurology, neuro-psychology, speech therapist, and counseling therapy. Monitor.        3. Prediabetes -stable.   Recommend heart healthy/low cholesterol/high fiber/low sugar/low processed food diet and routine exercise. Monitor in future.        4. Coronary artery disease involving native coronary artery of native heart without angina pectoris   Recommend heart healthy/low cholesterol/high fiber/low sugar/low processed food diet and routine exercise. Monitor.        5. Pure hypercholesterolemia   Recommend heart healthy/low cholesterol/high fiber/low sugar/low processed food diet and routine exercise. Monitor in future.        6. Thrombocytopenia (HCC) - mild, stable. Monitor in future.        7. Vitamin D insufficiency - on supplements. Stable. Monitor in future.        8. Benign prostatic hyperplasia with nocturia -stable, continue flomax therapy.   Reviewed precautions and side effects. Reviewed precaution for orthostatic symptoms. Monitor.        9. GISSEL on CPAP - stable, continue routine cpap use. Monitor.        10. Asymmetrical hearing loss - as below.        11. Hearing loss of left ear, unspecified hearing loss type -stable, continue routine follow up for hearing test and monitoring.        12. History of basal cell carcinoma   Follow up routinely with dermatology.        13. Chest wall symptom or complaint- left side, occasional brief zaps. Prior CXR, EKG, holter and stress test completed.   Appears  likely musculoskeletal etiology with possible nerve component.   Will plan for physical therapy.   May consider further imaging as needed, consider spine imaging in future.  May consider echocardiogram in future as needed. Continue to monitor.  Referral to Physical Therapy      14. History of colon polyps  Referral to GI for Colonoscopy      15. Screen for colon cancer  Referral to GI for Colonoscopy      Lab orders in system for patient to complete.     Services suggested: No services needed at this time  Health Care Screening: Age-appropriate preventive services recommended by USPTF and ACIP covered by Medicare were discussed today. Services ordered if indicated and agreed upon by the patient.  Referrals offered: Community-based lifestyle interventions to reduce health risks and promote self-management and wellness, fall prevention, nutrition, physical activity, tobacco-use cessation, weight loss, and mental health services as per orders if indicated.    Discussion today about general wellness and lifestyle habits:    Prevent falls and reduce trip hazards; Cautioned about securing or removing rugs.  Have a working fire alarm and carbon monoxide detector;   Engage in regular physical activity and social activities     Follow-up: Return in about 2 months (around 4/8/2024).  Recommend routine AWV    This medical record contains text that has been entered with the assistance of computer voice recognition and dictation software.  Therefore, it may contain unintended errors in text, spelling, punctuation, or grammar.

## 2024-03-11 ENCOUNTER — DOCUMENTATION (OUTPATIENT)
Dept: HEALTH INFORMATION MANAGEMENT | Facility: OTHER | Age: 69
End: 2024-03-11
Payer: MEDICARE

## 2024-04-24 ENCOUNTER — HOSPITAL ENCOUNTER (OUTPATIENT)
Dept: LAB | Facility: MEDICAL CENTER | Age: 69
End: 2024-04-24
Attending: FAMILY MEDICINE
Payer: MEDICARE

## 2024-04-24 DIAGNOSIS — R73.03 PREDIABETES: ICD-10-CM

## 2024-04-24 DIAGNOSIS — E78.00 PURE HYPERCHOLESTEROLEMIA: ICD-10-CM

## 2024-04-24 DIAGNOSIS — E55.9 VITAMIN D INSUFFICIENCY: ICD-10-CM

## 2024-04-24 DIAGNOSIS — D69.6 THROMBOCYTOPENIA (HCC): ICD-10-CM

## 2024-04-24 DIAGNOSIS — Z01.84 IMMUNITY STATUS TESTING: ICD-10-CM

## 2024-04-24 DIAGNOSIS — Z12.5 SCREENING FOR PROSTATE CANCER: ICD-10-CM

## 2024-04-24 LAB
25(OH)D3 SERPL-MCNC: 37 NG/ML (ref 30–100)
ALBUMIN SERPL BCP-MCNC: 4.5 G/DL (ref 3.2–4.9)
ALBUMIN/GLOB SERPL: 1.9 G/DL
ALP SERPL-CCNC: 54 U/L (ref 30–99)
ALT SERPL-CCNC: 27 U/L (ref 2–50)
ANION GAP SERPL CALC-SCNC: 11 MMOL/L (ref 7–16)
AST SERPL-CCNC: 33 U/L (ref 12–45)
BILIRUB SERPL-MCNC: 0.4 MG/DL (ref 0.1–1.5)
BUN SERPL-MCNC: 19 MG/DL (ref 8–22)
CALCIUM ALBUM COR SERPL-MCNC: 9.5 MG/DL (ref 8.5–10.5)
CALCIUM SERPL-MCNC: 9.9 MG/DL (ref 8.5–10.5)
CHLORIDE SERPL-SCNC: 103 MMOL/L (ref 96–112)
CO2 SERPL-SCNC: 27 MMOL/L (ref 20–33)
CREAT SERPL-MCNC: 1.02 MG/DL (ref 0.5–1.4)
ERYTHROCYTE [DISTWIDTH] IN BLOOD BY AUTOMATED COUNT: 49.1 FL (ref 35.9–50)
GFR SERPLBLD CREATININE-BSD FMLA CKD-EPI: 79 ML/MIN/1.73 M 2
GLOBULIN SER CALC-MCNC: 2.4 G/DL (ref 1.9–3.5)
GLUCOSE SERPL-MCNC: 97 MG/DL (ref 65–99)
HBV SURFACE AB SERPL IA-ACNC: 33.8 MIU/ML (ref 0–10)
HBV SURFACE AG SER QL: NORMAL
HCT VFR BLD AUTO: 48.6 % (ref 42–52)
HGB BLD-MCNC: 16.2 G/DL (ref 14–18)
MCH RBC QN AUTO: 32.7 PG (ref 27–33)
MCHC RBC AUTO-ENTMCNC: 33.3 G/DL (ref 32.3–36.5)
MCV RBC AUTO: 98.2 FL (ref 81.4–97.8)
PLATELET # BLD AUTO: 172 K/UL (ref 164–446)
PMV BLD AUTO: 10.5 FL (ref 9–12.9)
POTASSIUM SERPL-SCNC: 4.3 MMOL/L (ref 3.6–5.5)
PROT SERPL-MCNC: 6.9 G/DL (ref 6–8.2)
PSA SERPL-MCNC: 2.94 NG/ML (ref 0–4)
RBC # BLD AUTO: 4.95 M/UL (ref 4.7–6.1)
SODIUM SERPL-SCNC: 141 MMOL/L (ref 135–145)
TSH SERPL DL<=0.005 MIU/L-ACNC: 2.75 UIU/ML (ref 0.38–5.33)
WBC # BLD AUTO: 5.5 K/UL (ref 4.8–10.8)

## 2024-04-24 PROCEDURE — 80053 COMPREHEN METABOLIC PANEL: CPT

## 2024-04-24 PROCEDURE — 86706 HEP B SURFACE ANTIBODY: CPT

## 2024-04-24 PROCEDURE — 83036 HEMOGLOBIN GLYCOSYLATED A1C: CPT

## 2024-04-24 PROCEDURE — 36415 COLL VENOUS BLD VENIPUNCTURE: CPT

## 2024-04-24 PROCEDURE — 87340 HEPATITIS B SURFACE AG IA: CPT

## 2024-04-24 PROCEDURE — 82306 VITAMIN D 25 HYDROXY: CPT

## 2024-04-24 PROCEDURE — 85027 COMPLETE CBC AUTOMATED: CPT

## 2024-04-24 PROCEDURE — 84443 ASSAY THYROID STIM HORMONE: CPT

## 2024-04-24 PROCEDURE — 84153 ASSAY OF PSA TOTAL: CPT

## 2024-04-25 ENCOUNTER — OFFICE VISIT (OUTPATIENT)
Dept: MEDICAL GROUP | Facility: IMAGING CENTER | Age: 69
End: 2024-04-25
Payer: MEDICARE

## 2024-04-25 VITALS
BODY MASS INDEX: 24.02 KG/M2 | HEIGHT: 71 IN | RESPIRATION RATE: 16 BRPM | OXYGEN SATURATION: 95 % | TEMPERATURE: 97.9 F | DIASTOLIC BLOOD PRESSURE: 62 MMHG | HEART RATE: 65 BPM | SYSTOLIC BLOOD PRESSURE: 128 MMHG | WEIGHT: 171.6 LBS

## 2024-04-25 DIAGNOSIS — N40.1 BENIGN PROSTATIC HYPERPLASIA WITH NOCTURIA: ICD-10-CM

## 2024-04-25 DIAGNOSIS — R73.03 PREDIABETES: ICD-10-CM

## 2024-04-25 DIAGNOSIS — D69.6 THROMBOCYTOPENIA (HCC): ICD-10-CM

## 2024-04-25 DIAGNOSIS — S69.91XA INJURY TO FINGERNAIL OF RIGHT HAND, INITIAL ENCOUNTER: ICD-10-CM

## 2024-04-25 DIAGNOSIS — R73.09 ELEVATED HEMOGLOBIN A1C: ICD-10-CM

## 2024-04-25 DIAGNOSIS — G31.84 MCI (MILD COGNITIVE IMPAIRMENT): ICD-10-CM

## 2024-04-25 DIAGNOSIS — R35.1 BENIGN PROSTATIC HYPERPLASIA WITH NOCTURIA: ICD-10-CM

## 2024-04-25 DIAGNOSIS — R71.8 ELEVATED MCV: ICD-10-CM

## 2024-04-25 DIAGNOSIS — E55.9 VITAMIN D INSUFFICIENCY: ICD-10-CM

## 2024-04-25 DIAGNOSIS — E78.00 PURE HYPERCHOLESTEROLEMIA: ICD-10-CM

## 2024-04-25 DIAGNOSIS — Z23 NEED FOR HEPATITIS B VACCINATION: ICD-10-CM

## 2024-04-25 LAB
EST. AVERAGE GLUCOSE BLD GHB EST-MCNC: 126 MG/DL
HBA1C MFR BLD: 6 % (ref 4–5.6)

## 2024-04-25 ASSESSMENT — FIBROSIS 4 INDEX: FIB4 SCORE: 2.58

## 2024-04-25 NOTE — PROGRESS NOTES
SUBJECTIVE:    Chief Complaint   Patient presents with    Lab Results     Thrombocytopenia      HPI:     Nick Rodriguez is a 68 y.o. male with mild cognitive impairment, arlen, chronic insomnia, hx of memory changes, chronic low back/hip pain, hypercholesterolemia, and hx of basal cell carcinoma here for lab review.     Prediabetes- stable fasting glucose.   Low platelets- normalized.   MCV mildly elevated- folate and vitamin B 12 in normal range. Stable otherwise.    Hypercholesterolemia-simvastatin 40 mg. Last labs stable.     Vitamin D insufficiency-stable on labs.    Right thumbnail injury- hit by door, bruising mid area.  Nail is growing out.      Had left forearm sunburn when outdoors but is doing okay.  He does see dermatology every 3 months, Dr. Smith.  Uses topical therapy on his chest regularly.    Exercises daily. Sends to his son.     Memory stable- working with specialists.   Some ways improving.   Will see neurology annually.     BPH- Flomax therapy - stable.     Patient states it is okay to speak with wife and his children on aspects of his medical care.      ROS:  No recent fevers or chills. No nausea or vomiting. No diarrhea. No chest pains or shortness of breath. No lower extremity edema.    Current Outpatient Medications on File Prior to Visit   Medication Sig Dispense Refill    simvastatin (ZOCOR) 40 MG Tab TAKE 1 TABLET EVERY EVENING 100 Tablet 3    tamsulosin (FLOMAX) 0.4 MG capsule Take 1 Capsule by mouth every day. 100 Capsule 3    albuterol 108 (90 Base) MCG/ACT Aero Soln inhalation aerosol INHALE 2 PUFFS EVERY 6 HOURS AS NEEDED FOR SHORTNESS OF BREATH 18 g 0    ketoconazole (NIZORAL) 2 % Cream as needed.      imiquimod (ALDARA) 5 % cream prn      calcipotriene (DOVONEX) 0.005 % Cream       fluticasone (FLONASE) 50 MCG/ACT nasal spray use 2 sprays in each nostril daily 16 g 3    ASPIRIN 81 PO Take  by mouth.      Multiple Vitamins-Minerals (MULTIVITAMIN ADULTS PO) Take  by mouth.       Ascorbic Acid (VITAMIN C PO) Take 1,000 mg by mouth.      Cholecalciferol (VITAMIN D PO) Take  by mouth.      Vitamin E 400 UNIT Tab Take  by mouth.      Omega-3 Fatty Acids (FISH OIL) 1000 MG Cap capsule Take 1,000 mg by mouth 3 times a day, with meals.      GLUCOSAMINE CHONDROITIN COMPLX PO Take  by mouth.       No current facility-administered medications on file prior to visit.       Allergies   Allergen Reactions    Seasonal        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    Prediabetes 10/25/2023    Elevated hemoglobin A1c 10/25/2023    Presbycusis of both ears 08/24/2023    Asymmetrical hearing loss 08/24/2023    GISSEL on CPAP 12/07/2022    CAD (coronary artery disease) by coronary artery calcium score of 807 Agatston done as screening, no symptoms 07/26/2022    Sensorineural hearing loss, bilateral 02/19/2021    Ringing in left ear 02/19/2021    Presbycusis 02/19/2021    Hearing loss of left ear 02/19/2021    Persistent cognitive impairment 01/14/2021    History of colon polyps 10/15/2019    Elevated fasting glucose 10/16/2018    History of basal cell carcinoma 10/16/2018    Non-smoker 09/13/2018    Chronic insomnia 08/23/2018    Memory changes 04/29/2018    Pure hypercholesterolemia 11/14/2017    Chronic bilateral low back pain without sciatica 05/30/2017    Obstructive sleep apnea syndrome 10/17/2016    Pain in right shin 10/17/2016    Seasonal allergies 05/06/2016    Thrombocytopenia (HCC)     Scoliosis 10/09/2015    Benign prostatic hyperplasia with lower urinary tract symptoms 02/12/2015    Vitamin D insufficiency 11/21/2013    Postnasal drip 05/03/2012       Past Medical History:   Diagnosis Date    Allergy     Anxiety     Basal cell carcinoma     dermatology- Dr. Smith    Chickenpox     Dyslipidemia     [place]ese measles     Groin strain 10/21/2011     ICD-10 transition    Hemorrhoid     Hyperlipidemia     Insomnia     Left  "lateral epicondylitis 05/06/2016    Mumps     Right hip pain 11/21/2013    S/P colonoscopy 07/2013    negative, repeat in 5 years    S/P colonoscopy with polypectomy     age 53, due age    Scoliosis     Sleep apnea     Tonsillitis          OBJECTIVE:   /62 (BP Location: Left arm, Patient Position: Sitting, BP Cuff Size: Adult)   Pulse 65   Temp 36.6 °C (97.9 °F) (Temporal)   Resp 16   Ht 1.791 m (5' 10.5\")   Wt 77.8 kg (171 lb 9.6 oz)   SpO2 95%   BMI 24.27 kg/m²   General: Well-developed well-nourished male, no acute distress  Neck: supple, no lymphadenopathy- cervical or supraclavicular, no thyromegaly  Cardiovascular: regular rate and rhythm, no murmurs, gallops, rubs  Lungs: clear to auscultation bilaterally, no wheezes, crackles, or rhonchi  Abdomen: +bowel sounds, soft, nontender, nondistended, no rebound, no guarding, no hepatosplenomegaly  Extremities: no cyanosis, clubbing, edema. Right mid thumb area with small area of underlying hematoma, appears improving.   Skin: Warm and dry  Psych: appropriate mood and affect     Latest Reference Range & Units 04/24/24 06:18   WBC 4.8 - 10.8 K/uL 5.5   RBC 4.70 - 6.10 M/uL 4.95   Hemoglobin 14.0 - 18.0 g/dL 16.2   Hematocrit 42.0 - 52.0 % 48.6   MCV 81.4 - 97.8 fL 98.2 (H)   MCH 27.0 - 33.0 pg 32.7   MCHC 32.3 - 36.5 g/dL 33.3   RDW 35.9 - 50.0 fL 49.1   Platelet Count 164 - 446 K/uL 172   MPV 9.0 - 12.9 fL 10.5   Sodium 135 - 145 mmol/L 141   Potassium 3.6 - 5.5 mmol/L 4.3   Chloride 96 - 112 mmol/L 103   Co2 20 - 33 mmol/L 27   Anion Gap 7.0 - 16.0  11.0   Glucose 65 - 99 mg/dL 97   Bun 8 - 22 mg/dL 19   Creatinine 0.50 - 1.40 mg/dL 1.02   GFR (CKD-EPI) >60 mL/min/1.73 m 2 79   Calcium 8.5 - 10.5 mg/dL 9.9   Correct Calcium 8.5 - 10.5 mg/dL 9.5   AST(SGOT) 12 - 45 U/L 33   ALT(SGPT) 2 - 50 U/L 27   Alkaline Phosphatase 30 - 99 U/L 54   Total Bilirubin 0.1 - 1.5 mg/dL 0.4   Albumin 3.2 - 4.9 g/dL 4.5   Total Protein 6.0 - 8.2 g/dL 6.9   Globulin 1.9 - " 3.5 g/dL 2.4   A-G Ratio g/dL 1.9   25-Hydroxy   Vitamin D 25 30 - 100 ng/mL 37   Prostatic Specific Antigen Tot 0.00 - 4.00 ng/mL 2.94   TSH 0.380 - 5.330 uIU/mL 2.750   Hep B Surface Antibody Quant 0.00 - 10.00 mIU/mL 33.80 (H)   Hepatitis B Surface Antigen Non-Reactive  Non-Reactive   (H): Data is abnormally high    ASSESSMENT/PLAN:    68 y.o.male       1. Prediabetes -as below.       2. Thrombocytopenia (HCC) -improved, normalized.  Monitor in future.       3. Elevated MCV -mild.  Stable.  Last folate and vitamin B12 normal range.       4. Elevated hemoglobin A1c   Request lab to add A1c.  Continue healthy diet and routine exercise. HEMOGLOBIN A1C    CANCELED: POCT  A1C      5. Pure hypercholesterolemia-stable.  Continue healthy diet and routine exercise.  Continues simvastatin 40 mg daily.       6. Vitamin D insufficiency -stable on labs.  Continue supplement therapy.       7. Injury to fingernail of right hand, initial encounter- right thumbnail with small area of hematoma, appears healing.         8. MCI (mild cognitive impairment) -stable.   Continue current therapy with specialist.  Continue routine follow-up with specialist.       9. Benign prostatic hyperplasia with nocturia-stable.  Continue Flomax therapy.  Monitor.       10. Need for hepatitis B vaccination  Hep B Adult 20+          Return in about 3 months (around 7/25/2024).    This medical record contains text that has been entered with the assistance of computer voice recognition and dictation software.  Therefore, it may contain unintended errors in text, spelling, punctuation, or grammar.

## 2024-06-27 ENCOUNTER — APPOINTMENT (OUTPATIENT)
Dept: RADIOLOGY | Facility: IMAGING CENTER | Age: 69
End: 2024-06-27
Payer: MEDICARE

## 2024-06-27 ENCOUNTER — OFFICE VISIT (OUTPATIENT)
Dept: URGENT CARE | Facility: CLINIC | Age: 69
End: 2024-06-27
Payer: MEDICARE

## 2024-06-27 VITALS
BODY MASS INDEX: 24.14 KG/M2 | RESPIRATION RATE: 18 BRPM | TEMPERATURE: 98.4 F | HEIGHT: 69 IN | HEART RATE: 64 BPM | DIASTOLIC BLOOD PRESSURE: 70 MMHG | SYSTOLIC BLOOD PRESSURE: 126 MMHG | OXYGEN SATURATION: 95 % | WEIGHT: 163 LBS

## 2024-06-27 DIAGNOSIS — M77.12 LATERAL EPICONDYLITIS OF LEFT ELBOW: ICD-10-CM

## 2024-06-27 DIAGNOSIS — M25.522 LEFT ELBOW PAIN: ICD-10-CM

## 2024-06-27 PROCEDURE — 73080 X-RAY EXAM OF ELBOW: CPT | Mod: TC,FY,LT | Performed by: RADIOLOGY

## 2024-06-27 PROCEDURE — 3078F DIAST BP <80 MM HG: CPT

## 2024-06-27 PROCEDURE — 3074F SYST BP LT 130 MM HG: CPT

## 2024-06-27 PROCEDURE — 99214 OFFICE O/P EST MOD 30 MIN: CPT

## 2024-06-27 ASSESSMENT — FIBROSIS 4 INDEX: FIB4 SCORE: 2.58

## 2024-06-27 ASSESSMENT — ENCOUNTER SYMPTOMS
FEVER: 0
CHILLS: 0

## 2024-06-27 NOTE — PROGRESS NOTES
"Subjective:   Nick Rodriguez is a very pleasant 68 y.o. male who presents for:    Chief Complaint   Patient presents with    Elbow Pain     Lt elbow, x \"couple of weeks\" , worse today       HPI:    HPI    The patient presents to the urgent care for evaluation of pain in the left elbow.  He endorses that this is an acute on chronic problem.  He was diagnosed with trackers elbow in the past and reports that this feels similar.  The patient performs numerous repetitive motions while doing yard work, including lifting rocks, cutting branches.  The left elbow is tender to palpation, but he denies any trauma or previous injuries.  Denies swelling, redness, warmth, feve, numbness or tingling in the left fingers, weakness, decrease in  strength, forearm discomfort, shoulder discomfort, neck discomfort.  No supportive measures have been attempted at this time. The patient's spouse is accompanying him throughout this clinical encounter.    ROS:    Review of Systems   Constitutional:  Negative for chills, fever and malaise/fatigue.   Musculoskeletal:         Left elbow discomfort   All other systems reviewed and are negative.      Medications:      Current Outpatient Medications   Medication Sig    simvastatin (ZOCOR) 40 MG Tab TAKE ONE TABLET BY MOUTH EVERY EVENING    tamsulosin (FLOMAX) 0.4 MG capsule TAKE ONE CAPSULE BY MOUTH ONE TIME DAILY    albuterol 108 (90 Base) MCG/ACT Aero Soln inhalation aerosol INHALE 2 PUFFS EVERY 6 HOURS AS NEEDED FOR SHORTNESS OF BREATH    calcipotriene (DOVONEX) 0.005 % Cream     fluticasone (FLONASE) 50 MCG/ACT nasal spray use 2 sprays in each nostril daily    ASPIRIN 81 PO Take  by mouth.    Multiple Vitamins-Minerals (MULTIVITAMIN ADULTS PO) Take  by mouth.    Ascorbic Acid (VITAMIN C PO) Take 1,000 mg by mouth.    Cholecalciferol (VITAMIN D PO) Take  by mouth.    Vitamin E 400 UNIT Tab Take  by mouth.    Omega-3 Fatty Acids (FISH OIL) 1000 MG Cap capsule Take 1,000 mg by mouth 3 " times a day, with meals.    GLUCOSAMINE CHONDROITIN COMPLX PO Take  by mouth.    ketoconazole (NIZORAL) 2 % Cream as needed.    imiquimod (ALDARA) 5 % cream prn       Allergies:     Allergies   Allergen Reactions    Seasonal        Problem List:     Patient Active Problem List   Diagnosis    Postnasal drip    Vitamin D insufficiency    Benign prostatic hyperplasia with lower urinary tract symptoms    Scoliosis    Thrombocytopenia (HCC)    Seasonal allergies    Obstructive sleep apnea syndrome    Pain in right shin    Chronic bilateral low back pain without sciatica    Pure hypercholesterolemia    Memory changes    Chronic insomnia    Non-smoker    Elevated fasting glucose    History of basal cell carcinoma    History of colon polyps    Persistent cognitive impairment    Sensorineural hearing loss, bilateral    Ringing in left ear    Presbycusis    Hearing loss of left ear    CAD (coronary artery disease) by coronary artery calcium score of 807 Agatston done as screening, no symptoms    GISSEL on CPAP    Presbycusis of both ears    Asymmetrical hearing loss    Prediabetes    Elevated hemoglobin A1c    Amnestic MCI (mild cognitive impairment with memory loss)       Surgical History:    Past Surgical History:   Procedure Laterality Date    BLEPHAROPLASTY  11/14/2012    Performed by Florentin Naylor M.D. at [facility]    INGUINAL HERNIA REPAIR  3/17/2009    Performed by SUDHA BARRETT at [facility]    TONSILLECTOMY         Past Social Hx:     Social History     Socioeconomic History    Marital status:     Highest education level: Master's degree (e.g., MA, MS, [place], MEd, MSW, LEILANI)   Occupational History    Occupation: ADMINISTRATION     Employer: [employer]   Tobacco Use    Smoking status: Never    Smokeless tobacco: Never   Vaping Use    Vaping status: Never Used   Substance and Sexual Activity    Alcohol use: Not Currently     Alcohol/week: 3.0 oz     Types: 4  Glasses of wine, 1 Cans of beer per week     Comment: less than every day, 4x a week    Drug use: Not Currently     Types: Marijuana, Oral     Comment: occasionally    Sexual activity: Yes     Partners: Female   Social History Narrative    , 3 children.      Social Determinants of Health     Financial Resource Strain: Low Risk  (12/7/2023)    Overall Financial Resource Strain (CARDIA)     Difficulty of Paying Living Expenses: Not hard at all   Food Insecurity: No Food Insecurity (12/7/2023)    Hunger Vital Sign     Worried About Running Out of Food in the Last Year: Never true     Ran Out of Food in the Last Year: Never true   Transportation Needs: No Transportation Needs (12/7/2023)    PRAPARE - Transportation     Lack of Transportation (Medical): No     Lack of Transportation (Non-Medical): No   Physical Activity: Sufficiently Active (12/7/2023)    Exercise Vital Sign     Days of Exercise per Week: 5 days     Minutes of Exercise per Session: 60 min   Stress: No Stress Concern Present (12/7/2023)    [place] [employer] - Occupational Stress Questionnaire     Feeling of Stress : Only a little   Social Connections: Socially Integrated (12/7/2023)    Social Connection and Isolation Panel [NHANES]     Frequency of Communication with Friends and Family: More than three times a week     Frequency of Social Gatherings with Friends and Family: Three times a week     Attends Faith Services: 1 to 4 times per year     Active Member of Clubs or Organizations: Yes     Attends Club or Organization Meetings: 1 to 4 times per year     Marital Status:    Housing Stability: Low Risk  (12/7/2023)    Housing Stability Vital Sign     Unable to Pay for Housing in the Last Year: No     Number of Places Lived in the Last Year: 1     Unstable Housing in the Last Year: No        Past Family Hx:      Family History   Problem Relation Age of Onset    Alcohol/Drug Father         alcohol    Alcohol abuse  Father     Alcohol/Drug Brother         alcohol    Sleep Apnea Brother     Cancer Brother     Cancer Brother         Brain Cancer    Cancer Sister         Tumor    Heart Disease Neg Hx        Problem list, medications, and allergies reviewed by myself today in Epic.     Objective:     Vitals:    06/27/24 1210   BP: 126/70   Pulse: 64   Resp: 18   Temp: 36.9 °C (98.4 °F)   SpO2: 95%       Physical Exam  Vitals reviewed.   Constitutional:       Appearance: Normal appearance. He is normal weight.   Musculoskeletal:      Right elbow: Normal.      Left elbow: No swelling, deformity, effusion or lacerations. Normal range of motion. Tenderness present.        Arms:       Comments: Left TTP over the lateral epicondyles.  Elbow  Appearance - No swelling, bruising, or erythema  Palpation - No tenderness to palpation of triceps, biceps, olecranon, medial epicondyle  ROM - FROM  Strength - 5/5 flexion/extension/  Special testing -positive Cozen's  Neurovascular - 2+ radial pulse, sensation intact    Neurological:      Mental Status: He is alert.       X-ray images viewed and interpreted by APRN, confirmed by radiology:        RADIOLOGY RESULTS   DX-ELBOW-COMPLETE 3+ LEFT    Result Date: 6/27/2024 6/27/2024 12:35 PM HISTORY/REASON FOR EXAM:  Atraumatic Pain/Swelling/Deformity; acute on chronic elbow pain. R/O OA. LEFT elbow pain. TECHNIQUE/EXAM DESCRIPTION AND NUMBER OF VIEWS:  3 views of the LEFT elbow. COMPARISON: None FINDINGS: No focal soft tissue swelling or displaced fat pad. No fracture or dislocation. No radiopaque foreign body.     Unremarkable LEFT elbow.       Assessment/Plan:     Diagnosis and associated orders:     1. Left elbow pain  - DX-ELBOW-COMPLETE 3+ LEFT    2. Lateral epicondylitis of left elbow  - Referral to Physical Therapy          Comments/MDM:     X-ray negative for acute osseous abnormality, no evidence of osteoarthritis  Positive Cozen's test  Presentation consistent with lateral  epicondylitis  Referral placed to physical therapy  Discussed supportive measures at length  Follow-up with primary care         All questions answered. Patient verbalized understanding and is in agreement with this plan of care.     If symptoms are worsening or not improving in 3-5 days, follow-up with PCP or return to UC. Differential diagnosis, natural history, and supportive care discussed. AVS handout given and reviewed with patient. Patient educated on red flags and when to seek treatment back in ED or UC.     I personally reviewed prior external notes and test results pertinent to today's visit.  I have independently reviewed and interpreted all diagnostics ordered during this urgent care visit.     This dictation has been created using voice recognition software. The accuracy of the dictation is limited by the abilities of the software. I expect there may be some errors of grammar and possibly content. I made every attempt to manually correct the errors within my dictation. However, errors related to voice recognition software may still exist and should be interpreted within the appropriate context.    This note was electronically signed by OTILIA Maynard

## 2024-07-05 DIAGNOSIS — M77.12 LATERAL EPICONDYLITIS OF LEFT ELBOW: ICD-10-CM

## 2024-07-11 ENCOUNTER — OFFICE VISIT (OUTPATIENT)
Dept: MEDICAL GROUP | Facility: IMAGING CENTER | Age: 69
End: 2024-07-11
Payer: MEDICARE

## 2024-07-11 VITALS
WEIGHT: 164.8 LBS | HEART RATE: 69 BPM | HEIGHT: 69 IN | DIASTOLIC BLOOD PRESSURE: 70 MMHG | OXYGEN SATURATION: 98 % | RESPIRATION RATE: 14 BRPM | TEMPERATURE: 97.8 F | SYSTOLIC BLOOD PRESSURE: 130 MMHG | BODY MASS INDEX: 24.41 KG/M2

## 2024-07-11 DIAGNOSIS — R07.89 CHEST WALL DISCOMFORT: ICD-10-CM

## 2024-07-11 DIAGNOSIS — E78.00 PURE HYPERCHOLESTEROLEMIA: ICD-10-CM

## 2024-07-11 DIAGNOSIS — G31.84 AMNESTIC MCI (MILD COGNITIVE IMPAIRMENT WITH MEMORY LOSS): ICD-10-CM

## 2024-07-11 DIAGNOSIS — I25.10 CORONARY ARTERY DISEASE INVOLVING NATIVE CORONARY ARTERY OF NATIVE HEART WITHOUT ANGINA PECTORIS: ICD-10-CM

## 2024-07-11 DIAGNOSIS — M77.12 LATERAL EPICONDYLITIS OF LEFT ELBOW: ICD-10-CM

## 2024-07-11 DIAGNOSIS — R41.89 EPISODE OF ALTERED COGNITION: ICD-10-CM

## 2024-07-11 DIAGNOSIS — I47.10 SVT (SUPRAVENTRICULAR TACHYCARDIA) (HCC): ICD-10-CM

## 2024-07-11 PROCEDURE — 3078F DIAST BP <80 MM HG: CPT | Performed by: FAMILY MEDICINE

## 2024-07-11 PROCEDURE — 3075F SYST BP GE 130 - 139MM HG: CPT | Performed by: FAMILY MEDICINE

## 2024-07-11 PROCEDURE — 99214 OFFICE O/P EST MOD 30 MIN: CPT | Performed by: FAMILY MEDICINE

## 2024-07-11 ASSESSMENT — FIBROSIS 4 INDEX: FIB4 SCORE: 2.58

## 2024-07-11 ASSESSMENT — PAIN SCALES - GENERAL: PAINLEVEL: NO PAIN

## 2024-07-15 ENCOUNTER — TELEPHONE (OUTPATIENT)
Dept: MEDICAL GROUP | Facility: IMAGING CENTER | Age: 69
End: 2024-07-15
Payer: MEDICARE

## 2024-07-25 ENCOUNTER — APPOINTMENT (OUTPATIENT)
Dept: MEDICAL GROUP | Facility: IMAGING CENTER | Age: 69
End: 2024-07-25
Payer: MEDICARE

## 2024-08-09 ENCOUNTER — OFFICE VISIT (OUTPATIENT)
Dept: NEUROLOGY | Facility: MEDICAL CENTER | Age: 69
End: 2024-08-09
Attending: CLINICAL NEUROPSYCHOLOGIST
Payer: MEDICARE

## 2024-08-09 VITALS
WEIGHT: 163.8 LBS | HEART RATE: 64 BPM | DIASTOLIC BLOOD PRESSURE: 76 MMHG | HEIGHT: 69 IN | TEMPERATURE: 98.4 F | SYSTOLIC BLOOD PRESSURE: 128 MMHG | OXYGEN SATURATION: 96 % | BODY MASS INDEX: 24.26 KG/M2

## 2024-08-09 DIAGNOSIS — F02.80 PRIMARY PROGRESSIVE APHASIA (HCC): ICD-10-CM

## 2024-08-09 DIAGNOSIS — G31.01 PRIMARY PROGRESSIVE APHASIA (HCC): ICD-10-CM

## 2024-08-09 DIAGNOSIS — F03.90 MAJOR NEUROCOGNITIVE DISORDER (HCC): ICD-10-CM

## 2024-08-09 PROCEDURE — 96139 PSYCL/NRPSYC TST TECH EA: CPT | Performed by: CLINICAL NEUROPSYCHOLOGIST

## 2024-08-09 PROCEDURE — 96138 PSYCL/NRPSYC TECH 1ST: CPT | Performed by: CLINICAL NEUROPSYCHOLOGIST

## 2024-08-09 ASSESSMENT — PATIENT HEALTH QUESTIONNAIRE - PHQ9: CLINICAL INTERPRETATION OF PHQ2 SCORE: 0

## 2024-08-09 ASSESSMENT — FIBROSIS 4 INDEX: FIB4 SCORE: 2.58

## 2024-08-09 NOTE — PROGRESS NOTES
A clinical interview was conducted with the patient. Following this, the patient underwent a comprehensive neuropsychological evaluation.   History of colon polyps    Persistent cognitive impairment    Sensorineural hearing loss, bilateral    Ringing in left ear    Presbycusis    Hearing loss of left ear    CAD (coronary artery disease) by coronary artery calcium score of 807 Agatston done as screening, no symptoms    GISSEL on CPAP    Presbycusis of both ears    Asymmetrical hearing loss    Prediabetes    Elevated hemoglobin A1c    Amnestic MCI (mild cognitive impairment with memory loss)     Past Medical History:   Diagnosis Date    Allergy     Anxiety     Basal cell carcinoma     dermatology- Dr. Smith    Chickenpox     Dyslipidemia     French measles     Groin strain 10/21/2011     ICD-10 transition    Hemorrhoid     Hyperlipidemia     Insomnia     Left lateral epicondylitis 05/06/2016    Mumps     Right hip pain 11/21/2013    S/P colonoscopy 07/2013    negative, repeat in 5 years    S/P colonoscopy with polypectomy     age 53, due age    Scoliosis     Sleep apnea     Tonsillitis       Past Surgical History:   Procedure Laterality Date    BLEPHAROPLASTY  11/14/2012    Performed by Florentin Naylor M.D. at [facility]    INGUINAL HERNIA REPAIR  3/17/2009    Performed by SUDHA BARRETT at [facility]    TONSILLECTOMY        Family History   Problem Relation Age of Onset    Alcohol/Drug Father         alcohol    Alcohol abuse Father     Alcohol/Drug Brother         alcohol    Sleep Apnea Brother     Cancer Brother     Cancer Brother         Brain Cancer    Cancer Sister         Tumor    Heart Disease Neg Hx         Current Outpatient Medications:     albuterol 108 (90 Base) MCG/ACT Aero Soln inhalation aerosol, INHALE 2 PUFFS EVERY 6 HOURS AS NEEDED FOR SHORTNESS OF BREATH, Disp: 18 g, Rfl: 0    ketoconazole (NIZORAL) 2 % Cream, as needed., Disp: , Rfl:     imiquimod (ALDARA) 5 % cream, prn, Disp: , Rfl:     calcipotriene (DOVONEX) 0.005 % Cream, , Disp: , Rfl:     fluticasone (FLONASE) 50 MCG/ACT nasal spray, use 2  sprays in each nostril daily, Disp: 16 g, Rfl: 3    ASPIRIN 81 PO, Take  by mouth., Disp: , Rfl:     Multiple Vitamins-Minerals (MULTIVITAMIN ADULTS PO), Take  by mouth., Disp: , Rfl:     Ascorbic Acid (VITAMIN C PO), Take 1,000 mg by mouth., Disp: , Rfl:     Cholecalciferol (VITAMIN D PO), Take  by mouth., Disp: , Rfl:     Vitamin E 400 UNIT Tab, Take  by mouth., Disp: , Rfl:     Omega-3 Fatty Acids (FISH OIL) 1000 MG Cap capsule, Take 1,000 mg by mouth 3 times a day, with meals., Disp: , Rfl:     GLUCOSAMINE CHONDROITIN COMPLX PO, Take  by mouth., Disp: , Rfl:     simvastatin (ZOCOR) 40 MG Tab, TAKE ONE TABLET BY MOUTH EVERY EVENING, Disp: 100 Tablet, Rfl: 3    tamsulosin (FLOMAX) 0.4 MG capsule, Take 1 Capsule by mouth every day., Disp: 100 Capsule, Rfl: 3      Social History     Tobacco Use    Smoking status: Never    Smokeless tobacco: Never   Vaping Use    Vaping status: Never Used   Substance and Sexual Activity    Alcohol use: Not Currently     Alcohol/week: 3.0 oz     Types: 4 Glasses of wine, 1 Cans of beer per week     Comment: less than every day, 4x a week    Drug use: Not Currently     Types: Marijuana, Oral     Comment: occasionally    Sexual activity: Yes     Partners: Female      Social Determinants of Health     Alcohol Use: Not At Risk (12/7/2023)    AUDIT-C     Frequency of Alcohol Consumption: 2-3 times a week     Average Number of Drinks: 1 or 2     Frequency of Binge Drinking: Never   Depression: Not at risk (8/9/2024)    PHQ-2     PHQ-2 Score: 0   Financial Resource Strain: Low Risk  (12/7/2023)    Overall Financial Resource Strain (CARDIA)     Difficulty of Paying Living Expenses: Not hard at all   Food Insecurity: No Food Insecurity (12/7/2023)    Hunger Vital Sign     Worried About Running Out of Food in the Last Year: Never true     Ran Out of Food in the Last Year: Never true   Housing Stability: Low Risk  (12/7/2023)    Housing Stability Vital Sign     Unable to Pay for Housing in the  Last Year: No     Number of Places Lived in the Last Year: 1     Unstable Housing in the Last Year: No   Intimate Partner Violence: Not on file   Physical Activity: Sufficiently Active (12/7/2023)    Exercise Vital Sign     Days of Exercise per Week: 5 days     Minutes of Exercise per Session: 60 min   Social Connections: Socially Integrated (12/7/2023)    Social Connection and Isolation Panel [NHANES]     Frequency of Communication with Friends and Family: More than three times a week     Frequency of Social Gatherings with Friends and Family: Three times a week     Attends Scientology Services: 1 to 4 times per year     Active Member of Clubs or Organizations: Yes     Attends Club or Organization Meetings: 1 to 4 times per year     Marital Status:    Stress: No Stress Concern Present (12/7/2023)    [place] [employer] - Occupational Stress Questionnaire     Feeling of Stress : Only a little   Tobacco Use: Low Risk  (8/9/2024)    Patient History     Smoking Tobacco Use: Never     Smokeless Tobacco Use: Never     Passive Exposure: Not on file   Transportation Needs: No Transportation Needs (12/7/2023)    PRAPARE - Transportation     Lack of Transportation (Medical): No     Lack of Transportation (Non-Medical): No   Utilities: Not on file      Measures Administered: Auditory Naming Test; Holton Diagnostic Aphasia Examination 3rd Ed. (BDAE-3): Commands, Word Repetition, Sentence Repetition, Responsive Naming, Basic Word Discrimination, Complex Ideational Material, & Praxis; [place]ton Naming Test (BNT); Brief Visuospatial Memory Test - Revised (BVMT-R); Category Fluency (Animals); Controlled Oral Word Association Test (COWAT FAS); Sudha-Guzman Executive Functioning System (DKEFS): Color-Word Interference (CWI) & [place] Test (TT); Executive Clock Drawing Test (CLOX); Generalized Anxiety Disorder 7 Item Scale (YOHANNES-7); Freire Verbal Learning Test - Revised (HVLT-R); Mini-Mental State Examination -  2nd Ed. Orientation (MMSE-2); Neuropsychological Assessment Battery (NAB) Form 1: Dots & Oral Production (OR); Patient Health Questionnaire (PHQ-9); Repeatable Battery for the Assessment of Neuropsychological Status Line Orientation (RBANS LO); Test of Practical Judgment (TOPJ); Test of Premorbid Functioning (ToPF); Trail Making Test A & B (TMT); Wechsler Adult Intelligence Scale - 4th Ed. (WAIS-IV): Block Design (BD), Digit Span (DS), Matrix Reasoning (MR), Similarities (SI), Vocabulary (VC), Information (IN), & Coding (CD); and Wechsler Memory Scale - 4th Ed. Logical Memory (WMS-IV LM). Informant Questionnaires: Activities of Daily Living Questionnaire (ADLQ) and Neuropsychiatric Inventory Questionnaire (NPI-Q).     Behavioral Observations: The patient arrived at the clinic on time and was accompanied by his wife and daughter, who participated in the clinical interview. He was well groomed and dressed. He was alert and oriented to situation and person, but incompletely oriented to time and place (MMSE-2 Orientation = 7/10; incorrect month, date, and building floor). He was pleasant and always maintained appropriate interpersonal boundaries. Rapport was easily established. Gait was unremarkable. No gross abnormal movements or motor symptoms were observed. He exhibited word finding problems during the clinical interview. He was also tangential and had a tendency to use general language as opposed to describing words by their specific names. He was easily redirected. Comprehension was adequate for conversation and test instructions. Speech rate, volume, articulation, and prosody were normal. Speech content was appropriate to context. Mood was euthymic during the appointment. He reported he was anxious at the beginning of the session, but relaxed as the session progressed. Affect was mood-congruent and appropriate to the situation. Insight into cognitive and emotional functioning was adequate. There was no indication  of hallucinations, delusions, or thought disorder. Vision and hearing were adequate for the evaluation. He was attentive throughout the evaluation and had no difficulties with retention of task demands. He exhibited prominent word finding problems during the auditory naming task leading to numerous errors and the discontinuation of the test due to excessive difficulty and frustration. He also made several semantic paraphasic errors on the visual naming task. He perceived his performance as poor and made negative remarks at times, but responded well to redirection and encouragement from the examiner. Overall, he was engaged and cooperative throughout testing.      Valerie Olson, Ph.D.          Clinical Neuropsychologist          Department of Neurology          [facility]           I spent 57 minutes interviewing the patient and his family members (neurobehavioral status exam: 86731 = 1). Five hours and 42 minutes of technician time were spent in test administration and scoring under my supervision (04608 = 1, 42195 = 10).

## 2024-08-09 NOTE — PATIENT INSTRUCTIONS
Thank you for your effort during today's evaluation. We will have a feedback session to discuss your results on 09/06/2024 at 2 PM.

## 2024-08-12 ENCOUNTER — TELEPHONE (OUTPATIENT)
Dept: HEALTH INFORMATION MANAGEMENT | Facility: OTHER | Age: 69
End: 2024-08-12

## 2024-08-12 ENCOUNTER — HOSPITAL ENCOUNTER (OUTPATIENT)
Dept: RADIOLOGY | Facility: MEDICAL CENTER | Age: 69
End: 2024-08-12
Attending: FAMILY MEDICINE
Payer: MEDICARE

## 2024-08-12 DIAGNOSIS — R07.89 CHEST WALL DISCOMFORT: ICD-10-CM

## 2024-08-12 DIAGNOSIS — E78.00 PURE HYPERCHOLESTEROLEMIA: ICD-10-CM

## 2024-08-12 DIAGNOSIS — I25.10 CORONARY ARTERY DISEASE INVOLVING NATIVE CORONARY ARTERY OF NATIVE HEART WITHOUT ANGINA PECTORIS: ICD-10-CM

## 2024-08-12 RX ORDER — REGADENOSON 0.08 MG/ML
0.4 INJECTION, SOLUTION INTRAVENOUS ONCE
Status: DISCONTINUED | OUTPATIENT
Start: 2024-08-12 | End: 2024-08-12

## 2024-08-12 RX ORDER — AMINOPHYLLINE 25 MG/ML
100 INJECTION, SOLUTION INTRAVENOUS
Status: DISCONTINUED | OUTPATIENT
Start: 2024-08-12 | End: 2024-08-12

## 2024-08-15 ENCOUNTER — HOSPITAL ENCOUNTER (OUTPATIENT)
Dept: RADIOLOGY | Facility: MEDICAL CENTER | Age: 69
End: 2024-08-15
Attending: FAMILY MEDICINE
Payer: MEDICARE

## 2024-08-15 PROCEDURE — 78452 HT MUSCLE IMAGE SPECT MULT: CPT | Mod: 26 | Performed by: INTERNAL MEDICINE

## 2024-08-15 PROCEDURE — A9502 TC99M TETROFOSMIN: HCPCS

## 2024-08-15 PROCEDURE — 93018 CV STRESS TEST I&R ONLY: CPT | Performed by: INTERNAL MEDICINE

## 2024-08-15 PROCEDURE — 700111 HCHG RX REV CODE 636 W/ 250 OVERRIDE (IP): Performed by: FAMILY MEDICINE

## 2024-08-15 RX ORDER — REGADENOSON 0.08 MG/ML
0.4 INJECTION, SOLUTION INTRAVENOUS ONCE
Status: COMPLETED | OUTPATIENT
Start: 2024-08-15 | End: 2024-08-15

## 2024-08-15 RX ORDER — AMINOPHYLLINE 25 MG/ML
100 INJECTION, SOLUTION INTRAVENOUS
Status: DISCONTINUED | OUTPATIENT
Start: 2024-08-15 | End: 2024-08-16 | Stop reason: HOSPADM

## 2024-08-15 RX ADMIN — REGADENOSON 0.4 MG: 0.08 INJECTION, SOLUTION INTRAVENOUS at 09:16

## 2024-08-16 ENCOUNTER — TELEPHONE (OUTPATIENT)
Dept: MEDICAL GROUP | Facility: IMAGING CENTER | Age: 69
End: 2024-08-16
Payer: MEDICARE

## 2024-08-16 NOTE — TELEPHONE ENCOUNTER
Patient present with his wife for her visit today and notes a couple issues.    Notes had some physical therapy for left elbow issue.  PT worked on left hand has noticed some irritation of skin on the dorsum distal region.  Does have some eczema cream available at home.    Extremities/skin: Left distal finger region with slight pink and dryness of skin.  -Likely eczema.   Recommend avoid hot water.  May use Aquaphor or eczema cream.  Consider corticosteroid as needed.    Has also noted some pain of the left parietal area past 3 days or so.  Some soreness or brief discomfort of area.  Did not feel anything yesterday but felt it today.  No other significant symptoms.  HEENT: oropharynx clear, eyes clear, PERRL, EOMI, cranial nerves II through XII intact.  Head is atraumatic/normocephalic.  No tenderness to palpation of scalp or head.  No erythema scalp noted.  -Suspect will neuralgia of area.    Consider OTC medication as needed.  Recommend neck stretching exercises.  Patient will plan to journal and follow-up as scheduled in 2 weeks.  ER precautions given for any worsening symptoms.

## 2024-08-22 ENCOUNTER — APPOINTMENT (OUTPATIENT)
Dept: OCCUPATIONAL THERAPY | Facility: REHABILITATION | Age: 69
End: 2024-08-22
Payer: MEDICARE

## 2024-09-04 ENCOUNTER — APPOINTMENT (OUTPATIENT)
Dept: MEDICAL GROUP | Facility: IMAGING CENTER | Age: 69
End: 2024-09-04
Payer: MEDICARE

## 2024-09-04 VITALS
TEMPERATURE: 98.4 F | DIASTOLIC BLOOD PRESSURE: 60 MMHG | HEIGHT: 69 IN | SYSTOLIC BLOOD PRESSURE: 110 MMHG | OXYGEN SATURATION: 96 % | HEART RATE: 69 BPM | WEIGHT: 161 LBS | BODY MASS INDEX: 23.85 KG/M2

## 2024-09-04 DIAGNOSIS — F41.9 ANXIETY: ICD-10-CM

## 2024-09-04 DIAGNOSIS — G47.33 OSA ON CPAP: ICD-10-CM

## 2024-09-04 DIAGNOSIS — R07.89 CHEST WALL DISCOMFORT: ICD-10-CM

## 2024-09-04 DIAGNOSIS — L30.9 DERMATITIS: ICD-10-CM

## 2024-09-04 DIAGNOSIS — M77.12 LATERAL EPICONDYLITIS OF LEFT ELBOW: ICD-10-CM

## 2024-09-04 DIAGNOSIS — G31.84 AMNESTIC MCI (MILD COGNITIVE IMPAIRMENT WITH MEMORY LOSS): ICD-10-CM

## 2024-09-04 PROCEDURE — 99214 OFFICE O/P EST MOD 30 MIN: CPT | Performed by: FAMILY MEDICINE

## 2024-09-04 PROCEDURE — 3074F SYST BP LT 130 MM HG: CPT | Performed by: FAMILY MEDICINE

## 2024-09-04 PROCEDURE — 3078F DIAST BP <80 MM HG: CPT | Performed by: FAMILY MEDICINE

## 2024-09-04 ASSESSMENT — FIBROSIS 4 INDEX: FIB4 SCORE: 2.58

## 2024-09-04 NOTE — PROGRESS NOTES
SUBJECTIVE:    Chief Complaint   Patient presents with    Follow-Up     Cardio stress test     Other     Skin irritation     Anxiety     Consult on the magnesium        HPI:     Nick Rodriguez is a 68 y.o. male with mild cognitive impairment, arlen, chronic insomnia, hx of memory changes, chronic low back/hip pain, hypercholesterolemia, and hx of basal cell carcinoma here for review of chest wall symptoms. Here with his wife.     Chest wall discomfort- stable. Was picking up rocks.   PT worked on left elbow.   Stress test completed.     Left hand- tried eczema cream. Skin issues after PT.   Hydrating well.     Anxiety- several times during the day.   Anxious at night.   Using CPAP at night.  Does not want to add another medicine.   Takes magnesium.   At night, thinks of up and coming events.   Usually goes to sleep fine.   Friday, appointment with Dr. Olson.   6-7 hours sleep. Feels rested.   Does not feel anxious.     ROS:  No recent fevers or chills. No nausea or vomiting. No diarrhea. No chest pains or shortness of breath. No lower extremity edema.    Current Outpatient Medications on File Prior to Visit   Medication Sig Dispense Refill    simvastatin (ZOCOR) 40 MG Tab TAKE ONE TABLET BY MOUTH EVERY EVENING 100 Tablet 3    tamsulosin (FLOMAX) 0.4 MG capsule Take 1 Capsule by mouth every day. 100 Capsule 3    albuterol 108 (90 Base) MCG/ACT Aero Soln inhalation aerosol INHALE 2 PUFFS EVERY 6 HOURS AS NEEDED FOR SHORTNESS OF BREATH 18 g 0    ketoconazole (NIZORAL) 2 % Cream as needed.      imiquimod (ALDARA) 5 % cream prn      fluticasone (FLONASE) 50 MCG/ACT nasal spray use 2 sprays in each nostril daily 16 g 3    ASPIRIN 81 PO Take  by mouth.      Multiple Vitamins-Minerals (MULTIVITAMIN ADULTS PO) Take  by mouth.      Ascorbic Acid (VITAMIN C PO) Take 1,000 mg by mouth.      Cholecalciferol (VITAMIN D PO) Take  by mouth.      Vitamin E 400 UNIT Tab Take  by mouth.      Omega-3 Fatty Acids (FISH OIL) 1000  MG Cap capsule Take 1,000 mg by mouth 3 times a day, with meals.      GLUCOSAMINE CHONDROITIN COMPLX PO Take  by mouth.      calcipotriene (DOVONEX) 0.005 % Cream        No current facility-administered medications on file prior to visit.       Allergies   Allergen Reactions    Seasonal        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    Prediabetes 10/25/2023    Elevated hemoglobin A1c 10/25/2023    Presbycusis of both ears 08/24/2023    Asymmetrical hearing loss 08/24/2023    GISSEL on CPAP 12/07/2022    CAD (coronary artery disease) by coronary artery calcium score of 807 Agatston done as screening, no symptoms 07/26/2022    Sensorineural hearing loss, bilateral 02/19/2021    Ringing in left ear 02/19/2021    Presbycusis 02/19/2021    Hearing loss of left ear 02/19/2021    Persistent cognitive impairment 01/14/2021    History of colon polyps 10/15/2019    Elevated fasting glucose 10/16/2018    History of basal cell carcinoma 10/16/2018    Non-smoker 09/13/2018    Chronic insomnia 08/23/2018    Memory changes 04/29/2018    Pure hypercholesterolemia 11/14/2017    Chronic bilateral low back pain without sciatica 05/30/2017    Obstructive sleep apnea syndrome 10/17/2016    Pain in right shin 10/17/2016    Seasonal allergies 05/06/2016    Thrombocytopenia (HCC)     Scoliosis 10/09/2015    Benign prostatic hyperplasia with lower urinary tract symptoms 02/12/2015    Vitamin D insufficiency 11/21/2013    Postnasal drip 05/03/2012       Past Medical History:   Diagnosis Date    Allergy     Anxiety     Basal cell carcinoma     dermatology- Dr. Smith    Chickenpox     Dyslipidemia     Maltese measles     Groin strain 10/21/2011     ICD-10 transition    Hemorrhoid     Hyperlipidemia     Insomnia     Left lateral epicondylitis 05/06/2016    Mumps     Right hip pain 11/21/2013    S/P colonoscopy 07/2013    negative, repeat in 5 years    S/P colonoscopy  "with polypectomy     age 53, due age    Scoliosis     Sleep apnea     Tonsillitis          OBJECTIVE:   /60 (BP Location: Left arm, Patient Position: Sitting, BP Cuff Size: Adult)   Pulse 69   Temp 36.9 °C (98.4 °F) (Temporal)   Ht 1.758 m (5' 9.2\")   Wt 73 kg (161 lb)   SpO2 96%   BMI 23.64 kg/m²   General: Well-developed well-nourished male, no acute distress  Neck: supple, no lymphadenopathy- cervical or supraclavicular, no thyromegaly  Cardiovascular: regular rate and rhythm, no murmurs, gallops, rubs  Lungs: clear to auscultation bilaterally, no wheezes, crackles, or rhonchi  Abdomen: +bowel sounds, soft, nontender, nondistended, no rebound, no guarding, no hepatosplenomegaly  Extremities: no cyanosis, clubbing, edema. Left hand dorsum finger pink/dry.   Skin: Warm and dry  Psych: appropriate mood and affect      Narrative & Impression          Myocardial Perfusion   Report   NUCLEAR IMAGING INTERPRETATION   No evidence of significant jeopardized viable myocardium or prior myocardial    infarction.   Normal left ventricular size, ejection fraction, and wall motion.   ECG INTERPRETATION   Negative stress ECG for ischemia.      DANIEL POSADA      MRN:    [id number]         Gender:    M      Exam Date: 08/15/2024 08:26      ASSESSMENT/PLAN:    68 y.o.male     1. Chest wall discomfort - stable. Improved.   Modify activities. Monitor.        2. Lateral epicondylitis of left elbow -stable.   Continue exercises.        3. Dermatitis- hand.   Recommend avoid hot water. Keep moisturized. Consider use of aquaphor and cotton gloves.        4. Amnestic MCI (mild cognitive impairment with memory loss) -stable.   Continue follow up with neurology and neuropsychology.        5. GISSEL on CPAP -stable, continue CPAP.        6. Anxiety   Reviewed options of therapy. Continue magnesium. Recommend build toolbox. Reviewed management recommendations.          Return in about 6 weeks (around 10/16/2024).    This medical " record contains text that has been entered with the assistance of computer voice recognition and dictation software.  Therefore, it may contain unintended errors in text, spelling, punctuation, or grammar.

## 2024-09-06 ENCOUNTER — OFFICE VISIT (OUTPATIENT)
Dept: NEUROLOGY | Facility: MEDICAL CENTER | Age: 69
End: 2024-09-06
Attending: CLINICAL NEUROPSYCHOLOGIST
Payer: MEDICARE

## 2024-09-06 VITALS
SYSTOLIC BLOOD PRESSURE: 118 MMHG | BODY MASS INDEX: 24.39 KG/M2 | HEIGHT: 69 IN | TEMPERATURE: 97.7 F | HEART RATE: 72 BPM | OXYGEN SATURATION: 97 % | DIASTOLIC BLOOD PRESSURE: 62 MMHG | WEIGHT: 164.68 LBS

## 2024-09-06 DIAGNOSIS — F02.80 PRIMARY PROGRESSIVE APHASIA (HCC): ICD-10-CM

## 2024-09-06 DIAGNOSIS — F03.90 MAJOR NEUROCOGNITIVE DISORDER (HCC): ICD-10-CM

## 2024-09-06 DIAGNOSIS — G31.01 PRIMARY PROGRESSIVE APHASIA (HCC): ICD-10-CM

## 2024-09-06 PROCEDURE — 3078F DIAST BP <80 MM HG: CPT | Performed by: CLINICAL NEUROPSYCHOLOGIST

## 2024-09-06 PROCEDURE — 3074F SYST BP LT 130 MM HG: CPT | Performed by: CLINICAL NEUROPSYCHOLOGIST

## 2024-09-06 PROCEDURE — 96132 NRPSYC TST EVAL PHYS/QHP 1ST: CPT | Performed by: CLINICAL NEUROPSYCHOLOGIST

## 2024-09-06 PROCEDURE — 96133 NRPSYC TST EVAL PHYS/QHP EA: CPT | Performed by: CLINICAL NEUROPSYCHOLOGIST

## 2024-09-06 ASSESSMENT — FIBROSIS 4 INDEX: FIB4 SCORE: 2.58

## 2024-09-06 NOTE — PROGRESS NOTES
Neuropsychological Evaluation   "expectation ([facility]ore Cognitive Assessment; MOCA = 19/30). MRI of the brain (06/23/2022) documented: \"1. Mild supratentorial white matter disease most consistent with microvascular ischemic change. Common findings for Mr. Rodriguez's age. 2. Concerning the given history of new onset memory loss, no imaging findings indicative of specific neurodegenerative disease. No significant cerebral atrophy or disproportionate temporal lobe atrophy.\" Relevant lab results (04/24/2024) revealed elevated hemoglobin A1C.    Previous Evaluations: Mr. Rodriguez underwent a neuropsychological evaluation in our clinic on 08/12/2022. Test results revealed impairments in auditory attention/working memory, in addition to deficits in aspects of language and executive functioning. These deficits interfered with his memory and processing speed performance. His pattern of language deficits was indicative of a marked decline in semantic processing and knowledge. Diagnostic impression was of mild neurocognitive disorder (mild cognitive impairment). 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 Please refer to his previous report for further details.     Mr. Rodriguez underwent a speech and language pathology evaluation with Eli Goodman MS, CCC-SLP, at [facility] on 11/08/2023: “Overall, Mr. Rodriguez presented with significant word finding, recall, attention, and working memory difficulties. Mr. Rodriguez's performance on the language and cognitive assessments administered was significantly below expectations, considering his prior level of function, educational history, and social history. At this time, Mr. Rodriguez would benefit from speech therapy, focusing on cognitive and language rehabilitation.” “Nick Rodriguez presents with deficits in language and cognition, secondary to his medical diagnosis of mild cognitive " impairment. Mr. Rodriguez's deficits are characterized by difficulties with word finding, memory, and attention. Due to the deficits indicated, Mr. Rodriguez would experience difficulty communicating, participating in activities of daily living (ADLs), participating in instrumental activities of daily living (IADLs), maintaining relationships, and expressing pertinent information regarding safety, his well-being, and his personal needs. Mr. Rodriguez's current level of performance does warrant immediate clinical intervention due to medical necessity at this time. It is strongly recommended that he receive individual therapy 2x weekly for a minimum of 45 minutes per session. It is strongly recommended that client/caregiver participation occurs to ensure carryover into home and community environments.”    Presenting Concerns and Interim Changes:     Cognitive Functioning: Mr. Rodriguez reported his cognitive functioning has remained largely stable since his previous evaluation in August 2022. His wife and daughter reported observing a notable decline in expressive language coupled with milder declines in short-term memory, attention, and navigation. Specifically, they noted Mr. Rodriguez has more frequent difficulties with word finding, retrieving names (of peoples and objects), and semantic paraphasias (e.g., says bird instead of duck). Further, his daughter reported that he talks using more general terms, has slight problems pronouncing words, and sometimes talks about the family using old work-related language such as faculty or staff. His wife noted the patient has occasional problems “sounding words out,” although he can repeat them correctly. They reported that he also has difficulties following and comprehending conversations in large groups. They denied observing significant comprehension problems in one-to-one conversations. Regarding memory, they noted he occasionally forgets details of recent conversations and events. He  continues to rely on his calendar for planned activities without problems. He also frequently forgets the names of acquaintances and it takes him longer to retain new information. Reminders continue to be beneficial. They reported he has increased difficulty multitasking and following conversations when multiple people are involved. He has no problems focusing on one simple task at a time. Additionally, they reported Mr. Sepulvedas navigational skills in unfamiliar locations have mildly declined. He stated that when he must go to a new place, he typically goes there the day before to make sure he knows how to get there. His family stated that this is something he has done consistently in recent years, and he has no problems with navigation in familiar locations. His wife also stated that Mr. Rodriguez's cognitive problems appear to worsen in the evenings as he is more easily confused. She noted he has been engaging in speech therapy sessions weekly, which have been beneficial for learning compensation strategies for his cognitive deficits. They denied observing significant declines in planning, organizing, and decision making.     Functional Abilities: His wife reported that she has largely taken over managing finances and bill payments, but Mr. Rodriguez continues to be involved in their financial decisions. She noted he has no problems with simple monetary transactions but would have difficulty managing finances independently. She stated Mr. Rodriguez takes his medication independently but has difficulties adapting to changes in his medications. She stated he no longer cooks and has asked for instructions on how to assemble a taco or a sandwich, which is unusual. He is also having difficulties performing regular home maintenance tasks he used to do. His daughter noted that Mr. Rodriguez had difficulty putting gas in the car due to forgetting the credit card zip code. She stated he has forgotten debit card pins and passwords as well.  They noted he has problems learning how to use new technology such as switching to apple products or new car features (e.g., forgot he had to push a button to turn off the engine). They noted he remains independent with scheduling appointments, simple household responsibilities, and driving (no recent tickets or accidents). He also remains independent in all self-care activities (e.g., dressing, eating, and bathing).    Mental Health History: Mr. Rodriguez reported he has been engaging weekly in counseling sessions for the past 2 years, which has been beneficial. He denied any other interim changes in this area. Please refer to his previous report for additional information.    Emotional Functioning: Mr. Rodriguez reported sadness and grief related to his sister passing away earlier this week. His family members noted observing occasional sadness about his cognitive decline. They denied observing persistent depressive symptoms. His son reported observing increased anxiety about how other people, including family members, perceive Mr. Rodriguez. He explained Mr. Rodriguez has been reticent to share details about his cognitive difficulties and underlying diagnosis with family members, which has been stressful. His wife agreed, noting the patient has increased anxiety in the evenings coupled with episodes of confusion. During these episodes, he occasionally has paranoid ideation (e.g., thinks people are against him). She also noted Mr. Rodriguez has become more “sentimental and emotional” (e.g., cries more easily). Mr. Rodriguez reported he sleeps an average of 6 to 7 hours per night but has occasional difficulties with sleep maintenance. His wife explained that he occasionally wakes up in the middle of the night and is confused thinking that he is about to do something. Mr. Rodriguez denied loss of interest in activities, anhedonia, suicidal ideation, decreased energy, appetite changes, and traumatic stress related symptoms. There were no  reports of acting out dreams, hallucinations, or delusions.      Sensory/Physical/Motor Changes: Mr. Rodriguez reported occasional mild headaches, which he described as a pain in the back of his head. He noted mild chronic back pain that is not significant interfering with daily functioning. He also reported occasional episodes of feeling lightheaded, likely due to low blood pressure. He noted that overall, his balance has improved compared to the previous evaluation. His wife noted observing that his speech volume is reduced compared to 2 years ago. Mr. Rodriguez denied recent declines in sensory functioning, falls within the past 6 months, and fine motor problems.    Medical History: His family members reported that in July, while they were traveling in [place], Mr. Rodriguez had a prolonged episode of “delirium” following a day of hiking. They explained Mr. Rodriguez became increasingly confused throughout the day and the confusion lasted until the next morning. They noted that he was talking about work with the family, which was out of context (he has been retired for years), was anxious, mildly paranoid, and could not follow conversations adequately. They reported Mr. Rodriguez has not had another severe episode such as this but, as noted above, he has similar milder symptoms when he gets confused in the evenings. Per his medical record, Mr. Rodriguez was diagnosed with prediabetes since the previous evaluation. Hospitalizations within the past month were denied. Mr. Rodriguez and his family members denied any other interim changes in this area. Please refer to his previous report and medical record for additional information.     Family Medical History: Remarkable for dementia (mother was in assisted living for 2 to 3 years and passed away when she was 94 years old), Parkinson's disease (sister), brain tumor (brother), sleep apnea (brother), and alcohol use disorder.     Medications and Supplements: Albuterol, ketoconazole, imiquimod,  calcipotriene, fluticasone, aspirin, multivitamin/minerals, vitamin C, vitamin D, vitamin E, omega-3 fatty acids, glucosamine, simvastatin, and tamsulosin.     Psychosocial History: Mr. Rodriguez reported that his sister's son and wife are temporarily living with them. In terms of hobbies and activities he continues to play pickle ball, hike, garden, and do a 12-minute workout in the mornings. He denied any other interim changes in this area. Please refer to his previous report for additional information.    Substance Use: Mr. Rodriguez reported he drinks an average of 2 to 3 glasses of wine per week. He denied current use of illicit drugs, marijuana, and nicotine products. There is no reported history of substance use disorder or treatment.     Measures Administered: Auditory Naming Test; [place] Diagnostic Aphasia Examination 3rd Ed. (BDAE-3): Commands, Word Repetition, Sentence Repetition, Responsive Naming, Basic Word Discrimination, Complex Ideational Material, & Praxis; [place] Naming Test (BNT); Brief Visuospatial Memory Test - Revised (BVMT-R); Category Fluency (Animals); Controlled Oral Word Association Test (COWAT FAS); Sudha-Guzman Executive Functioning System (DKEFS): Color-Word Interference (CWI) & Bentley Test (TT); Executive Clock Drawing Test (CLOX); Generalized Anxiety Disorder 7 Item Scale (YOHANNES-7); Freire Verbal Learning Test - Revised (HVLT-R); Mini-Mental State Examination - 2nd Ed. Orientation (MMSE-2); Neuropsychological Assessment Battery (NAB) Form 1: Dots & Oral Production (OR); Patient Health Questionnaire (PHQ-9); Repeatable Battery for the Assessment of Neuropsychological Status Line Orientation (RBANS LO); Test of Practical Judgment (TOPJ); Test of Premorbid Functioning (ToPF); Trail Making Test A & B (TMT); Wechsler Adult Intelligence Scale - 4th Ed. (WAIS-IV): Block Design (BD), Digit Span (DS), Matrix Reasoning (MR), Similarities (SI), Vocabulary (VC), Information (IN), & Coding (CD); and  Wechsler Memory Scale - 4th Ed. Logical Memory (WMS-IV LM). Informant Questionnaires: Activities of Daily Living Questionnaire (ADLQ) and Neuropsychiatric Inventory Questionnaire (NPI-Q).     Behavioral Observations: Mr. Rodriguez arrived at the clinic on time and was accompanied by his wife, son, and daughter, who participated in the clinical interview. He was well groomed and dressed. He was alert and oriented to situation and person, but incompletely oriented to time and place (MMSE-2 Orientation = 7/10; incorrect month, date, and building floor). He was pleasant and always maintained appropriate interpersonal boundaries. Rapport was easily established. Gait was unremarkable. No gross abnormal movements or motor symptoms were observed. Insight into cognitive functioning was partial as he reported less cognitive concerns compared to his family members observations. He exhibited word finding problems during the clinical interview. He was also tangential and tended to use general language as opposed to describing words by their specific names. He was easily redirected. Comprehension was adequate for conversation and test instructions. Speech rate, volume, articulation, and prosody were normal. Speech content was appropriate to context. Mood was euthymic during the appointment. He reported he was anxious at the beginning of the session but relaxed as the session progressed. Affect was mood-congruent and appropriate to the situation. There was no indication of hallucinations, delusions, or thought disorder. Vision and hearing were adequate for the evaluation. He was attentive throughout the evaluation and had no difficulties with retention of task demands. He exhibited prominent word finding problems during the auditory naming task leading to numerous errors and the discontinuation of the test due to excessive difficulty and frustration. He also made several semantic paraphasic errors on the visual naming task. He  perceived his performance as poor and made negative remarks at times but responded well to redirection and encouragement from the examiner. Overall, he was engaged and cooperative throughout testing.     Results & Key Findings: Please note that scores reported are for professional use only. For diagnostic purposes, a performance score that falls below the 9th percentile of the reference group for that measure may be considered a cognitive deficit depending on the overall pattern of performance. The following clinical descriptors identify performance within the range of percentile scores indicated in the parentheses: Exceptionally High Score (>98th), Above Average Score (91st-97th), High Average Score (75th-90th), Average Score (25th-74th), Low Average Score (9th-24th), Below Average Score (3rd-8th), and Exceptionally Low Score (<2nd). Please see the attached test results summary table in the appendix for a list of measures administered as well as raw and normative scores, which are listed in the designated columns. All such scores are based on age-corrected norms and certain scores may be adjusted for education and/or other demographic factors as appropriate.     Data Validity: Mr. Truong performance on embedded validity indicators was within the valid range, suggesting he appropriately engaged in testing. The following test results are considered an accurate representation of his current level of cognitive functioning.    Premorbid Functioning: Per his previous evaluation, a predicted estimate of premorbid intellectual functioning based on age and his performance on a single word-reading test fell within the average range (TOPF). Based on demographic factors, his premorbid ability was predicted in the high average range (TOPF).    Cognitive Functioning:     Mr. Truong performance on some measures in the following domains was below expectation:     Attention/Working Memory/Processing Speed: Performance on an  overall measure of basic auditory attention span (forward number repetition) and working memory was (backward/sequenced number repetition) was exceptionally low (WAIS-IV DS). Visual working memory was average (NAB Dots). Rapid color naming and simple word reading speed were both exceptionally low (DKEFS CWI). Visuomotor number sequencing speed (TMT A) and rapid code transcription (WAIS-IV CD) were both low average.  Language: Performance on an overall index of verbal comprehension/reasoning was exceptionally low (WAIS-V VCI), with exceptionally low abstract verbal reasoning (SI), vocabulary knowledge (VC), and general fund of knowledge (IN). Semantic verbal fluency (Animals) and phonemic verbal fluency (COWAT FAS) were both exceptionally low. Visual confrontation naming was exceptionally low. His performance on this task was notable for several semantic paraphasias and no significant improvement with phonemic cues (BNT). Auditory naming was below expectation. During this task, he exhibited prominent word finding problems leading to numerous errors and the discontinuation of the test due to excessive difficulty and frustration (ANT). Responsive naming was impaired (BDAE-3). Single word reading was below average (TOPF). Oral speech production to describe a picture was low average. Qualitatively, he exhibited word finding problems and used general language in his description (e.g., child instead of daughter or girl). He also had difficulty naming specific details in the picture (NAB OP). Basic word discrimination, single word repetition, and sentence repetition were all impaired (BDAE-3). Auditory comprehension of complex ideational material was average (BDAE-3). Auditory comprehension of single and multiple step commands was intact (BDAE-3).  Memory: Total recall of a wordlist across three repeated learning trials was exceptionally low. Delayed recall remained exceptionally low, with 0% retention of information.  Recognition discrimination of the wordlist was also exceptionally low, with 9/12 target words correctly identified and 5 false positive errors (HVLT-R). Immediate and delayed recall of short stories were exceptionally low and below average, respectively (62% retention), when demographically corrected. Delayed recognition of story details was low average compared to similarly aged peers (WMS LM). Total recall of an array of simple geometric figures across three repeated learning trials was exceptionally low. Delayed recall remained exceptionally low (67% retention). Delayed recognition discrimination of the figures was low average with 5/6 target figures correctly identified and one false positive error (BVMT-R). Total immediate recognition recall of a series of shapes across three learning trials was average. Delayed recognition of the shapes remained average. Forced choice recognition discriminability was high average, with 9/9 shapes correctly identified and one false positive error (NAB SL).  Executive Functioning: Freehand clock drawing/conceptualization was exceptionally low; points were lost for not anchoring the numbers initially, symmetry errors, adding sectoring marks, drawing the numbers outside the clock face, omitting a number, placing the hour hand incorrectly, and an intrusion error (CLOX 1). Response inhibition was exceptionally low. Given the level of difficulty he exhibited on this task, response inhibition with a set shifting component was not administered (DKEFS CWI). Visuomotor set shifting (TMT B), abstract verbal reasoning (WAIS-IV SI), and phonemic verbal fluency were all exceptionally low (COWAT FAS). Spatial planning/problem solving was below average (DKEFS TT). Abstract/deductive visual reasoning was high average (WAIS-IV MR). Practical judgment and problem solving in response to hypothetical scenarios was above average (TOPJ).     Mr. Rodriguez's performance in the following domains was within  normal expectation:      Visual Perception/Construction: Judgment of line orientation (RBANS LO), simple figure copy (BVMT-R Copy), copy of a clock (CLOX 2), abstract/deductive visual reasoning (WAIS-IV MR), and construction of block designs (WAIS-IV BD).  Motor: Motor praxis was intact for natural gestures, conventional gestures, use of pretended objects, and bucco-facial/respiratory movements (BDAE-3).     Self-Report Questionnaires: Mr. Sepulvedas responses on self-report inventories of emotional functioning were indicative of minimal levels of depression (PHQ-9) and anxiety (YOHANNES-7) over the past two weeks.     Informant Questionnaires: His wife completed a questionnaire about Mr. Sepulvedas ability to function independently, implying a moderate level of impairment in activities of daily living, with particular difficulties in the areas of money management, transportation, communication, and household care (ADLQ). On a questionnaire regarding neuropsychiatric symptoms, she reported observing mild agitation, depression, anxiety, disinhibition, lability, nighttime behaviors, and appetite changes (NPI-Q).    Neuropsychological Findings and Impressions      Results Summary/Interpretation: Mr. Sepulvedas estimated premorbid intellectual ability was average to high average. Basic auditory attention span and working memory was below expectation. Visual working memory was intact. Language was characterized by deficits in verbal fluency (phonemic and semantic), vocabulary knowledge, general fund of knowledge, basic word discrimination, visual confrontation naming, auditory naming, and responsive naming. This is indicative of significant difficulties with semantic processing and knowledge. Single word reading, sentence repetition, and word repetition were also below expectation, though he only made one error on the word repetition task. Remaining aspects of language were within normal limits. Qualitatively, he exhibited word  finding problems during the clinical interview and oral production tasks. Further, he talked in general terms due to difficulty retrieving names of specific details and made numerous semantic paraphasias during the naming tasks. His language deficits likely interfered with his performance on a measure of oral processing speed (color naming) that involves semantic processing. Simple word reading speed was also below expectation. All other aspects of processing speed were within normal limits. Executive functioning was notable for deficits in phonemic verbal fluency, abstract verbal reasoning, response inhibition, freehand clock drawing/conceptualization (due to organizational difficulties), visuomotor set shifting, and spatial planning/problem solving. These deficits were suggestive of reduced cognitive flexibility. It is likely that his language deficits interfered with his performance on the abstract verbal reasoning and phonemic fluency tasks. His performance on the remaining measures of executive functioning was within normal limits. Memory was variable. [place] verbal memory (wordlist) was below expectation. Contextual verbal memory (stories) was notable for deficient encoding and retrieval, but improvement to low average with recognition cues. Visual memory (figures) was characterized by deficient encoding and delayed recall, but improvement to low average with recognition cues. On another measure of visual memory (shapes) using a recognition paradigm, encoding, consolidation, and retrieval were intact. This overall pattern of improvement in memory performance with recognition cues is suggestive of attentional and executive function difficulties interfering with encoding and retrieval of information. It is likely that his language deficits interfered with his verbal memory performance as well. Visual perception/constructional abilities and motor praxis were intact. Practical judgment was a cognitive strength.      Compared to his previous evaluation in August 2022, there were marked declines in aspects of oral processing speed (simple word reading speed), memory (encoding and delayed recall of short stories, and encoding of simple figures), and executive functioning (abstract verbal reasoning, visuomotor set shifting, response inhibition, response inhibition with a set shifting component, freehand clock drawing, and spatial planning/problem solving). Regarding language, there were marked declines in vocabulary knowledge, single word reading, semantic verbal fluency, phonemic verbal fluency, visual confrontation naming, and responsive naming. His performance on all the remaining measures across cognitive domains was largely stable compared to his previous evaluation, with slight changes that were consistent with normal testing variability.     Impression: Mr. Rodriguez's cognitive profile revealed impairments in aspects of auditory attention/working memory, oral processing speed, language, memory, and executive functioning. Language deficits likely interfered with his verbal memory and oral processing speed performance. His pattern of language deficits (impaired vocabulary, naming, fund of knowledge, semantic fluency, and basic word discrimination) was indicative of prominent deficits in semantic processing and knowledge. Compared to his previous evaluation, there were marked declines in aspects of language, oral processing speed, memory, and executive function. Executive functioning declined considerably, suggesting reduced cognitive flexibility. Based on his history, cognitive profile, and reported functional status, he meets criteria for major neurocognitive disorder (dementia) in the mild severity range. 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 Qualitatively, the language errors he made during the interview and testing including semantic paraphasias, word finding pauses, and use of general terms instead of specific names are also consistent with SPPA. His attention/working memory and executive function deficits remain consistent with what is typically seen in patients with vascular cognitive impairment. As such, given his history multiple vascular risk factors and brain MRI findings of mild microvascular ischemic changes, cerebrovascular contributions remain possible. Other possible contributing factors to his cognitive difficulties include sleep maintenance problems, mild mood disturbance, grief, anxiety, elevated hemoglobin A1C, and episodes of confusion in the evenings. Given the decline in memory performance compared to his previous evaluation, incipient Alzheimer's disease (AD) cannot be completely ruled out. However, AD is less likely given his largely intact visual memory and story recognition memory. Further, it is likely that his language decline is contributing to his verbal memory decline.      Recommendations:     Based on the present results, Mr. Rodriguez is recommended for a repeat neuropsychological evaluation in the next 18 to 24 months or sooner, if there is a considerable change in cognitive or emotional status. At that time, diagnostic impressions and treatment recommendations will be revised and updated as necessary. Additional testing will permit comparisons over time to better identify stability, potential improvement, or possible decline.   Continuous oversight and management of instrumental activities of daily living, particularly financial and medication/health management, are advised in light of his cognitive deficits. It is not recommended that he engage in these activities without supervision. Increase in-home assistance (e.g.,  hiring a caregiver or additional support from family members), home health, or placement in a location with increased support (e.g., senior living or assisted living/memory care) are options to consider in the future. Information about home health agencies can be found on the Alzheimer's Association's website (www.alz.org). The current level of assistance with daily activities should not be reduced.  Continuous accompaniment to medical appointments by trusted others and receipt of written instructions is recommended to ensure comprehension, later recall, and follow-through of advice given by providers. In light of his cognitive deficits, increased monitoring of the patient when he is outside his home is recommended to ensure his safety, particularly in unfamiliar locations.  Continued oversight and management from family members or trusted others with complex decision-making (e.g., legal, financial, medical) are also recommended. Given his cognitive deficits, it is not recommended that he engage in this type of decision making unsupervised. The severity of his cognitive decline puts him at higher risk for financial frauds and making mistakes in complex decisions.  Given his language deficits, Mr. Rodriguez is encouraged to continue attending speech therapy sessions with a cognitive rehabilitation component.  Mr. Rodriguez is encouraged to continue attending individual counseling sessions to further assess and treat reported mild symptoms of anxiety, depression, and grief. He may also benefit from a psychiatry consultation and may reach out to me to place a referral as needed. The following are additional options for psychotherapy and psychiatry in the community: [facility] (https://www.[facility].org/health-services/behavioral-health; 279.409.4132 or 485-596-8421); [facility] (https://www.[facility]io; 625.902.1098); and A directory of providers can also be found on the Psychology Today  website (www.[employer]).  Considering his history of hearing loss, continued follow up with audiology is recommended to rule out possible sensory contributions to his cognitive decline.   In light of his cognitive deficits, it is recommended that the patient undergo a formal driving evaluation conducted by an occupational therapist with expertise in this area to ensure his safety and that of others on the road: [facility] (https://www.[facility].org/Health-Services/Physical-Therapy; 591.325.5728). In the meantime, he should be closely monitored by his family or other trusted individuals for future changes or declines while driving. Examples to promote safe driving include restricting driving to well-known routes during the day in fair weather with good visibility, having a passenger in the car, limiting external distractions (e.g., radio, cell phone), and avoiding complicated driving situations and highly congested areas.  Mr. Rodriguez reported sleep maintenance problems in the context of a history of sleep apnea, which may exacerbate his cognitive difficulties. He may benefit from education regarding sleep hygiene and healthy sleep habits, including maintenance of a regular sleep/wake cycle and integrating relaxation exercises before bed. This was provided during the feedback session. Additional strategies include: The light emitted by phone screens, TVs, and iPads can mimic daylight and suppress the body's natural release of melatonin, making it harder to fall asleep; it is recommended to stop screen time about an hour before bed; develop a bedtime routine; keep the bedroom at a comfortable temperature; use low lighting in the evenings; avoid consuming large meals and caffeine close to bedtime; avoid napping during the day, as it can disturb the normal pattern of sleep and wakefulness; and exercise can promote good sleep, particularly when completed in the morning or early  afternoon.  Continued use of compensatory strategies is recommended to reduce cognitive demands. This may include setting scheduled routines, having an established location for essential items (e.g., wallet, glasses, and keys), completing tasks when there are no time demands, completing one task at a time, breaking down demanding tasks into smaller components, working on projects for shorter periods, maximizing time when he feels the most alert, minimizing external distractions, paraphrasing and repeating to be learned information, and using external aids for reminders (e.g., planner, calendar, setting alarms, labels, to-do lists) consistently.   In light of his medical history, cerebrovascular disease represents a risk factor for future cognitive decline. As such, the patient is encouraged to reduce vascular risk factors per his providers' treatment recommendations (e.g., healthy diet, exercise, and medication adherence). Mr. Rodriguez and his family may benefit from resources available through the American Heart Association (https://www.heart.org/ or 1-123.316.2194), which offers psychoeducational information and services in this regard.   The patient is encouraged to regularly engage in social and physical recreation, as well as cognitively stimulating activities (e.g., puzzles, games, reading, exercise, and social gatherings) to promote brain health and emotional well-being. The following are options for adult day programs in our community:  [facility] (https://www.[facility].gov/seniorsrv/adult_day_health/index.php; 338.568.3157)  [facility] (https://www.adultdayPossible Web.[employer]/; 322.917.3010)   When planning and carrying out activities with the patient, it is recommended that friends and family:  Be flexible and patient.  Encourage supervised involvement in daily life activities, such as household chores, gardening, walking, or any preferred activity that builds on  current skills and brings meaning, purpose, and crystal.  Avoid correcting him (unless safety is a concern).  Offer opportunities for choice, such as choosing the outfit for the day and some meals.  Simplify instructions using single-steps and unsophisticated language.  Establish a familiar daily routine.  Acknowledge and respond to his feelings.  Simplify structure by breaking activities into simple and easy to follow steps while providing supervision.  Provide encouragement and support.  If not already addressed, the patient and his family members are encouraged to discuss legal issues such as power of , advanced directives, and/or establishing a will/trust to assist him in future financial and healthcare decisions. Information regarding these issues can be found at www.caringinfo.org or www.agingwithdignity.org/5wishes.html. Consultation with an elder care  can also be helpful.   The following books may be helpful for The patient and his family: Undo It!: How Simple Lifestyle Changes Can Reverse Most Chronic Diseases by Chema Sood and Tatum Sood, and The 36 Hour Day: A Family Guide to Caring for Persons with Alzheimer's Disease, Related Dementing Illnesses, and Memory Loss in Later Life by Chelly Molina and Rich Silva.  The patient and his family may benefit from resources available through [facility] Friend[facility] [place] (https://dementiafriendly[place].org/) and the Alzheimer's Association (www.alz.org or 3.506.032.4552), which offer psychoeducational information and services for individuals with major neurocognitive disorder.   Additional local resources include:  [place] 2-1-1 (www.[place] [address].org/dementia-support) is an online resource connecting community members with needed , including dementia care.  [place] [facility] (www.[place]eniorservices.org) provides information about adult day health care centers and community-based services and programs.  The patient's family may benefit  from information and resources available through the Family Caregiver Pep (www.caregiver.org) and Caring.com (www.caring.com/), which include support groups and respite services.  Mr. Rodriguez and his family and his family may also benefit from resources available through the National Aphasia Association (https://www.aphasia.org/stories/the-abcs-of-tkm-pzwwiektdrvex-hcgfbcv-progressive-aphasia/?gclid=Zc[id number]_wcB), The Association for Frontotemporal Degeneration (https://www.theaftd.org/; 1-393.453.2296), and the [facility] (https://www.brain.[facility]/dementia/coktxtv-cxchhqugusb-cqcxhvu/index.html), which offer psychoeducational information and services for individuals with primary progressive aphasia.      Thank you for allowing me to participate in Mr. Rodriguez's care. If I can be of further assistance, please do not hesitate to contact me.      Valerie Olson, Ph.D.          Clinical Neuropsychologist          Department of Neurology          [facility]             Appendix:    Test Results Summary Table:            This report was created using voice recognition software. I have made every reasonable attempt to avoid dictation errors, but this document may contain an error not identified before finalizing. If the error changes the accuracy of the document, I would appreciate it being brought to my attention. Thank you.    Three hours and 59 minutes were spent in clinical decision-making, chart review, records reviews, interpretation of test results and clinical data, integration of patient data, interactive feedback to Mr. Rodriguez, and report preparation (test evaluation services: 55544 = 1, 03979 = 3).

## 2024-09-10 ENCOUNTER — APPOINTMENT (OUTPATIENT)
Dept: OCCUPATIONAL THERAPY | Facility: REHABILITATION | Age: 69
End: 2024-09-10
Payer: MEDICARE

## 2024-09-12 ENCOUNTER — APPOINTMENT (OUTPATIENT)
Dept: OCCUPATIONAL THERAPY | Facility: REHABILITATION | Age: 69
End: 2024-09-12
Payer: MEDICARE

## 2024-09-13 PROCEDURE — RXMED WILLOW AMBULATORY MEDICATION CHARGE: Performed by: FAMILY MEDICINE

## 2024-09-17 ENCOUNTER — APPOINTMENT (OUTPATIENT)
Dept: OCCUPATIONAL THERAPY | Facility: REHABILITATION | Age: 69
End: 2024-09-17
Payer: MEDICARE

## 2024-09-19 ENCOUNTER — APPOINTMENT (OUTPATIENT)
Dept: OCCUPATIONAL THERAPY | Facility: REHABILITATION | Age: 69
End: 2024-09-19
Payer: MEDICARE

## 2024-09-24 ENCOUNTER — APPOINTMENT (OUTPATIENT)
Dept: OCCUPATIONAL THERAPY | Facility: REHABILITATION | Age: 69
End: 2024-09-24
Payer: MEDICARE

## 2024-09-26 ENCOUNTER — APPOINTMENT (OUTPATIENT)
Dept: OCCUPATIONAL THERAPY | Facility: REHABILITATION | Age: 69
End: 2024-09-26
Payer: MEDICARE

## 2024-09-26 ENCOUNTER — PHARMACY VISIT (OUTPATIENT)
Dept: PHARMACY | Facility: MEDICAL CENTER | Age: 69
End: 2024-09-26
Payer: COMMERCIAL

## 2024-10-01 ENCOUNTER — APPOINTMENT (OUTPATIENT)
Dept: OCCUPATIONAL THERAPY | Facility: REHABILITATION | Age: 69
End: 2024-10-01
Payer: MEDICARE

## 2024-10-03 ENCOUNTER — APPOINTMENT (OUTPATIENT)
Dept: OCCUPATIONAL THERAPY | Facility: REHABILITATION | Age: 69
End: 2024-10-03
Payer: MEDICARE

## 2024-10-08 ENCOUNTER — APPOINTMENT (OUTPATIENT)
Dept: OCCUPATIONAL THERAPY | Facility: REHABILITATION | Age: 69
End: 2024-10-08
Payer: MEDICARE

## 2024-10-10 ENCOUNTER — APPOINTMENT (OUTPATIENT)
Dept: OCCUPATIONAL THERAPY | Facility: REHABILITATION | Age: 69
End: 2024-10-10
Payer: MEDICARE

## 2024-10-18 ENCOUNTER — OFFICE VISIT (OUTPATIENT)
Dept: MEDICAL GROUP | Facility: IMAGING CENTER | Age: 69
End: 2024-10-18
Payer: MEDICARE

## 2024-10-18 VITALS
TEMPERATURE: 97.2 F | HEIGHT: 69 IN | DIASTOLIC BLOOD PRESSURE: 64 MMHG | WEIGHT: 167 LBS | HEART RATE: 74 BPM | BODY MASS INDEX: 24.73 KG/M2 | OXYGEN SATURATION: 98 % | SYSTOLIC BLOOD PRESSURE: 112 MMHG

## 2024-10-18 DIAGNOSIS — M77.12 LATERAL EPICONDYLITIS OF LEFT ELBOW: ICD-10-CM

## 2024-10-18 DIAGNOSIS — G47.9 SLEEP DIFFICULTIES: ICD-10-CM

## 2024-10-18 DIAGNOSIS — L30.9 DERMATITIS: ICD-10-CM

## 2024-10-18 DIAGNOSIS — Z23 NEED FOR INFLUENZA VACCINATION: ICD-10-CM

## 2024-10-18 DIAGNOSIS — R07.89 CHEST WALL DISCOMFORT: ICD-10-CM

## 2024-10-18 DIAGNOSIS — G31.84 AMNESTIC MCI (MILD COGNITIVE IMPAIRMENT WITH MEMORY LOSS): ICD-10-CM

## 2024-10-18 DIAGNOSIS — M25.542 ARTHRALGIA OF LEFT HAND: ICD-10-CM

## 2024-10-18 PROCEDURE — 90662 IIV NO PRSV INCREASED AG IM: CPT | Performed by: FAMILY MEDICINE

## 2024-10-18 PROCEDURE — 99214 OFFICE O/P EST MOD 30 MIN: CPT | Mod: 25 | Performed by: FAMILY MEDICINE

## 2024-10-18 PROCEDURE — 3074F SYST BP LT 130 MM HG: CPT | Performed by: FAMILY MEDICINE

## 2024-10-18 PROCEDURE — G0008 ADMIN INFLUENZA VIRUS VAC: HCPCS | Performed by: FAMILY MEDICINE

## 2024-10-18 PROCEDURE — 3078F DIAST BP <80 MM HG: CPT | Performed by: FAMILY MEDICINE

## 2024-10-18 ASSESSMENT — FIBROSIS 4 INDEX: FIB4 SCORE: 2.62

## 2024-10-24 ENCOUNTER — APPOINTMENT (OUTPATIENT)
Dept: RADIOLOGY | Facility: MEDICAL CENTER | Age: 69
End: 2024-10-24
Attending: FAMILY MEDICINE
Payer: MEDICARE

## 2024-10-26 PROCEDURE — RXMED WILLOW AMBULATORY MEDICATION CHARGE: Performed by: FAMILY MEDICINE

## 2024-11-09 ENCOUNTER — HOSPITAL ENCOUNTER (OUTPATIENT)
Dept: LAB | Facility: MEDICAL CENTER | Age: 69
End: 2024-11-09
Attending: FAMILY MEDICINE
Payer: MEDICARE

## 2024-11-09 DIAGNOSIS — M25.542 ARTHRALGIA OF LEFT HAND: ICD-10-CM

## 2024-11-09 DIAGNOSIS — E78.00 PURE HYPERCHOLESTEROLEMIA: ICD-10-CM

## 2024-11-09 DIAGNOSIS — R41.89 EPISODE OF ALTERED COGNITION: ICD-10-CM

## 2024-11-09 LAB
ALBUMIN SERPL BCP-MCNC: 4.4 G/DL (ref 3.2–4.9)
ALBUMIN/GLOB SERPL: 1.8 G/DL
ALP SERPL-CCNC: 60 U/L (ref 30–99)
ALT SERPL-CCNC: 25 U/L (ref 2–50)
ANION GAP SERPL CALC-SCNC: 8 MMOL/L (ref 7–16)
APPEARANCE UR: CLEAR
AST SERPL-CCNC: 36 U/L (ref 12–45)
BASOPHILS # BLD AUTO: 0.6 % (ref 0–1.8)
BASOPHILS # BLD: 0.03 K/UL (ref 0–0.12)
BILIRUB SERPL-MCNC: 0.6 MG/DL (ref 0.1–1.5)
BILIRUB UR QL STRIP.AUTO: NEGATIVE
BUN SERPL-MCNC: 22 MG/DL (ref 8–22)
CALCIUM ALBUM COR SERPL-MCNC: 10.2 MG/DL (ref 8.5–10.5)
CALCIUM SERPL-MCNC: 10.5 MG/DL (ref 8.5–10.5)
CHLORIDE SERPL-SCNC: 104 MMOL/L (ref 96–112)
CHOLEST SERPL-MCNC: 154 MG/DL (ref 100–199)
CO2 SERPL-SCNC: 28 MMOL/L (ref 20–33)
COLOR UR: YELLOW
CREAT SERPL-MCNC: 1 MG/DL (ref 0.5–1.4)
CRP SERPL HS-MCNC: <0.3 MG/DL (ref 0–0.75)
EOSINOPHIL # BLD AUTO: 0.11 K/UL (ref 0–0.51)
EOSINOPHIL NFR BLD: 2.1 % (ref 0–6.9)
ERYTHROCYTE [DISTWIDTH] IN BLOOD BY AUTOMATED COUNT: 50.5 FL (ref 35.9–50)
ERYTHROCYTE [SEDIMENTATION RATE] IN BLOOD BY WESTERGREN METHOD: 5 MM/HOUR (ref 0–20)
GFR SERPLBLD CREATININE-BSD FMLA CKD-EPI: 80 ML/MIN/1.73 M 2
GLOBULIN SER CALC-MCNC: 2.4 G/DL (ref 1.9–3.5)
GLUCOSE SERPL-MCNC: 82 MG/DL (ref 65–99)
GLUCOSE UR STRIP.AUTO-MCNC: NEGATIVE MG/DL
HCT VFR BLD AUTO: 45.3 % (ref 42–52)
HDLC SERPL-MCNC: 68 MG/DL
HGB BLD-MCNC: 14.8 G/DL (ref 14–18)
IMM GRANULOCYTES # BLD AUTO: 0.01 K/UL (ref 0–0.11)
IMM GRANULOCYTES NFR BLD AUTO: 0.2 % (ref 0–0.9)
KETONES UR STRIP.AUTO-MCNC: NEGATIVE MG/DL
LDLC SERPL CALC-MCNC: 73 MG/DL
LEUKOCYTE ESTERASE UR QL STRIP.AUTO: NEGATIVE
LYMPHOCYTES # BLD AUTO: 1.78 K/UL (ref 1–4.8)
LYMPHOCYTES NFR BLD: 33.8 % (ref 22–41)
MCH RBC QN AUTO: 33.2 PG (ref 27–33)
MCHC RBC AUTO-ENTMCNC: 32.7 G/DL (ref 32.3–36.5)
MCV RBC AUTO: 101.6 FL (ref 81.4–97.8)
MICRO URNS: NORMAL
MONOCYTES # BLD AUTO: 0.46 K/UL (ref 0–0.85)
MONOCYTES NFR BLD AUTO: 8.7 % (ref 0–13.4)
NEUTROPHILS # BLD AUTO: 2.88 K/UL (ref 1.82–7.42)
NEUTROPHILS NFR BLD: 54.6 % (ref 44–72)
NITRITE UR QL STRIP.AUTO: NEGATIVE
NRBC # BLD AUTO: 0 K/UL
NRBC BLD-RTO: 0 /100 WBC (ref 0–0.2)
PH UR STRIP.AUTO: 5.5 [PH] (ref 5–8)
PLATELET # BLD AUTO: 190 K/UL (ref 164–446)
PMV BLD AUTO: 9.7 FL (ref 9–12.9)
POTASSIUM SERPL-SCNC: 4.5 MMOL/L (ref 3.6–5.5)
PROT SERPL-MCNC: 6.8 G/DL (ref 6–8.2)
PROT UR QL STRIP: NEGATIVE MG/DL
RBC # BLD AUTO: 4.46 M/UL (ref 4.7–6.1)
RBC UR QL AUTO: NEGATIVE
RHEUMATOID FACT SER IA-ACNC: <10 IU/ML (ref 0–14)
SODIUM SERPL-SCNC: 140 MMOL/L (ref 135–145)
SP GR UR STRIP.AUTO: 1.02
TRIGL SERPL-MCNC: 63 MG/DL (ref 0–149)
TSH SERPL DL<=0.005 MIU/L-ACNC: 2.54 UIU/ML (ref 0.38–5.33)
URATE SERPL-MCNC: 4.4 MG/DL (ref 2.5–8.3)
UROBILINOGEN UR STRIP.AUTO-MCNC: 1 EU/DL
WBC # BLD AUTO: 5.3 K/UL (ref 4.8–10.8)

## 2024-11-09 PROCEDURE — 86038 ANTINUCLEAR ANTIBODIES: CPT

## 2024-11-09 PROCEDURE — 36415 COLL VENOUS BLD VENIPUNCTURE: CPT

## 2024-11-09 PROCEDURE — 81003 URINALYSIS AUTO W/O SCOPE: CPT

## 2024-11-09 PROCEDURE — 84550 ASSAY OF BLOOD/URIC ACID: CPT

## 2024-11-09 PROCEDURE — 80061 LIPID PANEL: CPT

## 2024-11-09 PROCEDURE — 84443 ASSAY THYROID STIM HORMONE: CPT

## 2024-11-09 PROCEDURE — 86200 CCP ANTIBODY: CPT

## 2024-11-09 PROCEDURE — 85025 COMPLETE CBC W/AUTO DIFF WBC: CPT

## 2024-11-09 PROCEDURE — 80053 COMPREHEN METABOLIC PANEL: CPT

## 2024-11-09 PROCEDURE — 85652 RBC SED RATE AUTOMATED: CPT

## 2024-11-09 PROCEDURE — 86431 RHEUMATOID FACTOR QUANT: CPT

## 2024-11-09 PROCEDURE — 86140 C-REACTIVE PROTEIN: CPT

## 2024-11-11 ENCOUNTER — PHARMACY VISIT (OUTPATIENT)
Dept: PHARMACY | Facility: MEDICAL CENTER | Age: 69
End: 2024-11-11
Payer: COMMERCIAL

## 2024-11-12 LAB
CCP IGA+IGG SERPL IA-ACNC: 15 UNITS (ref 0–19)
NUCLEAR IGG SER QL IA: NORMAL

## 2024-11-15 ENCOUNTER — OFFICE VISIT (OUTPATIENT)
Dept: MEDICAL GROUP | Facility: IMAGING CENTER | Age: 69
End: 2024-11-15
Payer: MEDICARE

## 2024-11-15 ENCOUNTER — HOSPITAL ENCOUNTER (OUTPATIENT)
Dept: RADIOLOGY | Facility: MEDICAL CENTER | Age: 69
End: 2024-11-15
Attending: FAMILY MEDICINE
Payer: MEDICARE

## 2024-11-15 VITALS
HEIGHT: 69 IN | TEMPERATURE: 97.2 F | DIASTOLIC BLOOD PRESSURE: 50 MMHG | OXYGEN SATURATION: 96 % | HEART RATE: 66 BPM | BODY MASS INDEX: 24.14 KG/M2 | WEIGHT: 163 LBS | SYSTOLIC BLOOD PRESSURE: 96 MMHG

## 2024-11-15 DIAGNOSIS — D69.6 THROMBOCYTOPENIA (HCC): ICD-10-CM

## 2024-11-15 DIAGNOSIS — R71.8 ELEVATED MCV: ICD-10-CM

## 2024-11-15 DIAGNOSIS — M25.542 ARTHRALGIA OF LEFT HAND: ICD-10-CM

## 2024-11-15 DIAGNOSIS — L30.9 DERMATITIS: ICD-10-CM

## 2024-11-15 PROCEDURE — 3074F SYST BP LT 130 MM HG: CPT | Performed by: FAMILY MEDICINE

## 2024-11-15 PROCEDURE — 77077 JOINT SURVEY SINGLE VIEW: CPT

## 2024-11-15 PROCEDURE — 3078F DIAST BP <80 MM HG: CPT | Performed by: FAMILY MEDICINE

## 2024-11-15 PROCEDURE — 99214 OFFICE O/P EST MOD 30 MIN: CPT | Performed by: FAMILY MEDICINE

## 2024-11-15 ASSESSMENT — FIBROSIS 4 INDEX: FIB4 SCORE: 2.69

## 2024-11-15 NOTE — PROGRESS NOTES
SUBJECTIVE:    Chief Complaint   Patient presents with    Follow-Up     Lab results 11/9    Other     Dry fingers       HPI:     Nick Rodriguez is a 69 y.o. male with mild cognitive impairment, arlen, chronic insomnia, hx of memory changes, chronic low back/hip pain, hypercholesterolemia, and hx of basal cell carcinoma here with his wife for review of lab results and dry hands.    Developed a prior rash which was a reaction possibly to blue gloves he was wearing.  Had rash on top of hands.  Treated it like eczema and it improved.  Working with stones, uses gloves.  Washes hands frequently.  At night has been using Aquaphor and has recently switched to cotton gloves.  Spot treats with cortisone 10 which tends to work well.  Notes clindamycin topical was prescribed by dermatologist in past.    MCV elevated-Not much aclcohol.  Not on B12 supplement.    Arthralgia of left hand.  Completed lab orders.  Has not completed x-ray.  Saw chiropractor-history of back issues.    ROS:  No recent fevers or chills. No nausea or vomiting. No diarrhea. No chest pains or shortness of breath. No lower extremity edema.    Current Outpatient Medications on File Prior to Visit   Medication Sig Dispense Refill    tamsulosin (FLOMAX) 0.4 MG capsule Take 1 Capsule by mouth every day. 100 Capsule 3    traZODone (DESYREL) 50 MG Tab Take 0.5-1 Tablets by mouth every evening. May start 1/2 tab nightly and increase to one tab nightly. 30 Tablet 3    simvastatin (ZOCOR) 40 MG Tab TAKE ONE TABLET BY MOUTH EVERY EVENING 100 Tablet 3    albuterol 108 (90 Base) MCG/ACT Aero Soln inhalation aerosol INHALE 2 PUFFS EVERY 6 HOURS AS NEEDED FOR SHORTNESS OF BREATH 18 g 0    ketoconazole (NIZORAL) 2 % Cream as needed.      fluticasone (FLONASE) 50 MCG/ACT nasal spray use 2 sprays in each nostril daily 16 g 3    ASPIRIN 81 PO Take  by mouth.      Multiple Vitamins-Minerals (MULTIVITAMIN ADULTS PO) Take  by mouth.      Ascorbic Acid (VITAMIN C PO)  Take 1,000 mg by mouth.      Cholecalciferol (VITAMIN D PO) Take  by mouth.      Vitamin E 400 UNIT Tab Take  by mouth.      Omega-3 Fatty Acids (FISH OIL) 1000 MG Cap capsule Take 1,000 mg by mouth 3 times a day, with meals.      GLUCOSAMINE CHONDROITIN COMPLX PO Take  by mouth.      imiquimod (ALDARA) 5 % cream prn (Patient not taking: Reported on 11/15/2024)      calcipotriene (DOVONEX) 0.005 % Cream  (Patient not taking: Reported on 9/6/2024)       No current facility-administered medications on file prior to visit.       Allergies   Allergen Reactions    Seasonal        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    Prediabetes 10/25/2023    Elevated hemoglobin A1c 10/25/2023    Presbycusis of both ears 08/24/2023    Asymmetrical hearing loss 08/24/2023    GISSEL on CPAP 12/07/2022    CAD (coronary artery disease) by coronary artery calcium score of 807 Agatston done as screening, no symptoms 07/26/2022    Sensorineural hearing loss, bilateral 02/19/2021    Ringing in left ear 02/19/2021    Presbycusis 02/19/2021    Hearing loss of left ear 02/19/2021    Persistent cognitive impairment 01/14/2021    History of colon polyps 10/15/2019    Elevated fasting glucose 10/16/2018    History of basal cell carcinoma 10/16/2018    Non-smoker 09/13/2018    Chronic insomnia 08/23/2018    Memory changes 04/29/2018    Pure hypercholesterolemia 11/14/2017    Chronic bilateral low back pain without sciatica 05/30/2017    Obstructive sleep apnea syndrome 10/17/2016    Pain in right shin 10/17/2016    Seasonal allergies 05/06/2016    Thrombocytopenia (HCC)     Scoliosis 10/09/2015    Benign prostatic hyperplasia with lower urinary tract symptoms 02/12/2015    Vitamin D insufficiency 11/21/2013    Postnasal drip 05/03/2012       Past Medical History:   Diagnosis Date    Allergy     Anxiety     Basal cell carcinoma     dermatology- Dr. Sarah Tomas      "Dyslipidemia     [place] measles     Groin strain 10/21/2011     ICD-10 transition    Hemorrhoid     Hyperlipidemia     Insomnia     Left lateral epicondylitis 05/06/2016    Mumps     Right hip pain 11/21/2013    S/P colonoscopy 07/2013    negative, repeat in 5 years    S/P colonoscopy with polypectomy     age 53, due age    Scoliosis     Sleep apnea     Tonsillitis          OBJECTIVE:   BP 96/50 (BP Location: Left arm, Patient Position: Sitting, BP Cuff Size: Adult)   Pulse 66   Temp 36.2 °C (97.2 °F)   Ht 1.753 m (5' 9\")   Wt 73.9 kg (163 lb)   SpO2 96%   BMI 24.07 kg/m²   General: Well-developed well-nourished male, no acute distress  Neck: supple, no lymphadenopathy- cervical or supraclavicular, no thyromegaly  Cardiovascular: regular rate and rhythm, no murmurs, gallops, rubs  Lungs: clear to auscultation bilaterally, no wheezes, crackles, or rhonchi  Abdomen: +bowel sounds, soft, nontender, nondistended, no rebound, no guarding, no hepatosplenomegaly  Extremities: no cyanosis, clubbing, edema  Skin: Warm and dry.  Mostly volar side of hands with slight dry flaky skin, tips of some fingers with small minimal cracking and dryness, dorsum of hands with slight pink.  Psych: appropriate mood and affect      ASSESSMENT/PLAN:    69 y.o.male     1. Dermatitis -appears history of other rash which has improved.  Overall appears to be maintaining with use of Aquaphor and gloves at night.    Reviewed recommendations for continued maintenance.  Recommend use of cotton gloves.  Recommend avoid hot water.  Reviewed recommendations for creams after handwashing.  Avoid aggravating substances.  May use cortisone 10 as needed OTC.  Discussed other prescribed medication options, patient will request in future as needed.       2. Arthralgia of left hand -reviewed labs which are unremarkable for autoimmune condition at this time.  Recommend complete hand x-ray orders which are in system.       3. Elevated MCV -intermittent " elevated MCV and labs in past since about 2010.  Previously drank alcohol more regularly; however, not much alcohol now.  Has had abdominal ultrasound completed in past which was unremarkable.  Intermittently minimally decreased platelet counts noted, last labs stable.    Will plan to optimize vitamin B12 levels.  Recommend vitamin B12 500 MCG daily.  Continue to monitor labs at this time. VITAMIN B12    FOLATE    CBC WITHOUT DIFFERENTIAL      4.      Thrombocytopenia-mild intermittent.  Last labs within normal range.  Continue to monitor.    Follow-up in 3 months.    This medical record contains text that has been entered with the assistance of computer voice recognition and dictation software.  Therefore, it may contain unintended errors in text, spelling, punctuation, or grammar.

## 2024-12-08 PROCEDURE — RXMED WILLOW AMBULATORY MEDICATION CHARGE: Performed by: FAMILY MEDICINE

## 2024-12-09 ENCOUNTER — PHARMACY VISIT (OUTPATIENT)
Dept: PHARMACY | Facility: MEDICAL CENTER | Age: 69
End: 2024-12-09
Payer: COMMERCIAL

## 2024-12-09 PROCEDURE — RXMED WILLOW AMBULATORY MEDICATION CHARGE: Performed by: FAMILY MEDICINE

## 2024-12-10 ENCOUNTER — OFFICE VISIT (OUTPATIENT)
Dept: MEDICAL GROUP | Facility: IMAGING CENTER | Age: 69
End: 2024-12-10
Payer: MEDICARE

## 2024-12-10 ENCOUNTER — PHARMACY VISIT (OUTPATIENT)
Dept: PHARMACY | Facility: MEDICAL CENTER | Age: 69
End: 2024-12-10
Payer: COMMERCIAL

## 2024-12-10 VITALS
WEIGHT: 161.2 LBS | TEMPERATURE: 97.9 F | RESPIRATION RATE: 16 BRPM | BODY MASS INDEX: 23.88 KG/M2 | HEART RATE: 85 BPM | DIASTOLIC BLOOD PRESSURE: 58 MMHG | HEIGHT: 69 IN | SYSTOLIC BLOOD PRESSURE: 102 MMHG | OXYGEN SATURATION: 94 %

## 2024-12-10 DIAGNOSIS — H92.03 OTALGIA OF BOTH EARS: ICD-10-CM

## 2024-12-10 DIAGNOSIS — H65.92 OTITIS MEDIA WITH EFFUSION, LEFT: ICD-10-CM

## 2024-12-10 DIAGNOSIS — H60.502 ACUTE OTITIS EXTERNA OF LEFT EAR, UNSPECIFIED TYPE: ICD-10-CM

## 2024-12-10 DIAGNOSIS — J06.9 ACUTE UPPER RESPIRATORY INFECTION: ICD-10-CM

## 2024-12-10 LAB
FLUAV RNA SPEC QL NAA+PROBE: NEGATIVE
FLUBV RNA SPEC QL NAA+PROBE: NEGATIVE
RSV RNA SPEC QL NAA+PROBE: NEGATIVE
SARS-COV-2 RNA RESP QL NAA+PROBE: NEGATIVE

## 2024-12-10 PROCEDURE — 3078F DIAST BP <80 MM HG: CPT

## 2024-12-10 PROCEDURE — 1126F AMNT PAIN NOTED NONE PRSNT: CPT

## 2024-12-10 PROCEDURE — 99213 OFFICE O/P EST LOW 20 MIN: CPT

## 2024-12-10 PROCEDURE — 3074F SYST BP LT 130 MM HG: CPT

## 2024-12-10 PROCEDURE — RXMED WILLOW AMBULATORY MEDICATION CHARGE

## 2024-12-10 PROCEDURE — 0241U POCT CEPHEID COV-2, FLU A/B, RSV - PCR: CPT

## 2024-12-10 RX ORDER — OFLOXACIN 3 MG/ML
5 SOLUTION AURICULAR (OTIC) 2 TIMES DAILY
Qty: 10 ML | Refills: 0 | Status: SHIPPED | OUTPATIENT
Start: 2024-12-10

## 2024-12-10 ASSESSMENT — PAIN SCALES - GENERAL: PAINLEVEL_OUTOF10: NO PAIN

## 2024-12-10 ASSESSMENT — FIBROSIS 4 INDEX: FIB4 SCORE: 2.69

## 2025-01-05 PROBLEM — F03.90 MAJOR NEUROCOGNITIVE DISORDER (HCC): Status: ACTIVE | Noted: 2025-01-05

## 2025-01-05 PROBLEM — G31.84 AMNESTIC MCI (MILD COGNITIVE IMPAIRMENT WITH MEMORY LOSS): Status: RESOLVED | Noted: 2024-01-26 | Resolved: 2025-01-05

## 2025-01-05 PROBLEM — F02.80 PRIMARY PROGRESSIVE APHASIA (HCC): Status: ACTIVE | Noted: 2025-01-05

## 2025-01-05 PROBLEM — G31.01 PRIMARY PROGRESSIVE APHASIA (HCC): Status: ACTIVE | Noted: 2025-01-05

## 2025-01-11 PROCEDURE — RXMED WILLOW AMBULATORY MEDICATION CHARGE: Performed by: FAMILY MEDICINE

## 2025-01-13 PROCEDURE — RXMED WILLOW AMBULATORY MEDICATION CHARGE: Performed by: FAMILY MEDICINE

## 2025-01-17 ENCOUNTER — PHARMACY VISIT (OUTPATIENT)
Dept: PHARMACY | Facility: MEDICAL CENTER | Age: 70
End: 2025-01-17
Payer: COMMERCIAL

## 2025-01-17 DIAGNOSIS — G47.9 SLEEP DIFFICULTIES: ICD-10-CM

## 2025-01-17 DIAGNOSIS — F41.9 ANXIETY: ICD-10-CM

## 2025-01-17 RX ORDER — TRAZODONE HYDROCHLORIDE 50 MG/1
25-50 TABLET, FILM COATED ORAL NIGHTLY
Qty: 30 TABLET | Refills: 3 | Status: SHIPPED | OUTPATIENT
Start: 2025-01-17

## 2025-01-17 NOTE — TELEPHONE ENCOUNTER
Received request via: Pharmacy    Was the patient seen in the last year in this department? Yes    Does the patient have an active prescription (recently filled or refills available) for medication(s) requested? No    Ph[facility] Name[facility]    Does the patient have nursing home Plus and need 100-day supply? (This applies to ALL medications) Yes, quantity updated to 100 days

## 2025-01-23 ENCOUNTER — OFFICE VISIT (OUTPATIENT)
Dept: MEDICAL GROUP | Facility: IMAGING CENTER | Age: 70
End: 2025-01-23
Payer: MEDICARE

## 2025-01-23 VITALS
TEMPERATURE: 97.6 F | WEIGHT: 158 LBS | RESPIRATION RATE: 16 BRPM | OXYGEN SATURATION: 96 % | HEART RATE: 75 BPM | SYSTOLIC BLOOD PRESSURE: 90 MMHG | HEIGHT: 69 IN | BODY MASS INDEX: 23.4 KG/M2 | DIASTOLIC BLOOD PRESSURE: 54 MMHG

## 2025-01-23 DIAGNOSIS — F03.90 MAJOR NEUROCOGNITIVE DISORDER (HCC): ICD-10-CM

## 2025-01-23 DIAGNOSIS — G47.9 SLEEP DIFFICULTIES: ICD-10-CM

## 2025-01-23 DIAGNOSIS — F02.80 PRIMARY PROGRESSIVE APHASIA (HCC): ICD-10-CM

## 2025-01-23 DIAGNOSIS — F41.9 ANXIETY: ICD-10-CM

## 2025-01-23 DIAGNOSIS — G31.01 PRIMARY PROGRESSIVE APHASIA (HCC): ICD-10-CM

## 2025-01-23 PROCEDURE — 3078F DIAST BP <80 MM HG: CPT | Performed by: FAMILY MEDICINE

## 2025-01-23 PROCEDURE — 3074F SYST BP LT 130 MM HG: CPT | Performed by: FAMILY MEDICINE

## 2025-01-23 PROCEDURE — RXMED WILLOW AMBULATORY MEDICATION CHARGE: Performed by: FAMILY MEDICINE

## 2025-01-23 PROCEDURE — 99214 OFFICE O/P EST MOD 30 MIN: CPT | Mod: 25 | Performed by: FAMILY MEDICINE

## 2025-01-23 RX ORDER — ESCITALOPRAM OXALATE 10 MG/1
10 TABLET ORAL DAILY
Qty: 60 TABLET | Refills: 1 | Status: SHIPPED | OUTPATIENT
Start: 2025-01-23

## 2025-01-23 ASSESSMENT — ANXIETY QUESTIONNAIRES
7. FEELING AFRAID AS IF SOMETHING AWFUL MIGHT HAPPEN: SEVERAL DAYS
6. BECOMING EASILY ANNOYED OR IRRITABLE: NOT AT ALL
GAD7 TOTAL SCORE: 9
5. BEING SO RESTLESS THAT IT IS HARD TO SIT STILL: MORE THAN HALF THE DAYS
2. NOT BEING ABLE TO STOP OR CONTROL WORRYING: MORE THAN HALF THE DAYS
1. FEELING NERVOUS, ANXIOUS, OR ON EDGE: SEVERAL DAYS
3. WORRYING TOO MUCH ABOUT DIFFERENT THINGS: SEVERAL DAYS
4. TROUBLE RELAXING: MORE THAN HALF THE DAYS

## 2025-01-23 ASSESSMENT — FIBROSIS 4 INDEX: FIB4 SCORE: 2.69

## 2025-01-23 ASSESSMENT — PATIENT HEALTH QUESTIONNAIRE - PHQ9
CLINICAL INTERPRETATION OF PHQ2 SCORE: 1
SUM OF ALL RESPONSES TO PHQ QUESTIONS 1-9: 4
5. POOR APPETITE OR OVEREATING: 0 - NOT AT ALL

## 2025-01-23 NOTE — PROGRESS NOTES
SUBJECTIVE:    Chief Complaint   Patient presents with    Anxiety     Medication consult Lexapro or prozac       HPI:     Nick Rodriguez is a 69 y.o. male with neurocognitive disorder here for anxiety.  Here with wife.   Taking trazodone 100 mg nightly, not sure if it helps.   Wife feels zoloft would be too much.   No depression. Some anxiety, more with events/apppointment coming up.   Neurocognitive disorder-followed by neurology and neuropsychology.  Has been diagnosed with major neurocognitive disorder and progressive aphasia.     ROS:  No recent fevers or chills. No nausea or vomiting. No diarrhea. No chest pains or shortness of breath. No lower extremity edema.    Current Outpatient Medications on File Prior to Visit   Medication Sig Dispense Refill    traZODone (DESYREL) 50 MG Tab Take 0.5-1 Tablets by mouth every evening. May start 1/2 tab nightly and increase to one tab nightly. 30 Tablet 3    simvastatin (ZOCOR) 40 MG Tab TAKE ONE TABLET BY MOUTH EVERY EVENING 100 Tablet 3    tamsulosin (FLOMAX) 0.4 MG capsule Take 1 Capsule by mouth every day. 100 Capsule 3    ketoconazole (NIZORAL) 2 % Cream as needed.      fluticasone (FLONASE) 50 MCG/ACT nasal spray use 2 sprays in each nostril daily 16 g 3    ASPIRIN 81 PO Take  by mouth.      Multiple Vitamins-Minerals (MULTIVITAMIN ADULTS PO) Take  by mouth.      Ascorbic Acid (VITAMIN C PO) Take 1,000 mg by mouth.      Cholecalciferol (VITAMIN D PO) Take  by mouth.      Vitamin E 400 UNIT Tab Take  by mouth.      Omega-3 Fatty Acids (FISH OIL) 1000 MG Cap capsule Take 1,000 mg by mouth 3 times a day, with meals.      ofloxacin otic sol (FLOXIN OTIC) 0.3 % Solution Instill 5 drops into affected ear(s) 2 times a day. (Patient not taking: Reported on 1/23/2025) 10 mL 0    albuterol 108 (90 Base) MCG/ACT Aero Soln inhalation aerosol INHALE 2 PUFFS EVERY 6 HOURS AS NEEDED FOR SHORTNESS OF BREATH (Patient not taking: Reported on 12/10/2024) 18 g 0    imiquimod  (ALDARA) 5 % cream prn (Patient not taking: Reported on 10/18/2024)      calcipotriene (DOVONEX) 0.005 % Cream  (Patient not taking: Reported on 1/23/2025)      GLUCOSAMINE CHONDROITIN COMPLX PO Take  by mouth. (Patient not taking: Reported on 12/10/2024)       No current facility-administered medications on file prior to visit.       Allergies   Allergen Reactions    Seasonal        Patient Active Problem List    Diagnosis Date Noted    Major neurocognitive disorder (HCC) 01/05/2025    Primary progressive aphasia (HCC) 01/05/2025    Prediabetes 10/25/2023    Elevated hemoglobin A1c 10/25/2023    Presbycusis of both ears 08/24/2023    Asymmetrical hearing loss 08/24/2023    GISSEL on CPAP 12/07/2022    CAD (coronary artery disease) by coronary artery calcium score of 807 Agatston done as screening, no symptoms 07/26/2022    Sensorineural hearing loss, bilateral 02/19/2021    Ringing in left ear 02/19/2021    Presbycusis 02/19/2021    Hearing loss of left ear 02/19/2021    Persistent cognitive impairment 01/14/2021    History of colon polyps 10/15/2019    Elevated fasting glucose 10/16/2018    History of basal cell carcinoma 10/16/2018    Non-smoker 09/13/2018    Chronic insomnia 08/23/2018    Memory changes 04/29/2018    Pure hypercholesterolemia 11/14/2017    Chronic bilateral low back pain without sciatica 05/30/2017    Obstructive sleep apnea syndrome 10/17/2016    Pain in right shin 10/17/2016    Seasonal allergies 05/06/2016    Thrombocytopenia (HCC)     Scoliosis 10/09/2015    Benign prostatic hyperplasia with lower urinary tract symptoms 02/12/2015    Vitamin D insufficiency 11/21/2013    Postnasal drip 05/03/2012       Past Medical History:   Diagnosis Date    Allergy     Anxiety     Basal cell carcinoma     dermatology- Dr. Smith    Chickenpox     Dyslipidemia     [place] measles     Groin strain 10/21/2011     ICD-10 transition    Hemorrhoid     Hyperlipidemia     Insomnia     Left lateral epicondylitis  "05/06/2016    Mumps     Right hip pain 11/21/2013    S/P colonoscopy 07/2013    negative, repeat in 5 years    S/P colonoscopy with polypectomy     age 53, due age    Scoliosis     Sleep apnea     Tonsillitis          OBJECTIVE:   BP 90/54 (BP Location: Left arm, Patient Position: Sitting, BP Cuff Size: Adult)   Pulse 75   Temp 36.4 °C (97.6 °F) (Temporal)   Resp 16   Ht 1.753 m (5' 9\")   Wt 71.7 kg (158 lb) Comment: shoes on  SpO2 96%   BMI 23.33 kg/m²   General: Well-developed well-nourished male, no acute distress  Neck: supple, no lymphadenopathy- cervical or supraclavicular, no thyromegaly  Cardiovascular: regular rate and rhythm, no murmurs, gallops, rubs  Lungs: clear to auscultation bilaterally, no wheezes, crackles, or rhonchi  Abdomen: +bowel sounds, soft, nontender, nondistended, no rebound, no guarding, no hepatosplenomegaly  Extremities: no cyanosis, clubbing, edema  Skin: Warm and dry  Psych: appropriate mood and affect      EKG: Heart rate 63, sinus rhythm, left anterior fascicular block similar to prior EKG noted.      ASSESSMENT/PLAN:    69 y.o.male       1. Anxiety   Reviewed options of therapy.  Reviewed with psychiatry.  Will start on Lexapro 10 mg daily.  Reviewed potential side effects.  Monitor. EKG, lexapro 10 mg      2. Sleep difficulties -unclear if trazodone has been helpful.  Will continue to monitor.       3. Major neurocognitive disorder (HCC) -stable.  Continue routine follow-up with neuropsychology, neurology and therapist.       4. Primary progressive aphasia (HCC)-stable.  Continue routine follow-up with neuropsychology, neurology and therapist.           Return in about 1 month (around 2/23/2025).    This medical record contains text that has been entered with the assistance of computer voice recognition and dictation software.  Therefore, it may contain unintended errors in text, spelling, punctuation, or grammar.                            "

## 2025-01-24 ENCOUNTER — PHARMACY VISIT (OUTPATIENT)
Dept: PHARMACY | Facility: MEDICAL CENTER | Age: 70
End: 2025-01-24
Payer: COMMERCIAL

## 2025-01-30 ENCOUNTER — OFFICE VISIT (OUTPATIENT)
Dept: NEUROLOGY | Facility: MEDICAL CENTER | Age: 70
End: 2025-01-30
Attending: PSYCHIATRY & NEUROLOGY
Payer: MEDICARE

## 2025-01-30 VITALS
HEART RATE: 70 BPM | WEIGHT: 162.92 LBS | TEMPERATURE: 97.5 F | HEIGHT: 70 IN | SYSTOLIC BLOOD PRESSURE: 122 MMHG | DIASTOLIC BLOOD PRESSURE: 64 MMHG | BODY MASS INDEX: 23.32 KG/M2 | OXYGEN SATURATION: 100 %

## 2025-01-30 DIAGNOSIS — G47.33 OSA ON CPAP: ICD-10-CM

## 2025-01-30 DIAGNOSIS — G31.01 PRIMARY PROGRESSIVE APHASIA (HCC): ICD-10-CM

## 2025-01-30 DIAGNOSIS — G31.09: Primary | ICD-10-CM

## 2025-01-30 DIAGNOSIS — F02.A0: Primary | ICD-10-CM

## 2025-01-30 DIAGNOSIS — F02.80 PRIMARY PROGRESSIVE APHASIA (HCC): ICD-10-CM

## 2025-01-30 PROBLEM — N40.1 BENIGN PROSTATIC HYPERPLASIA WITH URINARY OBSTRUCTION: Status: ACTIVE | Noted: 2025-01-30

## 2025-01-30 PROBLEM — E78.00 HYPERCHOLESTEROLEMIA: Status: ACTIVE | Noted: 2025-01-30

## 2025-01-30 PROBLEM — N13.8 BENIGN PROSTATIC HYPERPLASIA WITH URINARY OBSTRUCTION: Status: ACTIVE | Noted: 2025-01-30

## 2025-01-30 PROBLEM — G47.30 SLEEP APNEA: Status: ACTIVE | Noted: 2025-01-30

## 2025-01-30 PROBLEM — Z86.0100 HISTORY OF COLONIC POLYPS: Status: ACTIVE | Noted: 2018-09-12

## 2025-01-30 PROCEDURE — 99212 OFFICE O/P EST SF 10 MIN: CPT | Performed by: PSYCHIATRY & NEUROLOGY

## 2025-01-30 RX ORDER — SIMVASTATIN 40 MG
40 TABLET ORAL NIGHTLY
COMMUNITY

## 2025-01-30 RX ORDER — TAMSULOSIN HYDROCHLORIDE 0.4 MG/1
0.4 CAPSULE ORAL DAILY
COMMUNITY

## 2025-01-30 ASSESSMENT — MONTREAL COGNITIVE ASSESSMENT (MOCA)
11. FOR EACH PAIR OF WORDS, WHAT CATEGORY DO THEY BELONG TO (OUT OF 2): 0/2
2. COPY DRAWING: 0/1
4. NAME EACH OF THE THREE ANIMALS SHOWN: 1/3
7. [VIGILENCE] TAP WHEN HEARING DESIGNATED LETTER: 0/1
WHAT IS THE TOTAL SCORE (OUT OF 30): 9
DELAYED RECALL SUBSCORE: 0/5
1. ALTERNATING TRAIL MAKING: 0/1
9. REPEAT EACH SENTENCE: 0/2
ORIENTATION SUBSCORE: 4/6
8. SERIAL SUBTRACTION OF 7S: 1/5
6. READ LIST OF DIGITS [FORWARD/BACKWARD]: 1/2
WHAT IS THE VERSION OF MOCA ADMINISTERED: 7.1
5. MEMORY TRIALS: SECOND TRIAL
10. [FLUENCY] NAME WORDS STARTING WITH DESIGNATED LETTER: 0/1
3. DRAW A CLOCK: CONTOUR, NUMBERS, HANDS: 2/3

## 2025-01-30 ASSESSMENT — PATIENT HEALTH QUESTIONNAIRE - PHQ9
CLINICAL INTERPRETATION OF PHQ2 SCORE: 1
SUM OF ALL RESPONSES TO PHQ QUESTIONS 1-9: 4
5. POOR APPETITE OR OVEREATING: 1 - SEVERAL DAYS

## 2025-01-30 ASSESSMENT — FIBROSIS 4 INDEX: FIB4 SCORE: 2.69

## 2025-01-30 NOTE — PROGRESS NOTES
Follow up visit:    Here with daughters and wife.    Reason : Mild Dementia    Had an extensive PhD level evaluation with Valerie Olson PhD at [facility] in the last 3-4 months or so:      Impression: Mr. Rodriguez's cognitive profile revealed impairments in aspects of auditory attention/working memory, oral processing speed, language, memory, and executive functioning. Language deficits likely interfered with his verbal memory and oral processing speed performance. His pattern of language deficits (impaired vocabulary, naming, fund of knowledge, semantic fluency, and basic word discrimination) was indicative of prominent deficits in semantic processing and knowledge. Compared to his previous evaluation, there were marked declines in aspects of language, oral processing speed, memory, and executive function. Executive functioning declined considerably, suggesting reduced cognitive flexibility. Based on his history, cognitive profile, and reported functional status, he meets criteria for major neurocognitive disorder (dementia) in the mild severity range. 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 Qualitatively, the language errors he made during the interview and testing including semantic paraphasias, word finding pauses, and use of general terms instead of specific names are also consistent with SPPA. His attention/working memory and executive function deficits remain consistent with what is typically seen in patients with vascular cognitive impairment. As such, given his history multiple vascular risk factors and brain MRI findings of mild microvascular ischemic changes, cerebrovascular contributions remain possible. Other possible contributing  "factors to his cognitive difficulties include sleep maintenance problems, mild mood disturbance, grief, anxiety, elevated hemoglobin A1C, and episodes of confusion in the evenings. Given the decline in memory performance compared to his previous evaluation, incipient Alzheimer's disease (AD) cannot be completely ruled out. However, AD is less likely given his largely intact visual memory and story recognition memory. Further, it is likely that his language decline is contributing to his verbal memory decline.     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    ============================================    Nick Ayo Price 69  y.o. right handed gentleman who was the  of the [employer]  at [facility] [employer]. He retired about 1 year.    Last saw me in 1/26/2024.     Previously he had noticed in the fall of 2018 that he had 4 close family members die over a 3 month period. He clearly admitted to having appropriate stress related to this issue.  He recalls at that time driving to the [employer] and he felt \"out of himself\" and he was taken to a local hospital for an evaluation and he felt much better and was discharged from the hospital. He was seen by Dr. Garcia and then Dr. Becky WEST and most recently with Dr. Junior in January 2021.    Since her last visit with me in 1/2024, she has noticed that he may have difficulty describing an animal by a different name ( usually it's the right category per the daughter).    He has a morning routine and does well with it  her wife in the recent months-> making coffee and he may run it through the  instead of heating it up in the microwave. Sometimes he has put ice cream in the toaster oven instead of the microwave.    He has gotten a little OCD's - obsesses about when appointments are coming up and compulsions > repetitively watching " "hands and taking out the trash.     Daughter gives information that struggling to remember things can be increased by stress or anxiety. He has less anxiety since retiring from work but still has problems remembering names.      Daughter today says that he seems less anxious or frustrated as remembering words and speech therapy helping with word recall (2 times a week)     He has been off Namenda since September 2019.     He has been off Zolpidem and Ambien for nearly 2 years.     He has not had any gait/balance decline or evolutionary chagnes in the last 12 months     Since the last visit with me, he is more aware of his inability in spelling words such as during emailing. Works such as \"[employer]\" and she forgets friends names.      Wife says today that Kamran may be forgetful very slightly ; she has noticed when he is on the computer that he may open multi tabs and tends to not close each tab but eventually there can be too many tabs open (he is aware of this).     There is no consistent repeating of information when he says anything or is told anything in the last 6 to 12 months.     He continues to play pickleball and still has difficulty remembering the score (immediately) but if he stays focused mentally he will remember the score. If he is able to spend 30 seconds of focused mental time, he usually can bring the word back into his mind.     ADL(s)- minimal issues with spelling of certain words for the last 2 years. He feels capable of reading book or papers with reasonable understanding.     Nick  has not had involuntary movements,headaches,visual disturbance(s) in the last 3 months.        Patient Active Problem List    Diagnosis Date Noted    Major neurocognitive disorder (HCC) 01/05/2025    Primary progressive aphasia (HCC) 01/05/2025    Prediabetes 10/25/2023    Elevated hemoglobin A1c 10/25/2023    Presbycusis of both ears 08/24/2023    Asymmetrical hearing loss 08/24/2023    GISSEL on CPAP " 12/07/2022    CAD (coronary artery disease) by coronary artery calcium score of 807 Agatston done as screening, no symptoms 07/26/2022    Sensorineural hearing loss, bilateral 02/19/2021    Ringing in left ear 02/19/2021    Presbycusis 02/19/2021    Hearing loss of left ear 02/19/2021    Persistent cognitive impairment 01/14/2021    History of colon polyps 10/15/2019    Elevated fasting glucose 10/16/2018    History of basal cell carcinoma 10/16/2018    Non-smoker 09/13/2018    Chronic insomnia 08/23/2018    Memory changes 04/29/2018    Pure hypercholesterolemia 11/14/2017    Chronic bilateral low back pain without sciatica 05/30/2017    Obstructive sleep apnea syndrome 10/17/2016    Pain in right shin 10/17/2016    Seasonal allergies 05/06/2016    Thrombocytopenia (HCC)     Scoliosis 10/09/2015    Benign prostatic hyperplasia with lower urinary tract symptoms 02/12/2015    Vitamin D insufficiency 11/21/2013    Postnasal drip 05/03/2012       Past medical history:   Past Medical History:   Diagnosis Date    Allergy     Anxiety     Basal cell carcinoma     dermatology- Dr. Smith    Chickenpox     Dyslipidemia     Tongan measles     Groin strain 10/21/2011     ICD-10 transition    Hemorrhoid     Hyperlipidemia     Insomnia     Left lateral epicondylitis 05/06/2016    Mumps     Right hip pain 11/21/2013    S/P colonoscopy 07/2013    negative, repeat in 5 years    S/P colonoscopy with polypectomy     age 53, due age    Scoliosis     Sleep apnea     Tonsillitis        Past surgical history:   Past Surgical History:   Procedure Laterality Date    BLEPHAROPLASTY  11/14/2012    Performed by Florentin Naylor M.D. at [facility]    INGUINAL HERNIA REPAIR  3/17/2009    Performed by SUDHA BARRETT at [facility]    TONSILLECTOMY           Social history:   Social History     Socioeconomic History    Marital status:      Spouse name: Not on file    Number of children: Not on file     Years of education: Not on file    Highest education level: Master's degree (e.g., MA, MS, Piedad, MEd, MSW, LEILANI)   Occupational History    Occupation: ADMINISTRATION     Employer: [employer]   Tobacco Use    Smoking status: Never    Smokeless tobacco: Never   Vaping Use    Vaping status: Never Used   Substance and Sexual Activity    Alcohol use: Not Currently     Alcohol/week: 3.0 oz     Types: 4 Glasses of wine, 1 Cans of beer per week     Comment: less than every day, 4x a week    Drug use: Not Currently     Types: Marijuana, Oral     Comment: occasionally    Sexual activity: Yes     Partners: Female   Other Topics Concern    Not on file   Social History Narrative    , 3 children.      Social Drivers of Health     Financial Resource Strain: Low Risk  (12/7/2023)    Overall Financial Resource Strain (CARDIA)     Difficulty of Paying Living Expenses: Not hard at all   Food Insecurity: No Food Insecurity (12/7/2023)    Hunger Vital Sign     Worried About Running Out of Food in the Last Year: Never true     Ran Out of Food in the Last Year: Never true   Transportation Needs: No Transportation Needs (12/7/2023)    PRAPARE - Transportation     Lack of Transportation (Medical): No     Lack of Transportation (Non-Medical): No   Physical Activity: Sufficiently Active (12/7/2023)    Exercise Vital Sign     Days of Exercise per Week: 5 days     Minutes of Exercise per Session: 60 min   Stress: No Stress Concern Present (12/7/2023)    Citizen of [place] of Occupational Health - Occupational Stress Questionnaire     Feeling of Stress : Only a little   Social Connections: Socially Integrated (12/7/2023)    Social Connection and Isolation Panel [NHANES]     Frequency of Communication with Friends and Family: More than three times a week     Frequency of Social Gatherings with Friends and Family: Three times a week     Attends Islam Services: 1 to 4 times per year     Active Member of Clubs or Organizations: Yes  "    Attends Club or Organization Meetings: 1 to 4 times per year     Marital Status:    Intimate Partner Violence: Not on file   Housing Stability: Low Risk  (12/7/2023)    Housing Stability Vital Sign     Unable to Pay for Housing in the Last Year: No     Number of Places Lived in the Last Year: 1     Unstable Housing in the Last Year: No       Family history:   Family History   Problem Relation Age of Onset    Alcohol/Drug Father         alcohol    Alcohol abuse Father     Alcohol/Drug Brother         alcohol    Sleep Apnea Brother     Cancer Brother     Cancer Brother         Brain Cancer    Cancer Sister         Tumor    Heart Disease Neg Hx          Current medications:   Current Outpatient Medications   Medication    escitalopram (LEXAPRO) 10 MG Tab    traZODone (DESYREL) 50 MG Tab    simvastatin (ZOCOR) 40 MG Tab    tamsulosin (FLOMAX) 0.4 MG capsule    ketoconazole (NIZORAL) 2 % Cream    fluticasone (FLONASE) 50 MCG/ACT nasal spray    ASPIRIN 81 PO    Multiple Vitamins-Minerals (MULTIVITAMIN ADULTS PO)    Ascorbic Acid (VITAMIN C PO)    Vitamin E 400 UNIT Tab    Omega-3 Fatty Acids (FISH OIL) 1000 MG Cap capsule    ofloxacin otic sol (FLOXIN OTIC) 0.3 % Solution    albuterol 108 (90 Base) MCG/ACT Aero Soln inhalation aerosol    imiquimod (ALDARA) 5 % cream    calcipotriene (DOVONEX) 0.005 % Cream    Cholecalciferol (VITAMIN D PO)    GLUCOSAMINE CHONDROITIN COMPLX PO     No current facility-administered medications for this visit.       Medication Allergy:  Allergies   Allergen Reactions    Seasonal            Physical examination:   Vitals:    01/30/25 1532   BP: 122/64   BP Location: Left arm   Patient Position: Sitting   BP Cuff Size: Adult   Pulse: 70   Temp: 36.4 °C (97.5 °F)   TempSrc: Temporal   SpO2: 100%   Weight: 73.9 kg (162 lb 14.7 oz)   Height: 1.778 m (5' 10\")       Normal cephalic atraumatic.  There is full range of movement around the neck in all directions without restrictions or " discrete pain evoked triggers.  No lower extremity edema.      Neurological  Exam:      [place] Cognitive Assessment (MOCA) Version 7.1    Years of Education: 4  years of college    TOTAL SCORE: 11/30  (to be scanned into the MEDIA section in the E.M.R.)    He think he is 46 years old.  He was able to spell his 1st name easily and his daughters names.        Mental status: Awake, alert and fully oriented to person, place, and situation. Normal attention and concentration.  Did not appear/act combative,irritable,anxious,paranoid/delusional or aggressive to or with me.    Speech and language: Speech is non fluent; word finding and paraphasic errors.     Follows 2 step motor commands in sequence without significant delay and correctly.    Cranial nerve exam:  II: Pupils are equally round and reactive to light. Visual fields are intact by confrontation.  III, IV, VI: EOMI, no diplopia, no ptosis.  V: Sensation to light touch is normal over V1-3 distributions bilaterally.  .  VII: Facial movements are symmetrical. There is no facial droop. .  VIII: Hearing intact to soft speech and finger rub bilaterally  IX: Palate elevates symmetrically, uvula is midline. Dysarthria is not present.  XI: Shoulder shrug are symmetrical and strong.   XII: Tongue protrudes midline.      Motor exam:  Muscle tone is normal in all 4 limbs. and No abnormal movements appreciated.    Muscle strength:    Neck Flexors/Extensors: 5/5       Right  Left  Deltoid   5/5  5/5      Biceps   5/5  5/5  Triceps              5/5  5/5   Wrist extensors 5/5  5/5  Wrist flexors  5/5  5/5     5/5  5/5  Interossei  5/5  5/5  Thenar (APB)  5/5  5/5   Hip flexors  5/5  5/5  Quadriceps  5/5  5/5    Hamstrings  5/5  5/5  Dorsiflexors  5/5  5/5  Plantarflexors  5/5  5/5  Toe extension  5/5  5/5    Reflexes:       Right  Left  Biceps   2/4  2/4  Triceps               2/4  2/4  Brachioradialis 2/4  2/4  Knee jerk  2/4  2/4  Ankle jerk  2/4  2/4     Frontal release  signs are absent    bilaterally toes are downgoing to plantar stimulation..    Coordination (finger-to-nose, heel/knee/shin, rapid alternating movements) was normal.     There was no ataxia, no tremors, and no dysmetria.     Station and gait were normal. Easily stands up from exam chair without retropulsion,veering,leaning,swaying (to either side).         NEUROIMAGING:     R-BRAIN-W/O  Order: [id number]   Status: Final result       Visible to patient: Yes (seen)       Next appt: 02/21/2025 at 08:20 AM in [facility] (Eva Rand M.D.)       Dx: Amnestic MCI (mild cognitive impairme...    0 Result Notes       1 Patient Communication  Details    Reading Physician Reading Date Result Priority   Anson Rodriges M.D.  [phone] 6/23/2022 Routine     Narrative & Impression     6/23/2022 7:27 AM     HISTORY/REASON FOR EXAM:  Memory Loss; Call wife for this visit.     TECHNIQUE/EXAM DESCRIPTION:  MRI of the brain without contrast,     T1 3D TFE sagittal, T2 Drive Turbo spin-echo axial, T1 coronal, 3-D thin section FLAIR with coronal and optional axial, sagittal reconstructions, Diffusion weighted and Apparent Diffusion Coefficient (ADC map) axial images were obtained of the whole   brain. Additional thin section T2 fast spin echo coronal images were also obtained to display the temporal lobes and  hippocampi.     The study was performed on a Jovi 3.0 Sofiya MRI scanner.     COMPARISON:  MRI brain and IACs 10/14/2019     FINDINGS:  The calvariae are unremarkable.  There are no extra-axial fluid collections.     The ventricular system and basal cisterns are within normal limits for the patient's age. No disproportional temporal lobe or hippocampal atrophy.     There is a pattern of mild supratentorial white matter disease with scattered foci of bright T2 and FLAIR signal in the subcortical and deep white matter of both hemispheres consistent with small vessel ischemic change versus demyelination or gliosis.   There  are no mass effects or shift of midline structures.  There are no hemorrhagic lesions.  The diffusion weighted axial images show no evidence of acute cerebral infarction.     The brainstem and posterior fossa structures are unremarkable.     Vascular flow voids in the vertebrobasilar and carotid arteries, [place], and dural venous sinuses are intact. There is a diminutive vertebro-basilar system consistent with normal variation likely associated with carotid origin of one or both   posterior cerebral arteries, likely on the left.     The paranasal sinuses show moderate mucosal thickening in the sphenoid sinus. There is a small retention cyst in the posterior aspect of the left maxillary sinus. There is mild mucosal thickening scattered among a few ethmoid air cells. No air-fluid   levels.     The mastoids are clear.     IMPRESSION:     1.  Mild supratentorial white matter disease most consistent with microvascular ischemic change. Common findings for the patient's age.  2.  Concerning the given history of new onset memory loss, no imaging findings indicative of specific neurodegenerative disease. No significant cerebral atrophy or disproportionate temporal lobe atrophy.        Exam Ended: 06/23/22  7:36 AM Last Resulted: 06/23/22  8:42 AM           Impression/Plans/Recommendations:    Semantic Dementia (Primary Progressive Aphasia)    MOCA score of 10/30 today.    Functional Activity Questionnaire score today of 18 per wife today.    We discussed this website today:    [email or web address]  [email or web address]    He will continue speech therapy with Eli MEDRANO Which has been ongoing for the last 2 years.    I placed a referral for an Occupational Therapy for evaluating his home environment.    Brain health factors reviewed today generally.      I have performed  a history and physical exam and a directed /focused  ROS  today.    Total time spent today or this patient's care was  60 minutes and included reviewing  the diagnostic workup to date (such as labs and imaging as well as interpreting such tests relevant to this patient's neurological condition),  reviewing/obtaining separately obtained history (from patient and/or accompanying family members)  for today's neurological problem(s) ,counseling and educating the patient and family member on issues related to cognition/memory and cognitive health factors and documenting  the clinical information in the EMR.    Follow up in about 6 months or so.        Rick Richmond MD  [place] [facility] [employer].   [facility]

## 2025-02-21 ENCOUNTER — APPOINTMENT (OUTPATIENT)
Dept: MEDICAL GROUP | Facility: IMAGING CENTER | Age: 70
End: 2025-02-21
Payer: MEDICARE

## 2025-02-21 VITALS
WEIGHT: 155.2 LBS | BODY MASS INDEX: 22.99 KG/M2 | HEART RATE: 65 BPM | TEMPERATURE: 97.4 F | RESPIRATION RATE: 15 BRPM | HEIGHT: 69 IN | SYSTOLIC BLOOD PRESSURE: 106 MMHG | OXYGEN SATURATION: 97 % | DIASTOLIC BLOOD PRESSURE: 60 MMHG

## 2025-02-21 DIAGNOSIS — R63.4 WEIGHT LOSS: ICD-10-CM

## 2025-02-21 DIAGNOSIS — G47.9 SLEEP DIFFICULTIES: ICD-10-CM

## 2025-02-21 DIAGNOSIS — F41.9 ANXIETY: ICD-10-CM

## 2025-02-21 DIAGNOSIS — F03.90 MAJOR NEUROCOGNITIVE DISORDER (HCC): ICD-10-CM

## 2025-02-21 PROCEDURE — 99214 OFFICE O/P EST MOD 30 MIN: CPT | Performed by: FAMILY MEDICINE

## 2025-02-21 PROCEDURE — 3074F SYST BP LT 130 MM HG: CPT | Performed by: FAMILY MEDICINE

## 2025-02-21 PROCEDURE — 3078F DIAST BP <80 MM HG: CPT | Performed by: FAMILY MEDICINE

## 2025-02-21 RX ORDER — ESCITALOPRAM OXALATE 20 MG/1
20 TABLET ORAL DAILY
Qty: 90 TABLET | Refills: 2 | Status: SHIPPED | OUTPATIENT
Start: 2025-02-21

## 2025-02-21 ASSESSMENT — FIBROSIS 4 INDEX: FIB4 SCORE: 2.69

## 2025-02-21 NOTE — PROGRESS NOTES
SUBJECTIVE:    Chief Complaint   Patient presents with    Follow-Up    Anxiety     Med check       HPI:     Nick Rodriguez is a 69 y.o. male with neurocognitive disorder, anxiety and sleep difficulty here with his wife and daughter for review of anxiety and medication.     He has been taking Lexapro 10 mg daily.  Tolerating well.  Occasionally takes trazodone at nighttime for sleep issues. Wife will plan to transition out his trazodone therapy.  Wife states that in anticipation for his visit today states he was up at 1:30 AM and ready to go.  Tends to have anticipation of events, has a calendar. Overall he does generally sleep well.    She has noticed some changes in his weight.  He does not eat as much of his food as he did in the past.  Tends to eat about half of his meals.  She is trying to have him supplement with protein shakes 3 times a day.  He does expend a lot of energy during the day with activities.  He does nap sometimes during the day.  No other concerning symptoms.   Prior labs including PSA, TSH, UA, CBC and CMP were overall unremarkable.  Is interested in further imaging as well.   Health maintenance is up to date.     ROS:  No recent fevers or chills. No nausea or vomiting. No diarrhea. No chest pains or shortness of breath. No lower extremity edema.    Current Outpatient Medications on File Prior to Visit   Medication Sig Dispense Refill    tamsulosin (FLOMAX) 0.4 MG capsule Take 0.4 mg by mouth every day.      escitalopram (LEXAPRO) 10 MG Tab Take 1 Tablet by mouth every day. 60 Tablet 1    traZODone (DESYREL) 50 MG Tab Take 0.5-1 Tablets by mouth every evening. May start 1/2 tab nightly and increase to one tab nightly. 30 Tablet 3    simvastatin (ZOCOR) 40 MG Tab TAKE ONE TABLET BY MOUTH EVERY EVENING 100 Tablet 3    tamsulosin (FLOMAX) 0.4 MG capsule Take 1 Capsule by mouth every day. 100 Capsule 3    ketoconazole (NIZORAL) 2 % Cream as needed.      imiquimod (ALDARA) 5 % cream prn       calcipotriene (DOVONEX) 0.005 % Cream       fluticasone (FLONASE) 50 MCG/ACT nasal spray use 2 sprays in each nostril daily 16 g 3    ASPIRIN 81 PO Take  by mouth.      Multiple Vitamins-Minerals (MULTIVITAMIN ADULTS PO) Take  by mouth.      Ascorbic Acid (VITAMIN C PO) Take 1,000 mg by mouth.      Cholecalciferol (VITAMIN D PO) Take  by mouth.      Vitamin E 400 UNIT Tab Take  by mouth.      Omega-3 Fatty Acids (FISH OIL) 1000 MG Cap capsule Take 1,000 mg by mouth 3 times a day, with meals.      GLUCOSAMINE CHONDROITIN COMPLX PO Take  by mouth.      simvastatin (ZOCOR) 40 MG Tab Take 40 mg by mouth every evening.      ofloxacin otic sol (FLOXIN OTIC) 0.3 % Solution Instill 5 drops into affected ear(s) 2 times a day. (Patient not taking: Reported on 1/23/2025) 10 mL 0    albuterol 108 (90 Base) MCG/ACT Aero Soln inhalation aerosol INHALE 2 PUFFS EVERY 6 HOURS AS NEEDED FOR SHORTNESS OF BREATH (Patient not taking: Reported on 1/30/2025) 18 g 0     No current facility-administered medications on file prior to visit.       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    Major neurocognitive disorder (HCC) 01/05/2025    Primary progressive aphasia (HCC) 01/05/2025    Prediabetes 10/25/2023    Elevated hemoglobin A1c 10/25/2023    Presbycusis of both ears 08/24/2023    Asymmetrical hearing loss 08/24/2023    GISSEL on CPAP 12/07/2022    CAD (coronary artery disease) by coronary artery calcium score of 807 Agatston done as screening, no symptoms 07/26/2022    Sensorineural hearing loss, bilateral 02/19/2021    Ringing in left ear 02/19/2021    Presbycusis 02/19/2021    Hearing loss of left ear 02/19/2021    Persistent cognitive impairment 01/14/2021    History of colon polyps 10/15/2019     "Elevated fasting glucose 10/16/2018    History of basal cell carcinoma 10/16/2018    Non-smoker 09/13/2018    History of colonic polyps 09/12/2018    Chronic insomnia 08/23/2018    Memory changes 04/29/2018    Pure hypercholesterolemia 11/14/2017    Chronic bilateral low back pain without sciatica 05/30/2017    Obstructive sleep apnea syndrome 10/17/2016    Pain in right shin 10/17/2016    Seasonal allergies 05/06/2016    Thrombocytopenia (HCC)     Scoliosis 10/09/2015    Benign prostatic hyperplasia with lower urinary tract symptoms 02/12/2015    Vitamin D insufficiency 11/21/2013    Postnasal drip 05/03/2012       Past Medical History:   Diagnosis Date    Allergy     Anxiety     Basal cell carcinoma     dermatology- Dr. Smith    Chickenpox     Dyslipidemia     Arabic measles     Groin strain 10/21/2011     ICD-10 transition    Hemorrhoid     Hyperlipidemia     Insomnia     Left lateral epicondylitis 05/06/2016    Mumps     Right hip pain 11/21/2013    S/P colonoscopy 07/2013    negative, repeat in 5 years    S/P colonoscopy with polypectomy     age 53, due age    Scoliosis     Sleep apnea     Tonsillitis          OBJECTIVE:   /60 (BP Location: Left arm, Patient Position: Sitting, BP Cuff Size: Adult)   Pulse 65   Temp 36.3 °C (97.4 °F) (Temporal)   Resp 15   Ht 1.753 m (5' 9\")   Wt 70.4 kg (155 lb 3.2 oz) Comment: shoes on  SpO2 97%   BMI 22.92 kg/m²   General: Well-developed well-nourished male, no acute distress  Neck: supple, no lymphadenopathy- cervical or supraclavicular, no thyromegaly  Cardiovascular: regular rate and rhythm, no murmurs, gallops, rubs  Lungs: clear to auscultation bilaterally, no wheezes, crackles, or rhonchi  Abdomen: +bowel sounds, soft, nontender, nondistended, no rebound, no guarding, no hepatosplenomegaly  Extremities: no cyanosis, clubbing, edema  Skin: Warm and dry  Psych: appropriate mood and affect      ASSESSMENT/PLAN:    69 y.o.male     1. Anxiety  escitalopram " (LEXAPRO) 20 MG tablet      2. Sleep difficulties        3. Weight loss  CT-CHEST,ABDOMEN,PELVIS WITH    Referral to Nutrition Services    Basic Metabolic Panel      4. Major neurocognitive disorder (HCC)  Referral to Nutrition Services      - Anxiety- stable, tolerating medication well.   Will increase Lexapro to 20 mg daily.  Reviewed grounding exercises, meditation exercises.  Reviewed limiting fore knowledge of events. Plan for reviewing daily scheduling in morning.   - Sleep difficulties- appears associated with anxiety and anticipation of events. Continue sleep hygiene. Plan to limit trazodone therapy.   - Weight loss- appears multifactorial. Health maintenance up to date.   Reviewed recommendations, plan for nutritionist referral, CT scan evaluation.   - Major neurocognitive disorder- continue progression noted. Followed by Dr. Olson and neurology.     This medical record contains text that has been entered with the assistance of computer voice recognition and dictation software.  Therefore, it may contain unintended errors in text, spelling, punctuation, or grammar.

## 2025-02-27 NOTE — Clinical Note
REFERRAL APPROVAL NOTICE         Sent on February 27, 2025                   Kamran Rodriguez  [address] [facility] [address]                   Dear Mr. Rodriguez,    After a careful review of the medical information and benefit coverage, [facility] has processed your referral. See below for additional details.    If applicable, you must be actively enrolled with your insurance for coverage of the authorized service. If you have any questions regarding your coverage, please contact your insurance directly.    REFERRAL INFORMATION   Referral #:  [id number]  Referred-To Department    Referred-By Provider:  Vidal Rand M.D.   [facility] [address]   [facility] [address]  [phone] [address] [facility] [id number] [phone]    Referral Start Date:  02/24/2025  Referral End Date:   02/21/2026             SCHEDULING  If you do not already have an appointment, please call [phone] to make an appointment.     MORE INFORMATION  If you do not already have a [facility] account, sign up at: [email or web address]  You can access your medical information, make appointments, see lab results, billing information, and more.  If you have questions regarding this referral, please contact  the [facility] Referrals department at:             [phone]. Monday - Friday 8:00AM - 5:00PM.     Sincerely,    [facility]

## 2025-03-13 PROCEDURE — RXMED WILLOW AMBULATORY MEDICATION CHARGE: Performed by: FAMILY MEDICINE

## 2025-03-14 ENCOUNTER — PHARMACY VISIT (OUTPATIENT)
Dept: PHARMACY | Facility: MEDICAL CENTER | Age: 70
End: 2025-03-14
Payer: COMMERCIAL

## 2025-04-23 PROCEDURE — RXMED WILLOW AMBULATORY MEDICATION CHARGE: Performed by: FAMILY MEDICINE

## 2025-05-02 ENCOUNTER — TELEPHONE (OUTPATIENT)
Dept: MEDICAL GROUP | Facility: IMAGING CENTER | Age: 70
End: 2025-05-02
Payer: MEDICARE

## 2025-05-03 NOTE — TELEPHONE ENCOUNTER
Patient was here for his wife's office visit.  However, incidentally notes having some left lateral ankle pain yesterday.  Some improvement today.  No specific injury.  He does do work outdoors.    Exam of the left ankle reveals no edema/ecchymosis/erythema.  No effusion.  Notes minimal tenderness to palpation of region just inferior to lateral malleolus.  Otherwise adequate strength and range of motion, negative anterior drawer test.    Patient will continue to monitor at this time and follow-up as needed.  May consider further imaging as needed if continued symptoms.

## 2025-05-07 ENCOUNTER — PHARMACY VISIT (OUTPATIENT)
Dept: PHARMACY | Facility: MEDICAL CENTER | Age: 70
End: 2025-05-07
Payer: COMMERCIAL

## 2025-05-20 ENCOUNTER — PHARMACY VISIT (OUTPATIENT)
Dept: PHARMACY | Facility: MEDICAL CENTER | Age: 70
End: 2025-05-20
Payer: COMMERCIAL

## 2025-05-20 PROCEDURE — RXMED WILLOW AMBULATORY MEDICATION CHARGE: Performed by: FAMILY MEDICINE

## 2025-06-09 PROCEDURE — RXMED WILLOW AMBULATORY MEDICATION CHARGE: Performed by: FAMILY MEDICINE

## 2025-06-10 ENCOUNTER — OFFICE VISIT (OUTPATIENT)
Dept: URGENT CARE | Facility: CLINIC | Age: 70
End: 2025-06-10
Payer: MEDICARE

## 2025-06-10 ENCOUNTER — PHARMACY VISIT (OUTPATIENT)
Dept: PHARMACY | Facility: MEDICAL CENTER | Age: 70
End: 2025-06-10
Payer: COMMERCIAL

## 2025-06-10 VITALS
BODY MASS INDEX: 22.33 KG/M2 | TEMPERATURE: 98.6 F | HEIGHT: 70 IN | WEIGHT: 156 LBS | HEART RATE: 63 BPM | SYSTOLIC BLOOD PRESSURE: 98 MMHG | DIASTOLIC BLOOD PRESSURE: 60 MMHG | RESPIRATION RATE: 14 BRPM | OXYGEN SATURATION: 95 %

## 2025-06-10 DIAGNOSIS — R03.1 LOW BLOOD PRESSURE READING: Primary | ICD-10-CM

## 2025-06-10 DIAGNOSIS — F03.90 DEMENTIA, UNSPECIFIED DEMENTIA SEVERITY, UNSPECIFIED DEMENTIA TYPE, UNSPECIFIED WHETHER BEHAVIORAL, PSYCHOTIC, OR MOOD DISTURBANCE OR ANXIETY (HCC): ICD-10-CM

## 2025-06-10 LAB
APPEARANCE UR: CLEAR
BILIRUB UR STRIP-MCNC: NORMAL MG/DL
COLOR UR AUTO: YELLOW
GLUCOSE BLD-MCNC: 102 MG/DL (ref 65–99)
GLUCOSE UR STRIP.AUTO-MCNC: NORMAL MG/DL
KETONES UR STRIP.AUTO-MCNC: NORMAL MG/DL
LEUKOCYTE ESTERASE UR QL STRIP.AUTO: NORMAL
NITRITE UR QL STRIP.AUTO: NORMAL
PH UR STRIP.AUTO: 5.5 [PH] (ref 5–8)
PROT UR QL STRIP: NORMAL MG/DL
RBC UR QL AUTO: NORMAL
SP GR UR STRIP.AUTO: 1.03
UROBILINOGEN UR STRIP-MCNC: 0.2 MG/DL

## 2025-06-10 PROCEDURE — 81002 URINALYSIS NONAUTO W/O SCOPE: CPT | Performed by: NURSE PRACTITIONER

## 2025-06-10 PROCEDURE — 1126F AMNT PAIN NOTED NONE PRSNT: CPT | Performed by: NURSE PRACTITIONER

## 2025-06-10 PROCEDURE — 82962 GLUCOSE BLOOD TEST: CPT | Performed by: NURSE PRACTITIONER

## 2025-06-10 PROCEDURE — 3078F DIAST BP <80 MM HG: CPT | Performed by: NURSE PRACTITIONER

## 2025-06-10 PROCEDURE — 99213 OFFICE O/P EST LOW 20 MIN: CPT | Performed by: NURSE PRACTITIONER

## 2025-06-10 PROCEDURE — 3074F SYST BP LT 130 MM HG: CPT | Performed by: NURSE PRACTITIONER

## 2025-06-10 ASSESSMENT — FIBROSIS 4 INDEX: FIB4 SCORE: 2.69

## 2025-06-10 NOTE — PROGRESS NOTES
"  Subjective:     Nick Rodriguez is a 69 y.o. male who presents for Blood Pressure Problem (Low blood pressure this morning while at dentist office explains) and Memory Loss (Patient did experience memory loss this morning which is getting worse patients family who is present with him at today's visit states)      States his blood pressure was 85/65 at the dentist office. Has had a recent toothache. He has reportedly been lethargic most of the day.  He said \"bad\" earlier, however was not specific. His wife states she seems more confused than normal.     Other  This is a new problem. The current episode started today. Pertinent negatives include no chest pain, coughing, fever, urinary symptoms or weakness.       Past Medical History[1]    Past Surgical History[2]    Social History     Socioeconomic History    Marital status:      Spouse name: Not on file    Number of children: Not on file    Years of education: Not on file    Highest education level: Master's degree (e.g., MA, MS, Piedad, MEd, MSW, LEILANI)   Occupational History    Occupation: ADMINISTRATION     Employer: [employer]   Tobacco Use    Smoking status: Never    Smokeless tobacco: Never   Vaping Use    Vaping status: Never Used   Substance and Sexual Activity    Alcohol use: Not Currently     Alcohol/week: 3.0 oz     Types: 4 Glasses of wine, 1 Cans of beer per week     Comment: less than every day, 4x a week    Drug use: Not Currently     Types: Marijuana, Oral     Comment: occasionally    Sexual activity: Yes     Partners: Female   Other Topics Concern    Not on file   Social History Narrative    , 3 children.      Social Drivers of Health     Financial Resource Strain: Low Risk  (12/7/2023)    Overall Financial Resource Strain (CARDIA)     Difficulty of Paying Living Expenses: Not hard at all   Food Insecurity: No Food Insecurity (12/7/2023)    Hunger Vital Sign     Worried About Running Out of Food in the Last Year: Never true "     Ran Out of Food in the Last Year: Never true   Transportation Needs: No Transportation Needs (12/7/2023)    PRAPARE - Transportation     Lack of Transportation (Medical): No     Lack of Transportation (Non-Medical): No   Physical Activity: Sufficiently Active (12/7/2023)    Exercise Vital Sign     Days of Exercise per Week: 5 days     Minutes of Exercise per Session: 60 min   Stress: No Stress Concern Present (12/7/2023)    [place] [employer] - Occupational Stress Questionnaire     Feeling of Stress : Only a little   Social Connections: Socially Integrated (12/7/2023)    Social Connection and Isolation Panel [NHANES]     Frequency of Communication with Friends and Family: More than three times a week     Frequency of Social Gatherings with Friends and Family: Three times a week     Attends Jehovah's witness Services: 1 to 4 times per year     Active Member of Clubs or Organizations: Yes     Attends Club or Organization Meetings: 1 to 4 times per year     Marital Status:    Intimate Partner Violence: Not on file   Housing Stability: Low Risk  (12/7/2023)    Housing Stability Vital Sign     Unable to Pay for Housing in the Last Year: No     Number of Places Lived in the Last Year: 1     Unstable Housing in the Last Year: No        Family History   Problem Relation Age of Onset    Alcohol/Drug Father         alcohol    Alcohol abuse Father     Alcohol/Drug Brother         alcohol    Sleep Apnea Brother     Cancer Brother     Cancer Brother         Brain Cancer    Cancer Sister         Tumor    Heart Disease Neg Hx         Allergies[3]    Review of Systems   Constitutional:  Positive for malaise/fatigue. Negative for fever.   Respiratory:  Negative for cough and shortness of breath.    Cardiovascular:  Negative for chest pain.   Neurological:  Negative for weakness.   Psychiatric/Behavioral:  Positive for memory loss.    All other systems reviewed and are negative.       Objective:   BP 98/60  "(BP Location: Left arm, Patient Position: Sitting, BP Cuff Size: Large adult)   Pulse 63   Temp 37 °C (98.6 °F)   Resp 14   Ht 1.778 m (5' 10\")   Wt 70.8 kg (156 lb)   SpO2 95%   BMI 22.38 kg/m²     Physical Exam  Vitals reviewed.   HENT:      Mouth/Throat:      Mouth: Mucous membranes are moist.   Pulmonary:      Effort: Pulmonary effort is normal. No respiratory distress.      Breath sounds: Normal breath sounds.   Abdominal:      General: Bowel sounds are normal. There is no distension.      Palpations: Abdomen is soft.      Tenderness: There is no abdominal tenderness. There is no guarding.   Skin:     General: Skin is warm and dry.   Neurological:      Mental Status: He is alert. Mental status is at baseline.      GCS: GCS eye subscore is 4. GCS motor subscore is 6.      Cranial Nerves: No facial asymmetry.      Motor: No weakness.      Comments: Following commands. No arm drift. No facial droop.          Assessment/Plan:   1. Low blood pressure reading  - POCT Glucose  - POCT Urinalysis    2. Dementia, unspecified dementia severity, unspecified dementia type, unspecified whether behavioral, psychotic, or mood disturbance or anxiety ([facility])    Results for orders placed or performed in visit on 06/10/25   POCT Glucose    Collection Time: 06/10/25  2:38 PM   Result Value Ref Range    Glucose - Accu-Ck 102 (A) 65 - 99 mg/dL   POCT Urinalysis    Collection Time: 06/10/25  2:46 PM   Result Value Ref Range    POC Color yellow Negative    POC Appearance clear Negative    POC Glucose neg Negative mg/dL    POC Bilirubin neg Negative mg/dL    POC Ketones neg Negative mg/dL    POC Specific Gravity 1.030 <1.005 - >1.030    POC Blood neg Negative    POC Urine PH 5.5 5.0 - 8.0    POC Protein neg Negative mg/dL    POC Urobiligen 0.2 Negative (0.2) mg/dL    POC Nitrites neg Negative    POC Leukocyte Esterase neg Negative     -Oral hydration and rest.    Follow up with PCP. Follow up for new or persistent symptoms, or for " any other concerns. Emergently for increased or new confusion, persistent hypotension, fever, dizziness, chest pain, shortness of breath, loss of balance, syncope, weakness, speech changes, vision changes, headache.    Presents with his wife and daughter. Had been hypotensive at a dental appointment today, 85/54 with a wrist measurement. Reviewed recent PCP office visit vitals, 106/60 2/21. BP today in clinic is within baseline. He has been afebrile. Negative UA. They report patient seeming lethargic today. Had slept throughout the night last night. He's following commands. No new medications. . They had recently been referred to a provider specializing in dementia. Was given ED precautions. Advised monitoring BP, with follow up for recurrent hypotension or any other concerns    Differential diagnosis, natural history, supportive care, and indications for immediate follow-up discussed.         [1]   Past Medical History:  Diagnosis Date    Allergy     Anxiety     Arthritis     Basal cell carcinoma     dermatology- Dr. Smith    Chickenpox     Dyslipidemia     Pashto measles     Groin strain 10/21/2011     ICD-10 transition    Hemorrhoid     Hyperlipidemia     Insomnia     Left lateral epicondylitis 05/06/2016    Mumps     Right hip pain 11/21/2013    S/P colonoscopy 07/2013    negative, repeat in 5 years    S/P colonoscopy with polypectomy     age 53, due age    Scoliosis     Sleep apnea     Tonsillitis    [2]   Past Surgical History:  Procedure Laterality Date    BLEPHAROPLASTY  11/14/2012    Performed by Florentin Naylor M.D. at [facility]    INGUINAL HERNIA REPAIR  3/17/2009    Performed by SUDHA BARRETT at [facility]    TONSILLECTOMY     [3]   Allergies  Allergen Reactions    Seasonal

## 2025-06-10 NOTE — PATIENT INSTRUCTIONS
-Oral hydration and rest.    Follow up with PCP. Follow up for new or persistent symptoms, or for any other concerns. Emergently for increased or new confusion, persistent hypotension, fever, dizziness, chest pain, shortness of breath, loss of balance, syncope, weakness, speech changes, vision changes, headache.

## 2025-06-13 ENCOUNTER — OFFICE VISIT (OUTPATIENT)
Dept: MEDICAL GROUP | Facility: IMAGING CENTER | Age: 70
End: 2025-06-13
Payer: MEDICARE

## 2025-06-13 VITALS
OXYGEN SATURATION: 98 % | DIASTOLIC BLOOD PRESSURE: 48 MMHG | HEART RATE: 60 BPM | WEIGHT: 154.4 LBS | RESPIRATION RATE: 16 BRPM | HEIGHT: 70 IN | BODY MASS INDEX: 22.1 KG/M2 | TEMPERATURE: 97.9 F | SYSTOLIC BLOOD PRESSURE: 96 MMHG

## 2025-06-13 DIAGNOSIS — R63.4 WEIGHT LOSS: ICD-10-CM

## 2025-06-13 DIAGNOSIS — R03.1 LOW BLOOD PRESSURE READING: ICD-10-CM

## 2025-06-13 DIAGNOSIS — R35.1 BENIGN PROSTATIC HYPERPLASIA WITH NOCTURIA: ICD-10-CM

## 2025-06-13 DIAGNOSIS — N40.1 BENIGN PROSTATIC HYPERPLASIA WITH NOCTURIA: ICD-10-CM

## 2025-06-13 DIAGNOSIS — Z12.5 SCREENING FOR PROSTATE CANCER: ICD-10-CM

## 2025-06-13 PROCEDURE — 99214 OFFICE O/P EST MOD 30 MIN: CPT | Performed by: FAMILY MEDICINE

## 2025-06-13 PROCEDURE — 3074F SYST BP LT 130 MM HG: CPT | Performed by: FAMILY MEDICINE

## 2025-06-13 PROCEDURE — 3078F DIAST BP <80 MM HG: CPT | Performed by: FAMILY MEDICINE

## 2025-06-13 ASSESSMENT — FIBROSIS 4 INDEX: FIB4 SCORE: 2.69

## 2025-06-13 ASSESSMENT — PAIN SCALES - GENERAL: PAINLEVEL_OUTOF10: NO PAIN

## 2025-06-13 ASSESSMENT — ENCOUNTER SYMPTOMS
WEAKNESS: 0
COUGH: 0
SHORTNESS OF BREATH: 0
FEVER: 0
MEMORY LOSS: 1

## 2025-06-13 NOTE — PROGRESS NOTES
SUBJECTIVE:    Chief Complaint   Patient presents with    Requesting Labs    Hypotension     Had unusually low b/p on 6/10 approx b/p reading at 95/65     Weight Check     Losing weight faster        HPI:     Nick Rodriguez is a 69 y.o. male with neurocognitive disorder, anxiety and sleep difficulty here with wife for review of low BP and weight issues.   Body mass index is 22.15 kg/m².  He had prior root canal evaluation appointment- and BP was low.   Trazodone at night. As needed.   Afternoon, early evening.   Has routine at night.   Working on exercise/swimming- has .   He is on lexapro therapy.   Wife will plan to try Calm supplement.   Not on day time otc supplement.   Looking into memory care at this time.   Prior UA- negative.   Flomax and simvastatin.   Hydrating. Has gotten occasionally light headed.   Protein shakes. No other recent illness.   Not on medication for blood pressure.     ROS:  No recent fevers or chills. No nausea or vomiting. No diarrhea. No chest pains or shortness of breath. No lower extremity edema.    Medications Ordered Prior to Encounter[1]    Allergies[2]    Patient Active Problem List    Diagnosis Date Noted    Benign prostatic hyperplasia with urinary obstruction 01/30/2025    Sleep apnea 01/30/2025    Hypercholesterolemia 01/30/2025    Mild semantic dementia (HCC) 01/30/2025    Major neurocognitive disorder (HCC) 01/05/2025    Primary progressive aphasia (HCC) 01/05/2025    Prediabetes 10/25/2023    Elevated hemoglobin A1c 10/25/2023    Presbycusis of both ears 08/24/2023    Asymmetrical hearing loss 08/24/2023    GISSEL on CPAP 12/07/2022    CAD (coronary artery disease) by coronary artery calcium score of 807 Agatston done as screening, no symptoms 07/26/2022    Sensorineural hearing loss, bilateral 02/19/2021    Ringing in left ear 02/19/2021    Presbycusis 02/19/2021    Hearing loss of left ear 02/19/2021    Persistent cognitive impairment 01/14/2021    History of  "colon polyps 10/15/2019    Elevated fasting glucose 10/16/2018    History of basal cell carcinoma 10/16/2018    Non-smoker 09/13/2018    History of colonic polyps 09/12/2018    Chronic insomnia 08/23/2018    Memory changes 04/29/2018    Pure hypercholesterolemia 11/14/2017    Chronic bilateral low back pain without sciatica 05/30/2017    Obstructive sleep apnea syndrome 10/17/2016    Pain in right shin 10/17/2016    Seasonal allergies 05/06/2016    Thrombocytopenia (HCC)     Scoliosis 10/09/2015    Benign prostatic hyperplasia with lower urinary tract symptoms 02/12/2015    Vitamin D insufficiency 11/21/2013    Postnasal drip 05/03/2012       Past Medical History[3]      OBJECTIVE:   BP 96/48 (BP Location: Left arm, Patient Position: Sitting, BP Cuff Size: Adult)   Pulse 60   Temp 36.6 °C (97.9 °F) (Temporal)   Resp 16   Ht 1.778 m (5' 10\")   Wt 70 kg (154 lb 6.4 oz)   SpO2 98%   BMI 22.15 kg/m²   General: Well-developed well-nourished male, no acute distress  Neck: supple, no lymphadenopathy- cervical or supraclavicular, no thyromegaly  Cardiovascular: regular rate and rhythm, no murmurs, gallops, rubs  Lungs: clear to auscultation bilaterally, no wheezes, crackles, or rhonchi  Abdomen: +bowel sounds, soft, nontender, nondistended, no rebound, no guarding, no hepatosplenomegaly  Extremities: no cyanosis, clubbing, edema  Skin: Warm and dry  Psych: appropriate mood and affect        ASSESSMENT/PLAN:    69 y.o.male       1. Low blood pressure reading - notes occasional light headedness, otherwise stable. No medication for BP, but is not flomax therapy which may contribute to low BP. Patient has had some weight loss which may also contribute.   Consider use of compression stockings. Avoid excess heat exposure and overly warm environments. Keep well hydrated.   Reviewed consideration of modifying flomax therapy.   CBC WITH DIFFERENTIAL    Comp Metabolic Panel    TSH WITH REFLEX TO FT4    EC-ECHOCARDIOGRAM " COMPLETE W/O CONT    proBrain Natriuretic Peptide, NT      2. Weight loss- likely multifactorial.   Advised of recommendation to complete prior ct chest/abdomen/pelvic placed previously for further evaluation.   Recommend adequate oral intake. May continue protein shakes and adequate hydration.        3. Benign prostatic hyperplasia with nocturia -stable. See #1. Monitor.        4. Screening for prostate cancer  PROSTATE SPECIFIC AG SCREENING        Return in about 2 months (around 8/13/2025). Follow up sooner as needed.     This medical record contains text that has been entered with the assistance of computer voice recognition and dictation software.  Therefore, it may contain unintended errors in text, spelling, punctuation, or grammar.                                 [1]   Current Outpatient Medications on File Prior to Visit   Medication Sig Dispense Refill    escitalopram (LEXAPRO) 20 MG tablet Take 1 Tablet by mouth every day. 90 Tablet 2    simvastatin (ZOCOR) 40 MG Tab Take 40 mg by mouth every evening.      tamsulosin (FLOMAX) 0.4 MG capsule Take 0.4 mg by mouth every day.      traZODone (DESYREL) 50 MG Tab Take 0.5-1 Tablets by mouth every evening. May start 1/2 tab nightly and increase to one tab nightly. 30 Tablet 3    simvastatin (ZOCOR) 40 MG Tab TAKE ONE TABLET BY MOUTH EVERY EVENING 100 Tablet 3    tamsulosin (FLOMAX) 0.4 MG capsule Take 1 Capsule by mouth every day. 100 Capsule 3    fluticasone (FLONASE) 50 MCG/ACT nasal spray use 2 sprays in each nostril daily 16 g 3    Multiple Vitamins-Minerals (MULTIVITAMIN ADULTS PO) Take  by mouth.      Ascorbic Acid (VITAMIN C PO) Take 1,000 mg by mouth.      Cholecalciferol (VITAMIN D PO) Take  by mouth.      Vitamin E 400 UNIT Tab Take  by mouth.      Omega-3 Fatty Acids (FISH OIL) 1000 MG Cap capsule Take 1,000 mg by mouth 3 times a day, with meals.      GLUCOSAMINE CHONDROITIN COMPLX PO Take  by mouth.      fluorouracil (EFUDEX) 5 % cream Apply to  affected areas once a day on Monday, Wednesday, Friday for 4 weeks for sun damage. stop if irritated. (Patient not taking: Reported on 6/13/2025) 40 g 3    ofloxacin otic sol (FLOXIN OTIC) 0.3 % Solution Instill 5 drops into affected ear(s) 2 times a day. (Patient not taking: Reported on 6/13/2025) 10 mL 0    albuterol 108 (90 Base) MCG/ACT Aero Soln inhalation aerosol INHALE 2 PUFFS EVERY 6 HOURS AS NEEDED FOR SHORTNESS OF BREATH (Patient not taking: Reported on 6/13/2025) 18 g 0    ketoconazole (NIZORAL) 2 % Cream as needed. (Patient not taking: Reported on 6/13/2025)      imiquimod (ALDARA) 5 % cream prn (Patient not taking: Reported on 6/13/2025)      calcipotriene (DOVONEX) 0.005 % Cream  (Patient not taking: Reported on 6/13/2025)      ASPIRIN 81 PO Take  by mouth. (Patient not taking: Reported on 6/13/2025)       No current facility-administered medications on file prior to visit.   [2]   Allergies  Allergen Reactions    Seasonal    [3]   Past Medical History:  Diagnosis Date    Allergy     Anxiety     Arthritis     Basal cell carcinoma     dermatology- Dr. Smith    Chickenpox     Dyslipidemia     Slovenian measles     Groin strain 10/21/2011     ICD-10 transition    Hemorrhoid     Hyperlipidemia     Insomnia     Left lateral epicondylitis 05/06/2016    Mumps     Right hip pain 11/21/2013    S/P colonoscopy 07/2013    negative, repeat in 5 years    S/P colonoscopy with polypectomy     age 53, due age    Scoliosis     Sleep apnea     Tonsillitis

## 2025-06-17 ENCOUNTER — APPOINTMENT (OUTPATIENT)
Dept: LAB | Facility: MEDICAL CENTER | Age: 70
End: 2025-06-17
Payer: MEDICARE

## 2025-06-17 ENCOUNTER — HOSPITAL ENCOUNTER (OUTPATIENT)
Dept: LAB | Facility: MEDICAL CENTER | Age: 70
End: 2025-06-17
Attending: FAMILY MEDICINE
Payer: MEDICARE

## 2025-06-17 DIAGNOSIS — Z12.5 SCREENING FOR PROSTATE CANCER: ICD-10-CM

## 2025-06-17 DIAGNOSIS — R03.1 LOW BLOOD PRESSURE READING: ICD-10-CM

## 2025-06-17 LAB
ALBUMIN SERPL BCP-MCNC: 4.1 G/DL (ref 3.2–4.9)
ALBUMIN/GLOB SERPL: 1.7 G/DL
ALP SERPL-CCNC: 56 U/L (ref 30–99)
ALT SERPL-CCNC: 29 U/L (ref 2–50)
ANION GAP SERPL CALC-SCNC: 8 MMOL/L (ref 7–16)
AST SERPL-CCNC: 31 U/L (ref 12–45)
BASOPHILS # BLD AUTO: 0.6 % (ref 0–1.8)
BASOPHILS # BLD: 0.03 K/UL (ref 0–0.12)
BILIRUB SERPL-MCNC: 0.3 MG/DL (ref 0.1–1.5)
BUN SERPL-MCNC: 21 MG/DL (ref 8–22)
CALCIUM ALBUM COR SERPL-MCNC: 9.9 MG/DL (ref 8.5–10.5)
CALCIUM SERPL-MCNC: 10 MG/DL (ref 8.5–10.5)
CHLORIDE SERPL-SCNC: 102 MMOL/L (ref 96–112)
CO2 SERPL-SCNC: 29 MMOL/L (ref 20–33)
CREAT SERPL-MCNC: 1.17 MG/DL (ref 0.5–1.4)
EOSINOPHIL # BLD AUTO: 0.05 K/UL (ref 0–0.51)
EOSINOPHIL NFR BLD: 1.1 % (ref 0–6.9)
ERYTHROCYTE [DISTWIDTH] IN BLOOD BY AUTOMATED COUNT: 47.8 FL (ref 35.9–50)
GFR SERPLBLD CREATININE-BSD FMLA CKD-EPI: 66 ML/MIN/1.73 M 2
GLOBULIN SER CALC-MCNC: 2.4 G/DL (ref 1.9–3.5)
GLUCOSE SERPL-MCNC: 98 MG/DL (ref 65–99)
HCT VFR BLD AUTO: 46.4 % (ref 42–52)
HGB BLD-MCNC: 15.2 G/DL (ref 14–18)
IMM GRANULOCYTES # BLD AUTO: 0.01 K/UL (ref 0–0.11)
IMM GRANULOCYTES NFR BLD AUTO: 0.2 % (ref 0–0.9)
LYMPHOCYTES # BLD AUTO: 1.28 K/UL (ref 1–4.8)
LYMPHOCYTES NFR BLD: 27.1 % (ref 22–41)
MCH RBC QN AUTO: 32.5 PG (ref 27–33)
MCHC RBC AUTO-ENTMCNC: 32.8 G/DL (ref 32.3–36.5)
MCV RBC AUTO: 99.1 FL (ref 81.4–97.8)
MONOCYTES # BLD AUTO: 0.37 K/UL (ref 0–0.85)
MONOCYTES NFR BLD AUTO: 7.8 % (ref 0–13.4)
NEUTROPHILS # BLD AUTO: 2.98 K/UL (ref 1.82–7.42)
NEUTROPHILS NFR BLD: 63.2 % (ref 44–72)
NRBC # BLD AUTO: 0 K/UL
NRBC BLD-RTO: 0 /100 WBC (ref 0–0.2)
NT-PROBNP SERPL IA-MCNC: 99 PG/ML (ref 0–125)
PLATELET # BLD AUTO: 192 K/UL (ref 164–446)
PMV BLD AUTO: 9.6 FL (ref 9–12.9)
POTASSIUM SERPL-SCNC: 4.5 MMOL/L (ref 3.6–5.5)
PROT SERPL-MCNC: 6.5 G/DL (ref 6–8.2)
PSA SERPL DL<=0.01 NG/ML-MCNC: 3.89 NG/ML (ref 0–4)
RBC # BLD AUTO: 4.68 M/UL (ref 4.7–6.1)
SODIUM SERPL-SCNC: 139 MMOL/L (ref 135–145)
TSH SERPL DL<=0.005 MIU/L-ACNC: 2.18 UIU/ML (ref 0.38–5.33)
WBC # BLD AUTO: 4.7 K/UL (ref 4.8–10.8)

## 2025-06-17 PROCEDURE — 84443 ASSAY THYROID STIM HORMONE: CPT

## 2025-06-17 PROCEDURE — 83880 ASSAY OF NATRIURETIC PEPTIDE: CPT

## 2025-06-17 PROCEDURE — 84153 ASSAY OF PSA TOTAL: CPT

## 2025-06-17 PROCEDURE — 36415 COLL VENOUS BLD VENIPUNCTURE: CPT

## 2025-06-17 PROCEDURE — 85025 COMPLETE CBC W/AUTO DIFF WBC: CPT

## 2025-06-17 PROCEDURE — 80053 COMPREHEN METABOLIC PANEL: CPT

## 2025-07-03 NOTE — Clinical Note
[facility]  [address] [address] [place] [address]    [id number]    Kamran Rodriguez  [address] [place] [address]    July 3, 2025    Member Name: Nick Rodriguez   Member Number: [id number]   Reference Number: [id number]   Approved Services: Echos and EKG   Approved Service Dates: 06/13/2025 - 10/31/2025   Requesting Provider: Eva Rand   Requested Provider: [facility]     Dear Nick Rollins Michael:    The following medical service(s) requested by Eva Rand have been approved:    Procedure Code Procedure Code Name Requested Quantity Approved Quantity Status   83705 (CPT®) ID ECHO HEART XTHORACIC,COMPLETE W DOPPLER 1 1 Authorized       Approved Quantity means the number of visits approved for medication treatments and/or medical services.    The services should be provided by [facility] no later than 10/31/2025. Please contact the provider listed below with any questions.     Provider Information:  [facility]  [phone]    Your plan benefit may require a deductible, co-payment or coinsurance for these services. This authorization does not guarantee [facility] will pay the claim for services that you receive. Payment by [facility] for these services is subject to the terms of your Evidence of Coverage, your eligibility at the time of service, and confirmation of benefit coverage.    For any questions or additional information, please contact Customer Service:    [facility] Toll Free: [phone]  TTY users dial: [phone]   Call Center Hours:  Oct 1 - Mar 31, Mon - Fri 7 AM to 8 PM PST  Oct 1 - Mar 31, Sat - Sun 8 AM to 8 PM PST  Apr 1 - Sep 30, Mon - Fri 7 AM to 8 PM PST   Office Hours: Mon - Fri 8 AM to 5 PM PST   E-mail: [email or web address]   Website:  [email or web address]      This information is available for free in other languages. Please contact Customer  Service at the phone number above for more information. [facility] complies with applicable Federal civil rights laws and does not discriminate on the basis of race, color, national origin, age, disability or sex.    Sincerely,     Healthcare Utilization Management Department     Cc: [facility]   Eva Rand    Multi-Language Insert  Multi- Services  English: We have free  services to answer any questions you may have about our health or drug plan.  To get an , just call us at [phone].  Someone who speaks English/Language can help you.  This is a free service.  Czech: Tenemos servicios de intérprete sin costo alguno  para responder cualquier pregunta que pueda tener sobre nuestro plan de bre o medicamentos. Para hablar con un intérprete, por favor llame al [phone]. Alguien que hable español le podrá ayudar. Elayne es un servicio gratuito.  Chinese Mandarin: ?????????????????????????????? ???????????????? [phone]????????????????? ?????????  Chinese Cantonese: ?????????????????????????????? ????????????? [phone]???????????????????? ????????  Tagalog:  Natan monzon serbisyo sa alexandersasalamarilys-berta benitoumang a bhupendra stinson hinggil sa dayami carsonong pangtawandalusufannie o panggamot.  charly Cervantes  [phone]. Maaari kayonisamar Solis.  Zia cho.  Ukrainian:  Nous proposons negro services gratuits d'interprétation pour répondre à toutes shy questions relatives à notre régime de santé ou d'assurance-médicaments. Pour accéder au service d'interprétation, il vous suffit de nous appeler au [phone]. Un interlocuteur parWatertown Regional Medical Centerbettie Françs pourra vous aider. Ce service est gratuit.  Czech:  Vivian tôi có d?ch v? thông d?ch mi?n phí ð? tr? l?i các câu h?i v? chýõng s?c kh?e và chýõng trìn thu?c men. N?u quí v?  c?n thông d?ch viên bridgette g?i [phone] s? có nhân viên nói ti?ng Vi?t giúp ð? quí v?. Ðây là d?ch v? mi?n phí .  Costa Rican:  Unser kostenser Dolmetscherservice beantwortet Ihren Fragen zu unserem Gesundheits- und Arzneimittelplan. Unsere Dolmetscher erreichen Sie [phone]. Man wird Ihnen rock auf [facility]. Dieser Service ist [facility].  Estonian:  ??? ?? ?? ?? ?? ??? ?? ??? ?? ???? ?? ?? ???? ???? ????. ?? ???? ????? ?? [phone] ??? ??? ????.  ???? ?? ???? ?? ?? ????. ? ???? ??? ?????.   Congolese: Åñëè ó âàñ âîçíèêíóò âîïðîñû îòíîñèòåëüíî ñòðàõîâîãî èëè ìåäèêàìåíòíîãî ïëàíà, âû ìîæåòå âîñïîëüçîâàòüñÿ íàøèìè áåñïëàòíûìè óñëóãàìè ïåðåâîä÷èêîâ. ×òîáû âîñïîëüçîâàòüñÿ óñëóãàìè ïåðåâîä÷èêà, ïîçâîíèòå íàì ïî òåëåôîíó [phone]. Âàì îêàæåò ïîìîùü ñîòðóäíèê, êîòîðûé ãîâîðèò ïî-póññêè. Äàííàÿ óñëóãà áåñïëàòíàÿ.  Bulgarian: ÅääÇ äÞÏã ÎÏãÇÊ ÇáãÊÑÌã ÇáÝæÑí ÇáãÌÇäíÉ ááÅÌÇÈÉ Úä Ãí ÃÓÆáÉ ÊÊÚáÞ ÈÇáÕÍÉ Ãæ ÌÏæá ÇáÃÏæíÉ áÏíäÇ. ááÍÕæá Úáì ãÊÑÌã ÝæÑí¡ áíÓ Úáíß Óæì ÇáÇÊÕÇá ÈäÇ Úáì [phone] . ÓíÞæã ÔÎÕ ãÇ íÊÍÏË ÇáÚÑÈíÉ ÈãÓÇÚÏÊß. åÐå ÎÏãÉ ãÌÇäíÉ.  Javier: ????? ????????? ?? ??? ?? ????? ?? ???? ??? ???? ???? ?? ?????? ?? ???? ???? ?? ??? ????? ??? ????? ???????? ?????? ?????? ???. ?? ???????? ??????? ???? ?? ???, ?? ???? [phone] ?? ??? ????. ??? ??????? ?? ?????? ????? ?? ???? ??? ?? ???? ??. ?? ?? ????? ???? ??.   Hebrew:  È disponibile un servizio di interpretariato gratuito per rispondere a eventuali domande sul nostro piano sanitario e farmaceutico. Per un interprete, contattare il mable [phone]. Un nostro incaricato nilson parla Italianovi fornirà l'assistenza necessaria. È un servizio gratuito.  Portugués:  Dispomos de serviços de interpretação gratuitos para responder a qualquer questão que tenha acerca do nosso plano de saúde ou de medicação. Para obter um intérprete, contacte-nos através do número [phone]. Irá encontrar alguém que fale o idioma  Português para o ajudar. Elayne  serviço é gratuito.  Estonian Creole:  Nou genyen sèvis entèprèt gratis elia reponn tout kesyon ou ta genyen konsènan plan medikal oswa dwòg nou an.  Elia jwenn yon entèprèt, jis rele nou nan [phone]. Yon moun ki pale Kreyòl kapab kevin w.  Sa a se yon sèvis ki gratis.  Polish:  Umo¿liwiamy bezp³atne skorzystanie z us³ug t³umacza ustnego, który pomo¿e w uzyskaniu odpowiedzi na temat planu zdrowotnego lub dawjuventino sifuentes. Celina skorzystaæ z pomocy t³umacza znaj¹cego shaji pickens¿y zadzwoniæ pod numer [phone]. Ta us³uga jest bezp³atna.  South Korean: ????? ??????? ????????????????????? ??????????????????????????????????[phone] ???????????????? ? ????????????????? ?????

## 2025-07-15 ENCOUNTER — APPOINTMENT (OUTPATIENT)
Dept: CARDIOLOGY | Facility: MEDICAL CENTER | Age: 70
End: 2025-07-15
Attending: FAMILY MEDICINE
Payer: MEDICARE

## 2025-08-11 ENCOUNTER — APPOINTMENT (OUTPATIENT)
Dept: NEUROLOGY | Facility: MEDICAL CENTER | Age: 70
End: 2025-08-11
Attending: PSYCHIATRY & NEUROLOGY
Payer: MEDICARE

## 2025-08-12 PROBLEM — L30.9 ECZEMA: Status: ACTIVE | Noted: 2025-08-12

## 2025-08-12 PROBLEM — R63.4 WEIGHT LOSS: Status: ACTIVE | Noted: 2025-08-12

## 2025-08-12 PROBLEM — M54.9 BACK PAIN: Status: ACTIVE | Noted: 2025-08-12

## 2025-08-12 PROBLEM — F03.94 DEMENTIA WITH ANXIETY (HCC): Status: ACTIVE | Noted: 2025-08-12

## 2025-08-12 PROBLEM — Z76.89 ENCOUNTER TO ESTABLISH CARE WITH NEW PROVIDER: Status: ACTIVE | Noted: 2025-08-12

## 2025-08-14 ENCOUNTER — APPOINTMENT (OUTPATIENT)
Facility: MEDICAL CENTER | Age: 70
End: 2025-08-14
Attending: FAMILY MEDICINE
Payer: MEDICARE

## 2025-09-11 ENCOUNTER — APPOINTMENT (OUTPATIENT)
Dept: MEDICAL GROUP | Facility: IMAGING CENTER | Age: 70
End: 2025-09-11
Payer: MEDICARE